# Patient Record
Sex: MALE | Race: WHITE | NOT HISPANIC OR LATINO | Employment: OTHER | URBAN - NONMETROPOLITAN AREA
[De-identification: names, ages, dates, MRNs, and addresses within clinical notes are randomized per-mention and may not be internally consistent; named-entity substitution may affect disease eponyms.]

---

## 2017-01-02 ENCOUNTER — OFFICE VISIT (OUTPATIENT)
Dept: CARDIAC REHAB | Facility: HOSPITAL | Age: 52
End: 2017-01-02
Payer: COMMERCIAL

## 2017-01-02 DIAGNOSIS — Z95.2 S/P TAVR (TRANSCATHETER AORTIC VALVE REPLACEMENT): ICD-10-CM

## 2017-01-02 PROCEDURE — 93798 PHYS/QHP OP CAR RHAB W/ECG: CPT

## 2017-01-04 ENCOUNTER — OFFICE VISIT (OUTPATIENT)
Dept: CARDIAC REHAB | Facility: HOSPITAL | Age: 52
End: 2017-01-04
Payer: COMMERCIAL

## 2017-01-04 DIAGNOSIS — Z95.2 S/P AVR: ICD-10-CM

## 2017-01-04 PROCEDURE — 93798 PHYS/QHP OP CAR RHAB W/ECG: CPT

## 2017-01-06 ENCOUNTER — OFFICE VISIT (OUTPATIENT)
Dept: CARDIAC REHAB | Facility: HOSPITAL | Age: 52
End: 2017-01-06
Payer: COMMERCIAL

## 2017-01-06 DIAGNOSIS — Z95.2 S/P AVR: ICD-10-CM

## 2017-01-06 PROCEDURE — 93798 PHYS/QHP OP CAR RHAB W/ECG: CPT

## 2017-01-09 ENCOUNTER — OFFICE VISIT (OUTPATIENT)
Dept: CARDIAC REHAB | Facility: HOSPITAL | Age: 52
End: 2017-01-09
Payer: COMMERCIAL

## 2017-01-09 DIAGNOSIS — Z95.2 S/P AVR (AORTIC VALVE REPLACEMENT): ICD-10-CM

## 2017-01-09 PROCEDURE — 93798 PHYS/QHP OP CAR RHAB W/ECG: CPT

## 2017-01-11 ENCOUNTER — GENERIC CONVERSION - ENCOUNTER (OUTPATIENT)
Dept: OTHER | Facility: OTHER | Age: 52
End: 2017-01-11

## 2017-01-11 ENCOUNTER — OFFICE VISIT (OUTPATIENT)
Dept: CARDIAC REHAB | Facility: HOSPITAL | Age: 52
End: 2017-01-11
Payer: COMMERCIAL

## 2017-01-11 DIAGNOSIS — Z95.2 S/P AVR (AORTIC VALVE REPLACEMENT): ICD-10-CM

## 2017-01-11 PROCEDURE — 93798 PHYS/QHP OP CAR RHAB W/ECG: CPT

## 2017-01-13 ENCOUNTER — APPOINTMENT (OUTPATIENT)
Dept: CARDIAC REHAB | Facility: HOSPITAL | Age: 52
End: 2017-01-13
Payer: COMMERCIAL

## 2017-01-14 DIAGNOSIS — I48.91 ATRIAL FIBRILLATION (HCC): ICD-10-CM

## 2017-01-16 ENCOUNTER — APPOINTMENT (OUTPATIENT)
Dept: CARDIAC REHAB | Facility: HOSPITAL | Age: 52
End: 2017-01-16
Payer: COMMERCIAL

## 2017-01-16 ENCOUNTER — GENERIC CONVERSION - ENCOUNTER (OUTPATIENT)
Dept: OTHER | Facility: OTHER | Age: 52
End: 2017-01-16

## 2017-01-16 ENCOUNTER — APPOINTMENT (OUTPATIENT)
Dept: LAB | Facility: HOSPITAL | Age: 52
End: 2017-01-16
Payer: COMMERCIAL

## 2017-01-16 ENCOUNTER — TRANSCRIBE ORDERS (OUTPATIENT)
Dept: ADMISSIONS | Facility: HOSPITAL | Age: 52
End: 2017-01-16

## 2017-01-16 DIAGNOSIS — I48.91 ATRIAL FIBRILLATION (HCC): ICD-10-CM

## 2017-01-16 LAB
INR PPP: 2.83 (ref 0.86–1.16)
PROTHROMBIN TIME: 28.4 SECONDS (ref 12–14.3)

## 2017-01-16 PROCEDURE — 36415 COLL VENOUS BLD VENIPUNCTURE: CPT

## 2017-01-16 PROCEDURE — 85610 PROTHROMBIN TIME: CPT

## 2017-01-18 ENCOUNTER — OFFICE VISIT (OUTPATIENT)
Dept: CARDIAC REHAB | Facility: HOSPITAL | Age: 52
End: 2017-01-18
Payer: COMMERCIAL

## 2017-01-18 DIAGNOSIS — Z95.2 S/P AVR (AORTIC VALVE REPLACEMENT): ICD-10-CM

## 2017-01-18 PROCEDURE — 93798 PHYS/QHP OP CAR RHAB W/ECG: CPT

## 2017-01-20 ENCOUNTER — OFFICE VISIT (OUTPATIENT)
Dept: CARDIAC REHAB | Facility: HOSPITAL | Age: 52
End: 2017-01-20
Payer: COMMERCIAL

## 2017-01-20 DIAGNOSIS — Z95.2 S/P AVR (AORTIC VALVE REPLACEMENT): ICD-10-CM

## 2017-01-20 PROCEDURE — 93798 PHYS/QHP OP CAR RHAB W/ECG: CPT

## 2017-01-23 ENCOUNTER — OFFICE VISIT (OUTPATIENT)
Dept: CARDIAC REHAB | Facility: HOSPITAL | Age: 52
End: 2017-01-23
Payer: COMMERCIAL

## 2017-01-23 DIAGNOSIS — Z95.2 S/P AVR: ICD-10-CM

## 2017-01-23 PROCEDURE — 93798 PHYS/QHP OP CAR RHAB W/ECG: CPT

## 2017-01-25 ENCOUNTER — OFFICE VISIT (OUTPATIENT)
Dept: CARDIAC REHAB | Facility: HOSPITAL | Age: 52
End: 2017-01-25
Payer: COMMERCIAL

## 2017-01-25 DIAGNOSIS — Z95.2 S/P AVR: ICD-10-CM

## 2017-01-25 PROCEDURE — 93798 PHYS/QHP OP CAR RHAB W/ECG: CPT

## 2017-01-27 ENCOUNTER — OFFICE VISIT (OUTPATIENT)
Dept: CARDIAC REHAB | Facility: HOSPITAL | Age: 52
End: 2017-01-27
Payer: COMMERCIAL

## 2017-01-27 DIAGNOSIS — Z95.2 S/P AVR: ICD-10-CM

## 2017-01-27 PROCEDURE — 93798 PHYS/QHP OP CAR RHAB W/ECG: CPT

## 2017-01-30 ENCOUNTER — APPOINTMENT (OUTPATIENT)
Dept: LAB | Facility: HOSPITAL | Age: 52
End: 2017-01-30
Payer: COMMERCIAL

## 2017-01-30 ENCOUNTER — GENERIC CONVERSION - ENCOUNTER (OUTPATIENT)
Dept: OTHER | Facility: OTHER | Age: 52
End: 2017-01-30

## 2017-01-30 ENCOUNTER — TRANSCRIBE ORDERS (OUTPATIENT)
Dept: ADMINISTRATIVE | Facility: HOSPITAL | Age: 52
End: 2017-01-30

## 2017-01-30 ENCOUNTER — OFFICE VISIT (OUTPATIENT)
Dept: CARDIAC REHAB | Facility: HOSPITAL | Age: 52
End: 2017-01-30
Payer: COMMERCIAL

## 2017-01-30 DIAGNOSIS — Z95.2 S/P AVR (AORTIC VALVE REPLACEMENT): ICD-10-CM

## 2017-01-30 DIAGNOSIS — I48.91 ATRIAL FIBRILLATION, UNSPECIFIED TYPE (HCC): Primary | ICD-10-CM

## 2017-01-30 DIAGNOSIS — I48.91 ATRIAL FIBRILLATION, UNSPECIFIED TYPE (HCC): ICD-10-CM

## 2017-01-30 LAB
INR PPP: 2.78 (ref 0.86–1.16)
PROTHROMBIN TIME: 28 SECONDS (ref 12–14.3)

## 2017-01-30 PROCEDURE — 36415 COLL VENOUS BLD VENIPUNCTURE: CPT

## 2017-01-30 PROCEDURE — 85610 PROTHROMBIN TIME: CPT

## 2017-01-30 PROCEDURE — 93798 PHYS/QHP OP CAR RHAB W/ECG: CPT

## 2017-02-01 ENCOUNTER — OFFICE VISIT (OUTPATIENT)
Dept: CARDIAC REHAB | Facility: HOSPITAL | Age: 52
End: 2017-02-01
Payer: COMMERCIAL

## 2017-02-01 DIAGNOSIS — Z95.2 S/P AVR: ICD-10-CM

## 2017-02-01 PROCEDURE — 93798 PHYS/QHP OP CAR RHAB W/ECG: CPT

## 2017-02-03 ENCOUNTER — OFFICE VISIT (OUTPATIENT)
Dept: CARDIAC REHAB | Facility: HOSPITAL | Age: 52
End: 2017-02-03
Payer: COMMERCIAL

## 2017-02-03 DIAGNOSIS — Z95.2 S/P AVR: ICD-10-CM

## 2017-02-03 PROCEDURE — 93798 PHYS/QHP OP CAR RHAB W/ECG: CPT

## 2017-02-06 ENCOUNTER — APPOINTMENT (OUTPATIENT)
Dept: CARDIAC REHAB | Facility: HOSPITAL | Age: 52
End: 2017-02-06
Payer: COMMERCIAL

## 2017-02-08 ENCOUNTER — APPOINTMENT (OUTPATIENT)
Dept: CARDIAC REHAB | Facility: HOSPITAL | Age: 52
End: 2017-02-08
Payer: COMMERCIAL

## 2017-02-10 ENCOUNTER — APPOINTMENT (OUTPATIENT)
Dept: CARDIAC REHAB | Facility: HOSPITAL | Age: 52
End: 2017-02-10
Payer: COMMERCIAL

## 2017-02-13 ENCOUNTER — APPOINTMENT (OUTPATIENT)
Dept: CARDIAC REHAB | Facility: HOSPITAL | Age: 52
End: 2017-02-13
Payer: COMMERCIAL

## 2017-02-14 ENCOUNTER — ALLSCRIPTS OFFICE VISIT (OUTPATIENT)
Dept: OTHER | Facility: OTHER | Age: 52
End: 2017-02-14

## 2017-02-14 DIAGNOSIS — E11.65 TYPE 2 DIABETES MELLITUS WITH HYPERGLYCEMIA (HCC): ICD-10-CM

## 2017-02-14 DIAGNOSIS — I10 ESSENTIAL (PRIMARY) HYPERTENSION: ICD-10-CM

## 2017-02-14 DIAGNOSIS — E78.5 HYPERLIPIDEMIA: ICD-10-CM

## 2017-02-14 DIAGNOSIS — I48.91 ATRIAL FIBRILLATION (HCC): ICD-10-CM

## 2017-02-14 DIAGNOSIS — J44.9 CHRONIC OBSTRUCTIVE PULMONARY DISEASE (HCC): ICD-10-CM

## 2017-02-15 ENCOUNTER — APPOINTMENT (OUTPATIENT)
Dept: CARDIAC REHAB | Facility: HOSPITAL | Age: 52
End: 2017-02-15
Payer: COMMERCIAL

## 2017-02-17 ENCOUNTER — APPOINTMENT (OUTPATIENT)
Dept: CARDIAC REHAB | Facility: HOSPITAL | Age: 52
End: 2017-02-17
Payer: COMMERCIAL

## 2017-02-20 ENCOUNTER — APPOINTMENT (OUTPATIENT)
Dept: CARDIAC REHAB | Facility: HOSPITAL | Age: 52
End: 2017-02-20
Payer: COMMERCIAL

## 2017-02-22 ENCOUNTER — APPOINTMENT (OUTPATIENT)
Dept: CARDIAC REHAB | Facility: HOSPITAL | Age: 52
End: 2017-02-22
Payer: COMMERCIAL

## 2017-02-24 ENCOUNTER — APPOINTMENT (OUTPATIENT)
Dept: LAB | Facility: HOSPITAL | Age: 52
End: 2017-02-24
Payer: MEDICARE

## 2017-02-24 ENCOUNTER — TRANSCRIBE ORDERS (OUTPATIENT)
Dept: ADMINISTRATIVE | Facility: HOSPITAL | Age: 52
End: 2017-02-24

## 2017-02-24 ENCOUNTER — APPOINTMENT (OUTPATIENT)
Dept: CARDIAC REHAB | Facility: HOSPITAL | Age: 52
End: 2017-02-24
Payer: COMMERCIAL

## 2017-02-24 ENCOUNTER — GENERIC CONVERSION - ENCOUNTER (OUTPATIENT)
Dept: OTHER | Facility: OTHER | Age: 52
End: 2017-02-24

## 2017-02-24 ENCOUNTER — ALLSCRIPTS OFFICE VISIT (OUTPATIENT)
Dept: OTHER | Facility: OTHER | Age: 52
End: 2017-02-24

## 2017-02-24 DIAGNOSIS — I48.91 ATRIAL FIBRILLATION (HCC): ICD-10-CM

## 2017-02-24 LAB
INR PPP: 2.41 (ref 0.86–1.16)
PROTHROMBIN TIME: 25.1 SECONDS (ref 12–14.3)

## 2017-02-24 PROCEDURE — 85610 PROTHROMBIN TIME: CPT

## 2017-02-24 PROCEDURE — 36415 COLL VENOUS BLD VENIPUNCTURE: CPT

## 2017-02-27 ENCOUNTER — APPOINTMENT (OUTPATIENT)
Dept: CARDIAC REHAB | Facility: HOSPITAL | Age: 52
End: 2017-02-27
Payer: COMMERCIAL

## 2017-03-17 ENCOUNTER — GENERIC CONVERSION - ENCOUNTER (OUTPATIENT)
Dept: OTHER | Facility: OTHER | Age: 52
End: 2017-03-17

## 2017-03-17 ENCOUNTER — TRANSCRIBE ORDERS (OUTPATIENT)
Dept: ADMINISTRATIVE | Facility: HOSPITAL | Age: 52
End: 2017-03-17

## 2017-03-17 ENCOUNTER — APPOINTMENT (OUTPATIENT)
Dept: LAB | Facility: HOSPITAL | Age: 52
End: 2017-03-17
Payer: MEDICARE

## 2017-03-17 DIAGNOSIS — I48.91 ATRIAL FIBRILLATION (HCC): ICD-10-CM

## 2017-03-17 LAB
INR PPP: 2.94 (ref 0.86–1.16)
PROTHROMBIN TIME: 29.2 SECONDS (ref 12–14.3)

## 2017-03-17 PROCEDURE — 85610 PROTHROMBIN TIME: CPT

## 2017-03-17 PROCEDURE — 36415 COLL VENOUS BLD VENIPUNCTURE: CPT

## 2017-04-14 DIAGNOSIS — I48.91 ATRIAL FIBRILLATION (HCC): ICD-10-CM

## 2017-04-18 ENCOUNTER — APPOINTMENT (OUTPATIENT)
Dept: LAB | Facility: HOSPITAL | Age: 52
End: 2017-04-18
Payer: MEDICARE

## 2017-04-18 ENCOUNTER — TRANSCRIBE ORDERS (OUTPATIENT)
Dept: ADMINISTRATIVE | Facility: HOSPITAL | Age: 52
End: 2017-04-18

## 2017-04-18 ENCOUNTER — GENERIC CONVERSION - ENCOUNTER (OUTPATIENT)
Dept: OTHER | Facility: OTHER | Age: 52
End: 2017-04-18

## 2017-04-18 DIAGNOSIS — I48.91 ATRIAL FIBRILLATION (HCC): ICD-10-CM

## 2017-04-18 LAB
INR PPP: 2.74 (ref 0.86–1.16)
PROTHROMBIN TIME: 27.7 SECONDS (ref 12–14.3)

## 2017-04-18 PROCEDURE — 36415 COLL VENOUS BLD VENIPUNCTURE: CPT

## 2017-04-18 PROCEDURE — 85610 PROTHROMBIN TIME: CPT

## 2017-04-19 ENCOUNTER — GENERIC CONVERSION - ENCOUNTER (OUTPATIENT)
Dept: OTHER | Facility: OTHER | Age: 52
End: 2017-04-19

## 2017-05-18 ENCOUNTER — APPOINTMENT (OUTPATIENT)
Dept: LAB | Facility: HOSPITAL | Age: 52
End: 2017-05-18
Payer: MEDICARE

## 2017-05-18 ENCOUNTER — GENERIC CONVERSION - ENCOUNTER (OUTPATIENT)
Dept: OTHER | Facility: OTHER | Age: 52
End: 2017-05-18

## 2017-05-18 ENCOUNTER — TRANSCRIBE ORDERS (OUTPATIENT)
Dept: ADMINISTRATIVE | Facility: HOSPITAL | Age: 52
End: 2017-05-18

## 2017-05-18 ENCOUNTER — HOSPITAL ENCOUNTER (OUTPATIENT)
Dept: SLEEP CENTER | Facility: HOSPITAL | Age: 52
Discharge: HOME | End: 2017-05-18
Payer: MEDICARE

## 2017-05-18 DIAGNOSIS — I48.91 ATRIAL FIBRILLATION (HCC): ICD-10-CM

## 2017-05-18 LAB
INR PPP: 2.39 (ref 0.86–1.16)
PROTHROMBIN TIME: 26.2 SECONDS (ref 12.1–14.4)

## 2017-05-18 PROCEDURE — 85610 PROTHROMBIN TIME: CPT

## 2017-05-18 PROCEDURE — 36415 COLL VENOUS BLD VENIPUNCTURE: CPT

## 2017-05-31 ENCOUNTER — ALLSCRIPTS OFFICE VISIT (OUTPATIENT)
Dept: OTHER | Facility: OTHER | Age: 52
End: 2017-05-31

## 2017-06-13 ENCOUNTER — ALLSCRIPTS OFFICE VISIT (OUTPATIENT)
Dept: OTHER | Facility: OTHER | Age: 52
End: 2017-06-13

## 2017-06-14 DIAGNOSIS — I48.91 ATRIAL FIBRILLATION (HCC): ICD-10-CM

## 2017-06-22 ENCOUNTER — APPOINTMENT (OUTPATIENT)
Dept: LAB | Facility: HOSPITAL | Age: 52
End: 2017-06-22
Payer: MEDICARE

## 2017-06-22 ENCOUNTER — TRANSCRIBE ORDERS (OUTPATIENT)
Dept: ADMINISTRATIVE | Facility: HOSPITAL | Age: 52
End: 2017-06-22

## 2017-06-22 ENCOUNTER — GENERIC CONVERSION - ENCOUNTER (OUTPATIENT)
Dept: OTHER | Facility: OTHER | Age: 52
End: 2017-06-22

## 2017-06-22 DIAGNOSIS — I48.91 ATRIAL FIBRILLATION, UNSPECIFIED TYPE (HCC): Primary | ICD-10-CM

## 2017-06-22 DIAGNOSIS — I10 ESSENTIAL HYPERTENSION, MALIGNANT: ICD-10-CM

## 2017-06-22 DIAGNOSIS — J44.9 OBSTRUCTIVE CHRONIC BRONCHITIS WITHOUT EXACERBATION (HCC): ICD-10-CM

## 2017-06-22 DIAGNOSIS — I48.91 ATRIAL FIBRILLATION, UNSPECIFIED TYPE (HCC): ICD-10-CM

## 2017-06-22 DIAGNOSIS — E78.5 HYPERLIPIDEMIA, UNSPECIFIED HYPERLIPIDEMIA TYPE: ICD-10-CM

## 2017-06-22 LAB
CHOLEST SERPL-MCNC: 142 MG/DL (ref 50–200)
EST. AVERAGE GLUCOSE BLD GHB EST-MCNC: 186 MG/DL
HBA1C MFR BLD: 8.1 % (ref 4.2–6.3)
HDLC SERPL-MCNC: 28 MG/DL (ref 40–60)
INR PPP: 2.35 (ref 0.86–1.16)
LDLC SERPL CALC-MCNC: 55 MG/DL (ref 0–100)
PROTHROMBIN TIME: 25.8 SECONDS (ref 12.1–14.4)
TRIGL SERPL-MCNC: 296 MG/DL

## 2017-06-22 PROCEDURE — 80061 LIPID PANEL: CPT

## 2017-06-22 PROCEDURE — 83036 HEMOGLOBIN GLYCOSYLATED A1C: CPT

## 2017-06-22 PROCEDURE — 85610 PROTHROMBIN TIME: CPT

## 2017-06-22 PROCEDURE — 36415 COLL VENOUS BLD VENIPUNCTURE: CPT

## 2017-06-23 ENCOUNTER — GENERIC CONVERSION - ENCOUNTER (OUTPATIENT)
Dept: OTHER | Facility: OTHER | Age: 52
End: 2017-06-23

## 2017-07-31 ENCOUNTER — APPOINTMENT (OUTPATIENT)
Dept: LAB | Facility: HOSPITAL | Age: 52
End: 2017-07-31
Payer: MEDICARE

## 2017-07-31 ENCOUNTER — TRANSCRIBE ORDERS (OUTPATIENT)
Dept: ADMINISTRATIVE | Facility: HOSPITAL | Age: 52
End: 2017-07-31

## 2017-07-31 ENCOUNTER — GENERIC CONVERSION - ENCOUNTER (OUTPATIENT)
Dept: OTHER | Facility: OTHER | Age: 52
End: 2017-07-31

## 2017-07-31 DIAGNOSIS — I48.91 ATRIAL FIBRILLATION, UNSPECIFIED TYPE (HCC): Primary | ICD-10-CM

## 2017-07-31 DIAGNOSIS — I48.91 ATRIAL FIBRILLATION, UNSPECIFIED TYPE (HCC): ICD-10-CM

## 2017-07-31 LAB
INR PPP: 2.72 (ref 0.86–1.16)
PROTHROMBIN TIME: 29 SECONDS (ref 12.1–14.4)

## 2017-07-31 PROCEDURE — 36415 COLL VENOUS BLD VENIPUNCTURE: CPT

## 2017-07-31 PROCEDURE — 85610 PROTHROMBIN TIME: CPT

## 2017-08-14 DIAGNOSIS — I48.91 ATRIAL FIBRILLATION (HCC): ICD-10-CM

## 2017-08-21 ENCOUNTER — TRANSCRIBE ORDERS (OUTPATIENT)
Dept: ADMINISTRATIVE | Facility: HOSPITAL | Age: 52
End: 2017-08-21

## 2017-08-21 ENCOUNTER — GENERIC CONVERSION - ENCOUNTER (OUTPATIENT)
Dept: OTHER | Facility: OTHER | Age: 52
End: 2017-08-21

## 2017-08-21 ENCOUNTER — APPOINTMENT (OUTPATIENT)
Dept: LAB | Facility: HOSPITAL | Age: 52
End: 2017-08-21
Payer: MEDICARE

## 2017-08-21 DIAGNOSIS — I48.91 ATRIAL FIBRILLATION, UNSPECIFIED TYPE (HCC): ICD-10-CM

## 2017-08-21 DIAGNOSIS — I48.91 ATRIAL FIBRILLATION, UNSPECIFIED TYPE (HCC): Primary | ICD-10-CM

## 2017-08-21 LAB
INR PPP: 2.57 (ref 0.86–1.16)
PROTHROMBIN TIME: 27.7 SECONDS (ref 12.1–14.4)

## 2017-08-21 PROCEDURE — 36415 COLL VENOUS BLD VENIPUNCTURE: CPT

## 2017-08-21 PROCEDURE — 85610 PROTHROMBIN TIME: CPT

## 2017-09-18 ENCOUNTER — APPOINTMENT (OUTPATIENT)
Dept: LAB | Facility: HOSPITAL | Age: 52
End: 2017-09-18
Payer: MEDICARE

## 2017-09-18 ENCOUNTER — TRANSCRIBE ORDERS (OUTPATIENT)
Dept: ADMINISTRATIVE | Facility: HOSPITAL | Age: 52
End: 2017-09-18

## 2017-09-18 DIAGNOSIS — Z01.818 PREOP EXAMINATION: Primary | ICD-10-CM

## 2017-09-18 DIAGNOSIS — Z01.818 PREOP EXAMINATION: ICD-10-CM

## 2017-09-18 LAB
INR PPP: 2.29 (ref 0.86–1.16)
PROTHROMBIN TIME: 25.3 SECONDS (ref 12.1–14.4)

## 2017-09-18 PROCEDURE — 36415 COLL VENOUS BLD VENIPUNCTURE: CPT

## 2017-09-18 PROCEDURE — 85610 PROTHROMBIN TIME: CPT

## 2017-09-21 DIAGNOSIS — I48.91 ATRIAL FIBRILLATION (HCC): ICD-10-CM

## 2017-10-16 ENCOUNTER — TRANSCRIBE ORDERS (OUTPATIENT)
Dept: SLEEP CENTER | Facility: HOSPITAL | Age: 52
End: 2017-10-16

## 2017-10-16 DIAGNOSIS — G47.33 OBSTRUCTIVE SLEEP APNEA SYNDROME: Primary | ICD-10-CM

## 2017-10-19 ENCOUNTER — TRANSCRIBE ORDERS (OUTPATIENT)
Dept: SLEEP CENTER | Facility: HOSPITAL | Age: 52
End: 2017-10-19

## 2017-10-19 ENCOUNTER — APPOINTMENT (OUTPATIENT)
Dept: LAB | Facility: HOSPITAL | Age: 52
End: 2017-10-19
Payer: MEDICARE

## 2017-10-19 ENCOUNTER — GENERIC CONVERSION - ENCOUNTER (OUTPATIENT)
Dept: OTHER | Facility: OTHER | Age: 52
End: 2017-10-19

## 2017-10-19 ENCOUNTER — HOSPITAL ENCOUNTER (OUTPATIENT)
Dept: SLEEP CENTER | Facility: HOSPITAL | Age: 52
Discharge: HOME | End: 2017-10-19
Payer: MEDICARE

## 2017-10-19 ENCOUNTER — TRANSCRIBE ORDERS (OUTPATIENT)
Dept: ADMINISTRATIVE | Facility: HOSPITAL | Age: 52
End: 2017-10-19

## 2017-10-19 DIAGNOSIS — G47.33 OBSTRUCTIVE SLEEP APNEA: Primary | ICD-10-CM

## 2017-10-19 DIAGNOSIS — I48.91 ATRIAL FIBRILLATION (HCC): ICD-10-CM

## 2017-10-19 DIAGNOSIS — G47.33 OBSTRUCTIVE SLEEP APNEA SYNDROME: ICD-10-CM

## 2017-10-19 LAB
INR PPP: 2.56 (ref 0.86–1.16)
PROTHROMBIN TIME: 27.6 SECONDS (ref 12.1–14.4)

## 2017-10-19 PROCEDURE — 85610 PROTHROMBIN TIME: CPT

## 2017-10-19 PROCEDURE — 36415 COLL VENOUS BLD VENIPUNCTURE: CPT

## 2017-10-19 NOTE — PROGRESS NOTES
Follow-up Note - [address]  45 y o  male  :1965  CWT:[id number]    Physician Requesting Consult: Asmita Croft DO    I saw Rashel Preciado in sleep clinic today for his sleep disordered breathing, coexisting sleep complaints & medical conditions  He is here today because he has machine broke and he got alone a about a week ago  He has DME provider states he is new to Medicare and needs repeat studies to justify ongoing use of CPAP and providing him with a new machine  A diagnostic study in  demonstrated an AHI of 29 per hour, considerably higher during stage REM  Minimum oxygen saturation was 83% and almost 12% of that study was spent with saturations less than 90%  There were also severe periodic limb movements of sleep  During his last titration study in 2016, he is sleep disordered breathing and periodic limb movements of sleep were remediated with bilevel pressure at 21/14 cm H2O  Past, Family, Social History & ROS were reviewed  Interval changes in PMH and medications were noted  He reports approximately 50 lb intentional weight loss in the past 5 months  Herewith findings in summary  Full Details are available on request     He is using CPAP regularly for and is benefitting from use  He has no difficulty tolerating the musk with the pressure  He is getting 6-8 hours sleep  He reports restless leg symptoms 1 to 2 times a week that may delay sleep and has interruptions around 3 times a night because of nocturia  However he awakens with the aid of an alarm feeling refreshed  He rated himself at 6/24 on the [place] Sleepiness Scale  On Exam:  Apart from weight at 377 lb, Physical findings are essentially unchanged from previous  Impression:  1  Obstructive sleep apnea that may have improved in association with weight loss  2  Restless leg syndrome and he may also have periodic limb movements of sleep  3   Hypersomnolence -improved  4  Obesity  5  Multiple comorbidities include hypertension, coronary artery disease, atrial fibrillation, asthma, COPD and diabetes  Plan:  1  I reviewed results of the Sleep studies with the patient  2  With respect to above conditions, I counseled on pathophysiology, diagnosis, treatment options, risks and benefits; inter-relationship and effects on symptoms and comorbidities; risks of no treatment; costs and insurance aspects  3  Patient elected to continue positive airway pressure therapy and ongoing use is medically necessary  The patient's current unit needs to be replaced  4   Based on his insurance mandate, a split study will be planned I and treatment and we initiated for AHI of greater than 5 per hour  He has pressure requirement may be lower given he has recent weight loss  5  Follow-up to be scheduled after the study to review results and to adjust therapy  Thank you for allowing me to participate in the care of this patient  I will keep you apprised of developments      Sincerely,    Khadra Saez MD  Board Certified Specialist

## 2017-10-23 ENCOUNTER — ALLSCRIPTS OFFICE VISIT (OUTPATIENT)
Dept: OTHER | Facility: OTHER | Age: 52
End: 2017-10-23

## 2017-10-23 DIAGNOSIS — E11.65 TYPE 2 DIABETES MELLITUS WITH HYPERGLYCEMIA (HCC): ICD-10-CM

## 2017-11-13 ENCOUNTER — TRANSCRIBE ORDERS (OUTPATIENT)
Dept: ADMINISTRATIVE | Facility: HOSPITAL | Age: 52
End: 2017-11-13

## 2017-11-13 ENCOUNTER — APPOINTMENT (OUTPATIENT)
Dept: LAB | Facility: HOSPITAL | Age: 52
End: 2017-11-13
Payer: MEDICARE

## 2017-11-13 ENCOUNTER — GENERIC CONVERSION - ENCOUNTER (OUTPATIENT)
Dept: OTHER | Facility: OTHER | Age: 52
End: 2017-11-13

## 2017-11-13 DIAGNOSIS — I48.91 ATRIAL FIBRILLATION, UNSPECIFIED TYPE (HCC): Primary | ICD-10-CM

## 2017-11-13 DIAGNOSIS — I48.91 ATRIAL FIBRILLATION, UNSPECIFIED TYPE (HCC): ICD-10-CM

## 2017-11-13 DIAGNOSIS — E11.65 TYPE 2 DIABETES MELLITUS WITH HYPERGLYCEMIA (HCC): ICD-10-CM

## 2017-11-13 LAB
CREAT UR-MCNC: 53.3 MG/DL
EST. AVERAGE GLUCOSE BLD GHB EST-MCNC: 303 MG/DL
HBA1C MFR BLD: 12.2 % (ref 4.2–6.3)
INR PPP: 2.46 (ref 0.86–1.16)
MICROALBUMIN UR-MCNC: 13.1 MG/L (ref 0–20)
MICROALBUMIN/CREAT 24H UR: 25 MG/G CREATININE (ref 0–30)
PROTHROMBIN TIME: 26.8 SECONDS (ref 12.1–14.4)

## 2017-11-13 PROCEDURE — 83036 HEMOGLOBIN GLYCOSYLATED A1C: CPT

## 2017-11-13 PROCEDURE — 82043 UR ALBUMIN QUANTITATIVE: CPT

## 2017-11-13 PROCEDURE — 85610 PROTHROMBIN TIME: CPT

## 2017-11-13 PROCEDURE — 36415 COLL VENOUS BLD VENIPUNCTURE: CPT

## 2017-11-13 PROCEDURE — 82570 ASSAY OF URINE CREATININE: CPT

## 2017-11-14 ENCOUNTER — GENERIC CONVERSION - ENCOUNTER (OUTPATIENT)
Dept: OTHER | Facility: OTHER | Age: 52
End: 2017-11-14

## 2017-11-26 ENCOUNTER — HOSPITAL ENCOUNTER (OUTPATIENT)
Dept: SLEEP CENTER | Facility: HOSPITAL | Age: 52
Discharge: HOME | End: 2017-11-26
Attending: INTERNAL MEDICINE
Payer: MEDICARE

## 2017-11-26 DIAGNOSIS — G47.33 OBSTRUCTIVE SLEEP APNEA: ICD-10-CM

## 2017-11-26 PROCEDURE — 95811 POLYSOM 6/>YRS CPAP 4/> PARM: CPT

## 2017-11-28 ENCOUNTER — TRANSCRIBE ORDERS (OUTPATIENT)
Dept: SLEEP CENTER | Facility: HOSPITAL | Age: 52
End: 2017-11-28

## 2017-11-28 DIAGNOSIS — I48.91 ATRIAL FIBRILLATION, UNSPECIFIED TYPE (HCC): ICD-10-CM

## 2017-11-28 DIAGNOSIS — G47.33 OBSTRUCTIVE SLEEP APNEA: Primary | ICD-10-CM

## 2017-12-12 ENCOUNTER — APPOINTMENT (OUTPATIENT)
Dept: LAB | Facility: HOSPITAL | Age: 52
End: 2017-12-12
Payer: MEDICARE

## 2017-12-12 ENCOUNTER — ALLSCRIPTS OFFICE VISIT (OUTPATIENT)
Dept: OTHER | Facility: OTHER | Age: 52
End: 2017-12-12

## 2017-12-12 ENCOUNTER — GENERIC CONVERSION - ENCOUNTER (OUTPATIENT)
Dept: OTHER | Facility: OTHER | Age: 52
End: 2017-12-12

## 2017-12-12 DIAGNOSIS — I48.91 ATRIAL FIBRILLATION, UNSPECIFIED TYPE (HCC): ICD-10-CM

## 2017-12-12 LAB
INR PPP: 2.6 (ref 0.86–1.16)
PROTHROMBIN TIME: 28 SECONDS (ref 12.1–14.4)

## 2017-12-12 PROCEDURE — 36415 COLL VENOUS BLD VENIPUNCTURE: CPT

## 2017-12-12 PROCEDURE — 85610 PROTHROMBIN TIME: CPT

## 2017-12-13 NOTE — PROGRESS NOTES
Assessment    1  Chronic obstructive pulmonary disease (496) (J44 9)   2  Sleep apnea (780 57) (G47 30)    Plan  Chronic obstructive pulmonary disease, Sleep apnea    · Follow-up visit in 6 months Evaluation and Treatment  Follow-up  Status: Hold For -Scheduling  Requested for: 43Uzl6666   Ordered;Chronic obstructive pulmonary disease, Sleep apnea; Ordered By: Celena Monae Performed:  Due: 71UGB1593    Discussion/Summary  Discussion Summary:   I have reviewed all this with the patient  He is currently on Breo 100 taking 1 puff once a day and Spiriva once a day  He also has a Ventolin inhaler to use as needed and nebulizer with albuterol solution  He does depend upon samples for his Breo inhaler  He did get the flu shot this year  did recommend Flonase for postnasal drip  also strongly suggested weight reduction and exercise  We will see him back in 6 months  Goals and Barriers: The patient has the current Goals: Breathe better  The patent has the current Barriers: Obesity and lack of exercise  Patient's Capacity to Self-Care: Patient is able to Self-Care  Patient Education: Educational resources provided: I did give the patient advice and recommendations regarding his chronic obstructive pulmonary disease and exercise  Medication SE Review and Pt Understands Tx: The treatment plan was reviewed with the patient/guardian  The patient/guardian understands and agrees with the treatment plan      Active Problems    1  Aortic stenosis (424 1) (I35 0)   2  Atrial fibrillation (427 31) (I48 91)   3  Chronic obstructive pulmonary disease (496) (J44 9)   4  Depression screening (V79 0) (Z13 89)   5  Diabetes mellitus type 2, uncontrolled (250 02) (E11 65)   6  Encounter for screening for malignant neoplasm of colon (V76 51) (Z12 11)   7  History of pneumococcal vaccination (V49 89) (Z92 29)   8  Hyperlipidemia (272 4) (E78 5)   9  Hypertension (401 9) (I10)   10   Living will, counseling/discussion (V65 49) (Z71 89) 11  Need for immunization against influenza (V04 81) (Z23)   12  Screening for cervical cancer (V76 2) (Z12 4)   13  Screening for colorectal cancer (V76 51) (Z12 11,Z12 12)   14  Screening for diabetic peripheral neuropathy (V80 09) (Z13 89)   15  Sleep apnea (780 57) (G47 30)    History of Present Illness  HPI: I saw this patient in the office today regarding his history of obstructive sleep apnea and chronic obstructive pulmonary disease  He is a 49-year-old gentleman who also has a history of an aortic valve replacement, atrial fibrillation and diabetes  He was last seen in this office about 6 months ago  He tells me he can walk about a block on the level before becoming short of breath  He does cough and brings up some mucus on a daily basis  He does have postnasal drip  He has not coughed up any blood  He does have some occasional wheezing in his chest   was a smoker but quit a year ago after he had his heart surgery  follows with Dr Quintin Jenkins for his THIEN  Review of Systems  Complete-Male Pulm:  Constitutional: feeling poorly-- and-- feeling tired, but-- No fever or chills, feels well, no tiredness, no recent weight gain or weight loss  Eyes: no complaints of vision problems  ENT: no rhinitis, no PND, no epistaxis  Cardiovascular: no palpitations, no chest pain-- and-- as noted in HPI  Respiratory: as noted in HPI  Past Medical History  1  History of Asthma (493 90) (J45 909)   2  History of Closed Fracture Of The Fibula (823 81)   3  History of Encounter for 's license history and physical (V70 3) (Z02 4)   4  History of atrial fibrillation (V12 59) (Z86 79)   5  History of chronic obstructive lung disease (V12 69) (Z87 09)   6  History of hypertension (V12 59) (Z86 79)   7  History of pneumococcal vaccination (V49 89) (Z92 29)   8  History of sleep apnea (V13 89) (Z86 69)   9  History of Increased BMI (783 9) (R63 8)   10   History of Need for hepatitis C screening test (V73 89) (Z11 59) 11  Need for immunization against influenza (V04 81) (Z23)   12  History of Restless legs syndrome (333 94) (G25 81)   13  History of Screening for depression (V79 0) (Z13 89)   14  History of Screening for diabetic retinopathy (V80 2) (Z13 5)    Surgical History  1  History of Cath Stent Placement  Surgical History Reviewed: The surgical history was reviewed and updated today  Family History  Mother    1  Family history of Atrial Fibrillation  Father    2  Family history of Acute Myocardial Infarction (V17 3)   3  Family history of Congestive Heart Failure   4  Family history of Stroke Syndrome (V17 1)    Social History     · Always uses seat belt   · Daily caffeine consumption   · Former smoker (D50 85) (U98 441)   · Smokes 5 cigarettes a day for 35 years   ·    · No alcohol use   · No living will  Social History Reviewed: The social history was reviewed and updated today  Current Meds   1  Albuterol Sulfate (2 5 MG/3ML) 0 083% Inhalation Nebulization Solution; use one unit dose in home nebulizer two to three times per day; Therapy: 91KYR1781 to (Last Rx:94Yfz0921)  Requested for: 04Rvn6192 Ordered   2  Aspirin 81 MG Oral Tablet Delayed Release Recorded   3  Atorvastatin Calcium 10 MG Oral Tablet; TAKE 1 TABLET BY MOUTH ONCE DAILY; Therapy: 19POZ7263 to (Last JX:03XFY3854)  Requested for: 87RHP5295 Ordered   4  BD Pen Needle Fany U/F 32G X 4 MM Miscellaneous; use with insulin; Therapy: 96HTS9116 to (Last Rx:87Bkh9747)  Requested for: 91WVV4460 Ordered   5  Breo Ellipta 100-25 MCG/INH Inhalation Aerosol Powder Breath Activated Recorded   6  DilTIAZem HCl ER Coated Beads 240 MG Oral Capsule Extended Release 24 Hour; Therapy: 32KNC5649 to (Last Rx:15Mar2011)  Requested for: 49CQV0702 Ordered   7  Furosemide 20 MG Oral Tablet; TAKE 1 TABLET DAILY AS DIRECTED; Therapy: 39Tcv7339 to (Vanessa Sy)  Requested for: 28LQG2795; Last Rx:13Jan2016 Ordered   8   MetFORMIN HCl - 1000 MG Oral Tablet; take 1 tablet by mouth twice a day; Therapy: 92QMI9026 to (Lise Villa)  Requested for: 57CZC1476; Last Rx:95Ltu6626 Ordered   9  Metoprolol Tartrate 50 MG Oral Tablet; take 1 tablet by mouth twice a day; Last Rx:87Qyn3963 Ordered   10  Plavix 75 MG Oral Tablet; Take daily Recorded   11  Potassium Chloride ER 10 MEQ Oral Tablet Extended Release; take 1 tablet by mouth  once daily; Therapy: 64Yka0907 to (Mariana Noguera)  Requested for: 99HDN7379; Last  Rx:13Jan2016 Ordered   12  Spiriva Respimat 2 5 MCG/ACT Inhalation Aerosol Solution; TAKE 2 INHALATIONS DAILY; Therapy: 37IBE7938 to (Last Rx:09Hgs0257)  Requested for: 44PLW1650 Ordered   13  Cristian Bowl FlexTouch 100 UNIT/ML Subcutaneous Solution Pen-injector; INJECT 10 UNIT  Daily; Therapy: 53GIC0988 to (Last Rx:16Nov2017)  Requested for: 21XUF9285 Ordered   14  Ventolin  (90 Base) MCG/ACT Inhalation Aerosol Solution; INHALE 2 PUFFS  EVERY 6 HOURS AS NEEDED FOR WHEEZING; Therapy: 69OYP3691 to (Evaluate:14Jan2018)  Requested for: 69SFW3602; Last  Rx:49Nry2100 Ordered   15  Warfarin Sodium 10 MG Oral Tablet; TAKE ONE TABLET BY MOUTH ONCE DAILY AS  DIRECTED; Therapy: 82CEY9168 to (Evaluate:53Uvc5125)  Requested for: 14Ygy3604; Last  Rx:06Tjt6369 Ordered   16  Warfarin Sodium 10 MG Oral Tablet; TAKE ONE TABLET BY MOUTH ONCE DAILY AS  DIRECTED; Therapy: 67Qta5136 to (Last Rx:32Paz3363)  Requested for: 95Buv1803 Ordered   17  Warfarin Sodium 2 MG Oral Tablet; 10 mg friday and 12 mg  daily rest of days; Therapy: 03OEB1854 to (Evaluate:21Nov2017)  Requested for: 06Kak8152; Last  Rx:14Qhr0206 Ordered    Allergies  1   No Known Drug Allergies    Vitals  Vital Signs    Recorded: 22MMZ4197 11:04AM   Heart Rate 93   Systolic 569, RUE, Sitting   Diastolic 76, RUE, Sitting   Height 6 ft 2 in   Weight 378 lb    BMI Calculated 48 53   BSA Calculated 2 85   O2 Saturation 97, RA       Physical Exam   Constitutional  General appearance: Abnormal   chronically ill,-- morbidly obese-- and-- appears older than stated age  Ears, Nose, Mouth, and Throat  Nasal mucosa, septum, and turbinates: Normal without edema or erythema  Lips, teeth, and gums: Normal, good dentition  Oropharynx: Abnormal  -- (some irritation and inflammation)  Neck  Neck: Supple, symmetric, trachea midline, no masses  Jugular veins: Normal    Pulmonary  Auscultation of lungs: Abnormal   Auscultation of the lungs revealed decreased breath sounds diffusely-- and-- expiratory wheezing  Cardiovascular  Auscultation of heart: Abnormal   The rhythm was irregularly irregular  -- prosthetic heart sounds  Examination of extremities for edema and/or varicosities: Normal    Abdomen  Abdomen: Soft, non-tender  Lymphatic  Palpation of lymph nodes in neck: No lymphadenopathy  Musculoskeletal  Gait and station: Normal    Digits and nails: Normal without clubbing or cyanosis  Neurologic  Mental Status: Normal  Not confused, no evidence of dementia, good comprehension, good concentration  Skin  Skin and subcutaneous tissue: Limited exam shows no rash     Psychiatric  Orientation to person, place and time: Normal    Mood and affect: Normal        Signatures   Electronically signed by : YANIRA Lilly ; Dec 12 2017  9:36AM EST                       (Author)

## 2017-12-21 ENCOUNTER — TRANSCRIBE ORDERS (OUTPATIENT)
Dept: SLEEP CENTER | Facility: HOSPITAL | Age: 52
End: 2017-12-21

## 2017-12-21 ENCOUNTER — HOSPITAL ENCOUNTER (OUTPATIENT)
Dept: SLEEP CENTER | Facility: HOSPITAL | Age: 52
Discharge: HOME | End: 2017-12-22
Payer: MEDICARE

## 2017-12-21 DIAGNOSIS — G47.33 OBSTRUCTIVE SLEEP APNEA: Primary | ICD-10-CM

## 2017-12-21 DIAGNOSIS — G47.33 OBSTRUCTIVE SLEEP APNEA: ICD-10-CM

## 2018-01-09 NOTE — RESULT NOTES
Verified Results  (1) PT WITH INR 87DWX3468 09:55AM Carley Martino     Test Name Result Flag Reference   INR 2 34 H 0 86-1 16   PT 24 6 seconds H 12 0-14 3

## 2018-01-09 NOTE — RESULT NOTES
Verified Results  (1) PT WITH INR 44Wxz6073 09:55AM Vinicius Hollis Order Number: [id number]     Test Name Result Flag Reference   INR 2 74 H 0 86-1 16   PT 27 7 seconds H 12 0-14 3

## 2018-01-09 NOTE — RESULT NOTES
Verified Results  (1) PT WITH INR 03CAH4335 10:30AM González Ponce Order Number: [id number]     Test Name Result Flag Reference   INR 2 66 H 0 86-1 16   PT 27 1 seconds H 12 0-14 3

## 2018-01-09 NOTE — RESULT NOTES
Verified Results  (1) PT WITH INR 99Qtp1076 08:14AM [facility]: [id number]   Order Number: [id number]     Test Name Result Flag Reference   INR 2 54 H 0 86-1 16   PT 26 1 seconds H 12 0-14 3

## 2018-01-09 NOTE — RESULT NOTES
Verified Results  (1) PT WITH INR 30Jan2017 08:50AM Bridger Irvin     Test Name Result Flag Reference   INR 2 78 H 0 86-1 16   PT 28 0 seconds H 12 0-14 3

## 2018-01-11 NOTE — RESULT NOTES
Verified Results  (1) PT WITH INR 31HJA2462 09:02AM Asmita Croft     Test Name Result Flag Reference   INR 2 72 H 0 86-1 16   PT 29 0 seconds H 12 1-14 4

## 2018-01-11 NOTE — RESULT NOTES
Verified Results  (1) PT WITH INR 62Kgl2400 08:58AM Asmita Croft     Test Name Result Flag Reference   INR 2 70 H 0 86-1 16   PT 27 4 seconds H 12 0-14 3

## 2018-01-11 NOTE — RESULT NOTES
Verified Results  (1) PT WITH INR 87Ber9182 07:32AM Nathalie Duran Order Number: [id number]     Test Name Result Flag Reference   INR 1 78 H 0 86-1 16   PT 20 6 seconds H 12 0-14 3

## 2018-01-12 NOTE — MISCELLANEOUS
Bisi Ramos has been going to Pulmonary rehab, [facility] advised that he has been in rapid A fib while at rest  I notified Dr Luis M Conley office so they could either order a stress test or change his medications  Their office is going to start him on bet blockers, and then he can start back to pulmonary rehab  Active Problems    1  Asthma (493 90) (J45 909)   2  Atrial fibrillation (427 31) (I48 91)   3  Chronic obstructive pulmonary disease (496) (J44 9)   4  Closed Fracture Of The Fibula (823 81)   5  Encounter for 's license history and physical (V70 3) (Z02 4)   6  Encounter for screening for malignant neoplasm of colon (V76 51) (Z12 11)   7  Hyperglycemia (790 29) (R73 9)   8  Hyperlipidemia (272 4) (E78 5)   9  Hypertension (401 9) (I10)   10  Need for pneumococcal vaccination (V03 82) (Z23)   11  Need for prophylactic vaccination and inoculation against influenza (V04 81) (Z23)   12  Sleep apnea (780 57) (G47 30)    Current Meds   1  Albuterol Sulfate (2 5 MG/3ML) 0 083% Inhalation Nebulization Solution; use one unit   dose in home nebulizer two to three times per day; Therapy: 02PWO5587 to (Last Rx:45Zrk5901)  Requested for: 72Faa4402 Ordered   2  Breo Ellipta 100-25 MCG/INH Inhalation Aerosol Powder Breath Activated; USE 1   INHALATION ONCE DAILY; Therapy: 75Lud3876 to (Evaluate:24Mmt2872)  Requested for: 06Yhx7791; Last   Rx:29Muo5561 Ordered   3  Diltiazem HCl ER Coated Beads 240 MG Oral Capsule Extended Release 24 Hour; Therapy: 31EDT7224 to (Last Rx:15Mar2011)  Requested for: 75YOA0485 Ordered   4  Furosemide 20 MG Oral Tablet; TAKE 1 TABLET DAILY AS DIRECTED; Therapy: 02Xtu1484 to (August Mis)  Requested for: 42STN2035; Last   Rx:13Jan2016 Ordered   5  Lisinopril 20 MG Oral Tablet; Therapy: 50EBT8423 to (Last Rx:08Mar2012)  Requested for: 15FZE4508 Ordered   6  MetFORMIN HCl - 500 MG Oral Tablet; TAKE ONE TABLET BY MOUTH IN THE   EVENING;    Therapy: 80RCX8809 to (Evaluate:14Mar2016)  Requested for: 47CYA4511; Last   Rx:54Zdp8954 Ordered   7  Potassium Chloride ER 10 MEQ Oral Tablet Extended Release; take 1 tablet by mouth   once daily; Therapy: 53Rkl8435 to (Deon Victoria)  Requested for: 19RMB9864; Last   Rx:13Jan2016 Ordered   8  Stiolto Respimat 2 5-2 5 MCG/ACT Inhalation Aerosol Solution; Two inhalations once   daily; Therapy: 13RSA2401 to (Evaluate:21Jan2016)  Requested for: 04Wdv9208; Last   Rx:60Rrg5886 Ordered   9  Ventolin  (90 Base) MCG/ACT Inhalation Aerosol Solution; INHALE 2 PUFFS   EVERY 6 HOURS AS NEEDED FOR WHEEZING; Therapy: 27JQF2290 to (Ryan Rasmussen)  Requested for: 30SGW8946; Last   Rx:89Wsu2641 Ordered   10  Warfarin Sodium 10 MG Oral Tablet; TAKE ONE TABLET BY MOUTH ONCE DAILY AS    DIRECTED; Therapy: 69BFC9882 to (Jessi Reinoso)  Requested for: 67MXU4137; Last    Rx:67Igj1634 Ordered   11  Warfarin Sodium 2 MG Oral Tablet; take as directed; Therapy: 55FLY6959 to (Evaluate:05Oct2015)  Requested for: 08Apr2015; Last    Rx:08Apr2015 Ordered    Allergies    1   No Known Drug Allergies    Signatures   Electronically signed by : Jared Brennan, ; Mar  2 2016  2:41PM EST                       (Author)

## 2018-01-12 NOTE — RESULT NOTES
Verified Results  (1) PT WITH INR 00HRM7073 10:07AM Rosie Risk     Test Name Result Flag Reference   INR 2 46 H 0 86-1 16   PT 26 8 seconds H 12 1-14 4

## 2018-01-13 VITALS
BODY MASS INDEX: 40.43 KG/M2 | OXYGEN SATURATION: 96 % | WEIGHT: 315 LBS | RESPIRATION RATE: 18 BRPM | HEART RATE: 99 BPM | DIASTOLIC BLOOD PRESSURE: 84 MMHG | HEIGHT: 74 IN | SYSTOLIC BLOOD PRESSURE: 128 MMHG

## 2018-01-13 VITALS
RESPIRATION RATE: 18 BRPM | HEIGHT: 74 IN | HEART RATE: 112 BPM | WEIGHT: 315 LBS | BODY MASS INDEX: 40.43 KG/M2 | SYSTOLIC BLOOD PRESSURE: 130 MMHG | OXYGEN SATURATION: 96 % | DIASTOLIC BLOOD PRESSURE: 82 MMHG

## 2018-01-13 VITALS
HEIGHT: 74 IN | SYSTOLIC BLOOD PRESSURE: 134 MMHG | DIASTOLIC BLOOD PRESSURE: 76 MMHG | WEIGHT: 315 LBS | BODY MASS INDEX: 40.43 KG/M2

## 2018-01-13 NOTE — RESULT NOTES
Verified Results  (1) PT WITH INR 41KMI6867 10:58AM Jamie Six     Test Name Result Flag Reference   INR 2 41 H 0 86-1 16   PT 24 9 seconds H 11 8-14 1

## 2018-01-13 NOTE — RESULT NOTES
Verified Results  (1) PT WITH INR 28YXM0305 08:01AM Claudette Pigg Order Number: [id number]     Test Name Result Flag Reference   INR 2 83 H 0 86-1 16   PT 28 4 seconds H 12 0-14 3

## 2018-01-13 NOTE — RESULT NOTES
Verified Results  (1) PT WITH INR [id number] 12:09PM [facility] Order Number: [id number]     Test Name Result Flag Reference   INR 2 94 H 0 86-1 16   PT 29 2 seconds H 12 0-14 3

## 2018-01-13 NOTE — RESULT NOTES
Verified Results  (1) CBC/PLT/DIFF 16Exb4935 08:14AM Vanessa Squires Order Number: [id number]  TW Order Number: [id number]     Test Name Result Flag Reference   WBC COUNT 7 31 Thousand/uL  4 31-10 16   RBC COUNT 5 14 Million/uL  3 88-5 62   HEMOGLOBIN 16 5 g/dL  12 0-17 0   HEMATOCRIT 47 6 %  36 5-49 3   MCV 93 fL  82-98   MCH 32 1 pg  26 8-34 3   MCHC 34 7 g/dL  31 4-37 4   RDW 13 4 %  11 6-15 1   MPV 9 1 fL  8 9-12 7   PLATELET COUNT 280 Thousands/uL  149-390   NEUTROPHILS RELATIVE PERCENT 62 %  43-75   LYMPHOCYTES RELATIVE PERCENT 25 %  14-44   MONOCYTES RELATIVE PERCENT 8 %  4-12   EOSINOPHILS RELATIVE PERCENT 4 %  0-6   BASOPHILS RELATIVE PERCENT 1 %  0-1   NEUTROPHILS ABSOLUTE COUNT 4 58 Thousands/?L  1 85-7 62   LYMPHOCYTES ABSOLUTE COUNT 1 84 Thousands/?L  0 60-4 47   MONOCYTES ABSOLUTE COUNT 0 59 Thousand/?L  0 17-1 22   EOSINOPHILS ABSOLUTE COUNT 0 26 Thousand/?L  0 00-0 61   BASOPHILS ABSOLUTE COUNT 0 04 Thousands/?L  0 00-0 10     (1) COMPREHENSIVE METABOLIC PANEL 13FVT1113 48:01TC Ruel Maldonado    Order Number: [id number]  TW Order Number: [id number] Order Number: [id number] Order Number: [id number]     Test Name Result Flag Reference   GLUCOSE,RANDM 119 mg/dL     If the patient is fasting, the ADA then defines impaired fasting glucose as > 100 mg/dL and diabetes as > or equal to 123 mg/dL     SODIUM 138 mmol/L  136-145   POTASSIUM 4 3 mmol/L  3 5-5 3   CHLORIDE 102 mmol/L  100-108   CARBON DIOXIDE 24 mmol/L  21-32   ANION GAP (CALC) 12 mmol/L  4-13   BLOOD UREA NITROGEN 16 mg/dL  5-25   CREATININE 0 84 mg/dL  0 60-1 30   Standardized to IDMS reference method   CALCIUM 8 8 mg/dL  8 3-10 1   BILI, TOTAL 0 40 mg/dL  0 20-1 00   ALK PHOSPHATAS 68 U/L     ALT (SGPT) 40 U/L  12-78   AST(SGOT) 18 U/L  5-45   ALBUMIN 3 6 g/dL  3 5-5 0   TOTAL PROTEIN 7 3 g/dL  6 4-8 2   eGFR Non-African American      >60 0 ml/min/1 73sq m National Kidney Disease Education Program recommendations are as follows:  GFR calculation is accurate only with a steady state creatinine  Chronic Kidney disease less than 60 ml/min/1 73 sq  meters  Kidney failure less than 15 ml/min/1 73 sq  meters  (1) HEMOGLOBIN A1C 22Jul2016 08:14AM [facility] Order Number: [id number]  TW Order Number: [id number]     Test Name Result Flag Reference   HEMOGLOBIN A1C 6 6 % H 4 2-6 3   EST  AVG  GLUCOSE 143 mg/dl       (1) LDL,DIRECT 02LOV1746 08:14AM Shameka [facility]    Order Number: [id number]  TW Order Number: [id number] Order Number: [id number] Order Number: [id number]     Test Name Result Flag Reference   LDL, DIRECT 103 mg/dl H 0-100   LDL Cholesterol:        Optimal          <100 mg/dl        Near Optimal     100-129 mg/dl        Above Optimal          Borderline High   130-159 mg/dl          High              160-189 mg/dl          Very High        >189 mg/dl     (1) TSH 22Jul2016 08:14AM Shameka Billcaio    Order Number: [id number]  TW Order Number: [id number] Order Number: [id number] Order Number: [id number]     Test Name Result Flag Reference   TSH 3 238 uIU/mL  0 358-3 740   Patients undergoing fluorescein dye angiography may retain small amounts of fluorescein in the body for 48-72 hours post procedure  Samples containing fluorescein can produce falsely depressed TSH values  If the patient had this procedure,a specimen should be resubmitted post fluorescein clearance       (1) APTT 22Jul2016 08:14AM [facility] Order Number: [id number]     Test Name Result Flag Reference   PARTIAL THROMBOPLASTIN TIME 46 seconds H 24-36   Therapeutic Heparin Range = 60-90 seconds

## 2018-01-14 VITALS
SYSTOLIC BLOOD PRESSURE: 122 MMHG | DIASTOLIC BLOOD PRESSURE: 76 MMHG | BODY MASS INDEX: 40.43 KG/M2 | HEIGHT: 74 IN | WEIGHT: 315 LBS

## 2018-01-14 NOTE — RESULT NOTES
Verified Results  (1) PT WITH INR 41Ybz0280 10:04AM Haley Pena     Test Name Result Flag Reference   INR 2 57 H 0 86-1 16   PT 27 7 seconds H 12 1-14 4

## 2018-01-14 NOTE — RESULT NOTES
Verified Results  (1) PT WITH INR 51QAY7116 08:49AM Sung Bucio     Test Name Result Flag Reference   INR 2 37 H 0 86-1 16   PT 24 6 seconds H 11 8-14 1

## 2018-01-14 NOTE — RESULT NOTES
Verified Results  (1) LIPID PANEL FASTING W DIRECT LDL REFLEX [id number] 08:00AM Afsaneh Merida Order Number: [id number]      Triglyceride:         Normal              <150 mg/dl       Borderline High    150-199 mg/dl       High               200-499 mg/dl       Very High          >499 mg/dl  Cholesterol:         Desirable        <200 mg/dl      Borderline High  200-239 mg/dl      High             >239 mg/dl  HDL Cholesterol:        High    >59 mg/dL      Low     <41 mg/dL  LDL Cholesterol:        Optimal          <100 mg/dl         Near Optimal     100-129 mg/dl        Above Optimal          Borderline High   130-159 mg/dl          High              160-189 mg/dl          Very High        >189 mg/dl  LDL CALCULATED:    This screening LDL is a calculated result  It does not have the accuracy of the Direct Measured LDL in the monitoring of patients with hyperlipidemia and/or statin therapy  Direct Measure LDL (SDL241) must be ordered separately in these patients  Test Name Result Flag Reference   CHOLESTEROL 186 mg/dL     LDL CHOLESTEROL CALCULATED 119 mg/dL H 0-100   TRIGLYCERIDES 187 mg/dL H <=150   HDL,DIRECT 30 mg/dL L 40-60     (1) HEMOGLOBIN A1C 68JIR8790 08:00AM [facility] Order Number: [id number]      5 7-6 4% impaired fasting glucose  >=6 5% diagnosis of diabetes    Falsely low levels are seen in conditions linked to short RBC life span-  hemolytic anemia, and splenomegaly  Falsely elevated levels are seen in situations where there is an increased production of RBC- receipt of erythropoietin or blood transfusions  Adopted from ADA-Clinical Practice Recommendations     Test Name Result Flag Reference   HEMOGLOBIN A1C 11 9 % H 4 0-5 6   EST  AVG  GLUCOSE 295 mg/dl         Plan  Diabetes mellitus type 2, uncontrolled    · Invokamet  MG Oral Tablet;  Take 1 tablet twice daily  Hyperglycemia    · MetFORMIN HCl - 500 MG Oral Tablet

## 2018-01-15 VITALS
HEIGHT: 74 IN | SYSTOLIC BLOOD PRESSURE: 140 MMHG | WEIGHT: 315 LBS | BODY MASS INDEX: 40.43 KG/M2 | DIASTOLIC BLOOD PRESSURE: 82 MMHG

## 2018-01-15 NOTE — RESULT NOTES
Verified Results  (1) PT WITH INR [id number] 09:56AM Claudette Pigg Order Number: [id number]     Test Name Result Flag Reference   INR 2 56 H 0 86-1 16   PT 27 6 seconds H 12 1-14 4

## 2018-01-15 NOTE — RESULT NOTES
Verified Results  (1) PT WITH INR 06Apr2016 08:56AM Sandra Grissom     Test Name Result Flag Reference   INR 2 47 H 0 86-1 16   PT 25 3 seconds H 11 8-14 1

## 2018-01-15 NOTE — RESULT NOTES
Verified Results  (1) PT WITH INR [id number] 11:27AM Saida Merchant Order Number: [id number]     Test Name Result Flag Reference   INR 2 39 H 0 86-1 16   PT 26 2 seconds H 12 1-14 4

## 2018-01-15 NOTE — PROGRESS NOTES
Assessment    1  Former smoker (V15 82) (B51 798)   · Smokes 5 cigarettes a day for 35 years   2  No living will   3  Encounter for preventive health examination (V70 0) (Z00 00)    Plan  Chronic obstructive pulmonary disease    · Amoxicillin 500 MG Oral Capsule; TAKE 2 CAPSULES TWICE DAILY UNTIL GONE   · *1 - [facility]  *  Status: Active  Requested for:  08LEB7771  Care Summary provided  : Yes  Depression screening, Screening for diabetic peripheral neuropathy    · *VB - Foot Exam; Status:Complete - Retrospective By Protocol Authorization;   Done:  25QTD5108 11:08AM  Depression screening, SocHx: No living will    · *VB-Depression Screening; Status:Complete - Retrospective By Protocol Authorization;    Done: 11ERV5800 11:07AM  Diabetes mellitus type 2, uncontrolled    · (1) HEMOGLOBIN A1C; Status:Active; Requested UBO:23ZQD2311;    · (1) MICROALBUMIN CREATININE RATIO, RANDOM URINE; Status:Active; Requested  JIR:43LWG7320;    · Follow-up visit in 4 Months Evaluation and Treatment  Follow-up  Status: Hold For -  Scheduling  Requested for: 51MFM9617  Need for immunization against influenza    · Fluzone Quadrivalent 0 5 ML Intramuscular Suspension Prefilled Syringe;  INJECT 0 5  ML Intramuscular; To Be Done: 80XDK8197  Screening for colorectal cancer    · COLONOSCOPY; Status:Temporary Deferral - Pt refuses;    10/23/2018    Discussion/Summary    Rx for amoxicillin in case his cold gets worse  Refer back to pulmonology  He does not wish to see podiatry at this time  Flu shot given  Rx for hemoglobin A1c and microalbumin  Follow-up in 4 months or when necessary  Impression: Subsequent Annual Wellness Visit  Cardiovascular screening and counseling: the risks and benefits of screening were discussed and screening is current  Diabetes screening and counseling: the risks and benefits of screening were discussed and due for blood glucose     HIV screening and counseling: screening not indicated  Immunizations: influenza vaccine is due today  Advance Directive Planning: not complete, he was encouraged to follow-up with me to discuss his questions and/or decisions  Patient Discussion: plan discussed with the patient, follow-up visit needed in 4 months  Possible side effects of new medications were reviewed with the patient/guardian today  The treatment plan was reviewed with the patient/guardian  The patient/guardian understands and agrees with the treatment plan      Chief Complaint  Med well      History of Present Illness  HPI: Med well  Patient denies any increased shortness of breath  Patient has had a sore throat with some congestion lately  No fever chills  His left knee continues to bother him  Does not feel it following up with anybody yet  Welcome to [employer] and Wellness Visits: The patient is being seen for the subsequent annual wellness visit  Medicare Screening and Risk Factors   Hospitalizations: no previous hospitalizations and he has been hospitalized 0 times  Once per lifetime medicare screening tests: ECG has not been done  Medicare Screening Tests Risk Questions   Abdominal aortic aneurysm risk assessment: none indicated  Osteoporosis risk assessment: none indicated  HIV risk assessment: none indicated  Drug and Alcohol Use: The patient is a former cigarette smoker  The patient reports never drinking alcohol  He has never used illicit drugs  Diet and Physical Activity: Current diet includes well balanced meals  The patient does not exercise  Mood Disorder and Cognitive Impairment Screening: Geriatric Depression Scale   Depression screening  negative for symptoms  He denies feeling down, depressed, or hopeless over the past two weeks  He denies feeling little interest or pleasure in doing things over the past two weeks     Cognitive impairment screening: denies difficulty learning/retaining new information, denies difficulty handling complex tasks, denies difficulty with reasoning, denies difficulty with spatial ability and orientation, denies difficulty with language and denies difficulty with behavior  Functional Ability/Level of Safety: Hearing is normal bilaterally, normal in the right ear and normal in the left ear  He does not use a hearing aid  The patient is currently able to participate in social activities with limitations, but able to do activities of daily living without limitations, able to do instrumental activities of daily living without limitations and able to drive without limitations  Activities of daily living details: does not need help using the phone, does not need help shopping, no meal preparation help needed, does not need help doing housework, does not need help doing laundry, does not need help managing medications and does not need help managing money  Fall risk factors: The patient fell 0 times in the past 12 months  Injury History: no polypharmacy, no alcohol use, no mobility impairment, no antidepressant use, no deconditioning, no postural hypotension, no sedative use, no visual impairment, no urinary incontinence, no antihypertensive use, no cognitive impairment, up and go test was normal and no previous fall  Home safety risk factors:  no handrails on the stairs, but no unfamiliar surroundings, no loose rugs, no poor household lighting, no uneven floors, no household clutter and grab bars in the bathroom  Advance Directives: Advance directives: no living will, no durable power of  for health care directives and no advance directives  Co-Managers and Medical Equipment/Suppliers: See Patient Care Team      Patient Care Team    Care Team Member Role Specialty Office Number   Kaia Muniz  Specialist Internal Medicine [phone]   [place] [address] Specialist Pulmonary Medicine [phone], [address]   [phone]     Review of Systems    Constitutional: negative  ENT: as noted in HPI  Cardiovascular: negative  Respiratory: negative  Gastrointestinal: negative  Genitourinary: negative  Active Problems    1  Aortic stenosis (424 1) (I35 0)   2  Asthma (493 90) (J45 909)   3  Atrial fibrillation (427 31) (I48 91)   4  Chronic obstructive pulmonary disease (496) (J44 9)   5  Depression screening (V79 0) (Z13 89)   6  Diabetes mellitus type 2, uncontrolled (250 02) (E11 65)   7  Encounter for screening for malignant neoplasm of colon (V76 51) (Z12 11)   8  History of pneumococcal vaccination (V49 89) (Z92 29)   9  Hyperlipidemia (272 4) (E78 5)   10  Hypertension (401 9) (I10)   11  Living will, counseling/discussion (V65 49) (Z71 89)   12  Need for immunization against influenza (V04 81) (Z23)   13  Screening for cervical cancer (V76 2) (Z12 4)   14  Screening for colorectal cancer (V76 51) (Z12 11,Z12 12)   15  Screening for diabetic peripheral neuropathy (V80 09) (Z13 89)   16  Sleep apnea (780 57) (G47 30)    Past Medical History    · History of Asthma (493 90) (J45 909)   · History of Closed Fracture Of The Fibula (823 81)   · History of Encounter for 's license history and physical (V70 3) (Z02 4)   · History of atrial fibrillation (V12 59) (Z86 79)   · History of chronic obstructive lung disease (V12 69) (Z87 09)   · History of hypertension (V12 59) (Z86 79)   · History of pneumococcal vaccination (V49 89) (Z92 29)   · History of sleep apnea (V13 89) (Z86 69)   · History of Increased BMI (783 9) (R63 8)   · History of Need for hepatitis C screening test (V73 89) (Z11 59)   · Need for immunization against influenza (V04 81) (Z23)   · History of Restless legs syndrome (333 94) (G25 81)   · History of Screening for depression (V79 0) (Z13 89)   · History of Screening for diabetic retinopathy (V80 2) (Z13 5)    The active problems and past medical history were reviewed and updated today        Surgical History    · History of Cath Stent Placement    The surgical history was reviewed and updated today  Family History  Mother    · Family history of Atrial Fibrillation  Father    · Family history of Acute Myocardial Infarction (V17 3)   · Family history of Congestive Heart Failure   · Family history of Stroke Syndrome (V17 1)    The family history was reviewed and updated today  Social History    · Always uses seat belt   · Daily caffeine consumption   · Former smoker (F08 87) (M63 531)   · Smokes 5 cigarettes a day for 35 years   ·    · No alcohol use   · No living will  The social history was reviewed and updated today  The social history was reviewed and is unchanged  Current Meds   1  Albuterol Sulfate (2 5 MG/3ML) 0 083% Inhalation Nebulization Solution; use one unit   dose in home nebulizer two to three times per day; Therapy: 32BFY1438 to (Last Rx:79Ezf1007)  Requested for: 96Rnp0617 Ordered   2  Aspirin 81 MG Oral Tablet Delayed Release Recorded   3  Atorvastatin Calcium 10 MG Oral Tablet; TAKE 1 TABLET BY MOUTH ONCE DAILY; Therapy: 28SNT8497 to (Last IS:58SFP6209)  Requested for: 58XUT9860 Ordered   4  Breo Ellipta 100-25 MCG/INH Inhalation Aerosol Powder Breath Activated Recorded   5  DilTIAZem HCl ER Coated Beads 240 MG Oral Capsule Extended Release 24 Hour; Therapy: 89ZZH9051 to (Last Rx:15Mar2011)  Requested for: 89NTX8382 Ordered   6  Furosemide 20 MG Oral Tablet; TAKE 1 TABLET DAILY AS DIRECTED; Therapy: 28Tlx1382 to (Valentine Gallego)  Requested for: 41ADW5920; Last   Rx:34Zwb5828 Ordered   7  MetFORMIN HCl - 1000 MG Oral Tablet; take 1 tablet by mouth twice a day; Therapy: 91KOJ6553 to (Bustre Card)  Requested for: 99JHZ7697; Last   Rx:08Xia0473 Ordered   8  Metoprolol Tartrate 50 MG Oral Tablet; take 1 tablet by mouth twice a day; Last   Rx:68Src7302 Ordered   9  Plavix 75 MG Oral Tablet; Take daily Recorded   10  Potassium Chloride ER 10 MEQ Oral Tablet Extended Release; take 1 tablet by mouth    once daily;     Therapy: 87ZTG5438 to (Kevin Sultana)  Requested for: 56QNP9347; Last    Rx:33Yau0752 Ordered   11  Spiriva Respimat 2 5 MCG/ACT Inhalation Aerosol Solution; TAKE 2 INHALATIONS    DAILY; Therapy: 10PRB5631 to (Last Rx:49Jag4325)  Requested for: 79LKI9208 Ordered   12  Tradjenta 5 MG Oral Tablet; TAKE 1 TABLET BY MOUTH ONCE DAILY; Therapy: 89CZR6813 to (Corey Rojas)  Requested for: 91LUQ4025; Last    MY:85KOW8804 Ordered   13  Ventolin  (90 Base) MCG/ACT Inhalation Aerosol Solution; INHALE 2 PUFFS    EVERY 6 HOURS AS NEEDED FOR WHEEZING; Therapy: 14SUV4811 to (Hermila Arnett)  Requested for: 14AHQ9445; Last    Rx:69Pfx5207 Ordered   14  Warfarin Sodium 10 MG Oral Tablet; TAKE ONE TABLET BY MOUTH ONCE DAILY AS    DIRECTED; Therapy: 05VXA8413 to (Evaluate:53Zkv0704)  Requested for: 79Ysk5259; Last    Rx:25Xpd9707 Ordered   15  Warfarin Sodium 10 MG Oral Tablet; TAKE ONE TABLET BY MOUTH ONCE DAILY AS    DIRECTED; Therapy: 77Gxu5326 to (Last Rx:83Aeq0901)  Requested for: 46Ufd5233 Ordered   16  Warfarin Sodium 2 MG Oral Tablet; 10 mg friday and 12 mg  daily rest of days; Therapy: 99IVW4497 to (Mare Evans)  Requested for: 58FIV0423; Last    Rx:89Ixx2466 Ordered    The medication list was reviewed and updated today  Allergies    1  No Known Drug Allergies    Immunizations   1 2 3 4    Influenza  03-Albert-2013 16-Dec-2013 03-Nov-2014 14-Oct-2016    PPSV  16-Dec-2013        Vitals  Signs    Systolic: 221  Diastolic: 76  Height: 6 ft 2 in  Weight: 378 lb   BMI Calculated: 48 53  BSA Calculated: 2 85    Physical Exam    Constitutional   General appearance: No acute distress, well appearing and well nourished  Ears, Nose, Mouth, and Throat   Oropharynx: Abnormal   The posterior pharynx was erythematous, but did not have an exudate  Pulmonary   Respiratory effort: No increased work of breathing or signs of respiratory distress      Auscultation of lungs: Abnormal   wheezing over both bases  Psychiatric   Orientation to person, place and time: Normal     Mood and affect: Normal       Socks and shoes removed, Right Foot Findings: normal foot, no swelling, no erythema  Diminished tactile sensation with monofilament testing throughout the right foot  Socks and shoes removed, Left Foot Findings: normal foot, no swelling, no erythema  Diminished tactile sensation with monofilament testing throughout the left foot  Pulses:   1+ in the dorsalis pedis on the right  Pulses:   1+ in the dorsalis pedis on the left  Assign Risk Category: 2: Loss of protective sensation with or without weakness, deformity, callus, pre-ulcer, or history of ulceration  High risk        Results/Data  *VB - Foot Exam 42HVP8680 11:08AM Fran Pap     Test Name Result Flag Reference   FOOT Ekaterina Pereira 59GJN2895       *VB-Depression Screening 77UCQ9500 11:07AM Fran Pap     Test Name Result Flag Reference   Depression Scale Result      Depression Screen - Negative For Symptoms       Signatures   Electronically signed by : Christina Mireles DO; Oct 23 2017 11:37AM EST                       (Author)

## 2018-01-15 NOTE — RESULT NOTES
Verified Results  (1) LIPID PANEL, FASTING 11BNY1925 07:28AM WiFastllis Molt     Test Name Result Flag Reference   CHOLESTEROL 142 mg/dL     HDL,DIRECT 28 mg/dL L 40-60   Specimen collection should occur prior to Metamizole administration due to the potential for falsely depressed results  LDL CHOLESTEROL CALCULATED 55 mg/dL  0-100   This is a fasting blood test  Water,black tea or black  coffee only after 9:00pm the night before test  Drink 2 glasses of water the morning of test         Triglyceride:         Normal              <150 mg/dl       Borderline High    150-199 mg/dl       High               200-499 mg/dl       Very High          >499 mg/dl  Cholesterol:         Desirable        <200 mg/dl      Borderline High  200-239 mg/dl      High             >239 mg/dl  HDL Cholesterol:        High    >59 mg/dL      Low     <41 mg/dL  LDL CALCULATED:    This screening LDL is a calculated result  It does not have the accuracy of the Direct Measured LDL in the monitoring of patients with hyperlipidemia and/or statin therapy  Direct Measure LDL (DXG847) must be ordered separately in these patients  TRIGLYCERIDES 296 mg/dL H <=150   Specimen collection should occur prior to N-Acetylcysteine or Metamizole administration due to the potential for falsely depressed results  (1) HEMOGLOBIN A1C 17MZQ8600 07:28AM WiFastllAito [facility] Molt     Test Name Result Flag Reference   HEMOGLOBIN A1C 8 1 % H 4 2-6 3   EST  AVG   GLUCOSE 186 mg/dl

## 2018-01-16 NOTE — MISCELLANEOUS
Assessment    1  Aortic stenosis (424 1) (I35 0)    Plan  Aortic stenosis, Diabetes mellitus type 2, uncontrolled    · (1) HEMOGLOBIN A1C; Status:Active; Requested for:18Oct2016;    Perform:[facility]; Due:18Oct2017;Ordered; For:Aortic stenosis, Diabetes mellitus type 2, uncontrolled; Ordered By:Иван Yanez;   · (1) MICROALBUMIN CREATININE RATIO, RANDOM URINE; Status:Active; Requested  for:18Oct2016;    Perform:[facility]; Due:18Oct2017;Ordered; For:Aortic stenosis, Diabetes mellitus type 2, uncontrolled; Ordered By:Иван Yanez;   · Follow-up visit in 1 month Evaluation and Treatment  Follow-up  Status: Hold For -  Scheduling  Requested for: 26GSS2002   Ordered; For: Aortic stenosis, Diabetes mellitus type 2, uncontrolled; Ordered By: Ibis Matamoros Performed:  Due: 37BMP3430  Atrial fibrillation    · From  Metoprolol Tartrate 25 MG Oral Tablet Take 1 tablet twice daily To  Metoprolol Tartrate 50 MG Oral Tablet take 1 tablet by mouth twice a day   Rx By: Ibis Matamoros; Dispense: 0 Days ; #:60 Tablet; Refill: 5; For: Atrial fibrillation; JACE = N; Record; Last Updated By: Fabienne Goddard; 10/18/2016 8:03:17 AM    Discussion/Summary  Discussion Summary:   Patient will continue same meds  We will call him with his INR result today  Get blood work with his next INR  He will call us if his weight continues to increase  Follow-up as scheduled next month or when necessary  Medication SE Review and Pt Understands Tx: Possible side effects of new medications were reviewed with the patient/guardian today  The treatment plan was reviewed with the patient/guardian  The patient/guardian understands and agrees with the treatment plan      Chief Complaint  Chief Complaint Free Text Note Form: Status post aortic valve replacement for aortic stenosis        History of Present Illness  TCM Communication [facility]: The patient is being contacted for follow-up after hospitalization and Homar Day called patient and scheduled for KRISTINE appt on 10/18/16 at 7:30 am  He also has a pending appt scheduled with our office on 11/14/16 at 9:15 am  Hospital records were reviewed and Patients Coumadin and Lovenox will be managed by Dr Barrera Harrell  He was hospitalized at [facility]  The date of admission: 10/7/16, date of discharge: 10/13/16  Diagnosis: Other symptoms involving cardiovascular system; Other specified pre-op exam; Aortic valve disease; Aortic stenosis D/C dx - Angina pectoris; Nonrheumatic aortic valve stenosis; Aortic stenosis; COPD, moderate; Morbid obesity due to excess calories; THIEN; Atrial fibrillation; CAD; Acute blood loss anemia; Chronic systolic CHF; Atelectasis, bilateral; S/P AVR; Hyponatremia  He was discharged to home, [facility]  Medications were not reviewed today  He scheduled a follow up appointment  Follow-up appointments with other specialists: Dr Srikanth Garcia 2 wks; Dr Emre Greene appt 11/7/16  The patient is currently asymptomatic  Counseling was provided to patient's family  Rosalio Jameson spoke with Faina Curiel  Communication performed and completed by René Anderson   HPI: Patient status post aortic valve replacement  Patient states and swelling in the legs  Patient put on his compression stockings yesterday  Patient states he is going to the bathroom more since he started that  He denies any shortness of breath  Just got his INR this morning      Review of Systems  Complete-Male:   Constitutional: No fever or chills, feels well, no tiredness, no recent weight gain or weight loss  Cardiovascular: as noted in HPI  Respiratory: No complaints of shortness of breath, no wheezing, no cough, no SOB on exertion, no orthopnea or PND  Gastrointestinal: No complaints of abdominal pain, no constipation, no nausea or vomiting, no diarrhea or bloody stools     Genitourinary: No complaints of dysuria, no incontinence, no hesitancy, no nocturia, no genital lesion, no testicular pain  Active Problems    1  Asthma (493 90) (J45 909)   2  Atrial fibrillation (427 31) (I48 91)   3  Chronic obstructive pulmonary disease (496) (J44 9)   4  Closed Fracture Of The Fibula (823 81)   5  Diabetes mellitus type 2, uncontrolled (250 02) (E11 65)   6  Encounter for 's license history and physical (V70 3) (Z02 4)   7  Encounter for screening for malignant neoplasm of colon (V76 51) (Z12 11)   8  Hyperlipidemia (272 4) (E78 5)   9  Hypertension (401 9) (I10)   10  Living will, counseling/discussion (V65 49) (Z71 89)   11  Need for pneumococcal vaccination (V03 82) (Z23)   12  Need for prophylactic vaccination and inoculation against influenza (V04 81) (Z23)   13  Screening for depression (V79 0) (Z13 89)   14  Sleep apnea (780 57) (G47 30)    Past Medical History    1  History of Asthma (493 90) (J45 909)   2  History of atrial fibrillation (V12 59) (Z86 79)   3  History of chronic obstructive lung disease (V12 69) (Z87 09)   4  History of hypertension (V12 59) (Z86 79)   5  History of sleep apnea (V13 89) (Z87 09)   6  Need for pneumococcal vaccination (V03 82) (Z23)   7  Need for prophylactic vaccination and inoculation against influenza (V04 81) (Z23)   8  History of Restless legs syndrome (333 94) (G25 81)    Surgical History    1  History of Cath Stent Placement  Surgical History Reviewed: The surgical history was reviewed and updated today  Family History  Mother    1  Family history of Atrial Fibrillation  Father    2  Family history of Acute Myocardial Infarction (V17 3)   3  Family history of Congestive Heart Failure   4  Family history of Stroke Syndrome (V17 1)  Family History Reviewed: The family history was reviewed and updated today  Social History    · Former smoker (P55 64) (M96 254)   · No alcohol use   · No living will  Social History Reviewed: The social history was reviewed and updated today   The social history was reviewed and is unchanged  Current Meds   1  Albuterol Sulfate (2 5 MG/3ML) 0 083% Inhalation Nebulization Solution; use one unit   dose in home nebulizer two to three times per day; Therapy: 13GQY1811 to (Last Rx:24Gfc8052)  Requested for: 50Ubr8601 Ordered   2  Breo Ellipta 100-25 MCG/INH Inhalation Aerosol Powder Breath Activated Recorded   3  DiltiaZEM HCl ER Coated Beads 240 MG Oral Capsule Extended Release 24 Hour; Therapy: 42KPS5029 to (Last Rx:15Mar2011)  Requested for: 89USZ6429 Ordered   4  Furosemide 20 MG Oral Tablet; TAKE 1 TABLET DAILY AS DIRECTED; Therapy: 86Xyr9475 to (SurveyMonkeyan Speaker)  Requested for: 74GPF6769; Last   Rx:13Jan2016 Ordered   5  Lisinopril 20 MG Oral Tablet; Therapy: 87DTM8822 to (Last Rx:08Mar2012)  Requested for: 77JCX1531 Ordered   6  MetFORMIN HCl - 1000 MG Oral Tablet; take 1 tablet by mouth twice a day; Therapy: 56PDP4643 to (Evaluate:24Jun2017)  Requested for: 61YMX7637; Last   Rx:29Jun2016 Ordered   7  Metoprolol Tartrate 25 MG Oral Tablet; Take 1 tablet twice daily Recorded   8  Plavix 75 MG Oral Tablet; Take daily Recorded   9  Potassium Chloride ER 10 MEQ Oral Tablet Extended Release; take 1 tablet by mouth   once daily; Therapy: 33Ima1443 to (Granify Speaker)  Requested for: 92UOH0523; Last   Rx:13Jan2016 Ordered   10  Spiriva Respimat 2 5 MCG/ACT Inhalation Aerosol Solution; TAKE 2 INHALATIONS DAILY; Therapy: 64KYU8492 to (Last Rx:53Ubu0795)  Requested for: 43Piv3149 Ordered   11  Ventolin  (90 Base) MCG/ACT Inhalation Aerosol Solution; INHALE 2 PUFFS    EVERY 6 HOURS AS NEEDED FOR WHEEZING; Therapy: 40PCQ2731 to (Evaluate:25Apr2016)  Requested for: 95AVZ3020; Last    Rx:86Jgm4324 Ordered   12  Warfarin Sodium 10 MG Oral Tablet; TAKE ONE TABLET BY MOUTH ONCE DAILY AS    DIRECTED; Therapy: 62COM0063 to (Chucky Kawasaki)  Requested for: 09ECA4750; Last    Rx:64Buz8194 Ordered   13  Warfarin Sodium 2 MG Oral Tablet; take as directed;     Therapy: 18LYE3481 to (Juarez Nascimento)  Requested for: 02Jun2016; Last    Rx:02Jun2016 Ordered  Medication List Reviewed: The medication list was reviewed and updated today  Allergies    1  No Known Drug Allergies    Vitals  Signs   Recorded: 65NKB3279 69:12QT   Systolic: 908, LUE, Sitting  Diastolic: 64, LUE, Sitting  Height: 6 ft 2 in  Weight: 422 lb   BMI Calculated: 54 18  BSA Calculated: 2 98    Physical Exam    Constitutional   General appearance: No acute distress, well appearing and well nourished  Pulmonary   Respiratory effort: No increased work of breathing or signs of respiratory distress  Auscultation of lungs: Clear to auscultation, equal breath sounds bilaterally, no wheezes, no rales, no rhonci  Cardiovascular   Auscultation of heart: Normal rate and rhythm, normal S1 and S2, without murmurs  Examination of extremities for edema and/or varicosities: Abnormal   bilateral ankle 1+ pitting edema and bilateral pretibial 2+ pitting edema     Psychiatric   Orientation to person, place and time: Normal     Mood and affect: Normal          Future Appointments    Date/Time Provider Specialty Site   11/14/2016 09:15 AM Nicole Davis DO Family Medicine [address] [place] [address]     Signatures   Electronically signed by : Parth Hawley, ; Oct 14 2016  2:36PM EST                       (Author)    Electronically signed by : Mirta Pantoja DO; Oct 18 2016  8:27AM EST                       (Author)

## 2018-01-16 NOTE — RESULT NOTES
Verified Results  (1) PT WITH INR [id number] 10:57AM Henny Le     Test Name Result Flag Reference   INR 2 78 H 0 86-1 16   PT 28 0 seconds H 12 0-14 3

## 2018-01-17 NOTE — RESULT NOTES
Verified Results  (1) HEMOGLOBIN A1C [id number] 10:07AM Pierre Kaur Order Number: [id number]     Test Name Result Flag Reference   HEMOGLOBIN A1C 12 2 % H 4 2-6 3   EST  AVG   GLUCOSE 303 mg/dl       (1) MICROALBUMIN CREATININE RATIO, RANDOM URINE [id number] 10:07AM Pierre Kaur Order Number: [id number]     Test Name Result Flag Reference   MICROALBUMIN/ CREAT R 25 mg/g creatinine  0-30   MICROALBUMIN,URINE 13 1 mg/L  0 0-20 0   CREATININE URINE 53 3 mg/dL

## 2018-01-17 NOTE — RESULT NOTES
Verified Results  (1) PT WITH INR [id number] 08:00AM Jailene Glez     Test Name Result Flag Reference   INR 2 08 H 0 86-1 16   PT 22 3 seconds H 11 8-14 1

## 2018-01-17 NOTE — RESULT NOTES
Verified Results  (1) PT WITH INR [id number] 11:26AM Haley Pena     Test Name Result Flag Reference   INR 2 48 H 0 86-1 16   PT 25 7 seconds H 12 0-14 3

## 2018-01-17 NOTE — RESULT NOTES
Verified Results  (1) HEMOGLOBIN A1C 27Oct2016 11:18AM [facility] [employer] Order Number: [id number]     Test Name Result Flag Reference   HEMOGLOBIN A1C 6 1 %  4 2-6 3   EST  AVG   GLUCOSE 128 mg/dl       (1) MICROALBUMIN CREATININE RATIO, RANDOM URINE 27Oct2016 11:18AM [facility] [employer] Order Number: [id number]     Test Name Result Flag Reference   MICROALBUMIN/ CREAT R <10 mg/g creatinine  0-30   MICROALBUMIN,URINE <5 0 mg/L  0 0-20 0   CREATININE URINE 49 4 mg/dL       (1) PT WITH INR 27Oct2016 11:18AM Santos William     Test Name Result Flag Reference   INR 2 05 H 0 86-1 16   PT 22 9 seconds H 12 0-14 3

## 2018-01-18 NOTE — RESULT NOTES
Verified Results  (1) PT WITH INR [id number] 03:00PM Henny Le     Test Name Result Flag Reference   INR 2 18 H 0 86-1 16   PT 23 3 seconds H 12 0-14 3

## 2018-01-18 NOTE — RESULT NOTES
Verified Results  (1) PT WITH INR [id number] 10:30AM [employer] Order Number: [id number]     Test Name Result Flag Reference   INR 2 41 H 0 86-1 16   PT 25 1 seconds H 12 0-14 3

## 2018-01-18 NOTE — RESULT NOTES
Verified Results  (1) PT WITH INR [id number] 12:01PM [facility] Order Number: [id number]     Test Name Result Flag Reference   INR 2 34 H 0 86-1 16   PT 24 6 seconds H 12 0-14 3

## 2018-01-18 NOTE — RESULT NOTES
Verified Results  (1) PT WITH INR [id number] 10:37AM Laura Saunders Order Number: [id number]     Test Name Result Flag Reference   INR 2 79 H 0 86-1 16   PT 28 1 seconds H 12 0-14 3

## 2018-01-22 ENCOUNTER — APPOINTMENT (OUTPATIENT)
Dept: LAB | Facility: HOSPITAL | Age: 53
End: 2018-01-22
Payer: MEDICARE

## 2018-01-22 ENCOUNTER — GENERIC CONVERSION - ENCOUNTER (OUTPATIENT)
Dept: OTHER | Facility: OTHER | Age: 53
End: 2018-01-22

## 2018-01-22 ENCOUNTER — TRANSCRIBE ORDERS (OUTPATIENT)
Dept: ADMINISTRATIVE | Facility: HOSPITAL | Age: 53
End: 2018-01-22

## 2018-01-22 DIAGNOSIS — I48.91 ATRIAL FIBRILLATION, UNSPECIFIED TYPE (HCC): ICD-10-CM

## 2018-01-22 DIAGNOSIS — I48.91 ATRIAL FIBRILLATION, UNSPECIFIED TYPE (HCC): Primary | ICD-10-CM

## 2018-01-22 LAB
INR PPP: 2.4 (ref 0.86–1.16)
PROTHROMBIN TIME: 26.3 SECONDS (ref 12.1–14.4)

## 2018-01-22 PROCEDURE — 36415 COLL VENOUS BLD VENIPUNCTURE: CPT

## 2018-01-22 PROCEDURE — 85610 PROTHROMBIN TIME: CPT

## 2018-01-23 VITALS
OXYGEN SATURATION: 97 % | WEIGHT: 315 LBS | SYSTOLIC BLOOD PRESSURE: 134 MMHG | DIASTOLIC BLOOD PRESSURE: 76 MMHG | HEART RATE: 79 BPM | BODY MASS INDEX: 40.43 KG/M2 | HEIGHT: 74 IN

## 2018-01-23 NOTE — RESULT NOTES
Verified Results  (1) PT WITH INR [id number] 09:41AM Dallas Ortiz     Test Name Result Flag Reference   INR 2 60 H 0 86-1 16   PT 28 0 seconds H 12 1-14 4

## 2018-01-24 NOTE — RESULT NOTES
Verified Results  (1) PT WITH INR 22Jan2018 09:39AM Justo Malone     Test Name Result Flag Reference   INR 2 40 H 0 86-1 16   This is a corrected result  Previous result was 4 97 on 1/22/2018 at 1049 EST   PT 26 3 seconds H 12 1-14 4   This is a corrected result   Previous result was 46 8 seconds on 1/22/2018 at 1049 EST

## 2018-02-12 DIAGNOSIS — I48.91 ATRIAL FIBRILLATION, UNSPECIFIED TYPE (HCC): Primary | ICD-10-CM

## 2018-02-12 RX ORDER — WARFARIN SODIUM 10 MG/1
1 TABLET ORAL DAILY
COMMUNITY
Start: 2016-12-21 | End: 2018-02-12 | Stop reason: SDUPTHER

## 2018-02-12 RX ORDER — ALBUTEROL SULFATE 90 UG/1
2 AEROSOL, METERED RESPIRATORY (INHALATION) EVERY 6 HOURS PRN
COMMUNITY
Start: 2013-01-03 | End: 2018-10-25 | Stop reason: SDUPTHER

## 2018-02-12 RX ORDER — POTASSIUM CHLORIDE 750 MG/1
1 CAPSULE, EXTENDED RELEASE ORAL DAILY
COMMUNITY
Start: 2011-08-30 | End: 2018-02-15

## 2018-02-12 RX ORDER — WARFARIN SODIUM 10 MG/1
TABLET ORAL
Qty: 90 TABLET | Refills: 5 | Status: SHIPPED | OUTPATIENT
Start: 2018-02-12 | End: 2019-02-13 | Stop reason: SDUPTHER

## 2018-02-12 RX ORDER — DILTIAZEM HYDROCHLORIDE 240 MG/1
240 CAPSULE, COATED, EXTENDED RELEASE ORAL 2 TIMES DAILY
COMMUNITY
Start: 2011-03-15 | End: 2018-08-30 | Stop reason: SDUPTHER

## 2018-02-12 RX ORDER — ALBUTEROL SULFATE 2.5 MG/3ML
SOLUTION RESPIRATORY (INHALATION)
COMMUNITY
Start: 2015-12-30 | End: 2021-02-25

## 2018-02-12 RX ORDER — ATORVASTATIN CALCIUM 10 MG/1
1 TABLET, FILM COATED ORAL DAILY
COMMUNITY
Start: 2017-06-23 | End: 2018-05-17 | Stop reason: SDUPTHER

## 2018-02-12 RX ORDER — FLUTICASONE FUROATE AND VILANTEROL 100; 25 UG/1; UG/1
1 POWDER RESPIRATORY (INHALATION) DAILY
COMMUNITY
End: 2018-03-23 | Stop reason: CLARIF

## 2018-02-12 RX ORDER — CLOPIDOGREL BISULFATE 75 MG/1
TABLET ORAL DAILY
COMMUNITY
End: 2018-02-15 | Stop reason: ALTCHOICE

## 2018-02-12 RX ORDER — METOPROLOL TARTRATE 50 MG/1
1 TABLET, FILM COATED ORAL 2 TIMES DAILY
COMMUNITY
End: 2018-10-08 | Stop reason: SDUPTHER

## 2018-02-12 RX ORDER — WARFARIN SODIUM 2 MG/1
TABLET ORAL
COMMUNITY
Start: 2014-02-10 | End: 2018-02-12 | Stop reason: SDUPTHER

## 2018-02-12 RX ORDER — FUROSEMIDE 20 MG/1
40 TABLET ORAL DAILY
COMMUNITY
Start: 2011-08-30 | End: 2018-03-23 | Stop reason: SDUPTHER

## 2018-02-12 RX ORDER — WARFARIN SODIUM 2 MG/1
2 TABLET ORAL
Qty: 90 TABLET | Refills: 5 | Status: SHIPPED | OUTPATIENT
Start: 2018-02-12 | End: 2018-07-30 | Stop reason: SDUPTHER

## 2018-02-14 ENCOUNTER — APPOINTMENT (OUTPATIENT)
Dept: LAB | Facility: HOSPITAL | Age: 53
End: 2018-02-14
Payer: MEDICARE

## 2018-02-14 ENCOUNTER — ANTICOAG VISIT (OUTPATIENT)
Dept: FAMILY MEDICINE CLINIC | Facility: CLINIC | Age: 53
End: 2018-02-14

## 2018-02-14 ENCOUNTER — TRANSCRIBE ORDERS (OUTPATIENT)
Dept: ADMINISTRATIVE | Facility: HOSPITAL | Age: 53
End: 2018-02-14

## 2018-02-14 DIAGNOSIS — I48.91 ATRIAL FIBRILLATION, UNSPECIFIED TYPE (HCC): Primary | ICD-10-CM

## 2018-02-14 DIAGNOSIS — I48.91 ATRIAL FIBRILLATION, UNSPECIFIED TYPE (HCC): ICD-10-CM

## 2018-02-14 LAB
INR PPP: 2.55 (ref 0.86–1.16)
PROTHROMBIN TIME: 27.5 SECONDS (ref 12.1–14.4)

## 2018-02-14 PROCEDURE — 85610 PROTHROMBIN TIME: CPT

## 2018-02-14 PROCEDURE — 36415 COLL VENOUS BLD VENIPUNCTURE: CPT

## 2018-02-15 ENCOUNTER — OFFICE VISIT (OUTPATIENT)
Dept: FAMILY MEDICINE CLINIC | Facility: CLINIC | Age: 53
End: 2018-02-15
Payer: MEDICARE

## 2018-02-15 ENCOUNTER — TRANSCRIBE ORDERS (OUTPATIENT)
Dept: ADMINISTRATIVE | Facility: HOSPITAL | Age: 53
End: 2018-02-15

## 2018-02-15 VITALS
DIASTOLIC BLOOD PRESSURE: 62 MMHG | SYSTOLIC BLOOD PRESSURE: 102 MMHG | HEIGHT: 74 IN | WEIGHT: 315 LBS | BODY MASS INDEX: 40.43 KG/M2

## 2018-02-15 DIAGNOSIS — R07.9 CHEST PAIN, UNSPECIFIED TYPE: Primary | ICD-10-CM

## 2018-02-15 DIAGNOSIS — IMO0002 UNCONTROLLED TYPE 2 DIABETES MELLITUS WITH DIABETIC POLYNEUROPATHY, WITH LONG-TERM CURRENT USE OF INSULIN: Primary | ICD-10-CM

## 2018-02-15 DIAGNOSIS — I10 ESSENTIAL HYPERTENSION: ICD-10-CM

## 2018-02-15 DIAGNOSIS — J44.9 CHRONIC OBSTRUCTIVE PULMONARY DISEASE, UNSPECIFIED COPD TYPE (HCC): ICD-10-CM

## 2018-02-15 PROCEDURE — 99214 OFFICE O/P EST MOD 30 MIN: CPT | Performed by: FAMILY MEDICINE

## 2018-02-15 RX ORDER — METOPROLOL SUCCINATE 50 MG/1
TABLET, EXTENDED RELEASE ORAL
COMMUNITY
Start: 2018-01-12 | End: 2018-03-23 | Stop reason: SDUPTHER

## 2018-02-15 RX ORDER — POTASSIUM CHLORIDE 750 MG/1
TABLET, FILM COATED, EXTENDED RELEASE ORAL
COMMUNITY
Start: 2018-02-12 | End: 2019-03-05 | Stop reason: SDUPTHER

## 2018-02-15 NOTE — PROGRESS NOTES
Assessment/Plan:   we will increase  His [place] to  20 units daily  He will continue to monitor sugars  He will call us in the next 2 weeks with results  We will increase the [place]  Accordingly if necessary  He will get blood work before we see him again in the next 3-4 months  No problem-specific Assessment & Plan notes found for this encounter  Diagnoses and all orders for this visit:    Uncontrolled type 2 diabetes mellitus with diabetic polyneuropathy, with long-term current use of insulin (HCC)  -     insulin degludec (TRESIBA FLEXTOUCH) injection pen 100 units/mL; Inject 20 Units under the skin daily  -     Hemoglobin A1c; Future  -     Lipid Panel with Direct LDL reflex; Future    Chronic obstructive pulmonary disease, unspecified COPD type ([facility] )    Essential hypertension  -     Comprehensive metabolic panel; Future  -     Lipid Panel with Direct LDL reflex; Future    Other orders  -     potassium chloride (K-DUR) 10 mEq tablet;   -     metoprolol succinate (TOPROL-XL) 50 mg 24 hr tablet;           Subjective:      Patient ID: hSeila New is a 45 y o  male  Patient here today for follow-up  Patient denies any chest pain  Still has his breathing issues, but it is stable  Patient's sugar is still elevated  Always above 200  Patient tolerating the Tresiba  Well  He is taking 10 units daily  Diabetes   He presents for his follow-up diabetic visit  He has type 2 diabetes mellitus  His disease course has been worsening  There are no hypoglycemic associated symptoms  Associated symptoms include foot paresthesias ( mild)  There are no hypoglycemic complications  Symptoms are stable  Diabetic complications include peripheral neuropathy  Risk factors for coronary artery disease include diabetes mellitus, dyslipidemia, male sex, hypertension and obesity  Current diabetic treatment includes oral agent (monotherapy) and insulin injections  He is compliant with treatment all of the time   His weight is stable  He rarely participates in exercise  There is no change in his home blood glucose trend  His breakfast blood glucose range is generally >200 mg/dl  His lunch blood glucose range is generally >200 mg/dl  His dinner blood glucose range is generally >200 mg/dl  An ACE inhibitor/angiotensin II receptor blocker is not being taken  Hypertension   This is a chronic problem  The problem is unchanged  The problem is controlled  Risk factors for coronary artery disease include diabetes mellitus, male gender, obesity and sedentary lifestyle  Past treatments include beta blockers and calcium channel blockers  The current treatment provides significant improvement  There are no compliance problems  The following portions of the patient's history were reviewed and updated as appropriate:   He  has no past medical history on file  He  does not have any pertinent problems on file  He  has no past surgical history on file  His family history is not on file  He  reports that he has quit smoking  He has never used smokeless tobacco  He reports that he does not drink alcohol or use drugs    Current Outpatient Prescriptions   Medication Sig Dispense Refill    albuterol (2 5 mg/3 mL) 0 083 % nebulizer solution Inhale      albuterol (VENTOLIN HFA) 90 mcg/act inhaler Inhale 2 puffs every 6 (six) hours as needed      aspirin 81 MG tablet Take by mouth      atorvastatin (LIPITOR) 10 mg tablet Take 1 tablet by mouth daily      diltiazem (CARDIZEM CD) 240 mg 24 hr capsule Take by mouth      fluticasone furoate-vilanterol (BREO ELLIPTA) Inhale      furosemide (LASIX) 20 mg tablet Take 40 mg by mouth daily        insulin degludec (TRESIBA FLEXTOUCH) injection pen 100 units/mL Inject 20 Units under the skin daily 5 pen 5    Insulin Pen Needle (BD PEN NEEDLE MANJEET U/F) 32G X 4 MM MISC by Does not apply route      metFORMIN (GLUCOPHAGE) 1000 MG tablet Take 1 tablet by mouth 2 (two) times a day      metoprolol tartrate (LOPRESSOR) 50 mg tablet Take 1 tablet by mouth 2 (two) times a day      Tiotropium Bromide Monohydrate (SPIRIVA RESPIMAT) 2 5 MCG/ACT AERS Inhale daily      warfarin (COUMADIN) 10 mg tablet TAKE ONE TABLET BY MOUTH ONCE DAILY AS DIRECTED 90 tablet 5    warfarin (COUMADIN) 2 mg tablet Take 1 tablet (2 mg total) by mouth daily 90 tablet 5    metoprolol succinate (TOPROL-XL) 50 mg 24 hr tablet       potassium chloride (K-DUR) 10 mEq tablet        No current facility-administered medications for this visit  Current Outpatient Prescriptions on File Prior to Visit   Medication Sig    albuterol (2 5 mg/3 mL) 0 083 % nebulizer solution Inhale    albuterol (VENTOLIN HFA) 90 mcg/act inhaler Inhale 2 puffs every 6 (six) hours as needed    aspirin 81 MG tablet Take by mouth    atorvastatin (LIPITOR) 10 mg tablet Take 1 tablet by mouth daily    diltiazem (CARDIZEM CD) 240 mg 24 hr capsule Take by mouth    fluticasone furoate-vilanterol (BREO ELLIPTA) Inhale    furosemide (LASIX) 20 mg tablet Take 40 mg by mouth daily      Insulin Pen Needle (BD PEN NEEDLE MANJEET U/F) 32G X 4 MM MISC by Does not apply route    metFORMIN (GLUCOPHAGE) 1000 MG tablet Take 1 tablet by mouth 2 (two) times a day    metoprolol tartrate (LOPRESSOR) 50 mg tablet Take 1 tablet by mouth 2 (two) times a day    Tiotropium Bromide Monohydrate (SPIRIVA RESPIMAT) 2 5 MCG/ACT AERS Inhale daily    warfarin (COUMADIN) 10 mg tablet TAKE ONE TABLET BY MOUTH ONCE DAILY AS DIRECTED    warfarin (COUMADIN) 2 mg tablet Take 1 tablet (2 mg total) by mouth daily    [DISCONTINUED] insulin degludec (TRESIBA FLEXTOUCH) injection pen 100 units/mL Inject 10 Units under the skin daily    [DISCONTINUED] potassium chloride (MICRO-K) 10 MEQ CR capsule Take 1 tablet by mouth daily    [DISCONTINUED] clopidogrel (PLAVIX) 75 mg tablet Take by mouth daily     No current facility-administered medications on file prior to visit        He is allergic to fluticasone-salmeterol       Review of Systems   Constitutional: Negative  Respiratory:        As per HPI   Cardiovascular: Negative  Gastrointestinal: Negative  Endocrine:        As per HPI   Genitourinary: Negative  Objective:    Vitals:    02/15/18 0730   BP: 102/62        Physical Exam   Constitutional: He is oriented to person, place, and time  He appears well-developed and well-nourished  Cardiovascular: Normal rate, regular rhythm and normal heart sounds  Pulmonary/Chest: Effort normal and breath sounds normal    Neurological: He is alert and oriented to person, place, and time  Psychiatric: He has a normal mood and affect   His behavior is normal

## 2018-02-21 ENCOUNTER — HOSPITAL ENCOUNTER (OUTPATIENT)
Dept: CT IMAGING | Facility: HOSPITAL | Age: 53
Discharge: HOME | End: 2018-02-21
Payer: MEDICARE

## 2018-02-21 DIAGNOSIS — R07.9 CHEST PAIN, UNSPECIFIED TYPE: ICD-10-CM

## 2018-02-21 PROCEDURE — 71250 CT THORAX DX C-: CPT

## 2018-03-01 ENCOUNTER — OFFICE VISIT (OUTPATIENT)
Dept: SLEEP CENTER | Facility: CLINIC | Age: 53
End: 2018-03-01

## 2018-03-01 VITALS
BODY MASS INDEX: 40.43 KG/M2 | SYSTOLIC BLOOD PRESSURE: 128 MMHG | WEIGHT: 315 LBS | OXYGEN SATURATION: 97 % | HEIGHT: 74 IN | DIASTOLIC BLOOD PRESSURE: 78 MMHG

## 2018-03-01 DIAGNOSIS — G25.81 RESTLESS LEG SYNDROME: ICD-10-CM

## 2018-03-01 DIAGNOSIS — G47.10 HYPERSOMNIA: ICD-10-CM

## 2018-03-01 DIAGNOSIS — G47.61 PLMD (PERIODIC LIMB MOVEMENT DISORDER): ICD-10-CM

## 2018-03-01 DIAGNOSIS — E66.01 MORBID OBESITY WITH BMI OF 40.0-44.9, ADULT (HCC): ICD-10-CM

## 2018-03-01 DIAGNOSIS — R00.2 PALPITATIONS: ICD-10-CM

## 2018-03-01 DIAGNOSIS — J44.9 COPD MIXED TYPE (HCC): ICD-10-CM

## 2018-03-01 DIAGNOSIS — G47.33 OBSTRUCTIVE SLEEP APNEA: Primary | ICD-10-CM

## 2018-03-01 NOTE — PROGRESS NOTES
Follow-Up Note - Seth Flores  45 y o  male  :1965  SUN:[id number]    CC: I saw this patient for follow-up in clinic today for his Sleep Disordered Breathing, Coexisting Sleep and Medical Problems  Medications, Past, Family & Social History were reviewed  Interval changes notable for None reported  ROS: as attached reviewed & significant for  Paresthesias involving lower extremities and ongoing restless leg symptoms  Nasal and respiratory symptoms are controlled  he has some swelling of his leg and some dyspnea on effort he reports less frequent palpitations  [place] HPI:  With respect to positive airway pressure therapy (PAP) and compliance data  he is using PAP > 4 hours per night  97% of the time  and AHI is  12 5 /hour at pressure of  22/15 cm H2O  This may be explained by a leaks occurring for almost 2 hours during the night  Tip Torres reports:   - some difficulty tolerating PAP;  air leaks from mask  -   benefiting from use   ; sleeping better   -         Sleep Routine:  Tip Torres reports getting sufficient sleep  ; he has no difficulty initiating, but reports diffculty maintaining sleep  He reports interruptions around 3 times a night because of nocturia  He is not aware of jerking movements during sleep  He awakens with the aid of an alarm feeling refreshed He denies excessive drowsiness  rated himself at Total score: 6 /24 on the [place] sleepiness scale  He naps only occasionally  EXAM: Vitals are stable: Blood pressure 128/78, height 6' 1 5" (1 867 m), weight (!) 167 kg (369 lb), SpO2 97 %  Body mass index is 48 02 kg/m²  [place] Organ Neck Circumference: 47 cm  Patient is alert, orientated, cooperative and in no distress  ental state apears normal  here are [no fcial pressure marks or rashes  Pysical findings are essentially unchanged from previous  IMPRESSION:     1  Obstructive sleep apnea  Sleep F/U  - established patient    Cpap DME   2  Restless leg syndrome     3  Hypersomnia     4  Morbid obesity with BMI of 40 0-44 9, adult ([facility] )     5  COPD mixed type ([facility] )     6  Palpitations      Comorbidities as outlined in his problem list    PLAN:  1  Treatment with  PAP is medically necessary and Dominick Jiang is agreable to continue use  2  Pressure settin/15 cm H2O     3  Instruction on care of equipment and strategies to improve comfort with use of PAP were discussed  Care was coordinated with DME provider and prescription to replace supplies as needed was provided  4  Need for compliance with therapy and weight reduction were emphasized  5  I advised stretching and magnesium supplement for the restless leg symptoms  He declined adding a medication for now  If symptoms persist or escalate, and additional medication may be considered  6  With your consent, follow-up is advised in 1 yea or sooner if needed omonitor progress, compliance and to           adjust therapy  Thank you for allowing me to participate in the care of this patient      Sincerely,    Jabari Coyle MD  Board Certified Specialist

## 2018-03-06 DIAGNOSIS — IMO0002 UNCONTROLLED TYPE 2 DIABETES MELLITUS WITH DIABETIC POLYNEUROPATHY, WITH LONG-TERM CURRENT USE OF INSULIN: ICD-10-CM

## 2018-03-23 ENCOUNTER — OFFICE VISIT (OUTPATIENT)
Dept: PULMONOLOGY | Facility: HOSPITAL | Age: 53
End: 2018-03-23
Payer: MEDICARE

## 2018-03-23 VITALS
WEIGHT: 315 LBS | BODY MASS INDEX: 49.23 KG/M2 | OXYGEN SATURATION: 96 % | SYSTOLIC BLOOD PRESSURE: 115 MMHG | HEART RATE: 67 BPM | TEMPERATURE: 97.2 F | DIASTOLIC BLOOD PRESSURE: 65 MMHG

## 2018-03-23 DIAGNOSIS — Z95.2 S/P AVR: ICD-10-CM

## 2018-03-23 DIAGNOSIS — R91.8 MULTIPLE PULMONARY NODULES DETERMINED BY COMPUTED TOMOGRAPHY OF LUNG: ICD-10-CM

## 2018-03-23 DIAGNOSIS — J44.9 COPD MIXED TYPE (HCC): Primary | ICD-10-CM

## 2018-03-23 DIAGNOSIS — G47.33 OBSTRUCTIVE SLEEP APNEA: ICD-10-CM

## 2018-03-23 DIAGNOSIS — J44.1 ACUTE EXACERBATION OF CHRONIC OBSTRUCTIVE BRONCHITIS (HCC): ICD-10-CM

## 2018-03-23 PROCEDURE — 99214 OFFICE O/P EST MOD 30 MIN: CPT | Performed by: INTERNAL MEDICINE

## 2018-03-23 RX ORDER — AZITHROMYCIN 250 MG/1
TABLET, FILM COATED ORAL
Qty: 6 TABLET | Refills: 0 | Status: SHIPPED | OUTPATIENT
Start: 2018-03-23 | End: 2018-03-28

## 2018-03-23 RX ORDER — PREDNISONE 20 MG/1
20 TABLET ORAL DAILY
Qty: 5 TABLET | Refills: 0 | Status: SHIPPED | OUTPATIENT
Start: 2018-03-23 | End: 2018-03-28

## 2018-03-23 RX ORDER — FUROSEMIDE 40 MG/1
1 TABLET ORAL DAILY
COMMUNITY
Start: 2018-03-21 | End: 2018-09-27 | Stop reason: SDUPTHER

## 2018-03-23 RX ORDER — WARFARIN SODIUM 2 MG/1
2 TABLET ORAL
COMMUNITY
End: 2018-03-23 | Stop reason: SDUPTHER

## 2018-03-23 NOTE — PROGRESS NOTES
Assessment/Plan:    Acute exacerbation of chronic obstructive bronchitis (HCC)  Trelegy inhaler 1 puff daily, rinse mouth after each use  This will replace the Spiriva and the Breo since Trelegy has 3 meds in the one inhaler  Prednisone 20 mg X 5 days then stop  This will break up the wheezing  Zithromax pack to reduce secretions  *reduce Coumadin to 10 mg daily for the 5 days that you are on Zithromax*    Multiple pulmonary nodules determined by computed tomography of lung  Repeat CT Chest in 6 months  Return after CT Chest         Problem List Items Addressed This Visit     COPD mixed type ([facility] [address] ) - Primary    Relevant Medications    Fluticasone-Umeclidin-Vilant 100-62 5-25 MCG/INH AEPB    predniSONE 20 mg tablet    azithromycin (ZITHROMAX) 250 mg tablet    Other Relevant Orders    CT chest without contrast    S/P AVR    Obstructive sleep apnea    Acute exacerbation of chronic obstructive bronchitis (HCC)     Trelegy inhaler 1 puff daily, rinse mouth after each use  This will replace the Spiriva and the Breo since Trelegy has 3 meds in the one inhaler  Prednisone 20 mg X 5 days then stop  This will break up the wheezing  Zithromax pack to reduce secretions  *reduce Coumadin to 10 mg daily for the 5 days that you are on Zithromax*         Relevant Medications    Fluticasone-Umeclidin-Vilant 100-62 5-25 MCG/INH AEPB    Multiple pulmonary nodules determined by computed tomography of lung     Repeat CT Chest in 6 months  Return after CT Chest                 Subjective:      Patient ID: April Faye is a 45 y o  male  Pt w/ COPD and pulm calcifications is here for 4 month re evaluation  He is generally stable but ofev the past week noticed increased SOB, soft wheezing and more than his usual sputum but still uncolored  He denies fever, chills, chest pain  He is on disability due to mult medical illnesses and an injured L knee  He had AVR several yrs ago and cardiac function is stable    He has NO increased edema  He also noticed increased abd wall discomfort in the L lateral ant abd without known injury and NO change in appetite or bowel habits  CT scan was reviewed and shows multiple 3-5 mm lung nodules  The following portions of the patient's history were reviewed and updated as appropriate: allergies, current medications, past family history, past medical history, past social history, past surgical history and problem list     Review of Systems   All other systems reviewed and are negative  Objective:      /65 (BP Location: Left arm, Patient Position: Sitting, Cuff Size: Large)   Pulse 67   Temp (!) 97 2 °F (36 2 °C)   Wt (!) 172 kg (378 lb 4 8 oz)   SpO2 96%   BMI 49 23 kg/m²          Physical Exam   Constitutional: He is oriented to person, place, and time  He appears well-developed and well-nourished  No distress  HENT:   Head: Normocephalic  Mouth/Throat: Oropharynx is clear and moist  No oropharyngeal exudate  Eyes: Conjunctivae are normal  Pupils are equal, round, and reactive to light  Neck: Neck supple  No JVD present  No tracheal deviation present  No thyromegaly present  Cardiovascular: Normal rate and regular rhythm  Exam reveals no gallop and no friction rub  No murmur heard  Pulmonary/Chest:   Chest wall is hyper expanded, mid sternotomy is intact  Lungs are diminished w/ inspiratory wheezes at the bases  Cough is non prodouctive   Abdominal: Soft  Bowel sounds are normal  He exhibits no distension  There is no tenderness  There is no rebound and no guarding  Musculoskeletal: Normal range of motion  He exhibits no edema  Neurological: He is alert and oriented to person, place, and time  Skin: Skin is warm and dry  Psychiatric: He has a normal mood and affect  His behavior is normal    Nursing note and vitals reviewed

## 2018-03-23 NOTE — LETTER
March 23, 2018     Aby Durham   [address] [facility] [address]    Patient: Nigel Alvarez   YOB: 1965   Date of Visit: 3/23/2018       Dear Dr Zhou Gore: Thank you for referring Leonila Méndez to me for evaluation  Below are my notes for this consultation  If you have questions, please do not hesitate to call me  I look forward to following your patient along with you  Sincerely,        Scarlet Dumont MD        CC: No Recipients  Scarlet Dumont MD  3/23/2018  1:59 PM  Sign at close encounter  Assessment/Plan:    Acute exacerbation of chronic obstructive bronchitis (HCC)  Trelegy inhaler 1 puff daily, rinse mouth after each use  This will replace the Spiriva and the Breo since Trelegy has 3 meds in the one inhaler  Prednisone 20 mg X 5 days then stop  This will break up the wheezing  Zithromax pack to reduce secretions  *reduce Coumadin to 10 mg daily for the 5 days that you are on Zithromax*    Multiple pulmonary nodules determined by computed tomography of lung  Repeat CT Chest in 6 months  Return after CT Chest         Problem List Items Addressed This Visit     COPD mixed type ([address] ) - Primary    Relevant Medications    Fluticasone-Umeclidin-Vilant 100-62 5-25 MCG/INH AEPB    predniSONE 20 mg tablet    azithromycin (ZITHROMAX) 250 mg tablet    Other Relevant Orders    CT chest without contrast    S/P AVR    Obstructive sleep apnea    Acute exacerbation of chronic obstructive bronchitis (HCC)     Trelegy inhaler 1 puff daily, rinse mouth after each use  This will replace the Spiriva and the Breo since Trelegy has 3 meds in the one inhaler  Prednisone 20 mg X 5 days then stop  This will break up the wheezing  Zithromax pack to reduce secretions      *reduce Coumadin to 10 mg daily for the 5 days that you are on Zithromax*         Relevant Medications    Fluticasone-Umeclidin-Vilant 100-62 5-25 MCG/INH AEPB    Multiple pulmonary nodules determined by computed tomography of lung     Repeat CT Chest in 6 months  Return after CT Chest                 Subjective:      Patient ID: Toribio Magallanes is a 45 y o  male  Pt w/ COPD and pulm calcifications is here for 4 month re evaluation  He is generally stable but ofev the past week noticed increased SOB, soft wheezing and more than his usual sputum but still uncolored  He denies fever, chills, chest pain  He is on disability due to mult medical illnesses and an injured L knee  He had AVR several yrs ago and cardiac function is stable  He has NO increased edema  He also noticed increased abd wall discomfort in the L lateral ant abd without known injury and NO change in appetite or bowel habits  CT scan was reviewed and shows multiple 3-5 mm lung nodules  The following portions of the patient's history were reviewed and updated as appropriate: allergies, current medications, past family history, past medical history, past social history, past surgical history and problem list     Review of Systems   All other systems reviewed and are negative  Objective:      /65 (BP Location: Left arm, Patient Position: Sitting, Cuff Size: Large)   Pulse 67   Temp (!) 97 2 °F (36 2 °C)   Wt (!) 172 kg (378 lb 4 8 oz)   SpO2 96%   BMI 49 23 kg/m²           Physical Exam   Constitutional: He is oriented to person, place, and time  He appears well-developed and well-nourished  No distress  HENT:   Head: Normocephalic  Mouth/Throat: Oropharynx is clear and moist  No oropharyngeal exudate  Eyes: Conjunctivae are normal  Pupils are equal, round, and reactive to light  Neck: Neck supple  No JVD present  No tracheal deviation present  No thyromegaly present  Cardiovascular: Normal rate and regular rhythm  Exam reveals no gallop and no friction rub  No murmur heard  Pulmonary/Chest:   Chest wall is hyper expanded, mid sternotomy is intact  Lungs are diminished w/ inspiratory wheezes at the bases    Cough is non prodouctive   Abdominal: Soft  Bowel sounds are normal  He exhibits no distension  There is no tenderness  There is no rebound and no guarding  Musculoskeletal: Normal range of motion  He exhibits no edema  Neurological: He is alert and oriented to person, place, and time  Skin: Skin is warm and dry  Psychiatric: He has a normal mood and affect  His behavior is normal    Nursing note and vitals reviewed

## 2018-03-23 NOTE — PATIENT INSTRUCTIONS
Acute exacerbation of chronic obstructive bronchitis (HCC)  Trelegy inhaler 1 puff daily, rinse mouth after each use  This will replace the Spiriva and the Breo since Trelegy has 3 meds in the one inhaler  Prednisone 20 mg X 5 days then stop  This will break up the wheezing  Zithromax pack to reduce secretions  *reduce Coumadin to 10 mg daily for the 5 days that you are on Zithromax*    Multiple pulmonary nodules determined by computed tomography of lung  Repeat CT Chest in 6 months    Return after CT Chest

## 2018-03-23 NOTE — ASSESSMENT & PLAN NOTE
Trelegy inhaler 1 puff daily, rinse mouth after each use  This will replace the Spiriva and the Breo since Trelegy has 3 meds in the one inhaler  Prednisone 20 mg X 5 days then stop  This will break up the wheezing  Zithromax pack to reduce secretions      *reduce Coumadin to 10 mg daily for the 5 days that you are on Zithromax*

## 2018-03-26 ENCOUNTER — TRANSCRIBE ORDERS (OUTPATIENT)
Dept: ADMINISTRATIVE | Facility: HOSPITAL | Age: 53
End: 2018-03-26

## 2018-03-26 ENCOUNTER — LAB (OUTPATIENT)
Dept: LAB | Facility: HOSPITAL | Age: 53
End: 2018-03-26
Payer: MEDICARE

## 2018-03-26 ENCOUNTER — ANTICOAG VISIT (OUTPATIENT)
Dept: FAMILY MEDICINE CLINIC | Facility: CLINIC | Age: 53
End: 2018-03-26

## 2018-03-26 DIAGNOSIS — I48.91 ATRIAL FIBRILLATION, UNSPECIFIED TYPE (HCC): Primary | ICD-10-CM

## 2018-03-26 DIAGNOSIS — I48.91 ATRIAL FIBRILLATION, UNSPECIFIED TYPE (HCC): ICD-10-CM

## 2018-03-26 LAB
INR PPP: 2.72 (ref 0.86–1.16)
PROTHROMBIN TIME: 29 SECONDS (ref 12.1–14.4)

## 2018-03-26 PROCEDURE — 85610 PROTHROMBIN TIME: CPT

## 2018-03-26 PROCEDURE — 36415 COLL VENOUS BLD VENIPUNCTURE: CPT

## 2018-05-02 ENCOUNTER — ANTICOAG VISIT (OUTPATIENT)
Dept: FAMILY MEDICINE CLINIC | Facility: CLINIC | Age: 53
End: 2018-05-02

## 2018-05-02 ENCOUNTER — APPOINTMENT (OUTPATIENT)
Dept: LAB | Facility: HOSPITAL | Age: 53
End: 2018-05-02
Payer: MEDICARE

## 2018-05-02 ENCOUNTER — TRANSCRIBE ORDERS (OUTPATIENT)
Dept: ADMINISTRATIVE | Facility: HOSPITAL | Age: 53
End: 2018-05-02

## 2018-05-02 DIAGNOSIS — I48.91 ATRIAL FIBRILLATION, UNSPECIFIED TYPE (HCC): ICD-10-CM

## 2018-05-02 DIAGNOSIS — I48.91 ATRIAL FIBRILLATION, UNSPECIFIED TYPE (HCC): Primary | ICD-10-CM

## 2018-05-02 LAB
INR PPP: 2.22 (ref 0.86–1.16)
PROTHROMBIN TIME: 24.7 SECONDS (ref 12.1–14.4)

## 2018-05-02 PROCEDURE — 85610 PROTHROMBIN TIME: CPT

## 2018-05-02 PROCEDURE — 36415 COLL VENOUS BLD VENIPUNCTURE: CPT

## 2018-05-17 ENCOUNTER — APPOINTMENT (OUTPATIENT)
Dept: LAB | Facility: HOSPITAL | Age: 53
End: 2018-05-17
Payer: MEDICARE

## 2018-05-17 ENCOUNTER — TRANSCRIBE ORDERS (OUTPATIENT)
Dept: ADMINISTRATIVE | Facility: HOSPITAL | Age: 53
End: 2018-05-17

## 2018-05-17 ENCOUNTER — ANTICOAG VISIT (OUTPATIENT)
Dept: FAMILY MEDICINE CLINIC | Facility: CLINIC | Age: 53
End: 2018-05-17

## 2018-05-17 DIAGNOSIS — I48.20 CHRONIC ATRIAL FIBRILLATION (HCC): Primary | ICD-10-CM

## 2018-05-17 DIAGNOSIS — E78.49 OTHER HYPERLIPIDEMIA: Primary | ICD-10-CM

## 2018-05-17 DIAGNOSIS — I48.20 CHRONIC ATRIAL FIBRILLATION (HCC): ICD-10-CM

## 2018-05-17 LAB
INR PPP: 2.63 (ref 0.86–1.16)
PROTHROMBIN TIME: 28.2 SECONDS (ref 11.8–14.2)

## 2018-05-17 PROCEDURE — 36415 COLL VENOUS BLD VENIPUNCTURE: CPT

## 2018-05-17 PROCEDURE — 85610 PROTHROMBIN TIME: CPT

## 2018-05-17 RX ORDER — ATORVASTATIN CALCIUM 10 MG/1
10 TABLET, FILM COATED ORAL DAILY
Qty: 90 TABLET | Refills: 3 | Status: SHIPPED | OUTPATIENT
Start: 2018-05-17 | End: 2019-05-13 | Stop reason: SDUPTHER

## 2018-05-29 ENCOUNTER — TELEPHONE (OUTPATIENT)
Dept: PULMONOLOGY | Facility: CLINIC | Age: 53
End: 2018-05-29

## 2018-05-29 DIAGNOSIS — J44.9 COPD MIXED TYPE (HCC): ICD-10-CM

## 2018-05-29 NOTE — TELEPHONE ENCOUNTER
Patient called stating that he needs refills in his trilogy 100-62 5  Patient stated there was a problem with his last script that was corrected via phone but now he has no refills   Please call patient to advise if refills can be sent to [facility]

## 2018-06-12 ENCOUNTER — APPOINTMENT (OUTPATIENT)
Dept: LAB | Facility: HOSPITAL | Age: 53
End: 2018-06-12
Payer: MEDICARE

## 2018-06-12 ENCOUNTER — ANTICOAG VISIT (OUTPATIENT)
Dept: FAMILY MEDICINE CLINIC | Facility: CLINIC | Age: 53
End: 2018-06-12

## 2018-06-12 DIAGNOSIS — I48.20 CHRONIC ATRIAL FIBRILLATION (HCC): ICD-10-CM

## 2018-06-12 DIAGNOSIS — IMO0002 UNCONTROLLED TYPE 2 DIABETES MELLITUS WITH DIABETIC POLYNEUROPATHY, WITH LONG-TERM CURRENT USE OF INSULIN: ICD-10-CM

## 2018-06-12 DIAGNOSIS — I10 ESSENTIAL HYPERTENSION: ICD-10-CM

## 2018-06-12 LAB
ALBUMIN SERPL BCP-MCNC: 3.8 G/DL (ref 3.5–5)
ALP SERPL-CCNC: 82 U/L (ref 46–116)
ALT SERPL W P-5'-P-CCNC: 28 U/L (ref 12–78)
ANION GAP SERPL CALCULATED.3IONS-SCNC: 11 MMOL/L (ref 4–13)
AST SERPL W P-5'-P-CCNC: 22 U/L (ref 5–45)
BILIRUB SERPL-MCNC: 0.6 MG/DL (ref 0.2–1)
BUN SERPL-MCNC: 10 MG/DL (ref 5–25)
CALCIUM SERPL-MCNC: 8.6 MG/DL (ref 8.3–10.1)
CHLORIDE SERPL-SCNC: 100 MMOL/L (ref 100–108)
CHOLEST SERPL-MCNC: 111 MG/DL (ref 50–200)
CO2 SERPL-SCNC: 25 MMOL/L (ref 21–32)
CREAT SERPL-MCNC: 0.8 MG/DL (ref 0.6–1.3)
EST. AVERAGE GLUCOSE BLD GHB EST-MCNC: 183 MG/DL
GFR SERPL CREATININE-BSD FRML MDRD: 101 ML/MIN/1.73SQ M
GLUCOSE P FAST SERPL-MCNC: 137 MG/DL (ref 65–99)
HBA1C MFR BLD: 8 % (ref 4.2–6.3)
HDLC SERPL-MCNC: 30 MG/DL (ref 40–60)
INR PPP: 3.66 (ref 0.86–1.17)
INTERNATIONAL NORMALIZATION RATIO, POC: 3.66
LDLC SERPL CALC-MCNC: 56 MG/DL (ref 0–100)
POTASSIUM SERPL-SCNC: 4.2 MMOL/L (ref 3.5–5.3)
PROT SERPL-MCNC: 7.1 G/DL (ref 6.4–8.2)
PROTHROMBIN TIME: 34.7 SECONDS (ref 11.8–14.2)
SODIUM SERPL-SCNC: 136 MMOL/L (ref 136–145)
TRIGL SERPL-MCNC: 125 MG/DL

## 2018-06-12 PROCEDURE — 83036 HEMOGLOBIN GLYCOSYLATED A1C: CPT

## 2018-06-12 PROCEDURE — 80053 COMPREHEN METABOLIC PANEL: CPT

## 2018-06-12 PROCEDURE — 85610 PROTHROMBIN TIME: CPT

## 2018-06-12 PROCEDURE — 80061 LIPID PANEL: CPT

## 2018-06-12 PROCEDURE — 36415 COLL VENOUS BLD VENIPUNCTURE: CPT

## 2018-06-18 ENCOUNTER — OFFICE VISIT (OUTPATIENT)
Dept: FAMILY MEDICINE CLINIC | Facility: CLINIC | Age: 53
End: 2018-06-18
Payer: MEDICARE

## 2018-06-18 VITALS
BODY MASS INDEX: 40.43 KG/M2 | WEIGHT: 315 LBS | HEIGHT: 74 IN | DIASTOLIC BLOOD PRESSURE: 68 MMHG | SYSTOLIC BLOOD PRESSURE: 134 MMHG

## 2018-06-18 DIAGNOSIS — G89.29 CHRONIC PAIN OF LEFT KNEE: ICD-10-CM

## 2018-06-18 DIAGNOSIS — IMO0002 UNCONTROLLED TYPE 2 DIABETES MELLITUS WITH DIABETIC POLYNEUROPATHY, WITH LONG-TERM CURRENT USE OF INSULIN: Primary | ICD-10-CM

## 2018-06-18 DIAGNOSIS — E78.49 OTHER HYPERLIPIDEMIA: ICD-10-CM

## 2018-06-18 DIAGNOSIS — M25.562 CHRONIC PAIN OF LEFT KNEE: ICD-10-CM

## 2018-06-18 DIAGNOSIS — I10 ESSENTIAL HYPERTENSION: ICD-10-CM

## 2018-06-18 PROCEDURE — 99213 OFFICE O/P EST LOW 20 MIN: CPT | Performed by: FAMILY MEDICINE

## 2018-06-18 NOTE — PROGRESS NOTES
Assessment/Plan:   reviewed his blood work  Patient will continue same medications  We will check blood work on him before his next visit in 4 months  Does not wish to follow up with Orthopedics yet for his knee pain  No problem-specific Assessment & Plan notes found for this encounter  Diagnoses and all orders for this visit:    Uncontrolled type 2 diabetes mellitus with diabetic polyneuropathy, with long-term current use of insulin (HCC)  -     Hemoglobin A1C; Future  -     Microalbumin / creatinine urine ratio; Future    Essential hypertension    Other hyperlipidemia    Chronic pain of left knee          Subjective:      Patient ID: Miky Vera is a 45 y o  male  Patient here today for follow-up  Patient denies any chest pain,  Still gets short of breath with exertion  Denies any nausea or vomiting  Patient still suffering from knee pain, especially on the left knee that he injured at work  Reviewed his blood work, his hemoglobin A1c came down to 8  His LDL is good at 56  Diabetes   He presents for his follow-up diabetic visit  He has type 2 diabetes mellitus  His disease course has been improving  There are no hypoglycemic associated symptoms  There are no diabetic associated symptoms  There are no hypoglycemic complications  There are no diabetic complications  Risk factors for coronary artery disease include diabetes mellitus, dyslipidemia, obesity, male sex, hypertension and sedentary lifestyle  Current diabetic treatment includes insulin injections and oral agent (monotherapy)  He is compliant with treatment all of the time  His weight is stable  He is following a generally healthy diet  He never participates in exercise  His home blood glucose trend is decreasing steadily  An ACE inhibitor/angiotensin II receptor blocker is not being taken  Eye exam is current  Hyperlipidemia   This is a chronic problem  The problem is controlled   Recent lipid tests were reviewed and are normal  There are no known factors aggravating his hyperlipidemia  Current antihyperlipidemic treatment includes statins  The current treatment provides significant improvement of lipids  There are no compliance problems  Risk factors for coronary artery disease include diabetes mellitus, dyslipidemia, hypertension, male sex, a sedentary lifestyle and obesity  Hypertension   This is a chronic problem  The problem is unchanged  The problem is controlled  There are no associated agents to hypertension  Risk factors for coronary artery disease include diabetes mellitus, dyslipidemia, male gender, obesity and sedentary lifestyle  Past treatments include beta blockers and diuretics  The current treatment provides moderate improvement  There are no compliance problems  The following portions of the patient's history were reviewed and updated as appropriate:   He  has no past medical history on file  He   Patient Active Problem List    Diagnosis Date Noted    Chronic pain of left knee 06/18/2018    Acute exacerbation of chronic obstructive bronchitis ([place] [address] ) 03/23/2018    Multiple pulmonary nodules determined by computed tomography of lung 03/23/2018    Restless leg syndrome 03/01/2018    Hypersomnia 03/01/2018    Morbid obesity with BMI of 40 0-44 9, adult ([place] [address] ) 03/01/2018    PLMD (periodic limb movement disorder) 03/01/2018    Palpitations 03/01/2018    Aortic stenosis 10/18/2016    S/P AVR 10/10/2016    Uncontrolled type 2 diabetes mellitus with diabetic polyneuropathy, with long-term current use of insulin ([place] [address] ) 03/04/2016    Asthma 08/21/2012    Atrial fibrillation ([place] [address] ) 08/21/2012    COPD mixed type ([place] [address] ) 08/21/2012    Other hyperlipidemia 08/21/2012    Essential hypertension 08/21/2012    Obstructive sleep apnea 08/21/2012     He  has no past surgical history on file  His family history is not on file  He  reports that he quit smoking about 20 months ago  His smoking use included Cigarettes   He has never used smokeless tobacco  He reports that he does not drink alcohol or use drugs  Current Outpatient Prescriptions   Medication Sig Dispense Refill    albuterol (2 5 mg/3 mL) 0 083 % nebulizer solution Inhale      albuterol (VENTOLIN HFA) 90 mcg/act inhaler Inhale 2 puffs every 6 (six) hours as needed      aspirin 81 MG tablet Take by mouth      atorvastatin (LIPITOR) 10 mg tablet Take 1 tablet (10 mg total) by mouth daily 90 tablet 3    diltiazem (CARDIZEM CD) 240 mg 24 hr capsule Take 240 mg by mouth 2 (two) times a day        Fluticasone-Umeclidin-Vilant 100-62 5-25 MCG/INH AEPB Inhale 1 puff daily 1 each 11    furosemide (LASIX) 40 mg tablet Take 1 tablet by mouth daily      insulin degludec (TRESIBA FLEXTOUCH) injection pen 100 units/mL Inject 20 Units under the skin daily 5 pen 3    Insulin Pen Needle (BD PEN NEEDLE MANJEET U/F) 32G X 4 MM MISC by Does not apply route      metFORMIN (GLUCOPHAGE) 1000 MG tablet Take 1 tablet by mouth 2 (two) times a day      metoprolol tartrate (LOPRESSOR) 50 mg tablet Take 1 tablet by mouth 2 (two) times a day      potassium chloride (K-DUR) 10 mEq tablet       warfarin (COUMADIN) 10 mg tablet TAKE ONE TABLET BY MOUTH ONCE DAILY AS DIRECTED 90 tablet 5    warfarin (COUMADIN) 2 mg tablet Take 1 tablet (2 mg total) by mouth daily 90 tablet 5     No current facility-administered medications for this visit        Current Outpatient Prescriptions on File Prior to Visit   Medication Sig    albuterol (2 5 mg/3 mL) 0 083 % nebulizer solution Inhale    albuterol (VENTOLIN HFA) 90 mcg/act inhaler Inhale 2 puffs every 6 (six) hours as needed    aspirin 81 MG tablet Take by mouth    atorvastatin (LIPITOR) 10 mg tablet Take 1 tablet (10 mg total) by mouth daily    diltiazem (CARDIZEM CD) 240 mg 24 hr capsule Take 240 mg by mouth 2 (two) times a day      Fluticasone-Umeclidin-Vilant 100-62 5-25 MCG/INH AEPB Inhale 1 puff daily    furosemide (LASIX) 40 mg tablet Take 1 tablet by mouth daily    insulin degludec (TRESIBA FLEXTOUCH) injection pen 100 units/mL Inject 20 Units under the skin daily    Insulin Pen Needle (BD PEN NEEDLE MANJEET U/F) 32G X 4 MM MISC by Does not apply route    metFORMIN (GLUCOPHAGE) 1000 MG tablet Take 1 tablet by mouth 2 (two) times a day    metoprolol tartrate (LOPRESSOR) 50 mg tablet Take 1 tablet by mouth 2 (two) times a day    potassium chloride (K-DUR) 10 mEq tablet     warfarin (COUMADIN) 10 mg tablet TAKE ONE TABLET BY MOUTH ONCE DAILY AS DIRECTED    warfarin (COUMADIN) 2 mg tablet Take 1 tablet (2 mg total) by mouth daily     No current facility-administered medications on file prior to visit  He is allergic to fluticasone-salmeterol       Review of Systems   Constitutional: Negative  Respiratory: Negative  Cardiovascular: Negative  Gastrointestinal: Negative  Endocrine:        As per HPI   Genitourinary: Negative  Musculoskeletal: Positive for arthralgias ( left knee)  Objective:      /68   Ht 6' 1 5" (1 867 m)   Wt (!) 180 kg (397 lb)   BMI 51 67 kg/m²          Physical Exam   Constitutional: He is oriented to person, place, and time  He appears well-developed and well-nourished  No distress  Cardiovascular: Normal rate, regular rhythm and normal heart sounds  Exam reveals no gallop and no friction rub  No murmur heard  Pulmonary/Chest: Effort normal and breath sounds normal  No respiratory distress  He has no wheezes  He has no rales  Musculoskeletal: He exhibits edema ( mild ankle edema bilaterally ) and tenderness (  Left knee)  Neurological: He is alert and oriented to person, place, and time  Skin: He is not diaphoretic  Psychiatric: He has a normal mood and affect  His behavior is normal  Judgment and thought content normal    Vitals reviewed

## 2018-06-27 ENCOUNTER — TRANSCRIBE ORDERS (OUTPATIENT)
Dept: ADMINISTRATIVE | Facility: HOSPITAL | Age: 53
End: 2018-06-27

## 2018-06-27 ENCOUNTER — APPOINTMENT (OUTPATIENT)
Dept: LAB | Facility: HOSPITAL | Age: 53
End: 2018-06-27
Payer: MEDICARE

## 2018-06-27 DIAGNOSIS — I48.91 ATRIAL FIBRILLATION, UNSPECIFIED TYPE (HCC): ICD-10-CM

## 2018-06-27 DIAGNOSIS — I48.91 ATRIAL FIBRILLATION, UNSPECIFIED TYPE (HCC): Primary | ICD-10-CM

## 2018-06-27 LAB
INR PPP: 3.25 (ref 0.86–1.17)
PROTHROMBIN TIME: 31.7 SECONDS (ref 11.8–14.2)

## 2018-06-27 PROCEDURE — 36415 COLL VENOUS BLD VENIPUNCTURE: CPT

## 2018-06-27 PROCEDURE — 85610 PROTHROMBIN TIME: CPT

## 2018-06-28 ENCOUNTER — ANTICOAG VISIT (OUTPATIENT)
Dept: FAMILY MEDICINE CLINIC | Facility: CLINIC | Age: 53
End: 2018-06-28

## 2018-07-02 DIAGNOSIS — E11.8 TYPE 2 DIABETES MELLITUS WITH COMPLICATION, UNSPECIFIED WHETHER LONG TERM INSULIN USE: Primary | ICD-10-CM

## 2018-07-20 ENCOUNTER — TRANSCRIBE ORDERS (OUTPATIENT)
Dept: ADMINISTRATIVE | Facility: HOSPITAL | Age: 53
End: 2018-07-20

## 2018-07-20 ENCOUNTER — LAB (OUTPATIENT)
Dept: LAB | Facility: HOSPITAL | Age: 53
End: 2018-07-20
Payer: MEDICARE

## 2018-07-20 ENCOUNTER — ANTICOAG VISIT (OUTPATIENT)
Dept: FAMILY MEDICINE CLINIC | Facility: CLINIC | Age: 53
End: 2018-07-20

## 2018-07-20 DIAGNOSIS — I48.91 ATRIAL FIBRILLATION, UNSPECIFIED TYPE (HCC): ICD-10-CM

## 2018-07-20 DIAGNOSIS — I48.91 ATRIAL FIBRILLATION, UNSPECIFIED TYPE (HCC): Primary | ICD-10-CM

## 2018-07-20 LAB
INR PPP: 3.24 (ref 0.86–1.17)
PROTHROMBIN TIME: 31.6 SECONDS (ref 11.8–14.2)

## 2018-07-20 PROCEDURE — 85610 PROTHROMBIN TIME: CPT

## 2018-07-20 PROCEDURE — 36415 COLL VENOUS BLD VENIPUNCTURE: CPT

## 2018-07-30 DIAGNOSIS — I48.91 ATRIAL FIBRILLATION, UNSPECIFIED TYPE (HCC): ICD-10-CM

## 2018-07-30 RX ORDER — WARFARIN SODIUM 2 MG/1
2 TABLET ORAL
Qty: 90 TABLET | Refills: 5 | Status: SHIPPED | OUTPATIENT
Start: 2018-07-30 | End: 2019-08-13 | Stop reason: SDUPTHER

## 2018-08-20 DIAGNOSIS — E11.8 TYPE 2 DIABETES MELLITUS WITH COMPLICATION, UNSPECIFIED WHETHER LONG TERM INSULIN USE: ICD-10-CM

## 2018-08-22 ENCOUNTER — LAB (OUTPATIENT)
Dept: LAB | Facility: HOSPITAL | Age: 53
End: 2018-08-22
Payer: MEDICARE

## 2018-08-22 ENCOUNTER — TRANSCRIBE ORDERS (OUTPATIENT)
Dept: ADMINISTRATIVE | Facility: HOSPITAL | Age: 53
End: 2018-08-22

## 2018-08-22 ENCOUNTER — ANTICOAG VISIT (OUTPATIENT)
Dept: FAMILY MEDICINE CLINIC | Facility: CLINIC | Age: 53
End: 2018-08-22

## 2018-08-22 DIAGNOSIS — I48.91 ATRIAL FIBRILLATION, UNSPECIFIED TYPE (HCC): ICD-10-CM

## 2018-08-22 DIAGNOSIS — I48.91 ATRIAL FIBRILLATION, UNSPECIFIED TYPE (HCC): Primary | ICD-10-CM

## 2018-08-22 LAB
INR PPP: 3.06 (ref 0.86–1.17)
PROTHROMBIN TIME: 30.2 SECONDS (ref 11.8–14.2)

## 2018-08-22 PROCEDURE — 36415 COLL VENOUS BLD VENIPUNCTURE: CPT

## 2018-08-22 PROCEDURE — 85610 PROTHROMBIN TIME: CPT

## 2018-08-30 DIAGNOSIS — I10 HYPERTENSION, UNSPECIFIED TYPE: Primary | ICD-10-CM

## 2018-08-30 RX ORDER — DILTIAZEM HYDROCHLORIDE 240 MG/1
240 CAPSULE, COATED, EXTENDED RELEASE ORAL 2 TIMES DAILY
Qty: 180 CAPSULE | Refills: 3 | Status: SHIPPED | OUTPATIENT
Start: 2018-08-30 | End: 2019-08-28 | Stop reason: SDUPTHER

## 2018-08-30 NOTE — TELEPHONE ENCOUNTER
----- Message from [facility] Antolin PUENTES sent at 8/30/2018  8:11 AM EDT -----  Regarding: medication  Contact: [phone]  Needs Cartia XT 240mg  pills with 3 refills sent to [facility]   Dr Katherin Lambert

## 2018-09-05 ENCOUNTER — HOSPITAL ENCOUNTER (OUTPATIENT)
Dept: CT IMAGING | Facility: HOSPITAL | Age: 53
Discharge: HOME | End: 2018-09-05
Attending: INTERNAL MEDICINE
Payer: MEDICARE

## 2018-09-05 DIAGNOSIS — J44.9 COPD MIXED TYPE (HCC): ICD-10-CM

## 2018-09-05 PROCEDURE — 71250 CT THORAX DX C-: CPT

## 2018-09-17 ENCOUNTER — OFFICE VISIT (OUTPATIENT)
Dept: PULMONOLOGY | Facility: HOSPITAL | Age: 53
End: 2018-09-17
Payer: MEDICARE

## 2018-09-17 VITALS
OXYGEN SATURATION: 96 % | BODY MASS INDEX: 41.75 KG/M2 | DIASTOLIC BLOOD PRESSURE: 82 MMHG | WEIGHT: 315 LBS | HEART RATE: 95 BPM | SYSTOLIC BLOOD PRESSURE: 140 MMHG | HEIGHT: 73 IN

## 2018-09-17 DIAGNOSIS — J30.1 SEASONAL ALLERGIC RHINITIS DUE TO POLLEN: ICD-10-CM

## 2018-09-17 DIAGNOSIS — J44.9 COPD MIXED TYPE (HCC): Primary | ICD-10-CM

## 2018-09-17 PROCEDURE — 99214 OFFICE O/P EST MOD 30 MIN: CPT | Performed by: INTERNAL MEDICINE

## 2018-09-17 RX ORDER — FLUTICASONE PROPIONATE 50 MCG
2 SPRAY, SUSPENSION (ML) NASAL DAILY
Qty: 16 G | Refills: 3 | Status: SHIPPED | OUTPATIENT
Start: 2018-09-17 | End: 2021-02-05 | Stop reason: ALTCHOICE

## 2018-09-17 NOTE — ASSESSMENT & PLAN NOTE
Continue Trelegy  Call if wheezing and SOB increases  Get a flu shot in the next month or so    Check PFT in 6 months for baseline

## 2018-09-17 NOTE — LETTER
September 17, 2018     Santino Larkin DO  [address] [facility] [address]    Patient: Kathryn Winkler   YOB: 1965   Date of Visit: 9/17/2018       Dear Dr Estevan Malik: Thank you for referring Marian Brito to me for evaluation  Below are my notes for this consultation  If you have questions, please do not hesitate to call me  I look forward to following your patient along with you  Sincerely,        Gerri Viveros MD        CC: No Recipients  Gerri Viveros MD  9/17/2018  9:56 AM  Sign at close encounter  Assessment/Plan:    COPD mixed type ([address] )  Continue Trelegy  Call if wheezing and SOB increases  Get a flu shot in the next month or so  Check PFT in 6 months for baseline    Seasonal allergic rhinitis due to pollen  OTC antihistamines  Benadryl is the best antihistamine but may cause drowsiness  Newer antihistamines are less sedating and include Claritin, Allegra and Zyrtec  You can take any OTC antihistamine that works best for you  Flonase 2 sprays each nostril for nasal congestion  Avoid decongestants  Diagnoses and all orders for this visit:    COPD mixed type ([address] )  -     Pulmonary function test; Future    Seasonal allergic rhinitis due to pollen  -     fluticasone (FLONASE) 50 mcg/act nasal spray; 2 sprays into each nostril daily          Subjective:      Patient ID: Kathryn Winkler is a 52 y o  male  Pt w/ COPD and pulmonary nodules is here for 6 month review  He is better than last visit but is having allergy symptoms w/ nasal congestion and rhinorrhea  Trelegy is working well for him  CT chest done to evaluate nodules shows resolution of the new nodules  He is being treated for THIEN by Dr Marlyn Kennedy          The following portions of the patient's history were reviewed and updated as appropriate: allergies, current medications, past family history, past medical history, past social history, past surgical history and problem list     Review of Systems Constitutional: Negative  HENT: Positive for congestion, postnasal drip, rhinorrhea and sneezing  Eyes: Negative  Respiratory:        Still w/ SOBOE but minimal wheezing  Cardiovascular: Negative  Gastrointestinal: Negative  Objective:      /82 (BP Location: Left arm, Patient Position: Sitting)   Pulse 95   Ht 6' 1" (1 854 m)   Wt (!) 182 kg (402 lb)   SpO2 96%   BMI 53 04 kg/m²           Physical Exam   Constitutional: He is oriented to person, place, and time  He appears well-developed and well-nourished  No distress  HENT:   Head: Normocephalic  Mouth/Throat: Oropharynx is clear and moist  No oropharyngeal exudate  Eyes: Pupils are equal, round, and reactive to light  No scleral icterus  Neck: Normal range of motion  Neck supple  No JVD present  No thyromegaly present  Cardiovascular: Normal rate and regular rhythm  Exam reveals no gallop and no friction rub  No murmur heard  Pulmonary/Chest: Effort normal    Generally diminished BS w/ soft end exp wheezes at the bases   Abdominal: Soft  He exhibits no distension  There is no tenderness  Musculoskeletal: He exhibits no edema  Neurological: He is alert and oriented to person, place, and time  Psychiatric: He has a normal mood and affect  Vitals reviewed

## 2018-09-17 NOTE — PATIENT INSTRUCTIONS
COPD mixed type ([address] )  Continue Trelegy  Call if wheezing and SOB increases  Get a flu shot in the next month or so  Check PFT in 6 months for baseline    Seasonal allergic rhinitis due to pollen  OTC antihistamines  Benadryl is the best antihistamine but may cause drowsiness  Newer antihistamines are less sedating and include Claritin, Allegra and Zyrtec  You can take any OTC antihistamine that works best for you  Flonase 2 sprays each nostril for nasal congestion  Avoid decongestants

## 2018-09-17 NOTE — PROGRESS NOTES
Assessment/Plan:    COPD mixed type ([address] )  Continue Trelegy  Call if wheezing and SOB increases  Get a flu shot in the next month or so  Check PFT in 6 months for baseline    Seasonal allergic rhinitis due to pollen  OTC antihistamines  Benadryl is the best antihistamine but may cause drowsiness  Newer antihistamines are less sedating and include Claritin, Allegra and Zyrtec  You can take any OTC antihistamine that works best for you  Flonase 2 sprays each nostril for nasal congestion  Avoid decongestants  Diagnoses and all orders for this visit:    COPD mixed type ([address] )  -     Pulmonary function test; Future    Seasonal allergic rhinitis due to pollen  -     fluticasone (FLONASE) 50 mcg/act nasal spray; 2 sprays into each nostril daily          Subjective:      Patient ID: Michele Martin is a 52 y o  male  Pt w/ COPD and pulmonary nodules is here for 6 month review  He is better than last visit but is having allergy symptoms w/ nasal congestion and rhinorrhea  Trelegy is working well for him  CT chest done to evaluate nodules shows resolution of the new nodules  He is being treated for THIEN by Dr Hao Stubbs  The following portions of the patient's history were reviewed and updated as appropriate: allergies, current medications, past family history, past medical history, past social history, past surgical history and problem list     Review of Systems   Constitutional: Negative  HENT: Positive for congestion, postnasal drip, rhinorrhea and sneezing  Eyes: Negative  Respiratory:        Still w/ SOBOE but minimal wheezing  Cardiovascular: Negative  Gastrointestinal: Negative  Objective:      /82 (BP Location: Left arm, Patient Position: Sitting)   Pulse 95   Ht 6' 1" (1 854 m)   Wt (!) 182 kg (402 lb)   SpO2 96%   BMI 53 04 kg/m²          Physical Exam   Constitutional: He is oriented to person, place, and time  He appears well-developed and well-nourished   No distress  HENT:   Head: Normocephalic  Mouth/Throat: Oropharynx is clear and moist  No oropharyngeal exudate  Eyes: Pupils are equal, round, and reactive to light  No scleral icterus  Neck: Normal range of motion  Neck supple  No JVD present  No thyromegaly present  Cardiovascular: Normal rate and regular rhythm  Exam reveals no gallop and no friction rub  No murmur heard  Pulmonary/Chest: Effort normal    Generally diminished BS w/ soft end exp wheezes at the bases   Abdominal: Soft  He exhibits no distension  There is no tenderness  Musculoskeletal: He exhibits no edema  Neurological: He is alert and oriented to person, place, and time  Psychiatric: He has a normal mood and affect  Vitals reviewed

## 2018-09-17 NOTE — ASSESSMENT & PLAN NOTE
OTC antihistamines  Benadryl is the best antihistamine but may cause drowsiness  Newer antihistamines are less sedating and include Claritin, Allegra and Zyrtec  You can take any OTC antihistamine that works best for you  Flonase 2 sprays each nostril for nasal congestion  Avoid decongestants

## 2018-09-27 ENCOUNTER — TELEPHONE (OUTPATIENT)
Dept: FAMILY MEDICINE CLINIC | Facility: CLINIC | Age: 53
End: 2018-09-27

## 2018-09-27 DIAGNOSIS — IMO0002 UNCONTROLLED TYPE 2 DIABETES MELLITUS WITH DIABETIC POLYNEUROPATHY, WITH LONG-TERM CURRENT USE OF INSULIN: Primary | ICD-10-CM

## 2018-09-27 DIAGNOSIS — I48.91 ATRIAL FIBRILLATION, UNSPECIFIED TYPE (HCC): ICD-10-CM

## 2018-09-27 DIAGNOSIS — I25.10 CORONARY ARTERY DISEASE INVOLVING NATIVE CORONARY ARTERY OF NATIVE HEART WITHOUT ANGINA PECTORIS: Primary | ICD-10-CM

## 2018-09-27 DIAGNOSIS — I42.8 OTHER CARDIOMYOPATHY (HCC): ICD-10-CM

## 2018-09-27 RX ORDER — FUROSEMIDE 40 MG/1
40 TABLET ORAL DAILY
Qty: 90 TABLET | Refills: 3 | Status: SHIPPED | OUTPATIENT
Start: 2018-09-27 | End: 2019-09-30 | Stop reason: SDUPTHER

## 2018-10-04 ENCOUNTER — APPOINTMENT (OUTPATIENT)
Dept: LAB | Facility: HOSPITAL | Age: 53
End: 2018-10-04
Payer: MEDICARE

## 2018-10-04 ENCOUNTER — ANTICOAG VISIT (OUTPATIENT)
Dept: FAMILY MEDICINE CLINIC | Facility: CLINIC | Age: 53
End: 2018-10-04

## 2018-10-04 DIAGNOSIS — E11.8 TYPE 2 DIABETES MELLITUS WITH COMPLICATION, UNSPECIFIED WHETHER LONG TERM INSULIN USE: ICD-10-CM

## 2018-10-04 LAB
CREAT UR-MCNC: 193 MG/DL
EST. AVERAGE GLUCOSE BLD GHB EST-MCNC: 177 MG/DL
HBA1C MFR BLD: 7.8 % (ref 4.2–6.3)
INR PPP: 2.6 (ref 0.86–1.17)
MICROALBUMIN UR-MCNC: 73.1 MG/L (ref 0–20)
MICROALBUMIN/CREAT 24H UR: 38 MG/G CREATININE (ref 0–30)
PROTHROMBIN TIME: 26.6 SECONDS (ref 11.8–14.2)
TSH SERPL DL<=0.05 MIU/L-ACNC: 3.3 UIU/ML (ref 0.36–3.74)

## 2018-10-04 PROCEDURE — 36415 COLL VENOUS BLD VENIPUNCTURE: CPT

## 2018-10-04 PROCEDURE — 82043 UR ALBUMIN QUANTITATIVE: CPT

## 2018-10-04 PROCEDURE — 85610 PROTHROMBIN TIME: CPT

## 2018-10-04 PROCEDURE — 84443 ASSAY THYROID STIM HORMONE: CPT

## 2018-10-04 PROCEDURE — 83036 HEMOGLOBIN GLYCOSYLATED A1C: CPT

## 2018-10-04 PROCEDURE — 82570 ASSAY OF URINE CREATININE: CPT

## 2018-10-05 DIAGNOSIS — IMO0002 UNCONTROLLED TYPE 2 DIABETES MELLITUS WITH DIABETIC POLYNEUROPATHY, WITH LONG-TERM CURRENT USE OF INSULIN: Primary | ICD-10-CM

## 2018-10-05 RX ORDER — BENAZEPRIL HYDROCHLORIDE 10 MG/1
10 TABLET ORAL DAILY
Qty: 30 TABLET | Refills: 5 | Status: SHIPPED | OUTPATIENT
Start: 2018-10-05 | End: 2018-11-20 | Stop reason: SDUPTHER

## 2018-10-08 DIAGNOSIS — I10 ESSENTIAL HYPERTENSION: Primary | ICD-10-CM

## 2018-10-08 RX ORDER — METOPROLOL TARTRATE 50 MG/1
50 TABLET, FILM COATED ORAL 2 TIMES DAILY
Qty: 180 TABLET | Refills: 3 | Status: SHIPPED | OUTPATIENT
Start: 2018-10-08 | End: 2019-09-30 | Stop reason: SDUPTHER

## 2018-10-11 ENCOUNTER — APPOINTMENT (OUTPATIENT)
Dept: LAB | Facility: HOSPITAL | Age: 53
End: 2018-10-11
Payer: MEDICARE

## 2018-10-11 DIAGNOSIS — IMO0002 UNCONTROLLED TYPE 2 DIABETES MELLITUS WITH DIABETIC POLYNEUROPATHY, WITH LONG-TERM CURRENT USE OF INSULIN: ICD-10-CM

## 2018-10-11 LAB
ANION GAP SERPL CALCULATED.3IONS-SCNC: 10 MMOL/L (ref 4–13)
BUN SERPL-MCNC: 13 MG/DL (ref 5–25)
CALCIUM SERPL-MCNC: 8.9 MG/DL (ref 8.3–10.1)
CHLORIDE SERPL-SCNC: 100 MMOL/L (ref 100–108)
CO2 SERPL-SCNC: 25 MMOL/L (ref 21–32)
CREAT SERPL-MCNC: 0.96 MG/DL (ref 0.6–1.3)
GFR SERPL CREATININE-BSD FRML MDRD: 89 ML/MIN/1.73SQ M
GLUCOSE SERPL-MCNC: 128 MG/DL (ref 65–140)
POTASSIUM SERPL-SCNC: 4 MMOL/L (ref 3.5–5.3)
SODIUM SERPL-SCNC: 135 MMOL/L (ref 136–145)

## 2018-10-11 PROCEDURE — 80048 BASIC METABOLIC PNL TOTAL CA: CPT

## 2018-10-11 PROCEDURE — 36415 COLL VENOUS BLD VENIPUNCTURE: CPT

## 2018-10-12 DIAGNOSIS — I10 ESSENTIAL HYPERTENSION: Primary | ICD-10-CM

## 2018-10-25 ENCOUNTER — OFFICE VISIT (OUTPATIENT)
Dept: FAMILY MEDICINE CLINIC | Facility: CLINIC | Age: 53
End: 2018-10-25
Payer: MEDICARE

## 2018-10-25 VITALS
SYSTOLIC BLOOD PRESSURE: 122 MMHG | HEIGHT: 73 IN | DIASTOLIC BLOOD PRESSURE: 86 MMHG | BODY MASS INDEX: 41.75 KG/M2 | WEIGHT: 315 LBS

## 2018-10-25 DIAGNOSIS — IMO0002 UNCONTROLLED TYPE 2 DIABETES MELLITUS WITH DIABETIC POLYNEUROPATHY, WITH LONG-TERM CURRENT USE OF INSULIN: Primary | ICD-10-CM

## 2018-10-25 DIAGNOSIS — Z00.00 MEDICARE ANNUAL WELLNESS VISIT, SUBSEQUENT: ICD-10-CM

## 2018-10-25 DIAGNOSIS — J43.9 PULMONARY EMPHYSEMA, UNSPECIFIED EMPHYSEMA TYPE (HCC): Primary | ICD-10-CM

## 2018-10-25 DIAGNOSIS — Z23 ENCOUNTER FOR IMMUNIZATION: ICD-10-CM

## 2018-10-25 PROCEDURE — G0009 ADMIN PNEUMOCOCCAL VACCINE: HCPCS | Performed by: FAMILY MEDICINE

## 2018-10-25 PROCEDURE — G0439 PPPS, SUBSEQ VISIT: HCPCS | Performed by: FAMILY MEDICINE

## 2018-10-25 PROCEDURE — G0008 ADMIN INFLUENZA VIRUS VAC: HCPCS | Performed by: FAMILY MEDICINE

## 2018-10-25 PROCEDURE — 90682 RIV4 VACC RECOMBINANT DNA IM: CPT | Performed by: FAMILY MEDICINE

## 2018-10-25 PROCEDURE — 90732 PPSV23 VACC 2 YRS+ SUBQ/IM: CPT | Performed by: FAMILY MEDICINE

## 2018-10-25 RX ORDER — ALBUTEROL SULFATE 90 UG/1
2 AEROSOL, METERED RESPIRATORY (INHALATION) EVERY 6 HOURS PRN
Qty: 1 INHALER | Refills: 5 | Status: SHIPPED | OUTPATIENT
Start: 2018-10-25 | End: 2019-12-10 | Stop reason: SDUPTHER

## 2018-10-25 NOTE — PATIENT INSTRUCTIONS
Obesity   AMBULATORY CARE:   Obesity  is when your body mass index (BMI) is greater than 30  Your healthcare provider will use your height and weight to measure your BMI  The risks of obesity include  many health problems, such as injuries or physical disability  You may need tests to check for the following:  · Diabetes     · High blood pressure or high cholesterol     · Heart disease     · Gallbladder or liver disease     · Cancer of the colon, breast, prostate, liver, or kidney     · Sleep apnea     · Arthritis or gout  Seek care immediately if:   · You have a severe headache, confusion, or difficulty speaking  · You have weakness on one side of your body  · You have chest pain, sweating, or shortness of breath  Contact your healthcare provider if:   · You have symptoms of gallbladder or liver disease, such as pain in your upper abdomen  · You have knee or hip pain and discomfort while walking  · You have symptoms of diabetes, such as intense hunger and thirst, and frequent urination  · You have symptoms of sleep apnea, such as snoring or daytime sleepiness  · You have questions or concerns about your condition or care  Treatment for obesity  focuses on helping you lose weight to improve your health  Even a small decrease in BMI can reduce the risk for many health problems  Your healthcare provider will help you set a weight-loss goal   · Lifestyle changes  are the first step in treating obesity  These include making healthy food choices and getting regular physical activity  Your healthcare provider may suggest a weight-loss program that involves coaching, education, and therapy  · Medicine  may help you lose weight when it is used with a healthy diet and physical activity  · Surgery  can help you lose weight if you are very obese and have other health problems  There are several types of weight-loss surgery  Ask your healthcare provider for more information    Be successful losing weight:   · Set small, realistic goals  An example of a small goal is to walk for 20 minutes 5 days a week  Anther goal is to lose 5% of your body weight  · Tell friends, family members, and coworkers about your goals  and ask for their support  Ask a friend to lose weight with you, or join a weight-loss support group  · Identify foods or triggers that may cause you to overeat , and find ways to avoid them  Remove tempting high-calorie foods from your home and workplace  Place a bowl of fresh fruit on your kitchen counter  If stress causes you to eat, then find other ways to cope with stress  · Keep a diary to track what you eat and drink  Also write down how many minutes of physical activity you do each day  Weigh yourself once a week and record it in your diary  Eating changes: You will need to eat 500 to 1,000 fewer calories each day than you currently eat to lose 1 to 2 pounds a week  The following changes will help you cut calories:  · Eat smaller portions  Use small plates, no larger than 9 inches in diameter  Fill your plate half full of fruits and vegetables  Measure your food using measuring cups until you know what a serving size looks like  · Eat 3 meals and 1 or 2 snacks each day  Plan your meals in advance  Vinicio Rodríguez and eat at home most of the time  Eat slowly  · Eat fruits and vegetables at every meal   They are low in calories and high in fiber, which makes you feel full  Do not add butter, margarine, or cream sauce to vegetables  Use herbs to season steamed vegetables  · Eat less fat and fewer fried foods  Eat more baked or grilled chicken and fish  These protein sources are lower in calories and fat than red meat  Limit fast food  Dress your salads with olive oil and vinegar instead of bottled dressing  · Limit the amount of sugar you eat  Do not drink sugary beverages  Limit alcohol  Activity changes:  Physical activity is good for your body in many ways   It helps you burn calories and build strong muscles  It decreases stress and depression, and improves your mood  It can also help you sleep better  Talk to your healthcare provider before you begin an exercise program   · Exercise for at least 30 minutes 5 days a week  Start slowly  Set aside time each day for physical activity that you enjoy and that is convenient for you  It is best to do both weight training and an activity that increases your heart rate, such as walking, bicycling, or swimming  · Find ways to be more active  Do yard work and housecleaning  Walk up the stairs instead of using elevators  Spend your leisure time going to events that require walking, such as outdoor festivals or fairs  This extra physical activity can help you lose weight and keep it off  Follow up with your healthcare provider as directed: You may need to meet with a dietitian  Write down your questions so you remember to ask them during your visits  © 2017 [address] [employer] Information is for End User's use only and may not be sold, redistributed or otherwise used for commercial purposes  All illustrations and images included in CareNotes® are the copyrighted property of [employer]  or Roger Guadalupe  The above information is an  only  It is not intended as medical advice for individual conditions or treatments  Talk to your doctor, nurse or pharmacist before following any medical regimen to see if it is safe and effective for you  Urinary Incontinence   WHAT YOU NEED TO KNOW:   What is urinary incontinence? Urinary incontinence (UI) is when you lose control of your bladder  What causes UI? UI occurs because your bladder cannot store or empty urine properly  The following are the most common types of UI:  · Stress incontinence  is when you leak urine due to increased bladder pressure  This may happen when you cough, sneeze, or exercise       · Urge incontinence  is when you feel the need to urinate right away and leak urine accidentally  · Mixed incontinence  is when you have both stress and urge UI  What are the signs and symptoms of UI?   · You feel like your bladder does not empty completely when you urinate  · You urinate often and need to urinate immediately  · You leak urine when you sleep, or you wake up with the urge to urinate  · You leak urine when you cough, sneeze, exercise, or laugh  How is UI diagnosed? Your healthcare provider will ask how often you leak urine and whether you have stress or urge symptoms  Tell him which medicines you take, how often you urinate, and how much liquid you drink each day  You may need any of the following tests:  · Urine tests  may show infection or kidney function  · A pelvic exam  may be done to check for blockages  A pelvic exam will also show if your bladder, uterus, or other organs have moved out of place  · An x-ray, ultrasound, or CT  may show problems with parts of your urinary system  You may be given contrast liquid to help your organs show up better in the pictures  Tell the healthcare provider if you have ever had an allergic reaction to contrast liquid  Do not enter the MRI room with anything metal  Metal can cause serious injury  Tell the healthcare provider if you have any metal in or on your body  · A bladder scan  will show how much urine is left in your bladder after you urinate  You will be asked to urinate and then healthcare providers will use a small ultrasound machine to check the urine left in your bladder  · Cystometry  is used to check the function of your urinary system  Your healthcare provider checks the pressure in your bladder while filling it with fluid  Your bladder pressure may also be tested when your bladder is full and while you urinate  How is UI treated? · Medicines  can help strengthen your bladder control      · Electrical stimulation  is used to send a small amount of electrical energy to your pelvic floor muscles  This helps control your bladder function  Electrodes may be placed outside your body or in your rectum  For women, the electrodes may be placed in the vagina  · A bulking agent  may be injected into the wall of your urethra to make it thicker  This helps keep your urethra closed and decreases urine leakage  · Devices  such as a clamp, pessary, or tampon may help stop urine leaks  Ask your healthcare provider for more information about these and other devices  · Surgery  may be needed if other treatments do not work  Several types of surgery can help improve your bladder control  Ask your healthcare provider for more information about the surgery you may need  How can I manage my symptoms? · Do pelvic muscle exercises often  Your pelvic muscles help you stop urinating  Squeeze these muscles tight for 5 seconds, then relax for 5 seconds  Gradually work up to squeezing for 10 seconds  Do 3 sets of 15 repetitions a day, or as directed  This will help strengthen your pelvic muscles and improve bladder control  · A catheter  may be used to help empty your bladder  A catheter is a tiny, plastic tube that is put into your bladder to drain your urine  Your healthcare provider may tell you to use a catheter to prevent your bladder from getting too full and leaking urine  · Keep a UI record  Write down how often you leak urine and how much you leak  Make a note of what you were doing when you leaked urine  · Train your bladder  Go to the bathroom at set times, such as every 2 hours, even if you do not feel the urge to go  You can also try to hold your urine when you feel the urge to go  For example, hold your urine for 5 minutes when you feel the urge to go  As that becomes easier, hold your urine for 10 minutes  · Drink liquids as directed  Ask your healthcare provider how much liquid to drink each day and which liquids are best for you   You may need to limit the amount of liquid you drink to help control your urine leakage  Limit or do not have drinks that contain caffeine or alcohol  Do not drink any liquid right before you go to bed  · Prevent constipation  Eat a variety of high-fiber foods  Good examples are high-fiber cereals, beans, vegetables, and whole-grain breads  Prune juice may help make your bowel movement softer  Walking is the best way to trigger your intestines to have a bowel movement  · Exercise regularly and maintain a healthy weight  Ask your healthcare provider how much you should weigh and about the best exercise plan for you  Weight loss and exercise will decrease pressure on your bladder and help you control your leakage  Ask him to help you create a weight loss plan if you are overweight  When should I seek immediate care? · You have severe pain  · You are confused or cannot think clearly  When should I contact my healthcare provider? · You have a fever  · You see blood in your urine  · You have pain when you urinate  · You have new or worse pain, even after treatment  · Your mouth feels dry or you have vision changes  · Your urine is cloudy or smells bad  · You have questions or concerns about your condition or care  CARE AGREEMENT:   You have the right to help plan your care  Learn about your health condition and how it may be treated  Discuss treatment options with your caregivers to decide what care you want to receive  You always have the right to refuse treatment  The above information is an  only  It is not intended as medical advice for individual conditions or treatments  Talk to your doctor, nurse or pharmacist before following any medical regimen to see if it is safe and effective for you  © 2017 [address] [employer] Information is for End User's use only and may not be sold, redistributed or otherwise used for commercial purposes   All illustrations and images included in CareNotes® are the copyrighted property of [employer]  or Roger Guadalupe  Cigarette Smoking and Your Health   AMBULATORY CARE:   Risks to your health if you smoke:  Nicotine and other chemicals found in tobacco damage every cell in your body  Even if you are a light smoker, you have an increased risk for cancer, heart disease, and lung disease  If you are pregnant or have diabetes, smoking increases your risk for complications  Benefits to your health if you stop smoking:   · You decrease respiratory symptoms such as coughing, wheezing, and shortness of breath  · You reduce your risk for cancers of the lung, mouth, throat, kidney, bladder, pancreas, stomach, and cervix  If you already have cancer, you increase the benefits of chemotherapy  You also reduce your risk for cancer returning or a second cancer from developing  · You reduce your risk for heart disease, blood clots, heart attack, and stroke  · You reduce your risk for lung infections, and diseases such as pneumonia, asthma, chronic bronchitis, and emphysema  · Your circulation improves  More oxygen can be delivered to your body  If you have diabetes, you lower your risk for complications, such as kidney, artery, and eye diseases  You also lower your risk for nerve damage  Nerve damage can lead to amputations, poor vision, and blindness  · You improve your body's ability to heal and to fight infections  Benefits to the health of others if you stop smoking:  Tobacco is harmful to nonsmokers who breathe in your secondhand smoke  The following are ways the health of others around you may improve when you stop smoking:  · You lower the risks for lung cancer and heart disease in nonsmoking adults  · If you are pregnant, you lower the risk for miscarriage, early delivery, low birth weight, and stillbirth  You also lower your baby's risk for SIDS, obesity, developmental delay, and neurobehavioral problems, such as ADHD  · If you have children, you lower their risk for ear infections, colds, pneumonia, bronchitis, and asthma  For more information and support to stop smoking:   · Smokefree  gov  Phone: 0- 883 - 449-9057  Web Address: www smokefrZapya  Follow up with your healthcare provider as directed:  Write down your questions so you remember to ask them during your visits  © 2017 [address] [employer] Information is for End User's use only and may not be sold, redistributed or otherwise used for commercial purposes  All illustrations and images included in CareNotes® are the copyrighted property of [employer]  or Roger Guadalupe  The above information is an  only  It is not intended as medical advice for individual conditions or treatments  Talk to your doctor, nurse or pharmacist before following any medical regimen to see if it is safe and effective for you  Fall Prevention   WHAT YOU NEED TO KNOW:   What is fall prevention? Fall prevention includes ways to make your home and other areas safer  It also includes ways you can move more carefully to prevent a fall  What increases my risk for falls? · Lack of vitamin D    · Not getting enough sleep each night    · Trouble walking or keeping your balance, or foot problems    · Health conditions that cause changes in your blood pressure, vision, or muscle strength and coordination    · Medicines that make you dizzy, weak, or sleepy    · Problems seeing clearly    · Shoes that have high heels or are not supportive    · Tripping hazards, such as items left on the floor, no handrails on the stairs, or broken steps  How can I help protect myself from falls? · Stand or sit up slowly  This may help you keep your balance and prevent falls  If you need to get up during the night, sit up first  Be sure you are fully awake before you stand  Turn on the light before you start walking  Go slowly in case you are still sleepy   Make sure you will not trip over any pets sleeping in the bedroom  · Use assistive devices as directed  Your healthcare provider may suggest that you use a cane or walker to help you keep your balance  You may need to have grab bars put in your bathroom near the toilet or in the shower  · Wear shoes that fit well and have soles that   Wear shoes both inside and outside  Use slippers with good   Do not wear shoes with high heels  · Wear a personal alarm  This is a device that allows you to call 911 if you fall and need help  Ask your healthcare provider for more information  · Stay active  Exercise can help strengthen your muscles and improve your balance  Your healthcare provider may recommend water aerobics or walking  He or she may also recommend physical therapy to improve your coordination  Never start an exercise program without talking to your healthcare provider first      · Manage medical conditions  Keep all appointments with your healthcare providers  Visit your eye doctor as directed  How can I make my home safer? · Add items to prevent falls in the bathroom  Put nonslip strips on your bath or shower floor to prevent you from slipping  Use a bath mat if you do not have carpet in the bathroom  This will prevent you from falling when you step out of the bath or shower  Use a shower seat so you do not need to stand while you shower  Sit on the toilet or a chair in your bathroom to dry yourself and put on clothing  This will prevent you from losing your balance from drying or dressing yourself while you are standing  · Keep paths clear  Remove books, shoes, and other objects from walkways and stairs  Place cords for telephones and lamps out of the way so that you do not need to walk over them  Tape them down if you cannot move them  Remove small rugs  If you cannot remove a rug, secure it with double-sided tape  This will prevent you from tripping  · Install bright lights in your home  Use night lights to help light paths to the bathroom or kitchen  Always turn on the light before you start walking  · Keep items you use often on shelves within reach  Do not use a step stool to help you reach an item  · Paint or place reflective tape on the edges of your stairs  This will help you see the stairs better  Call 911 or have someone else call if:   · You have fallen and are unconscious  · You have fallen and cannot move part of your body  Contact your healthcare provider if:   · You have fallen and have pain or a headache  · You have questions or concerns about your condition or care  CARE AGREEMENT:   You have the right to help plan your care  Learn about your health condition and how it may be treated  Discuss treatment options with your caregivers to decide what care you want to receive  You always have the right to refuse treatment  The above information is an  only  It is not intended as medical advice for individual conditions or treatments  Talk to your doctor, nurse or pharmacist before following any medical regimen to see if it is safe and effective for you  © 2017 [address] Information is for End User's use only and may not be sold, redistributed or otherwise used for commercial purposes  All illustrations and images included in CareNotes® are the copyrighted property of [employer]  or Roger Guadalupe  Advance Directives   WHAT YOU NEED TO KNOW:   What are advance directives? Advance directives are legal documents that state your wishes and plans for medical care  These plans are made ahead of time in case you lose your ability to make decisions for yourself  Advance directives can apply to any medical decision, such as the treatments you want, and if you want to donate organs  What are the types of advance directives? There are many types of advance directives, and each state has rules about how to use them   You may choose a combination of any of the following:  · Living will: This is a written record of the treatment you want  You can also choose which treatments you do not want, which to limit, and which to stop at a certain time  This includes surgery, medicine, IV fluid, and tube feedings  · Durable power of  for healthcare [facility]): This is a written record that states who you want to make healthcare choices for you when you are unable to make them for yourself  This person, called a proxy, is usually a family member or a friend  You may choose more than 1 proxy  · Do not resuscitate (DNR) order:  A DNR order is used in case your heart stops beating or you stop breathing  It is a request not to have certain forms of treatment, such as CPR  A DNR order may be included in other types of advance directives  · Medical directive: This covers the care that you want if you are in a coma, near death, or unable to make decisions for yourself  You can list the treatments you want for each condition  Treatment may include pain medicine, surgery, blood transfusions, dialysis, IV or tube feedings, and a ventilator (breathing machine)  · Values history: This document has questions about your views, beliefs, and how you feel and think about life  This information can help others choose the care that you would choose  Why are advance directives important? An advance directive helps you control your care  Although spoken wishes may be used, it is better to have your wishes written down  Spoken wishes can be misunderstood, or not followed  Treatments may be given even if you do not want them  An advance directive may make it easier for your family to make difficult choices about your care  How do I decide what to put in my advance directives? · Make informed decisions:  Make sure you fully understand treatments or care you may receive   Think about the benefits and problems your decisions could cause for you or your family  Talk to healthcare providers if you have concerns or questions before you write down your wishes  You may also want to talk with your [employer] or , or a   Check your state laws to make sure that what you put in your advance directive is legal      · Sign all forms:  Sign and date your advance directive when you have finished  You may also need 2 witnesses to sign the forms  Witnesses cannot be your doctor or his staff, your spouse, heirs or beneficiaries, people you owe money to, or your chosen proxy  Talk to your family, proxy, and healthcare providers about your advance directive  Give each person a copy, and keep one for yourself in a place you can get to easily  Do not keep it hidden or locked away  · Review and revise your plans: You can revise your advance directive at any time, as long as you are able to make decisions  Review your plan every year, and when there are changes in your life, or your health  When you make changes, let your family, proxy, and healthcare providers know  Give each a new copy  Where can I find more information? · [employer]  [address] , [address]   Phone: 3- [phone] - [phone]  Phone: 3- 609 - [phone]  Web Address: http://www  aafp org  · [address] [facility])  [address] [facility], [address]  Phone: 5- [phone]  Phone: [phone]  Web Address: Josse martinez  [facility] AGREEMENT:   You have the right to help plan your care  To help with this plan, you must learn about your health condition and treatment options  You must also learn about advance directives and how they are used  Work with your healthcare providers to decide what care will be used to treat you  You always have the right to refuse treatment  The above information is an  only   It is not intended as medical advice for individual conditions or treatments  Talk to your doctor, nurse or pharmacist before following any medical regimen to see if it is safe and effective for you  © 2017 [address] [employer] Information is for End User's use only and may not be sold, redistributed or otherwise used for commercial purposes  All illustrations and images included in CareNotes® are the copyrighted property of A [employer] M [employer]  or Roger Guadalupe

## 2018-10-25 NOTE — PROGRESS NOTES
Assessment and Plan:  Problem List Items Addressed This Visit        Endocrine    Uncontrolled type 2 diabetes mellitus with diabetic polyneuropathy, with long-term current use of insulin ([facility] ) - Primary    Relevant Orders    Hemoglobin A1C      Other Visit Diagnoses     Medicare annual wellness visit, subsequent        Encounter for immunization        Relevant Orders    PNEUMOCOCCAL POLYSACCHARIDE VACCINE 23-VALENT =>1YO SQ IM (Pneumovax)    influenza vaccine, 3751-5201, quadrivalent, recombinant, PF, 0 5 mL, for patients 18 yr+ (FLUBLOK)        Health Maintenance Due   Topic Date Due    INFLUENZA VACCINE  07/01/2018    Diabetic Foot Exam  10/23/2018     Patient does not wish to see Podiatry  Flu shot and pneumonia 23 given today  Reviewed blood work  Follow up with Cardiology as scheduled  Follow-up here in 4 months, getting a hemoglobin A1c before that visit      HPI:  Patient Active Problem List   Diagnosis    Aortic stenosis    Asthma    Atrial fibrillation ([facility] )    COPD mixed type ([facility] )    Uncontrolled type 2 diabetes mellitus with diabetic polyneuropathy, with long-term current use of insulin (HCC)    Other hyperlipidemia    Essential hypertension    S/P AVR    Obstructive sleep apnea    Restless leg syndrome    Hypersomnia    Morbid obesity with BMI of 40 0-44 9, adult ([facility])    PLMD (periodic limb movement disorder)    Palpitations    Acute exacerbation of chronic obstructive bronchitis ([facility])    Multiple pulmonary nodules determined by computed tomography of lung    Chronic pain of left knee    Seasonal allergic rhinitis due to pollen     Past Medical History:   Diagnosis Date    Asthma     last assessed 8/21/12    Atrial fibrillation ([facility] )     last assessed 8/21/12    Closed fracture of fibula     last assessed 10/18/13    COLD (chronic obstructive lung disease) ([facility] )     last assessed 8/21/12    Hypertension     last assessed 8/21/12    Restless legs syndrome     last assessed 8/21/12    Sleep apnea     last assessed 8/21/12     Past Surgical History:   Procedure Laterality Date    CORONARY ANGIOPLASTY WITH STENT PLACEMENT       Family History   Problem Relation Age of Onset    Atrial fibrillation Mother     Heart attack Father         acute    Heart failure Father     Stroke Father         syndrome     History   Smoking Status    Former Smoker    Years: 35 00    Types: Cigarettes    Quit date: 10/2016   Smokeless Tobacco    Never Used     Comment: 5 cigs a day     History   Alcohol Use No      History   Drug Use No         Current Outpatient Prescriptions   Medication Sig Dispense Refill    albuterol (2 5 mg/3 mL) 0 083 % nebulizer solution Inhale      aspirin 81 MG tablet Take by mouth      atorvastatin (LIPITOR) 10 mg tablet Take 1 tablet (10 mg total) by mouth daily 90 tablet 3    benazepril (LOTENSIN) 10 mg tablet Take 1 tablet (10 mg total) by mouth daily 30 tablet 5    diltiazem (CARDIZEM CD) 240 mg 24 hr capsule Take 1 capsule (240 mg total) by mouth 2 (two) times a day 180 capsule 3    fluticasone (FLONASE) 50 mcg/act nasal spray 2 sprays into each nostril daily 16 g 3    Fluticasone-Umeclidin-Vilant 100-62 5-25 MCG/INH AEPB Inhale 1 puff daily 1 each 11    furosemide (LASIX) 40 mg tablet Take 1 tablet (40 mg total) by mouth daily 90 tablet 3    insulin degludec (TRESIBA FLEXTOUCH) injection pen 100 units/mL Inject 20 Units under the skin daily 5 pen 3    Insulin Pen Needle (BD PEN NEEDLE MANJEET U/F) 32G X 4 MM MISC by Does not apply route      metFORMIN (GLUCOPHAGE) 1000 MG tablet Take 1 tablet (1,000 mg total) by mouth 2 (two) times a day 180 tablet 1    metoprolol tartrate (LOPRESSOR) 50 mg tablet Take 1 tablet (50 mg total) by mouth 2 (two) times a day 180 tablet 3    potassium chloride (K-DUR) 10 mEq tablet       warfarin (COUMADIN) 10 mg tablet TAKE ONE TABLET BY MOUTH ONCE DAILY AS DIRECTED 90 tablet 5    warfarin (COUMADIN) 2 mg tablet Take 1 tablet (2 mg total) by mouth daily 90 tablet 5    albuterol (VENTOLIN HFA) 90 mcg/act inhaler Inhale 2 puffs every 6 (six) hours as needed for shortness of breath 1 Inhaler 5     No current facility-administered medications for this visit  Allergies   Allergen Reactions    Fluticasone-Salmeterol Palpitations     Immunization History   Administered Date(s) Administered    Influenza 10/13/2016, 10/14/2016, 10/23/2017    Influenza Quadrivalent Preservative Free 3 years and older IM 11/03/2014, 10/14/2016, 10/23/2017    Influenza TIV (IM) 01/03/2013, 12/16/2013    Pneumococcal Polysaccharide PPV23 12/16/2013       Patient Care Team:  Oscar Vasquez DO as PCP - General  Corinne Shy, MD    Medicare Screening Tests and Risk Assessments:  Melvin Rojo is here for his Subsequent Wellness visit  Last Medicare Wellness visit information reviewed, patient interviewed and updates made to the record today  Health Risk Assessment:  Patient rates overall health as poor  Patient feels that their physical health rating is Same  Eyesight was rated as Same  Hearing was rated as Same  Patient feels that their emotional and mental health rating is Same  Pain experienced by patient in the last 7 days has been A lot  Patient's pain rating has been 5/10  Patient states that he has experienced no weight loss or gain in last 6 months  Emotional/Mental Health:  Patient has been feeling nervous/anxious  PHQ-9 Depression Screening:    Frequency of the following problems over the past two weeks:      1  Little interest or pleasure in doing things: 0 - not at all      2  Feeling down, depressed, or hopeless: 0 - not at all  PHQ-2 Score: 0          Broken Bones/Falls: Fall Risk Assessment:    In the past year, patient has experienced: No history of falling in past year          Bladder/Bowel:  Patient has not leaked urine accidently in the last six months  Patient reports no loss of bowel control      Immunizations:  Patient has had a flu vaccination within the last year  Patient has received a pneumonia shot  Patient has not received a shingles shot  Patient has received tetanus/diphtheria shot  (Additional Comments: Dates unknown)    Home Safety:  Patient has trouble with stairs inside or outside of their home  Patient currently reports that there are no safety hazards present in home, working smoke alarms, working carbon monoxide detectors  Preventative Screenings:   no prostate cancer screen performed, no colon cancer screen completed, cholesterol screen completed, no glaucoma eye exam completed    Nutrition:  Current diet: Regular with servings of the following:    Medications:  Patient is not currently taking any over-the-counter supplements  Patient is able to manage medications  Lifestyle Choices:  Patient reports no tobacco use  Patient has smoked or used tobacco in the past   Patient has stopped his tobacco use  Tobacco use quit date: 2 years ago  Patient reports no alcohol use  Patient drives a vehicle  Patient wears seat belt  Current level of exercise of physical activity described by patient as: yard work  Activities of Daily Living:  Can get out of bed by his or her self, able to dress self, able to make own meals, able to do own shopping, able to bathe self, can do own laundry/housekeeping, can manage own money, pay bills and track expenses    Previous Hospitalizations:  No hospitalization or ED visit in past 12 months        Advanced Directives:  Patient has decided on a power of   Patient has not spoken to designated power of   Patient has not completed advanced directive          Preventative Screening/Counseling:      Cardiovascular:      General: Risks and Benefits Discussed and Screening Current          Diabetes:      General: Risks and Benefits Discussed and Screening Current          Colorectal Cancer:      General: Patient Declines          HIV:      General: Screening Not Indicated          Hepatitis C:      General: Screening Not Indicated        Advanced Directives:   Patient has no living will for healthcare, does not have durable POA for healthcare, patient does not have an advanced directive  Information on ACP and/or AD provided  Immunizations:      Influenza: Influenza Due Today      Pneumococcal: Pneumococcal Due Today            No exam data present    Physical Exam:  Review of Systems   Constitutional: Negative  Respiratory: Negative  Cardiovascular: Negative  Gastrointestinal: Negative  Negative for bowel incontinence  Genitourinary: Negative  Psychiatric/Behavioral: The patient is nervous/anxious  Vitals:    10/25/18 0856   BP: 122/86   Weight: (!) 182 kg (402 lb 3 2 oz)   Height: 6' 1" (1 854 m)   Body mass index is 53 06 kg/m²  Physical Exam   Constitutional: He is oriented to person, place, and time  He appears well-developed and well-nourished  No distress  Cardiovascular: Normal rate, regular rhythm and normal heart sounds  Exam reveals no gallop and no friction rub  Pulses are no weak pulses  No murmur heard  Pulses:       Dorsalis pedis pulses are 2+ on the right side, and 2+ on the left side  Posterior tibial pulses are 2+ on the right side, and 2+ on the left side  Pulmonary/Chest: Effort normal and breath sounds normal  No respiratory distress  He has no wheezes  He has no rales  Feet:   Right Foot:   Skin Integrity: Negative for ulcer, skin breakdown, erythema, warmth, callus or dry skin  Left Foot:   Skin Integrity: Negative for ulcer, skin breakdown, erythema, warmth, callus or dry skin  Neurological: He is alert and oriented to person, place, and time  Skin: He is not diaphoretic  Psychiatric: He has a normal mood and affect  His behavior is normal  Judgment and thought content normal    Vitals reviewed  Patient's shoes and socks removed  Right Foot/Ankle   Right Foot Inspection  Skin Exam: skin normal and skin intact no dry skin, no warmth, no callus, no erythema, no maceration, no abnormal color, no pre-ulcer, no ulcer and no callus                            Sensory       Monofilament testing: absent  Vascular    The right DP pulse is 2+  The right PT pulse is 2+  Left Foot/Ankle  Left Foot Inspection  Skin Exam: skin normal and skin intactno dry skin, no warmth, no erythema, no maceration, normal color, no pre-ulcer, no ulcer and no callus                                         Sensory       Monofilament: absent  Vascular    The left DP pulse is 2+  The left PT pulse is 2+  Assign Risk Category:  No deformity present; Loss of protective sensation;  No weak pulses       Risk: 2

## 2018-11-20 DIAGNOSIS — IMO0002 UNCONTROLLED TYPE 2 DIABETES MELLITUS WITH DIABETIC POLYNEUROPATHY, WITH LONG-TERM CURRENT USE OF INSULIN: ICD-10-CM

## 2018-11-20 RX ORDER — BENAZEPRIL HYDROCHLORIDE 10 MG/1
10 TABLET ORAL DAILY
Qty: 90 TABLET | Refills: 3 | Status: SHIPPED | OUTPATIENT
Start: 2018-11-20 | End: 2019-03-25 | Stop reason: SDUPTHER

## 2018-11-27 ENCOUNTER — APPOINTMENT (OUTPATIENT)
Dept: LAB | Facility: HOSPITAL | Age: 53
End: 2018-11-27
Payer: MEDICARE

## 2018-11-27 ENCOUNTER — TRANSCRIBE ORDERS (OUTPATIENT)
Dept: ADMINISTRATIVE | Facility: HOSPITAL | Age: 53
End: 2018-11-27

## 2018-11-27 DIAGNOSIS — I48.91 ATRIAL FIBRILLATION, UNSPECIFIED TYPE (HCC): Primary | ICD-10-CM

## 2018-11-27 DIAGNOSIS — Z79.01 ANTICOAGULATED ON COUMADIN: ICD-10-CM

## 2018-11-27 DIAGNOSIS — Z79.01 ANTICOAGULATED ON COUMADIN: Primary | ICD-10-CM

## 2018-11-27 LAB
INR PPP: 4.84 (ref 0.86–1.17)
PROTHROMBIN TIME: 43.1 SECONDS (ref 11.8–14.2)

## 2018-11-27 PROCEDURE — 85610 PROTHROMBIN TIME: CPT

## 2018-11-27 PROCEDURE — 36415 COLL VENOUS BLD VENIPUNCTURE: CPT

## 2018-11-28 ENCOUNTER — ANTICOAG VISIT (OUTPATIENT)
Dept: FAMILY MEDICINE CLINIC | Facility: CLINIC | Age: 53
End: 2018-11-28

## 2018-11-28 DIAGNOSIS — I25.10 ATHSCL HEART DISEASE OF NATIVE CORONARY ARTERY W/O ANG PCTRS: ICD-10-CM

## 2018-11-28 DIAGNOSIS — I35.0 NONRHEUMATIC AORTIC VALVE STENOSIS: Primary | ICD-10-CM

## 2018-11-30 ENCOUNTER — HOSPITAL ENCOUNTER (OUTPATIENT)
Dept: NON INVASIVE DIAGNOSTICS | Facility: HOSPITAL | Age: 53
Discharge: HOME | End: 2018-11-30
Attending: INTERNAL MEDICINE
Payer: MEDICARE

## 2018-11-30 DIAGNOSIS — I25.10 ATHSCL HEART DISEASE OF NATIVE CORONARY ARTERY W/O ANG PCTRS: ICD-10-CM

## 2018-11-30 DIAGNOSIS — I35.0 NONRHEUMATIC AORTIC VALVE STENOSIS: ICD-10-CM

## 2018-11-30 PROCEDURE — 93306 TTE W/DOPPLER COMPLETE: CPT | Performed by: INTERNAL MEDICINE

## 2018-11-30 PROCEDURE — 93306 TTE W/DOPPLER COMPLETE: CPT

## 2018-12-04 DIAGNOSIS — J44.9 COPD MIXED TYPE (HCC): Primary | ICD-10-CM

## 2018-12-10 ENCOUNTER — OFFICE VISIT (OUTPATIENT)
Dept: CARDIOLOGY CLINIC | Facility: CLINIC | Age: 53
End: 2018-12-10
Payer: MEDICARE

## 2018-12-10 VITALS
HEART RATE: 76 BPM | BODY MASS INDEX: 41.75 KG/M2 | SYSTOLIC BLOOD PRESSURE: 120 MMHG | DIASTOLIC BLOOD PRESSURE: 80 MMHG | WEIGHT: 315 LBS | HEIGHT: 73 IN

## 2018-12-10 DIAGNOSIS — G47.33 OBSTRUCTIVE SLEEP APNEA: ICD-10-CM

## 2018-12-10 DIAGNOSIS — Z95.5 H/O HEART ARTERY STENT: ICD-10-CM

## 2018-12-10 DIAGNOSIS — I48.20 CHRONIC ATRIAL FIBRILLATION (HCC): ICD-10-CM

## 2018-12-10 DIAGNOSIS — Z95.2 S/P AVR: ICD-10-CM

## 2018-12-10 DIAGNOSIS — I25.10 CORONARY ARTERY DISEASE INVOLVING NATIVE CORONARY ARTERY OF NATIVE HEART WITHOUT ANGINA PECTORIS: Primary | ICD-10-CM

## 2018-12-10 PROCEDURE — 93000 ELECTROCARDIOGRAM COMPLETE: CPT | Performed by: INTERNAL MEDICINE

## 2018-12-10 PROCEDURE — 99214 OFFICE O/P EST MOD 30 MIN: CPT | Performed by: INTERNAL MEDICINE

## 2018-12-10 RX ORDER — NITROGLYCERIN 0.4 MG/1
0.4 TABLET SUBLINGUAL
COMMUNITY
End: 2018-12-10 | Stop reason: SDUPTHER

## 2018-12-10 RX ORDER — NITROGLYCERIN 0.4 MG/1
0.4 TABLET SUBLINGUAL
Qty: 100 TABLET | Refills: 3 | Status: SHIPPED | OUTPATIENT
Start: 2018-12-10 | End: 2021-09-09 | Stop reason: SDUPTHER

## 2018-12-10 NOTE — PROGRESS NOTES
Subjective:        Patient ID: Vangie Feng is a 52 y o  male  Chief Complaint:  Melita is here for routine follow-up, I reviewed Dr Nelson Cruz note from [address] [facility] surgery, he felt revising his sternotomy was likely more risky than beneficial   Since last seen Melita only noted 1 episode of tachycardia, heart rate transiently 200 beats per minute on a pulse ox machine, did have some palpitations at a but no dyspnea or chest pain, this has never recurred  Right around that time he did switch to metoprolol tartrate from succinate  He has had no recent exertional chest pains, concerning dyspnea or progressive lower extremity edema  He is using CPAP at night  We reviewed his recent echo, EF 45%, normal aortic bioprosthetic valve function  EKG shows rate controlled stable atrial fibrillation  The following portions of the patient's history were reviewed and updated as appropriate: allergies, current medications, past family history, past medical history, past social history, past surgical history and problem list   Review of Systems   Constitution: Negative for chills, diaphoresis, malaise/fatigue and weight gain  HENT: Negative for nosebleeds and stridor  Eyes: Negative for double vision, vision loss in left eye, vision loss in right eye and visual disturbance  Cardiovascular: Positive for chest pain ( clearly musculoskeletal, nonexertional, with certain thorax movement), dyspnea on exertion (Mild chronic stable), leg swelling ( chronic, nonprogressive) and palpitations ( 1 day prior to on metoprolol tartrate)  Negative for claudication, cyanosis, irregular heartbeat, near-syncope, orthopnea, paroxysmal nocturnal dyspnea and syncope  Respiratory: Negative for cough, shortness of breath, snoring and wheezing  Endocrine: Negative for polydipsia, polyphagia and polyuria  Hematologic/Lymphatic: Negative for bleeding problem  Does not bruise/bleed easily  Skin: Negative for flushing and rash  Musculoskeletal: Negative for falls and myalgias  Gastrointestinal: Negative for abdominal pain, heartburn, hematemesis, hematochezia, melena and nausea  Genitourinary: Negative for hematuria  Neurological: Negative for brief paralysis, dizziness, focal weakness, headaches, light-headedness, loss of balance and vertigo  Psychiatric/Behavioral: Negative for altered mental status and substance abuse  Allergic/Immunologic: Negative for hives  Objective:      /80   Pulse 76   Ht 6' 1" (1 854 m)   Wt (!) 181 kg (399 lb)   BMI 52 64 kg/m²   Physical Exam   Constitutional: He is oriented to person, place, and time  He appears well-developed and well-nourished  No distress  HENT:   Head: Normocephalic and atraumatic  Eyes: Pupils are equal, round, and reactive to light  EOM are normal  No scleral icterus  Neck: Normal range of motion  Neck supple  No JVD present  No thyromegaly present  Cardiovascular: Normal rate  Exam reveals no gallop and no friction rub  Murmur (Grade 1/6 short outflow) heard  Pulmonary/Chest: Effort normal and breath sounds normal  No stridor  No respiratory distress  He has no wheezes  He has no rales  Abdominal: Soft  Bowel sounds are normal  He exhibits no distension and no mass  There is no tenderness  Musculoskeletal: Normal range of motion  He exhibits edema (Chronic +2 bilateral lower extremity edema)  He exhibits no deformity  Legs are soft today however   Neurological: He is alert and oriented to person, place, and time  Coordination normal    Skin: Skin is warm and dry  No erythema  No pallor  Psychiatric: He has a normal mood and affect   His behavior is normal        Lab Review:   Appointment on 11/27/2018   Component Date Value    Protime 11/27/2018 43 1*    INR 11/27/2018 4 84*   Appointment on 10/11/2018   Component Date Value    Sodium 10/11/2018 135*    Potassium 10/11/2018 4 0     Chloride 10/11/2018 100     CO2 10/11/2018 25  ANION GAP 10/11/2018 10     BUN 10/11/2018 13     Creatinine 10/11/2018 0 96     Glucose 10/11/2018 128     Calcium 10/11/2018 8 9     eGFR 10/11/2018 89      No results found  Assessment:       1  Coronary artery disease involving native coronary artery of native heart without angina pectoris  POCT ECG    nitroglycerin (NITROSTAT) 0 4 mg SL tablet   2  H/O heart artery stent     3  S/P AVR     4  Chronic atrial fibrillation (HCC)     5  Obstructive sleep apnea          Plan:       Magy Monroe is without signs or symptoms reminiscent of angina, decompensated heart failure nor electrical instability  His obesity and lymphedema continue, he will stay on daily diuretic therapy  His AFib is rate controlled and anticoagulated  Goal INR 2 0-3 0  He has had no falls nor overt bleeding  Recent echocardiogram revealed normal bioprosthetic aortic valve function and a mildly improved LV systolic function, now only mildly overall reduced, he is aware of antibiotic prophylaxis required prior to applicable dental and surgical procedures  He will continue with CPAP  No changes made today  Return office in 6 months  He will call with any progressive dyspnea, edema, certainly any chest pains particularly with exertion or any other concerning symptoms prior to this

## 2018-12-10 NOTE — PATIENT INSTRUCTIONS
Aortic Valve Replacement   WHAT YOU NEED TO KNOW:   Aortic valve replacement is surgery to put a new aortic valve in your heart  Your aortic valve separates the lower section of your heart from your aorta  The aorta is the large blood vessel that carries blood from your heart to your body  Your aortic valve opens and closes to let blood flow from your heart  When your aortic valve does not open or close as it should, the amount of blood that your heart can pump to your body decreases  DISCHARGE INSTRUCTIONS:   Medicines:   · Antiplatelets  help prevent blood clots  This medicine makes it more likely for you to bleed or bruise  · Blood thinners    help prevent blood clots  Examples of blood thinners include heparin and warfarin  Clots can cause strokes, heart attacks, and death  The following are general safety guidelines to follow while you are taking a blood thinner:    ¨ Watch for bleeding and bruising while you take blood thinners  Watch for bleeding from your gums or nose  Watch for blood in your urine and bowel movements  Use a soft washcloth on your skin, and a soft toothbrush to brush your teeth  This can keep your skin and gums from bleeding  If you shave, use an electric shaver  Do not play contact sports  ¨ Tell your dentist and other healthcare providers that you take anticoagulants  Wear a bracelet or necklace that says you take this medicine  ¨ Do not start or stop any medicines unless your healthcare provider tells you to  Many medicines cannot be used with blood thinners  ¨ Tell your healthcare provider right away if you forget to take the medicine, or if you take too much  ¨ Warfarin  is a blood thinner that you may need to take  The following are things you should be aware of if you take warfarin  § Foods and medicines can affect the amount of warfarin in your blood  Do not make major changes to your diet while you take warfarin   Warfarin works best when you eat about the same amount of vitamin K every day  Vitamin K is found in green leafy vegetables and certain other foods  Ask for more information about what to eat when you are taking warfarin  § You will need to see your healthcare provider for follow-up visits when you are on warfarin  You will need regular blood tests  These tests are used to decide how much medicine you need  · Heart medicine: This medicine is given to strengthen or regulate your heartbeat  It also may help your heart in other ways  Talk with your caregiver to find out what your heart medicine is and why you are taking it  · Blood pressure medicine: This is given to lower your blood pressure  A controlled blood pressure helps protect your organs, such as your heart, lungs, brain, and kidneys  Take your blood pressure medicine exactly as directed  · Antibiotics: This medicine will help kill germs that may get into your blood and cause an infection in your heart  Healthcare providers may tell you to take antibiotics before and after dental work, surgery, and some procedures  This is important after heart valve disease  · Take your medicine as directed  Contact your healthcare provider if you think your medicine is not helping or if you have side effects  Tell him or her if you are allergic to any medicine  Keep a list of the medicines, vitamins, and herbs you take  Include the amounts, and when and why you take them  Bring the list or the pill bottles to follow-up visits  Carry your medicine list with you in case of an emergency  Follow up with your healthcare provider as directed:  Write down your questions so you remember to ask them during your visits  Care for your wound:  Ask healthcare providers how to take care of your incision wound  Check your wound, clean it, and change the bandages each day  If the bandage gets dirty or falls off, put on a new one  Mouth care: Take care of your teeth and gums   Brush and floss your teeth, and see your dentist regularly  You may help prevent an infection in your heart if you do this  Tell your dentist that you have had heart valve surgery  Cardiac rehabilitation:  Your cardiologist or heart surgeon may recommend that you attend cardiac rehabilitation (rehab)  This is a program run by specialists who will help you safely strengthen your heart and prevent more heart disease  The plan includes exercise, relaxation, stress management, and heart-healthy nutrition  Healthcare providers will also check to make sure any medicines you are taking are working  The plan may also include instructions for when you can drive, return to work, and do other normal daily activities  Good nutrition for your heart:  Get enough calories, protein, vitamins, and minerals to help prevent poor nutrition and muscle wasting  You may be told to eat foods low in cholesterol or sodium (salt)  You also may be told to limit saturated and trans fats  Do eat foods that contain healthy fats, such as walnuts, salmon, and canola and soybean oils  Eat foods that help protect the heart, including plenty of fruits and vegetables, nuts, and sources of fiber  Ask what a healthy weight is for you  Set goals to reach and stay at that weight  Contact your healthcare provider if:   · You have a fever  · Your wound area is painful, red, or oozing fluid  · You feel too tired for normal activities weeks after your surgery  · Your hands, ankles, or feet are swollen  · You urinate less, or not at all  · You feel tired or weak and are short of breath  · Your arm or leg feels warm, tender, and painful  It may look swollen and red  · You have questions or concerns about your condition or care  Seek care immediately or call 911 if:   · You have blood in your bowel movements or urine  You are bleeding from your nose, mouth, or incision wound  · You have a severe headache  This may feel like the worst headache of your life  · You have weakness or numbness in your arm, leg, or face  This may happen on only 1 side of your body  · You feel lightheaded, dizzy, or sick to your stomach  You may have cold sweats and bluish skin, lips, or nail beds  · You have trouble breathing or are coughing up blood  You may have more pain when you take deep breaths or cough  · You have chest pain, or discomfort that spreads to your arms, jaw, or back, or sudden back pain  · You are confused or have problems speaking or understanding speech  You have vision changes or loss of vision  © 2017 [employer] [employer]  Information is for End User's use only and may not be sold, redistributed or otherwise used for commercial purposes  All illustrations and images included in CareNotes® are the copyrighted property of [employer]  or [employer]  The above information is an  only  It is not intended as medical advice for individual conditions or treatments  Talk to your doctor, nurse or pharmacist before following any medical regimen to see if it is safe and effective for you

## 2018-12-11 ENCOUNTER — APPOINTMENT (OUTPATIENT)
Dept: LAB | Facility: HOSPITAL | Age: 53
End: 2018-12-11
Payer: MEDICARE

## 2018-12-11 ENCOUNTER — ANTICOAG VISIT (OUTPATIENT)
Dept: FAMILY MEDICINE CLINIC | Facility: CLINIC | Age: 53
End: 2018-12-11

## 2018-12-11 LAB
INR PPP: 2.37 (ref 0.86–1.17)
PROTHROMBIN TIME: 24.8 SECONDS (ref 11.8–14.2)

## 2018-12-11 PROCEDURE — 36415 COLL VENOUS BLD VENIPUNCTURE: CPT

## 2018-12-11 PROCEDURE — 85610 PROTHROMBIN TIME: CPT

## 2018-12-17 DIAGNOSIS — Z79.4 TYPE 2 DIABETES MELLITUS WITHOUT COMPLICATION, WITH LONG-TERM CURRENT USE OF INSULIN (HCC): Primary | ICD-10-CM

## 2018-12-17 DIAGNOSIS — E11.9 TYPE 2 DIABETES MELLITUS WITHOUT COMPLICATION, WITH LONG-TERM CURRENT USE OF INSULIN (HCC): Primary | ICD-10-CM

## 2018-12-19 ENCOUNTER — APPOINTMENT (OUTPATIENT)
Dept: LAB | Facility: HOSPITAL | Age: 53
End: 2018-12-19
Payer: MEDICARE

## 2018-12-19 ENCOUNTER — ANTICOAG VISIT (OUTPATIENT)
Dept: FAMILY MEDICINE CLINIC | Facility: CLINIC | Age: 53
End: 2018-12-19

## 2018-12-19 DIAGNOSIS — I48.91 ATRIAL FIBRILLATION, UNSPECIFIED TYPE (HCC): ICD-10-CM

## 2018-12-19 LAB
INR PPP: 3.18 (ref 0.86–1.17)
PROTHROMBIN TIME: 31.1 SECONDS (ref 11.8–14.2)

## 2018-12-19 PROCEDURE — 36415 COLL VENOUS BLD VENIPUNCTURE: CPT

## 2018-12-19 PROCEDURE — 85610 PROTHROMBIN TIME: CPT

## 2019-01-03 ENCOUNTER — ANTICOAG VISIT (OUTPATIENT)
Dept: FAMILY MEDICINE CLINIC | Facility: CLINIC | Age: 54
End: 2019-01-03

## 2019-01-03 ENCOUNTER — APPOINTMENT (OUTPATIENT)
Dept: LAB | Facility: HOSPITAL | Age: 54
End: 2019-01-03
Payer: MEDICARE

## 2019-01-03 DIAGNOSIS — I48.91 ATRIAL FIBRILLATION, UNSPECIFIED TYPE (HCC): ICD-10-CM

## 2019-01-03 LAB
INR PPP: 2.7 (ref 0.86–1.17)
PROTHROMBIN TIME: 27.4 SECONDS (ref 11.8–14.2)

## 2019-01-03 PROCEDURE — 36415 COLL VENOUS BLD VENIPUNCTURE: CPT

## 2019-01-03 PROCEDURE — 85610 PROTHROMBIN TIME: CPT

## 2019-01-07 DIAGNOSIS — IMO0002 UNCONTROLLED TYPE 2 DIABETES MELLITUS WITH DIABETIC POLYNEUROPATHY, WITH LONG-TERM CURRENT USE OF INSULIN: ICD-10-CM

## 2019-02-11 ENCOUNTER — ANTICOAG VISIT (OUTPATIENT)
Dept: FAMILY MEDICINE CLINIC | Facility: CLINIC | Age: 54
End: 2019-02-11

## 2019-02-11 ENCOUNTER — APPOINTMENT (OUTPATIENT)
Dept: LAB | Facility: HOSPITAL | Age: 54
End: 2019-02-11
Payer: MEDICARE

## 2019-02-11 DIAGNOSIS — I48.91 ATRIAL FIBRILLATION, UNSPECIFIED TYPE (HCC): ICD-10-CM

## 2019-02-11 LAB
INR PPP: 2.5 (ref 0.86–1.17)
PROTHROMBIN TIME: 25.8 SECONDS (ref 11.8–14.2)

## 2019-02-11 PROCEDURE — 36415 COLL VENOUS BLD VENIPUNCTURE: CPT

## 2019-02-11 PROCEDURE — 85610 PROTHROMBIN TIME: CPT

## 2019-02-13 DIAGNOSIS — I48.91 ATRIAL FIBRILLATION, UNSPECIFIED TYPE (HCC): ICD-10-CM

## 2019-02-13 RX ORDER — WARFARIN SODIUM 10 MG/1
10 TABLET ORAL DAILY
Qty: 90 TABLET | Refills: 5 | Status: SHIPPED | OUTPATIENT
Start: 2019-02-13 | End: 2020-05-12 | Stop reason: SDUPTHER

## 2019-02-22 ENCOUNTER — ANTICOAG VISIT (OUTPATIENT)
Dept: FAMILY MEDICINE CLINIC | Facility: CLINIC | Age: 54
End: 2019-02-22

## 2019-02-22 ENCOUNTER — APPOINTMENT (OUTPATIENT)
Dept: LAB | Facility: HOSPITAL | Age: 54
End: 2019-02-22
Payer: MEDICARE

## 2019-02-22 DIAGNOSIS — I48.91 ATRIAL FIBRILLATION, UNSPECIFIED TYPE (HCC): ICD-10-CM

## 2019-02-22 DIAGNOSIS — IMO0002 UNCONTROLLED TYPE 2 DIABETES MELLITUS WITH DIABETIC POLYNEUROPATHY, WITH LONG-TERM CURRENT USE OF INSULIN: ICD-10-CM

## 2019-02-22 LAB
EST. AVERAGE GLUCOSE BLD GHB EST-MCNC: 214 MG/DL
HBA1C MFR BLD: 9.1 % (ref 4.2–6.3)
INR PPP: 2.97 (ref 0.86–1.17)
PROTHROMBIN TIME: 29.5 SECONDS (ref 11.8–14.2)

## 2019-02-22 PROCEDURE — 83036 HEMOGLOBIN GLYCOSYLATED A1C: CPT

## 2019-02-22 PROCEDURE — 36415 COLL VENOUS BLD VENIPUNCTURE: CPT

## 2019-02-22 PROCEDURE — 85610 PROTHROMBIN TIME: CPT

## 2019-02-25 ENCOUNTER — OFFICE VISIT (OUTPATIENT)
Dept: FAMILY MEDICINE CLINIC | Facility: CLINIC | Age: 54
End: 2019-02-25
Payer: MEDICARE

## 2019-02-25 VITALS
HEIGHT: 73 IN | SYSTOLIC BLOOD PRESSURE: 130 MMHG | WEIGHT: 315 LBS | BODY MASS INDEX: 41.75 KG/M2 | DIASTOLIC BLOOD PRESSURE: 78 MMHG

## 2019-02-25 DIAGNOSIS — J44.9 COPD MIXED TYPE (HCC): ICD-10-CM

## 2019-02-25 DIAGNOSIS — I48.20 CHRONIC ATRIAL FIBRILLATION (HCC): ICD-10-CM

## 2019-02-25 DIAGNOSIS — E66.01 MORBID OBESITY WITH BMI OF 40.0-44.9, ADULT (HCC): ICD-10-CM

## 2019-02-25 DIAGNOSIS — IMO0002 UNCONTROLLED TYPE 2 DIABETES MELLITUS WITH DIABETIC POLYNEUROPATHY, WITH LONG-TERM CURRENT USE OF INSULIN: Primary | ICD-10-CM

## 2019-02-25 PROCEDURE — 99214 OFFICE O/P EST MOD 30 MIN: CPT | Performed by: FAMILY MEDICINE

## 2019-02-25 NOTE — PROGRESS NOTES
Assessment/Plan:  Reviewed blood work  Patient's hemoglobin A1c went up to 9 1  Patient will try to watch his diet more  We will increase his Buddie Arsalan to 30 units daily  Patient will follow up with pulmonology for his COPD  Continue to follow up with Cardiology  He will get blood work before we see him again in 4 months  Patient not interested in seeing an eye doctor at this time  No problem-specific Assessment & Plan notes found for this encounter  Diagnoses and all orders for this visit:    Uncontrolled type 2 diabetes mellitus with diabetic polyneuropathy, with long-term current use of insulin (HCC)  -     insulin degludec (TRESIBA FLEXTOUCH) 100 units/mL injection pen; Inject 30 Units under the skin daily  -     CBC and differential; Future  -     Comprehensive metabolic panel; Future  -     Hemoglobin A1C; Future  -     Lipid Panel with Direct LDL reflex; Future    COPD mixed type (HCC)  -     CBC and differential; Future    Morbid obesity with BMI of 40 0-44 9, adult (HCC)    Chronic atrial fibrillation (HCC)  -     CBC and differential; Future  -     Comprehensive metabolic panel; Future  -     Lipid Panel with Direct LDL reflex; Future  -     Protime-INR; Future          Subjective:      Patient ID: Omar Gomez is a 52 y o  male  Patient here today for follow-up  Patient denies any chest pain  Continues to have his breathing problems, it is stable for now  He is scheduled to see pulmonology next month  Reviewed his blood work, his hemoglobin A1c went up to 9 1  Patient admits that he has not been eating as well as he should  Diabetes   He presents for his follow-up diabetic visit  He has type 2 diabetes mellitus  His disease course has been worsening  There are no hypoglycemic associated symptoms  There are no diabetic associated symptoms  There are no hypoglycemic complications  Diabetic complications include peripheral neuropathy   Risk factors for coronary artery disease include diabetes mellitus, dyslipidemia, male sex, obesity, hypertension and sedentary lifestyle  Current diabetic treatment includes insulin injections and oral agent (monotherapy)  He is compliant with treatment all of the time  His weight is stable  He is following a generally unhealthy diet  When asked about meal planning, he reported none  He has not had a previous visit with a dietitian  He never participates in exercise  His home blood glucose trend is increasing steadily  An ACE inhibitor/angiotensin II receptor blocker is being taken  He does not see a podiatrist Eye exam is not current  Breathing Problem   He complains of difficulty breathing  This is a chronic problem  The problem occurs daily  The problem has been unchanged  His symptoms are aggravated by strenuous activity  His symptoms are alleviated by beta-agonist  He reports moderate improvement on treatment  His past medical history is significant for COPD  The following portions of the patient's history were reviewed and updated as appropriate:   He  has a past medical history of Asthma, Athscl heart disease of native coronary artery w/o ang pctrs, Atrial fibrillation ([facility] ), Cardiomyopathy ([facility] ), Closed fracture of fibula, COLD (chronic obstructive lung disease) ([facility] ), Coronary angioplasty status, History of echocardiogram (04/19/2017), Hyperlipidemia, Hypertension, Presence of prosthetic heart valve, Restless legs syndrome, and Sleep apnea    He   Patient Active Problem List    Diagnosis Date Noted    Seasonal allergic rhinitis due to pollen 09/17/2018    Chronic pain of left knee 06/18/2018    Acute exacerbation of chronic obstructive bronchitis ([facility] ) 03/23/2018    Multiple pulmonary nodules determined by computed tomography of lung 03/23/2018    Restless leg syndrome 03/01/2018    Hypersomnia 03/01/2018    Morbid obesity with BMI of 40 0-44 9, adult ([facility] ) 03/01/2018    PLMD (periodic limb movement disorder) 03/01/2018    Palpitations 03/01/2018    Aortic stenosis 10/18/2016    S/P AVR 10/10/2016    Uncontrolled type 2 diabetes mellitus with diabetic polyneuropathy, with long-term current use of insulin ([facility] ) 03/04/2016    Asthma 08/21/2012    Atrial fibrillation ([place] [address] ) 08/21/2012    COPD mixed type ([place] [address] ) 08/21/2012    Other hyperlipidemia 08/21/2012    Essential hypertension 08/21/2012    Obstructive sleep apnea 08/21/2012     He  has a past surgical history that includes Coronary angioplasty with stent (07/29/2010) and Aortic valve replacement (10/07/2016)  His family history includes Atrial fibrillation in his mother; Heart attack in his father; Heart failure in his father; Stroke in his father  He  reports that he quit smoking about 2 years ago  His smoking use included cigarettes  He quit after 35 00 years of use  He has never used smokeless tobacco  He reports that he drank alcohol  He reports that he does not use drugs    Current Outpatient Medications   Medication Sig Dispense Refill    albuterol (2 5 mg/3 mL) 0 083 % nebulizer solution Inhale      albuterol (VENTOLIN HFA) 90 mcg/act inhaler Inhale 2 puffs every 6 (six) hours as needed for shortness of breath 1 Inhaler 5    aspirin 81 MG tablet Take by mouth      atorvastatin (LIPITOR) 10 mg tablet Take 1 tablet (10 mg total) by mouth daily 90 tablet 3    benazepril (LOTENSIN) 10 mg tablet Take 1 tablet (10 mg total) by mouth daily 90 tablet 3    diltiazem (CARDIZEM CD) 240 mg 24 hr capsule Take 1 capsule (240 mg total) by mouth 2 (two) times a day 180 capsule 3    fluticasone (FLONASE) 50 mcg/act nasal spray 2 sprays into each nostril daily 16 g 3    Fluticasone-Umeclidin-Vilant 100-62 5-25 MCG/INH AEPB Inhale 1 puff daily 1 each 11    furosemide (LASIX) 40 mg tablet Take 1 tablet (40 mg total) by mouth daily 90 tablet 3    insulin degludec (TRESIBA FLEXTOUCH) 100 units/mL injection pen Inject 30 Units under the skin daily 5 pen 1    Insulin Pen Needle (BD PEN NEEDLE MANJEET U/F) 32G X 4 MM MISC by Does not apply route daily 100 each 5    metFORMIN (GLUCOPHAGE) 1000 MG tablet Take 1 tablet (1,000 mg total) by mouth 2 (two) times a day 180 tablet 1    metoprolol tartrate (LOPRESSOR) 50 mg tablet Take 1 tablet (50 mg total) by mouth 2 (two) times a day 180 tablet 3    nitroglycerin (NITROSTAT) 0 4 mg SL tablet Place 1 tablet (0 4 mg total) under the tongue every 5 (five) minutes as needed for chest pain 100 tablet 3    potassium chloride (K-DUR) 10 mEq tablet       warfarin (COUMADIN) 10 mg tablet Take 1 tablet (10 mg total) by mouth daily 90 tablet 5    warfarin (COUMADIN) 2 mg tablet Take 1 tablet (2 mg total) by mouth daily 90 tablet 5     No current facility-administered medications for this visit        Current Outpatient Medications on File Prior to Visit   Medication Sig    albuterol (2 5 mg/3 mL) 0 083 % nebulizer solution Inhale    albuterol (VENTOLIN HFA) 90 mcg/act inhaler Inhale 2 puffs every 6 (six) hours as needed for shortness of breath    aspirin 81 MG tablet Take by mouth    atorvastatin (LIPITOR) 10 mg tablet Take 1 tablet (10 mg total) by mouth daily    benazepril (LOTENSIN) 10 mg tablet Take 1 tablet (10 mg total) by mouth daily    diltiazem (CARDIZEM CD) 240 mg 24 hr capsule Take 1 capsule (240 mg total) by mouth 2 (two) times a day    fluticasone (FLONASE) 50 mcg/act nasal spray 2 sprays into each nostril daily    Fluticasone-Umeclidin-Vilant 100-62 5-25 MCG/INH AEPB Inhale 1 puff daily    furosemide (LASIX) 40 mg tablet Take 1 tablet (40 mg total) by mouth daily    Insulin Pen Needle (BD PEN NEEDLE MANJEET U/F) 32G X 4 MM MISC by Does not apply route daily    metFORMIN (GLUCOPHAGE) 1000 MG tablet Take 1 tablet (1,000 mg total) by mouth 2 (two) times a day    metoprolol tartrate (LOPRESSOR) 50 mg tablet Take 1 tablet (50 mg total) by mouth 2 (two) times a day    nitroglycerin (NITROSTAT) 0 4 mg SL tablet Place 1 tablet (0 4 mg total) under the tongue every 5 (five) minutes as needed for chest pain    potassium chloride (K-DUR) 10 mEq tablet     warfarin (COUMADIN) 10 mg tablet Take 1 tablet (10 mg total) by mouth daily    warfarin (COUMADIN) 2 mg tablet Take 1 tablet (2 mg total) by mouth daily    [DISCONTINUED] insulin degludec (TRESIBA FLEXTOUCH) 100 units/mL injection pen Inject 20 Units under the skin daily     No current facility-administered medications on file prior to visit  He is allergic to fluticasone-salmeterol       Review of Systems   Constitutional: Negative  Respiratory:        As per HPI   Cardiovascular: Negative  Gastrointestinal: Negative  Genitourinary: Negative  Objective:      /78   Ht 6' 1" (1 854 m)   Wt (!) 183 kg (404 lb)   BMI 53 30 kg/m²          Physical Exam   Constitutional: He is oriented to person, place, and time  He appears well-developed and well-nourished  No distress  HENT:   Head: Normocephalic and atraumatic  Eyes: Conjunctivae are normal    Cardiovascular: Normal rate and normal heart sounds  An irregularly irregular rhythm present  Exam reveals no gallop and no friction rub  No murmur heard  Pulmonary/Chest: Effort normal and breath sounds normal  No respiratory distress  He has no wheezes  He has no rales  Musculoskeletal: He exhibits no edema  Neurological: He is alert and oriented to person, place, and time  Skin: He is not diaphoretic  Psychiatric: He has a normal mood and affect  His behavior is normal  Judgment and thought content normal    Vitals reviewed

## 2019-03-05 DIAGNOSIS — I10 ESSENTIAL HYPERTENSION: Primary | ICD-10-CM

## 2019-03-05 DIAGNOSIS — I48.91 ATRIAL FIBRILLATION, UNSPECIFIED TYPE (HCC): ICD-10-CM

## 2019-03-05 RX ORDER — POTASSIUM CHLORIDE 750 MG/1
10 TABLET, FILM COATED, EXTENDED RELEASE ORAL DAILY
Qty: 90 TABLET | Refills: 3 | Status: SHIPPED | OUTPATIENT
Start: 2019-03-05 | End: 2020-04-30 | Stop reason: SDUPTHER

## 2019-03-18 ENCOUNTER — HOSPITAL ENCOUNTER (OUTPATIENT)
Dept: PULMONOLOGY | Facility: HOSPITAL | Age: 54
Discharge: HOME | End: 2019-03-18
Attending: INTERNAL MEDICINE
Payer: MEDICARE

## 2019-03-18 DIAGNOSIS — J44.9 COPD MIXED TYPE (HCC): ICD-10-CM

## 2019-03-18 PROCEDURE — 94060 EVALUATION OF WHEEZING: CPT | Performed by: INTERNAL MEDICINE

## 2019-03-18 PROCEDURE — 94726 PLETHYSMOGRAPHY LUNG VOLUMES: CPT | Performed by: INTERNAL MEDICINE

## 2019-03-18 PROCEDURE — 94729 DIFFUSING CAPACITY: CPT

## 2019-03-18 PROCEDURE — 94727 GAS DIL/WSHOT DETER LNG VOL: CPT

## 2019-03-18 PROCEDURE — 94060 EVALUATION OF WHEEZING: CPT

## 2019-03-18 PROCEDURE — 94729 DIFFUSING CAPACITY: CPT | Performed by: INTERNAL MEDICINE

## 2019-03-18 PROCEDURE — 94760 N-INVAS EAR/PLS OXIMETRY 1: CPT

## 2019-03-18 RX ORDER — ALBUTEROL SULFATE 2.5 MG/3ML
2.5 SOLUTION RESPIRATORY (INHALATION) ONCE AS NEEDED
Status: COMPLETED | OUTPATIENT
Start: 2019-03-18 | End: 2019-03-18

## 2019-03-18 RX ADMIN — ALBUTEROL SULFATE 2.5 MG: 2.5 SOLUTION RESPIRATORY (INHALATION) at 11:11

## 2019-03-21 ENCOUNTER — OFFICE VISIT (OUTPATIENT)
Dept: PULMONOLOGY | Facility: HOSPITAL | Age: 54
End: 2019-03-21
Payer: MEDICARE

## 2019-03-21 VITALS
OXYGEN SATURATION: 95 % | HEIGHT: 73 IN | DIASTOLIC BLOOD PRESSURE: 78 MMHG | BODY MASS INDEX: 41.75 KG/M2 | WEIGHT: 315 LBS | HEART RATE: 99 BPM | SYSTOLIC BLOOD PRESSURE: 122 MMHG

## 2019-03-21 DIAGNOSIS — I42.8 OTHER CARDIOMYOPATHY (HCC): ICD-10-CM

## 2019-03-21 DIAGNOSIS — J44.9 COPD MIXED TYPE (HCC): ICD-10-CM

## 2019-03-21 DIAGNOSIS — I25.10 CORONARY ARTERY DISEASE INVOLVING NATIVE CORONARY ARTERY OF NATIVE HEART WITHOUT ANGINA PECTORIS: ICD-10-CM

## 2019-03-21 DIAGNOSIS — G47.33 OBSTRUCTIVE SLEEP APNEA: Primary | ICD-10-CM

## 2019-03-21 PROCEDURE — 99214 OFFICE O/P EST MOD 30 MIN: CPT | Performed by: INTERNAL MEDICINE

## 2019-03-21 NOTE — LETTER
March 21, 2019     Janel Garcia, DO  [address] [facility] [address]    Patient: Linette Stanford   YOB: 1965   Date of Visit: 3/21/2019       Dear Dr Yonathan Khan: Thank you for referring Natalia Quezada to me for evaluation  Below are my notes for this consultation  If you have questions, please do not hesitate to call me  I look forward to following your patient along with you  Sincerely,        Lorraine Mcdaniel MD        CC: No Recipients  Lorraine Mcdaniel MD  3/21/2019  9:17 AM  Sign at close encounter  Assessment/Plan:    Obstructive sleep apnea  Continue to wear CPAP, check w/ [facility] regarding getting a mask that may be a better fit, or even a nasal pillow    COPD mixed type ([address] )  Continue Trelegy since it is working well for him  PFT shows moderate obstructive ventilatory defect indicating COPD       Diagnoses and all orders for this visit:    Obstructive sleep apnea    Coronary artery disease involving native coronary artery of native heart without angina pectoris    Other cardiomyopathy ([address] )    COPD mixed type ([address] )  -     fluticasone-umeclidinium-vilanterol (TRELEGY ELLIPTA) 100-62 5-25 MCG/INH inhaler; Inhale 1 puff daily          Subjective:      Patient ID: Linette Stanford is a 52 y o  male  Doing well w/ both his inhaler and his CPAP  He sometimes forgets the Trelegy inhaler and says he notices the difference when he wakes up the next morning, otherwise it is working for him  He has a small leak from his mask when he moves during his sleep but is otherwise comfortable w/ his CPAP machine  He has no new issues  I reviewed his PFT w/ him and he has a moderate obstructive vent defect w/ minimal change w/ bronchodilators  This is COPD rather than asthma since asthma would be reversible        The following portions of the patient's history were reviewed and updated as appropriate: allergies, current medications, past family history, past medical history, past social history, past surgical history and problem list     Review of Systems   All other systems reviewed and are negative  Objective:      /78 (BP Location: Left arm, Patient Position: Sitting)   Pulse 99   Ht 6' 1" (1 854 m)   Wt (!) 182 kg (401 lb)   SpO2 95%   BMI 52 91 kg/m²           Physical Exam   Constitutional: He is oriented to person, place, and time  He appears well-developed and well-nourished  No distress  obese   HENT:   Head: Normocephalic and atraumatic  Dry tongue but without exudates  Narrowed oropharynx   Eyes: Pupils are equal, round, and reactive to light  Conjunctivae are normal  No scleral icterus  Neck: Normal range of motion  Neck supple  Short and stocky   Cardiovascular: Normal rate, regular rhythm, normal heart sounds and intact distal pulses  Exam reveals no gallop and no friction rub  No murmur heard  Pulmonary/Chest: Effort normal and breath sounds normal  No stridor  No respiratory distress  He has no wheezes  He has no rales  He exhibits no tenderness  Abdominal: Soft  Bowel sounds are normal  He exhibits no distension  There is no tenderness  Musculoskeletal: Normal range of motion  He exhibits no edema  Neurological: He is alert and oriented to person, place, and time  Skin: Skin is warm and dry  Psychiatric: He has a normal mood and affect  His behavior is normal    Nursing note and vitals reviewed

## 2019-03-21 NOTE — PROGRESS NOTES
Assessment/Plan:    Obstructive sleep apnea  Continue to wear CPAP, check w/ [facility] regarding getting a mask that may be a better fit, or even a nasal pillow    COPD mixed type ([address] )  Continue Trelegy since it is working well for him  PFT shows moderate obstructive ventilatory defect indicating COPD       Diagnoses and all orders for this visit:    Obstructive sleep apnea    Coronary artery disease involving native coronary artery of native heart without angina pectoris    Other cardiomyopathy ([address] )    COPD mixed type ([address] )  -     fluticasone-umeclidinium-vilanterol (TRELEGY ELLIPTA) 100-62 5-25 MCG/INH inhaler; Inhale 1 puff daily          Subjective:      Patient ID: Eduardo Moffett is a 52 y o  male  Doing well w/ both his inhaler and his CPAP  He sometimes forgets the Trelegy inhaler and says he notices the difference when he wakes up the next morning, otherwise it is working for him  He has a small leak from his mask when he moves during his sleep but is otherwise comfortable w/ his CPAP machine  He has no new issues  I reviewed his PFT w/ him and he has a moderate obstructive vent defect w/ minimal change w/ bronchodilators  This is COPD rather than asthma since asthma would be reversible  The following portions of the patient's history were reviewed and updated as appropriate: allergies, current medications, past family history, past medical history, past social history, past surgical history and problem list     Review of Systems   All other systems reviewed and are negative  Objective:      /78 (BP Location: Left arm, Patient Position: Sitting)   Pulse 99   Ht 6' 1" (1 854 m)   Wt (!) 182 kg (401 lb)   SpO2 95%   BMI 52 91 kg/m²          Physical Exam   Constitutional: He is oriented to person, place, and time  He appears well-developed and well-nourished  No distress  obese   HENT:   Head: Normocephalic and atraumatic  Dry tongue but without exudates    Narrowed oropharynx   Eyes: Pupils are equal, round, and reactive to light  Conjunctivae are normal  No scleral icterus  Neck: Normal range of motion  Neck supple  Short and stocky   Cardiovascular: Normal rate, regular rhythm, normal heart sounds and intact distal pulses  Exam reveals no gallop and no friction rub  No murmur heard  Pulmonary/Chest: Effort normal and breath sounds normal  No stridor  No respiratory distress  He has no wheezes  He has no rales  He exhibits no tenderness  Abdominal: Soft  Bowel sounds are normal  He exhibits no distension  There is no tenderness  Musculoskeletal: Normal range of motion  He exhibits no edema  Neurological: He is alert and oriented to person, place, and time  Skin: Skin is warm and dry  Psychiatric: He has a normal mood and affect  His behavior is normal    Nursing note and vitals reviewed

## 2019-03-21 NOTE — ASSESSMENT & PLAN NOTE
Continue Trelegy since it is working well for him    PFT shows moderate obstructive ventilatory defect indicating COPD

## 2019-03-21 NOTE — PATIENT INSTRUCTIONS
Obstructive sleep apnea  Continue to wear CPAP, check w/ [facility] regarding getting a mask that may be a better fit, or even a nasal pillow    COPD mixed type ([address] )  Continue Trelegy since it is working well for him    PFT shows moderate obstructive ventilatory defect indicating COPD

## 2019-03-21 NOTE — ASSESSMENT & PLAN NOTE
Continue to wear CPAP, check w/ [facility] regarding getting a mask that may be a better fit, or even a nasal pillow

## 2019-03-25 DIAGNOSIS — IMO0002 UNCONTROLLED TYPE 2 DIABETES MELLITUS WITH DIABETIC POLYNEUROPATHY, WITH LONG-TERM CURRENT USE OF INSULIN: ICD-10-CM

## 2019-03-25 DIAGNOSIS — E11.8 TYPE 2 DIABETES MELLITUS WITH COMPLICATION, UNSPECIFIED WHETHER LONG TERM INSULIN USE: ICD-10-CM

## 2019-03-25 RX ORDER — BENAZEPRIL HYDROCHLORIDE 10 MG/1
10 TABLET ORAL DAILY
Qty: 90 TABLET | Refills: 1 | Status: SHIPPED | OUTPATIENT
Start: 2019-03-25 | End: 2019-09-30 | Stop reason: SDUPTHER

## 2019-04-03 ENCOUNTER — ANTICOAG VISIT (OUTPATIENT)
Dept: FAMILY MEDICINE CLINIC | Facility: CLINIC | Age: 54
End: 2019-04-03

## 2019-04-03 ENCOUNTER — APPOINTMENT (OUTPATIENT)
Dept: LAB | Facility: HOSPITAL | Age: 54
End: 2019-04-03
Payer: MEDICARE

## 2019-04-03 DIAGNOSIS — I48.91 ATRIAL FIBRILLATION, UNSPECIFIED TYPE (HCC): ICD-10-CM

## 2019-04-03 LAB
INR PPP: 3.02 (ref 0.86–1.17)
PROTHROMBIN TIME: 29.9 SECONDS (ref 11.8–14.2)

## 2019-04-03 PROCEDURE — 36415 COLL VENOUS BLD VENIPUNCTURE: CPT

## 2019-04-03 PROCEDURE — 85610 PROTHROMBIN TIME: CPT

## 2019-05-06 DIAGNOSIS — IMO0002 UNCONTROLLED TYPE 2 DIABETES MELLITUS WITH DIABETIC POLYNEUROPATHY, WITH LONG-TERM CURRENT USE OF INSULIN: ICD-10-CM

## 2019-05-08 DIAGNOSIS — Z71.89 COMPLEX CARE COORDINATION: Primary | ICD-10-CM

## 2019-05-13 ENCOUNTER — PATIENT OUTREACH (OUTPATIENT)
Dept: FAMILY MEDICINE CLINIC | Facility: CLINIC | Age: 54
End: 2019-05-13

## 2019-05-13 DIAGNOSIS — E78.49 OTHER HYPERLIPIDEMIA: ICD-10-CM

## 2019-05-13 RX ORDER — ATORVASTATIN CALCIUM 10 MG/1
10 TABLET, FILM COATED ORAL DAILY
Qty: 90 TABLET | Refills: 3 | Status: SHIPPED | OUTPATIENT
Start: 2019-05-13 | End: 2020-05-12 | Stop reason: SDUPTHER

## 2019-05-16 ENCOUNTER — APPOINTMENT (OUTPATIENT)
Dept: LAB | Facility: HOSPITAL | Age: 54
End: 2019-05-16
Payer: MEDICARE

## 2019-05-16 ENCOUNTER — OFFICE VISIT (OUTPATIENT)
Dept: SLEEP CENTER | Facility: CLINIC | Age: 54
End: 2019-05-16
Payer: MEDICARE

## 2019-05-16 ENCOUNTER — ANTICOAG VISIT (OUTPATIENT)
Dept: FAMILY MEDICINE CLINIC | Facility: CLINIC | Age: 54
End: 2019-05-16

## 2019-05-16 VITALS
DIASTOLIC BLOOD PRESSURE: 76 MMHG | SYSTOLIC BLOOD PRESSURE: 120 MMHG | WEIGHT: 315 LBS | BODY MASS INDEX: 41.75 KG/M2 | HEIGHT: 73 IN | HEART RATE: 74 BPM | OXYGEN SATURATION: 100 %

## 2019-05-16 DIAGNOSIS — R00.2 PALPITATIONS: ICD-10-CM

## 2019-05-16 DIAGNOSIS — G25.81 RESTLESS LEG SYNDROME: ICD-10-CM

## 2019-05-16 DIAGNOSIS — J44.9 COPD MIXED TYPE (HCC): ICD-10-CM

## 2019-05-16 DIAGNOSIS — J30.1 SEASONAL ALLERGIC RHINITIS DUE TO POLLEN: ICD-10-CM

## 2019-05-16 DIAGNOSIS — G47.10 HYPERSOMNIA: ICD-10-CM

## 2019-05-16 DIAGNOSIS — E66.01 MORBID OBESITY WITH BMI OF 40.0-44.9, ADULT (HCC): ICD-10-CM

## 2019-05-16 DIAGNOSIS — I48.20 CHRONIC ATRIAL FIBRILLATION (HCC): ICD-10-CM

## 2019-05-16 DIAGNOSIS — I10 ESSENTIAL HYPERTENSION: ICD-10-CM

## 2019-05-16 DIAGNOSIS — IMO0002 UNCONTROLLED TYPE 2 DIABETES MELLITUS WITH DIABETIC POLYNEUROPATHY, WITH LONG-TERM CURRENT USE OF INSULIN: ICD-10-CM

## 2019-05-16 DIAGNOSIS — J34.89 DRY NOSE: ICD-10-CM

## 2019-05-16 DIAGNOSIS — R68.2 DRY MOUTH: ICD-10-CM

## 2019-05-16 DIAGNOSIS — G47.33 OBSTRUCTIVE SLEEP APNEA: Primary | ICD-10-CM

## 2019-05-16 PROCEDURE — 99214 OFFICE O/P EST MOD 30 MIN: CPT | Performed by: INTERNAL MEDICINE

## 2019-06-13 ENCOUNTER — OFFICE VISIT (OUTPATIENT)
Dept: CARDIOLOGY CLINIC | Facility: CLINIC | Age: 54
End: 2019-06-13
Payer: MEDICARE

## 2019-06-13 VITALS
HEART RATE: 74 BPM | SYSTOLIC BLOOD PRESSURE: 124 MMHG | DIASTOLIC BLOOD PRESSURE: 80 MMHG | BODY MASS INDEX: 41.75 KG/M2 | HEIGHT: 73 IN | WEIGHT: 315 LBS

## 2019-06-13 DIAGNOSIS — I48.20 CHRONIC ATRIAL FIBRILLATION (HCC): ICD-10-CM

## 2019-06-13 DIAGNOSIS — Z95.2 S/P AVR: Primary | ICD-10-CM

## 2019-06-13 DIAGNOSIS — I10 ESSENTIAL HYPERTENSION: ICD-10-CM

## 2019-06-13 DIAGNOSIS — Z95.5 H/O HEART ARTERY STENT: ICD-10-CM

## 2019-06-13 DIAGNOSIS — I25.10 CORONARY ARTERY DISEASE INVOLVING NATIVE CORONARY ARTERY OF NATIVE HEART WITHOUT ANGINA PECTORIS: ICD-10-CM

## 2019-06-13 DIAGNOSIS — R60.9 DEPENDENT EDEMA: ICD-10-CM

## 2019-06-13 PROCEDURE — 99214 OFFICE O/P EST MOD 30 MIN: CPT | Performed by: INTERNAL MEDICINE

## 2019-06-13 PROCEDURE — 93000 ELECTROCARDIOGRAM COMPLETE: CPT | Performed by: INTERNAL MEDICINE

## 2019-07-02 ENCOUNTER — ANTICOAG VISIT (OUTPATIENT)
Dept: FAMILY MEDICINE CLINIC | Facility: CLINIC | Age: 54
End: 2019-07-02

## 2019-07-02 ENCOUNTER — APPOINTMENT (OUTPATIENT)
Dept: LAB | Facility: HOSPITAL | Age: 54
End: 2019-07-02
Payer: MEDICARE

## 2019-07-02 DIAGNOSIS — I48.20 CHRONIC ATRIAL FIBRILLATION (HCC): ICD-10-CM

## 2019-07-02 DIAGNOSIS — IMO0002 UNCONTROLLED TYPE 2 DIABETES MELLITUS WITH DIABETIC POLYNEUROPATHY, WITH LONG-TERM CURRENT USE OF INSULIN: ICD-10-CM

## 2019-07-02 DIAGNOSIS — J44.9 COPD MIXED TYPE (HCC): ICD-10-CM

## 2019-07-02 LAB
ALBUMIN SERPL BCP-MCNC: 3.4 G/DL (ref 3.5–5)
ALP SERPL-CCNC: 105 U/L (ref 46–116)
ALT SERPL W P-5'-P-CCNC: 17 U/L (ref 12–78)
ANION GAP SERPL CALCULATED.3IONS-SCNC: 7 MMOL/L (ref 4–13)
AST SERPL W P-5'-P-CCNC: 18 U/L (ref 5–45)
BASOPHILS # BLD AUTO: 0.05 THOUSANDS/ΜL (ref 0–0.1)
BASOPHILS NFR BLD AUTO: 1 % (ref 0–1)
BILIRUB SERPL-MCNC: 0.6 MG/DL (ref 0.2–1)
BUN SERPL-MCNC: 14 MG/DL (ref 5–25)
CALCIUM SERPL-MCNC: 8.7 MG/DL (ref 8.3–10.1)
CHLORIDE SERPL-SCNC: 100 MMOL/L (ref 100–108)
CHOLEST SERPL-MCNC: 117 MG/DL (ref 50–200)
CO2 SERPL-SCNC: 28 MMOL/L (ref 21–32)
CREAT SERPL-MCNC: 0.93 MG/DL (ref 0.6–1.3)
EOSINOPHIL # BLD AUTO: 0.33 THOUSAND/ΜL (ref 0–0.61)
EOSINOPHIL NFR BLD AUTO: 3 % (ref 0–6)
ERYTHROCYTE [DISTWIDTH] IN BLOOD BY AUTOMATED COUNT: 13.2 % (ref 11.6–15.1)
EST. AVERAGE GLUCOSE BLD GHB EST-MCNC: 183 MG/DL
GFR SERPL CREATININE-BSD FRML MDRD: 92 ML/MIN/1.73SQ M
GLUCOSE P FAST SERPL-MCNC: 154 MG/DL (ref 65–99)
HBA1C MFR BLD: 8 % (ref 4.2–6.3)
HCT VFR BLD AUTO: 45.9 % (ref 36.5–49.3)
HDLC SERPL-MCNC: 23 MG/DL (ref 40–60)
HGB BLD-MCNC: 15.2 G/DL (ref 12–17)
IMM GRANULOCYTES # BLD AUTO: 0.03 THOUSAND/UL (ref 0–0.2)
IMM GRANULOCYTES NFR BLD AUTO: 0 % (ref 0–2)
INR PPP: 2.6 (ref 0.84–1.19)
LDLC SERPL CALC-MCNC: 65 MG/DL (ref 0–100)
LYMPHOCYTES # BLD AUTO: 1.96 THOUSANDS/ΜL (ref 0.6–4.47)
LYMPHOCYTES NFR BLD AUTO: 20 % (ref 14–44)
MCH RBC QN AUTO: 30.7 PG (ref 26.8–34.3)
MCHC RBC AUTO-ENTMCNC: 33.1 G/DL (ref 31.4–37.4)
MCV RBC AUTO: 93 FL (ref 82–98)
MONOCYTES # BLD AUTO: 0.64 THOUSAND/ΜL (ref 0.17–1.22)
MONOCYTES NFR BLD AUTO: 7 % (ref 4–12)
NEUTROPHILS # BLD AUTO: 6.61 THOUSANDS/ΜL (ref 1.85–7.62)
NEUTS SEG NFR BLD AUTO: 69 % (ref 43–75)
NRBC BLD AUTO-RTO: 0 /100 WBCS
PLATELET # BLD AUTO: 290 THOUSANDS/UL (ref 149–390)
PMV BLD AUTO: 9 FL (ref 8.9–12.7)
POTASSIUM SERPL-SCNC: 4.1 MMOL/L (ref 3.5–5.3)
PROT SERPL-MCNC: 7.6 G/DL (ref 6.4–8.2)
PROTHROMBIN TIME: 28.2 SECONDS (ref 11.6–14.5)
RBC # BLD AUTO: 4.95 MILLION/UL (ref 3.88–5.62)
SODIUM SERPL-SCNC: 135 MMOL/L (ref 136–145)
TRIGL SERPL-MCNC: 144 MG/DL
WBC # BLD AUTO: 9.62 THOUSAND/UL (ref 4.31–10.16)

## 2019-07-02 PROCEDURE — 85610 PROTHROMBIN TIME: CPT

## 2019-07-02 PROCEDURE — 80053 COMPREHEN METABOLIC PANEL: CPT

## 2019-07-02 PROCEDURE — 80061 LIPID PANEL: CPT

## 2019-07-02 PROCEDURE — 83036 HEMOGLOBIN GLYCOSYLATED A1C: CPT

## 2019-07-02 PROCEDURE — 36415 COLL VENOUS BLD VENIPUNCTURE: CPT

## 2019-07-02 PROCEDURE — 85025 COMPLETE CBC W/AUTO DIFF WBC: CPT

## 2019-08-05 DIAGNOSIS — IMO0002 UNCONTROLLED TYPE 2 DIABETES MELLITUS WITH DIABETIC POLYNEUROPATHY, WITH LONG-TERM CURRENT USE OF INSULIN: ICD-10-CM

## 2019-08-13 DIAGNOSIS — I48.91 ATRIAL FIBRILLATION, UNSPECIFIED TYPE (HCC): ICD-10-CM

## 2019-08-13 RX ORDER — WARFARIN SODIUM 2 MG/1
2 TABLET ORAL
Qty: 90 TABLET | Refills: 5 | Status: SHIPPED | OUTPATIENT
Start: 2019-08-13 | End: 2020-10-29 | Stop reason: SDUPTHER

## 2019-08-28 DIAGNOSIS — I10 HYPERTENSION, UNSPECIFIED TYPE: ICD-10-CM

## 2019-08-28 RX ORDER — DILTIAZEM HYDROCHLORIDE 240 MG/1
240 CAPSULE, COATED, EXTENDED RELEASE ORAL 2 TIMES DAILY
Qty: 180 CAPSULE | Refills: 3 | Status: SHIPPED | OUTPATIENT
Start: 2019-08-28 | End: 2020-08-21 | Stop reason: SDUPTHER

## 2019-08-30 ENCOUNTER — ANTICOAG VISIT (OUTPATIENT)
Dept: FAMILY MEDICINE CLINIC | Facility: CLINIC | Age: 54
End: 2019-08-30

## 2019-08-30 ENCOUNTER — APPOINTMENT (OUTPATIENT)
Dept: LAB | Facility: HOSPITAL | Age: 54
End: 2019-08-30
Payer: MEDICARE

## 2019-09-30 DIAGNOSIS — I25.10 CORONARY ARTERY DISEASE INVOLVING NATIVE CORONARY ARTERY OF NATIVE HEART WITHOUT ANGINA PECTORIS: ICD-10-CM

## 2019-09-30 DIAGNOSIS — I10 ESSENTIAL HYPERTENSION: ICD-10-CM

## 2019-09-30 DIAGNOSIS — IMO0002 UNCONTROLLED TYPE 2 DIABETES MELLITUS WITH DIABETIC POLYNEUROPATHY, WITH LONG-TERM CURRENT USE OF INSULIN: ICD-10-CM

## 2019-09-30 DIAGNOSIS — I42.8 OTHER CARDIOMYOPATHY (HCC): ICD-10-CM

## 2019-09-30 DIAGNOSIS — E11.8 TYPE 2 DIABETES MELLITUS WITH COMPLICATION, UNSPECIFIED WHETHER LONG TERM INSULIN USE: ICD-10-CM

## 2019-09-30 RX ORDER — METOPROLOL TARTRATE 50 MG/1
50 TABLET, FILM COATED ORAL 2 TIMES DAILY
Qty: 180 TABLET | Refills: 3 | Status: SHIPPED | OUTPATIENT
Start: 2019-09-30 | End: 2020-09-22 | Stop reason: SDUPTHER

## 2019-09-30 RX ORDER — BENAZEPRIL HYDROCHLORIDE 10 MG/1
10 TABLET ORAL DAILY
Qty: 90 TABLET | Refills: 1 | Status: SHIPPED | OUTPATIENT
Start: 2019-09-30 | End: 2020-03-25 | Stop reason: SDUPTHER

## 2019-09-30 RX ORDER — FUROSEMIDE 40 MG/1
40 TABLET ORAL DAILY
Qty: 90 TABLET | Refills: 3 | Status: SHIPPED | OUTPATIENT
Start: 2019-09-30 | End: 2020-12-07 | Stop reason: SDUPTHER

## 2019-10-04 ENCOUNTER — APPOINTMENT (OUTPATIENT)
Dept: LAB | Facility: HOSPITAL | Age: 54
End: 2019-10-04
Payer: MEDICARE

## 2019-10-04 ENCOUNTER — ANTICOAG VISIT (OUTPATIENT)
Dept: FAMILY MEDICINE CLINIC | Facility: CLINIC | Age: 54
End: 2019-10-04

## 2019-10-28 ENCOUNTER — OFFICE VISIT (OUTPATIENT)
Dept: FAMILY MEDICINE CLINIC | Facility: CLINIC | Age: 54
End: 2019-10-28
Payer: MEDICARE

## 2019-10-28 VITALS
BODY MASS INDEX: 41.75 KG/M2 | SYSTOLIC BLOOD PRESSURE: 138 MMHG | HEIGHT: 73 IN | DIASTOLIC BLOOD PRESSURE: 78 MMHG | WEIGHT: 315 LBS

## 2019-10-28 DIAGNOSIS — Z23 ENCOUNTER FOR IMMUNIZATION: ICD-10-CM

## 2019-10-28 DIAGNOSIS — Z12.11 SCREENING FOR COLON CANCER: ICD-10-CM

## 2019-10-28 DIAGNOSIS — E66.01 MORBID OBESITY WITH BMI OF 50.0-59.9, ADULT (HCC): ICD-10-CM

## 2019-10-28 DIAGNOSIS — IMO0002 UNCONTROLLED TYPE 2 DIABETES MELLITUS WITH DIABETIC POLYNEUROPATHY, WITH LONG-TERM CURRENT USE OF INSULIN: ICD-10-CM

## 2019-10-28 DIAGNOSIS — Z00.00 MEDICARE ANNUAL WELLNESS VISIT, SUBSEQUENT: Primary | ICD-10-CM

## 2019-10-28 LAB
LEFT EYE DIABETIC RETINOPATHY: NORMAL
LEFT EYE IMAGE QUALITY: NORMAL
LEFT EYE MACULAR EDEMA: NORMAL
LEFT EYE OTHER RETINOPATHY: NORMAL
RIGHT EYE DIABETIC RETINOPATHY: NORMAL
RIGHT EYE IMAGE QUALITY: NORMAL
RIGHT EYE MACULAR EDEMA: NORMAL
RIGHT EYE OTHER RETINOPATHY: NORMAL
SEVERITY (EYE EXAM): NORMAL

## 2019-10-28 PROCEDURE — G0439 PPPS, SUBSEQ VISIT: HCPCS | Performed by: FAMILY MEDICINE

## 2019-10-28 PROCEDURE — G0008 ADMIN INFLUENZA VIRUS VAC: HCPCS | Performed by: FAMILY MEDICINE

## 2019-10-28 PROCEDURE — 90682 RIV4 VACC RECOMBINANT DNA IM: CPT | Performed by: FAMILY MEDICINE

## 2019-10-28 NOTE — PROGRESS NOTES
Assessment and Plan:   Flu shot given today  Patient does not wish to see a podiatrist at this time  Will check a hemoglobin A1c and microalbumin when he gets his next PT INR  Patient still wishes to hold on seeing an orthopedic for his knee pain  He will think about seeing a bariatric doctor for his weight  We will see him back in 3-4 months or p r n  Problem List Items Addressed This Visit        Endocrine    Uncontrolled type 2 diabetes mellitus with diabetic polyneuropathy, with long-term current use of insulin ([facility] )    Relevant Orders    IRIS Diabetic eye exam    Hemoglobin A1C    Microalbumin / creatinine urine ratio      Other Visit Diagnoses     Medicare annual wellness visit, subsequent    -  Primary    Screening for colon cancer        Relevant Orders    Occult Blood, Fecal Immunochemical    Morbid obesity with BMI of 50 0-59 9, adult ([facility] )        Encounter for immunization        Relevant Orders    influenza vaccine, [id number], quadrivalent, recombinant, PF, 0 5 mL, for patients 18 yr+ ([facility])           Preventive health issues were discussed with patient, and age appropriate screening tests were ordered as noted in patient's After Visit Summary  Personalized health advice and appropriate referrals for health education or preventive services given if needed, as noted in patient's After Visit Summary  History of Present Illness:     Patient presents for Welcome to Medicare visit  Patient Care Team:  Torsten Moralez DO as PCP - General  Juan David Snyder MD     Review of Systems:     Review of Systems   Constitutional: Negative  Respiratory: Negative  Cardiovascular: Negative  Gastrointestinal: Negative  Genitourinary: Negative         Problem List:     Patient Active Problem List   Diagnosis    Aortic stenosis    Atrial fibrillation ([facility] )    COPD mixed type ([facility] )    Uncontrolled type 2 diabetes mellitus with diabetic polyneuropathy, with long-term current use of insulin ([facility] )  Other hyperlipidemia    Essential hypertension    S/P AVR    Obstructive sleep apnea    Restless leg syndrome    Hypersomnia    Morbid obesity with BMI of 40 0-44 9, adult (HCC)    PLMD (periodic limb movement disorder)    Palpitations    Acute exacerbation of chronic obstructive bronchitis ([facility])    Multiple pulmonary nodules determined by computed tomography of lung    Chronic pain of left knee    Seasonal allergic rhinitis due to pollen      Past Medical and Surgical History:     Past Medical History:   Diagnosis Date    Asthma     last assessed 8/21/12    Athscl heart disease of native coronary artery w/o ang pctrs     At[facility] fibrillation ([facility] )     last assessed 8/21/12    Cardiomyopathy [facility])     Closed fracture of fibula     last assessed 10/18/13    COLD (chronic obstructive lung disease) ([facility] )     last assessed 8/21/12    Coronary angioplasty status     History of echocardiogram 04/19/2017    EF 0 50, LVSF is at the lower limits of normal  Mild concentric LV hypertophy  Mild mitral regurg  Normal functioning mechanical AV      Hyperlipidemia     Hypertension     last assessed 8/21/12    Presence of prosthetic heart valve     Restless legs syndrome     last assessed 8/21/12    Sleep apnea     last assessed 8/21/12     Past Surgical History:   Procedure Laterality Date    AORTIC VALVE REPLACEMENT  10/07/2016    AVR replacement, mechanical    CORONARY ANGIOPLASTY WITH STENT PLACEMENT  07/29/2010    EF 40%, Successful bare metal stent mid RCA      Family History:     Family History   Problem Relation Age of Onset    Atrial fibrillation Mother     Heart attack Father         acute    Heart failure Father     Stroke Father         syndrome      Social History:     Social History     Socioeconomic History    Marital status: /Civil Union     Spouse name: None    Number of children: None    Years of education: None    Highest education level: None   Occupational History    None   Social Needs    Financial resource strain: None    Food insecurity:     Worry: None     Inability: None    Transportation needs:     Medical: None     Non-medical: None   Tobacco Use    Smoking status: Former Smoker     Years: 35 00     Types: Cigarettes     Last attempt to quit: 10/2016     Years since quitting: 3 0    Smokeless tobacco: Never Used    Tobacco comment: 5 cigs a day   Substance and Sexual Activity    Alcohol use: Not Currently    Drug use: Never    Sexual activity: None   Lifestyle    Physical activity:     Days per week: None     Minutes per session: None    Stress: None   Relationships    Social connections:     Talks on phone: None     Gets together: None     Attends L[employer] day service: None     Active member of club or organization: None     Attends meetings of clubs or organizations: None     Relationship status: None    Intimate partner violence:     Fear of current or ex partner: None     Emotionally abused: None     Physically abused: None     Forced sexual activity: None   Other Topics Concern    None   Social History Narrative    Always uses seatbelt    Daily caffeine    No living will      Medications and Allergies:     Current Outpatient Medications   Medication Sig Dispense Refill    albuterol (2 5 mg/3 mL) 0 083 % nebulizer solution Inhale       albuterol (VENTOLIN HFA) 90 mcg/act inhaler Inhale 2 puffs every 6 (six) hours as needed for shortness of breath 1 Inhaler 5    aspirin 81 MG tablet Take by mouth      atorvastatin (LIPITOR) 10 mg tablet Take 1 tablet (10 mg total) by mouth daily 90 tablet 3    benazepril (LOTENSIN) 10 mg tablet Take 1 tablet (10 mg total) by mouth daily 90 tablet 1    diltiazem (CARDIZEM CD) 240 mg 24 hr capsule Take 1 capsule (240 mg total) by mouth 2 (two) times a day 180 capsule 3    fluticasone (FLONASE) 50 mcg/act nasal spray 2 sprays into each nostril daily 16 g 3    fluticasone-umeclidinium-vilanterol (TRELEGY ELLIPTA) 100-62 5-25 MCG/INH inhaler Inhale 1 puff daily 1 each 11    furosemide (LASIX) 40 mg tablet Take 1 tablet (40 mg total) by mouth daily 90 tablet 3    insulin degludec (TRESIBA FLEXTOUCH) 100 units/mL injection pen Inject 40 Units under the skin daily 5 pen 3    Insulin Pen Needle (BD PEN NEEDLE MANJEET U/F) 32G X 4 MM MISC by Does not apply route daily 100 each 5    metFORMIN (GLUCOPHAGE) 1000 MG tablet Take 1 tablet (1,000 mg total) by mouth 2 (two) times a day 180 tablet 1    metoprolol tartrate (LOPRESSOR) 50 mg tablet Take 1 tablet (50 mg total) by mouth 2 (two) times a day 180 tablet 3    nitroglycerin (NITROSTAT) 0 4 mg SL tablet Place 1 tablet (0 4 mg total) under the tongue every 5 (five) minutes as needed for chest pain 100 tablet 3    potassium chloride (K-DUR) 10 mEq tablet Take 1 tablet (10 mEq total) by mouth daily 90 tablet 3    warfarin (COUMADIN) 10 mg tablet Take 1 tablet (10 mg total) by mouth daily 90 tablet 5    warfarin (COUMADIN) 2 mg tablet Take 1 tablet (2 mg total) by mouth daily 90 tablet 5     No current facility-administered medications for this visit        Allergies   Allergen Reactions    Fluticasone-Salmeterol Palpitations      Immunizations:     Immunization History   Administered Date(s) Administered    INFLUENZA 10/13/2016, 10/14/2016, 10/23/2017, 10/25/2018    Influenza Quadrivalent Preservative Free 3 years and older IM 11/03/2014, 10/14/2016, 10/23/2017    Influenza TIV (IM) 01/03/2013, 12/16/2013    Influenza, recombinant, quadrivalent,injectable, preservative free 10/25/2018    Pneumococcal Polysaccharide PPV23 12/16/2013, 10/25/2018      Health Maintenance:         Topic Date Due    Hepatitis C Screening  1965    CRC Screening: Colonoscopy  1965    CRC Screening: FOBTx3/FIT  08/26/2013         Topic Date Due    HEPATITIS B VACCINES (1 of 3 - Risk 3-dose series) 08/26/1982    DTaP,Tdap,and Td Vaccines (1 - Tdap) 08/26/1984    INFLUENZA VACCINE 07/01/2019      Medicare Screening Tests and Risk Assessments:     Alta Almazan is here for his Subsequent Wellness visit  Last Medicare Wellness visit information reviewed, patient interviewed and updates made to the record today  Health Risk Assessment:   Patient rates overall health as poor  Patient feels that their physical health rating is same  Eyesight was rated as slightly worse  Hearing was rated as same  Patient feels that their emotional and mental health rating is same  Pain experienced in the last 7 days has been a lot  Patient's pain rating has been 6/10  Patient states that he has experienced no weight loss or gain in last 6 months  Depression Screening:   PHQ-2 Score: 0      Fall Risk Screening: In the past year, patient has experienced: no history of falling in past year      Home Safety:  Patient has trouble with stairs inside or outside of their home  Patient has working smoke alarms and has working carbon monoxide detector  Home safety hazards include: none  Nutrition:   Current diet is Regular  Medications:   Patient is not currently taking any over-the-counter supplements  Patient is able to manage medications  Activities of Daily Living (ADLs)/Instrumental Activities of Daily Living (IADLs):   Walk and transfer into and out of bed and chair?: Yes  Dress and groom yourself?: Yes    Bathe or shower yourself?: Yes    Feed yourself?  Yes  Do your laundry/housekeeping?: Yes  Manage your money, pay your bills and track your expenses?: Yes  Make your own meals?: Yes    Do your own shopping?: Yes    Previous Hospitalizations:   Any hospitalizations or ED visits within the last 12 months?: No      Advance Care Planning:   Living will: No    Durable POA for healthcare: No    Advanced directive: No    Advanced directive counseling given: Yes      Cognitive Screening:   Provider or family/friend/caregiver concerned regarding cognition?: No    PREVENTIVE SCREENINGS      Cardiovascular Screening:    General: History Lipid Disorder and Screening Current      Diabetes Screening:     General: History Diabetes    Due for: Blood Glucose      Colorectal Cancer Screening:     General: Risks and Benefits Discussed    Due for: FOBT/FIT      Abdominal Aortic Aneurysm (AAA) Screening:    Risk factors include: tobacco use      No exam data present     Physical Exam:     /78   Ht 6' 1" (1 854 m)   Wt (!) 186 kg (410 lb 3 2 oz)   BMI 54 12 kg/m²     Physical Exam   Constitutional: He is oriented to person, place, and time  He appears well-developed and well-nourished  No distress  HENT:   Head: Normocephalic and atraumatic  Eyes: Conjunctivae are normal    Cardiovascular: Normal rate and normal heart sounds  An irregularly irregular rhythm present  Exam reveals no gallop and no friction rub  Pulses are no weak pulses  No murmur heard  Pulses:       Dorsalis pedis pulses are 2+ on the right side, and 2+ on the left side  Posterior tibial pulses are 2+ on the right side, and 2+ on the left side  Pulmonary/Chest: Effort normal and breath sounds normal  No respiratory distress  He has no wheezes  He has no rales  Musculoskeletal: He exhibits no edema  Feet:   Right Foot:   Skin Integrity: Negative for ulcer, skin breakdown, erythema, warmth, callus or dry skin  Left Foot:   Skin Integrity: Negative for ulcer, skin breakdown, erythema, warmth, callus or dry skin  Neurological: He is alert and oriented to person, place, and time  Skin: He is not diaphoretic  Psychiatric: He has a normal mood and affect  His behavior is normal  Judgment and thought content normal    Vitals reviewed  Patient's shoes and socks removed  Right Foot/Ankle   Right Foot Inspection  Skin Exam: skin normal and skin intact no dry skin, no warmth, no callus, no erythema, no maceration, no abnormal color, no pre-ulcer, no ulcer and no callus                            Sensory       Monofilament testing: absent  Vascular    The right DP pulse is 2+  The right PT pulse is 2+  Left Foot/Ankle  Left Foot Inspection  Skin Exam: skin normal and skin intactno dry skin, no warmth, no erythema, no maceration, normal color, no pre-ulcer, no ulcer and no callus                                         Sensory       Monofilament: absent  Vascular    The left DP pulse is 2+  The left PT pulse is 2+  Assign Risk Category:  No deformity present; Loss of protective sensation; No weak pulses       Risk: 3      Mckee Prior, DO  BMI Counseling: Body mass index is 54 12 kg/m²  The BMI is above normal  Nutrition recommendations include reducing portion sizes, consuming healthier snacks, moderation in carbohydrate intake, increasing intake of lean protein, reducing intake of saturated fat and trans fat and reducing intake of cholesterol

## 2019-10-28 NOTE — PATIENT INSTRUCTIONS
Medicare Preventive Visit Patient Instructions  Thank you for completing your Welcome to Medicare Visit or Medicare Annual Wellness Visit today  Your next wellness visit will be due in one year (10/28/2020)  The screening/preventive services that you may require over the next 5-10 years are detailed below  Some tests may not apply to you based off risk factors and/or age  Screening tests ordered at today's visit but not completed yet may show as past due  Also, please note that scanned in results may not display below  Preventive Screenings:  Service Recommendations Previous Testing/Comments   Colorectal Cancer Screening  · Colonoscopy    · Fecal Occult Blood Test (FOBT)/Fecal Immunochemical Test (FIT)  · Fecal DNA/Cologuard Test  · Flexible Sigmoidoscopy Age: 54-65 years old   Colonoscopy: every 10 years (May be performed more frequently if at higher risk)  OR  FOBT/FIT: every 1 year  OR  Cologuard: every 3 years  OR  Sigmoidoscopy: every 5 years  Screening may be recommended earlier than age 48 if at higher risk for colorectal cancer  Also, an individualized decision between you and your healthcare provider will decide whether screening between the ages of 74-80 would be appropriate   Colonoscopy: Not on file  FOBT/FIT: Not on file  Cologuard: Not on file  Sigmoidoscopy: Not on file         Prostate Cancer Screening Individualized decision between patient and health care provider in men between ages of 53-78   Medicare will cover every 12 months beginning on the day after your 50th birthday PSA: No results in last 5 years          Hepatitis C Screening Once for adults born between 80 and 1965  More frequently in patients at high risk for Hepatitis C Hep C Antibody: Not on file       Diabetes Screening 1-2 times per year if you're at risk for diabetes or have pre-diabetes Fasting glucose: 154 mg/dL   A1C: 8 0 %    Screening Not Indicated  History Diabetes   Cholesterol Screening Once every 5 years if you don't have a lipid disorder  May order more often based on risk factors  Lipid panel: 07/02/2019    Screening Not Indicated  History Lipid Disorder      Other Preventive Screenings Covered by Medicare:  1  Abdominal Aortic Aneurysm (AAA) Screening: covered once if your at risk  You're considered to be at risk if you have a family history of AAA or a male between the age of 73-68 who smoking at least 100 cigarettes in your lifetime  2  Lung Cancer Screening: covers low dose CT scan once per year if you meet all of the following conditions: (1) Age 50-69; (2) No signs or symptoms of lung cancer; (3) Current smoker or have quit smoking within the last 15 years; (4) You have a tobacco smoking history of at least 30 pack years (packs per day x number of years you smoked); (5) You get a written order from a healthcare provider  3  Glaucoma Screening: covered annually if you're considered high risk: (1) You have diabetes OR (2) Family history of glaucoma OR (3)  aged 48 and older OR (3)  American aged 72 and older  3  Osteoporosis Screening: covered every 2 years if you meet one of the following conditions: (1) Have a vertebral abnormality; (2) On glucocorticoid therapy for more than 3 months; (3) Have primary hyperparathyroidism; (4) On osteoporosis medications and need to assess response to drug therapy  5  HIV Screening: covered annually if you're between the age of 12-76  Also covered annually if you are younger than 13 and older than 72 with risk factors for HIV infection  For pregnant patients, it is covered up to 3 times per pregnancy      Immunizations:  Immunization Recommendations   Influenza Vaccine Annual influenza vaccination during flu season is recommended for all persons aged >= 6 months who do not have contraindications   Pneumococcal Vaccine (Prevnar and Pneumovax)  * Prevnar = PCV13  * Pneumovax = PPSV23 Adults 25-60 years old: 1-3 doses may be recommended based on certain risk factors  Adults 72 years old: Prevnar (PCV13) vaccine recommended followed by Pneumovax (PPSV23) vaccine  If already received PPSV23 since turning 65, then PCV13 recommended at least one year after PPSV23 dose  Hepatitis B Vaccine 3 dose series if at intermediate or high risk (ex: diabetes, end stage renal disease, liver disease)   Tetanus (Td) Vaccine - COST NOT COVERED BY MEDICARE PART B Following completion of primary series, a booster dose should be given every 10 years to maintain immunity against tetanus  Td may also be given as tetanus wound prophylaxis  Tdap Vaccine - COST NOT COVERED BY MEDICARE PART B Recommended at least once for all adults  For pregnant patients, recommended with each pregnancy  Shingles Vaccine (Shingrix) - COST NOT COVERED BY MEDICARE PART B  2 shot series recommended in those aged 48 and above     Health Maintenance Due:      Topic Date Due    Hepatitis C Screening  1965    CRC Screening: Colonoscopy  1965    CRC Screening: FOBTx3/FIT  08/26/2013     Immunizations Due:      Topic Date Due    HEPATITIS B VACCINES (1 of 3 - Risk 3-dose series) 08/26/1982    DTaP,Tdap,and Td Vaccines (1 - Tdap) 08/26/1984    INFLUENZA VACCINE  07/01/2019     Advance Directives   What are advance directives? Advance directives are legal documents that state your wishes and plans for medical care  These plans are made ahead of time in case you lose your ability to make decisions for yourself  Advance directives can apply to any medical decision, such as the treatments you want, and if you want to donate organs  What are the types of advance directives? There are many types of advance directives, and each state has rules about how to use them  You may choose a combination of any of the following:  · Living will: This is a written record of the treatment you want  You can also choose which treatments you do not want, which to limit, and which to stop at a certain time   This includes surgery, medicine, IV fluid, and tube feedings  · Durable power of  for healthcare [facility]): This is a written record that states who you want to make healthcare choices for you when you are unable to make them for yourself  This person, called a proxy, is usually a family member or a friend  You may choose more than 1 proxy  · Do not resuscitate (DNR) order:  A DNR order is used in case your heart stops beating or you stop breathing  It is a request not to have certain forms of treatment, such as CPR  A DNR order may be included in other types of advance directives  · Medical directive: This covers the care that you want if you are in a coma, near death, or unable to make decisions for yourself  You can list the treatments you want for each condition  Treatment may include pain medicine, surgery, blood transfusions, dialysis, IV or tube feedings, and a ventilator (breathing machine)  · Values history: This document has questions about your views, beliefs, and how you feel and think about life  This information can help others choose the care that you would choose  Why are advance directives important? An advance directive helps you control your care  Although spoken wishes may be used, it is better to have your wishes written down  Spoken wishes can be misunderstood, or not followed  Treatments may be given even if you do not want them  An advance directive may make it easier for your family to make difficult choices about your care  Weight Management   Why it is important to manage your weight:  Being overweight increases your risk of health conditions such as heart disease, high blood pressure, type 2 diabetes, and certain types of cancer  It can also increase your risk for osteoarthritis, sleep apnea, and other respiratory problems  Aim for a slow, steady weight loss  Even a small amount of weight loss can lower your risk of health problems    How to lose weight safely:  A safe and healthy way to lose weight is to eat fewer calories and get regular exercise  You can lose up about 1 pound a week by decreasing the number of calories you eat by 500 calories each day  Healthy meal plan for weight management:  A healthy meal plan includes a variety of foods, contains fewer calories, and helps you stay healthy  A healthy meal plan includes the following:  · Eat whole-grain foods more often  A healthy meal plan should contain fiber  Fiber is the part of grains, fruits, and vegetables that is not broken down by your body  Whole-grain foods are healthy and provide extra fiber in your diet  Some examples of whole-grain foods are whole-wheat breads and pastas, oatmeal, brown rice, and bulgur  · Eat a variety of vegetables every day  Include dark, leafy greens such as spinach, kale, oly greens, and mustard greens  Eat yellow and orange vegetables such as carrots, sweet potatoes, and winter squash  · Eat a variety of fruits every day  Choose fresh or canned fruit (canned in its own juice or light syrup) instead of juice  Fruit juice has very little or no fiber  · Eat low-fat dairy foods  Drink fat-free (skim) milk or 1% milk  Eat fat-free yogurt and low-fat cottage cheese  Try low-fat cheeses such as mozzarella and other reduced-fat cheeses  · Choose meat and other protein foods that are low in fat  Choose beans or other legumes such as split peas or lentils  Choose fish, skinless poultry (chicken or turkey), or lean cuts of red meat (beef or pork)  Before you cook meat or poultry, cut off any visible fat  · Use less fat and oil  Try baking foods instead of frying them  Add less fat, such as margarine, sour cream, regular salad dressing and mayonnaise to foods  Eat fewer high-fat foods  Some examples of high-fat foods include french fries, doughnuts, ice cream, and cakes  · Eat fewer sweets  Limit foods and drinks that are high in sugar   This includes candy, cookies, regular soda, and sweetened drinks  Exercise:  Exercise at least 30 minutes per day on most days of the week  Some examples of exercise include walking, biking, dancing, and swimming  You can also fit in more physical activity by taking the stairs instead of the elevator or parking farther away from stores  Ask your healthcare provider about the best exercise plan for you  © Copyright [employer] 2018 Information is for End User's use only and may not be sold, redistributed or otherwise used for commercial purposes  All illustrations and images included in CareNotes® are the copyrighted property of [employer]  or [facility] Preventive Visit Patient Instructions  Thank you for completing your Welcome to Medicare Visit or Medicare Annual Wellness Visit today  Your next wellness visit will be due in one year (10/28/2020)  The screening/preventive services that you may require over the next 5-10 years are detailed below  Some tests may not apply to you based off risk factors and/or age  Screening tests ordered at today's visit but not completed yet may show as past due  Also, please note that scanned in results may not display below  Preventive Screenings:  Service Recommendations Previous Testing/Comments   Colorectal Cancer Screening  · Colonoscopy    · Fecal Occult Blood Test (FOBT)/Fecal Immunochemical Test (FIT)  · Fecal DNA/Cologuard Test  · Flexible Sigmoidoscopy Age: 54-65 years old   Colonoscopy: every 10 years (May be performed more frequently if at higher risk)  OR  FOBT/FIT: every 1 year  OR  Cologuard: every 3 years  OR  Sigmoidoscopy: every 5 years  Screening may be recommended earlier than age 48 if at higher risk for colorectal cancer  Also, an individualized decision between you and your healthcare provider will decide whether screening between the ages of 74-80 would be appropriate   Colonoscopy: Not on file  FOBT/FIT: Not on file  Cologuard: Not on file  Sigmoidoscopy: Not on file Prostate Cancer Screening Individualized decision between patient and health care provider in men between ages of 53-78   Medicare will cover every 12 months beginning on the day after your 50th birthday PSA: No results in last 5 years          Hepatitis C Screening Once for adults born between 80 and 1965  More frequently in patients at high risk for Hepatitis C Hep C Antibody: Not on file       Diabetes Screening 1-2 times per year if you're at risk for diabetes or have pre-diabetes Fasting glucose: 154 mg/dL   A1C: 8 0 %    Screening Not Indicated  History Diabetes   Cholesterol Screening Once every 5 years if you don't have a lipid disorder  May order more often based on risk factors  Lipid panel: 07/02/2019    Screening Not Indicated  History Lipid Disorder      Other Preventive Screenings Covered by Medicare:  6  Abdominal Aortic Aneurysm (AAA) Screening: covered once if your at risk  You're considered to be at risk if you have a family history of AAA or a male between the age of 73-68 who smoking at least 100 cigarettes in your lifetime  7  Lung Cancer Screening: covers low dose CT scan once per year if you meet all of the following conditions: (1) Age 50-69; (2) No signs or symptoms of lung cancer; (3) Current smoker or have quit smoking within the last 15 years; (4) You have a tobacco smoking history of at least 30 pack years (packs per day x number of years you smoked); (5) You get a written order from a healthcare provider  8  Glaucoma Screening: covered annually if you're considered high risk: (1) You have diabetes OR (2) Family history of glaucoma OR (3)  aged 48 and older OR (3)  American aged 72 and older  5   Osteoporosis Screening: covered every 2 years if you meet one of the following conditions: (1) Have a vertebral abnormality; (2) On glucocorticoid therapy for more than 3 months; (3) Have primary hyperparathyroidism; (4) On osteoporosis medications and need to assess response to drug therapy  10  HIV Screening: covered annually if you're between the age of 12-76  Also covered annually if you are younger than 13 and older than 72 with risk factors for HIV infection  For pregnant patients, it is covered up to 3 times per pregnancy  Immunizations:  Immunization Recommendations   Influenza Vaccine Annual influenza vaccination during flu season is recommended for all persons aged >= 6 months who do not have contraindications   Pneumococcal Vaccine (Prevnar and Pneumovax)  * Prevnar = PCV13  * Pneumovax = PPSV23 Adults 25-60 years old: 1-3 doses may be recommended based on certain risk factors  Adults 72 years old: Prevnar (PCV13) vaccine recommended followed by Pneumovax (PPSV23) vaccine  If already received PPSV23 since turning 65, then PCV13 recommended at least one year after PPSV23 dose  Hepatitis B Vaccine 3 dose series if at intermediate or high risk (ex: diabetes, end stage renal disease, liver disease)   Tetanus (Td) Vaccine - COST NOT COVERED BY MEDICARE PART B Following completion of primary series, a booster dose should be given every 10 years to maintain immunity against tetanus  Td may also be given as tetanus wound prophylaxis  Tdap Vaccine - COST NOT COVERED BY MEDICARE PART B Recommended at least once for all adults  For pregnant patients, recommended with each pregnancy  Shingles Vaccine (Shingrix) - COST NOT COVERED BY MEDICARE PART B  2 shot series recommended in those aged 48 and above     Health Maintenance Due:      Topic Date Due    Hepatitis C Screening  1965    CRC Screening: Colonoscopy  1965    CRC Screening: FOBTx3/FIT  08/26/2013     Immunizations Due:      Topic Date Due    HEPATITIS B VACCINES (1 of 3 - Risk 3-dose series) 08/26/1982    DTaP,Tdap,and Td Vaccines (1 - Tdap) 08/26/1984    INFLUENZA VACCINE  07/01/2019     Advance Directives   What are advance directives?   Advance directives are legal documents that state your wishes and plans for medical care  These plans are made ahead of time in case you lose your ability to make decisions for yourself  Advance directives can apply to any medical decision, such as the treatments you want, and if you want to donate organs  What are the types of advance directives? There are many types of advance directives, and each state has rules about how to use them  You may choose a combination of any of the following:  · Living will: This is a written record of the treatment you want  You can also choose which treatments you do not want, which to limit, and which to stop at a certain time  This includes surgery, medicine, IV fluid, and tube feedings  · Durable power of  for healthcare [facility]): This is a written record that states who you want to make healthcare choices for you when you are unable to make them for yourself  This person, called a proxy, is usually a family member or a friend  You may choose more than 1 proxy  · Do not resuscitate (DNR) order:  A DNR order is used in case your heart stops beating or you stop breathing  It is a request not to have certain forms of treatment, such as CPR  A DNR order may be included in other types of advance directives  · Medical directive: This covers the care that you want if you are in a coma, near death, or unable to make decisions for yourself  You can list the treatments you want for each condition  Treatment may include pain medicine, surgery, blood transfusions, dialysis, IV or tube feedings, and a ventilator (breathing machine)  · Values history: This document has questions about your views, beliefs, and how you feel and think about life  This information can help others choose the care that you would choose  Why are advance directives important? An advance directive helps you control your care  Although spoken wishes may be used, it is better to have your wishes written down   Spoken wishes can be misunderstood, or not followed  Treatments may be given even if you do not want them  An advance directive may make it easier for your family to make difficult choices about your care  Weight Management   Why it is important to manage your weight:  Being overweight increases your risk of health conditions such as heart disease, high blood pressure, type 2 diabetes, and certain types of cancer  It can also increase your risk for osteoarthritis, sleep apnea, and other respiratory problems  Aim for a slow, steady weight loss  Even a small amount of weight loss can lower your risk of health problems  How to lose weight safely:  A safe and healthy way to lose weight is to eat fewer calories and get regular exercise  You can lose up about 1 pound a week by decreasing the number of calories you eat by 500 calories each day  Healthy meal plan for weight management:  A healthy meal plan includes a variety of foods, contains fewer calories, and helps you stay healthy  A healthy meal plan includes the following:  · Eat whole-grain foods more often  A healthy meal plan should contain fiber  Fiber is the part of grains, fruits, and vegetables that is not broken down by your body  Whole-grain foods are healthy and provide extra fiber in your diet  Some examples of whole-grain foods are whole-wheat breads and pastas, oatmeal, brown rice, and bulgur  · Eat a variety of vegetables every day  Include dark, leafy greens such as spinach, kale, oly greens, and mustard greens  Eat yellow and orange vegetables such as carrots, sweet potatoes, and winter squash  · Eat a variety of fruits every day  Choose fresh or canned fruit (canned in its own juice or light syrup) instead of juice  Fruit juice has very little or no fiber  · Eat low-fat dairy foods  Drink fat-free (skim) milk or 1% milk  Eat fat-free yogurt and low-fat cottage cheese  Try low-fat cheeses such as mozzarella and other reduced-fat cheeses    · Choose meat and other protein foods that are low in fat  Choose beans or other legumes such as split peas or lentils  Choose fish, skinless poultry (chicken or turkey), or lean cuts of red meat (beef or pork)  Before you cook meat or poultry, cut off any visible fat  · Use less fat and oil  Try baking foods instead of frying them  Add less fat, such as margarine, sour cream, regular salad dressing and mayonnaise to foods  Eat fewer high-fat foods  Some examples of high-fat foods include french fries, doughnuts, ice cream, and cakes  · Eat fewer sweets  Limit foods and drinks that are high in sugar  This includes candy, cookies, regular soda, and sweetened drinks  Exercise:  Exercise at least 30 minutes per day on most days of the week  Some examples of exercise include walking, biking, dancing, and swimming  You can also fit in more physical activity by taking the stairs instead of the elevator or parking farther away from stores  Ask your healthcare provider about the best exercise plan for you  © Copyright [employer] 2018 Information is for End User's use only and may not be sold, redistributed or otherwise used for commercial purposes  All illustrations and images included in CareNotes® are the copyrighted property of A [employer] A M [employer]  or [address] Healthy Diet   AMBULATORY CARE:   A heart healthy diet  is an eating plan low in total fat, unhealthy fats, and sodium (salt)  A heart healthy diet helps decrease your risk for heart disease and stroke  Limit the amount of fat you eat to 25% to 35% of your total daily calories  Limit sodium to less than 2,300 mg each day  Healthy fats:  Healthy fats can help improve cholesterol levels  The risk for heart disease is decreased when cholesterol levels are normal  Choose healthy fats, such as the following:  · Unsaturated fat  is found in foods such as soybean, canola, olive, corn, and safflower oils   It is also found in soft tub margarine that is made with liquid vegetable oil      · Omega-3 fat  is found in certain fish, such as salmon, tuna, and trout, and in walnuts and flaxseed  Unhealthy fats:  Unhealthy fats can cause unhealthy cholesterol levels in your blood and increase your risk of heart disease  Limit unhealthy fats, such as the following:  · Cholesterol  is found in animal foods, such as eggs and lobster, and in dairy products made from whole milk  Limit cholesterol to less than 300 milligrams (mg) each day  You may need to limit cholesterol to 200 mg each day if you have heart disease  · Saturated fat  is found in meats, such as byrd and hamburger  It is also found in chicken or turkey skin, whole milk, and butter  Limit saturated fat to less than 7% of your total daily calories  Limit saturated fat to less than 6% if you have heart disease or are at increased risk for it  · Trans fat  is found in packaged foods, such as potato chips and cookies  It is also in hard margarine, some fried foods, and shortening  Avoid trans fats as much as possible    Heart healthy foods and drinks to include:  Ask your dietitian or healthcare provider how many servings to have from each of the following food groups:  · Grains:      ¨ Whole-wheat breads, cereals, and pastas, and brown rice    ¨ Low-fat, low-sodium crackers and chips    · Vegetables:      ¨ Broccoli, green beans, green peas, and spinach    ¨ Collards, kale, and lima beans    ¨ Carrots, sweet potatoes, tomatoes, and peppers    ¨ Canned vegetables with no salt added    · Fruits:      ¨ Bananas, peaches, pears, and pineapple    ¨ Grapes, raisins, and dates    ¨ Oranges, tangerines, grapefruit, orange juice, and grapefruit juice    ¨ Apricots, mangoes, melons, and papaya    ¨ Raspberries and strawberries    ¨ Canned fruit with no added sugar    · Low-fat dairy products:      ¨ Nonfat (skim) milk, 1% milk, and low-fat almond, cashew, or soy milks fortified with calcium    ¨ Low-fat cheese, regular or frozen yogurt, and cottage cheese    · Meats and proteins , such as lean cuts of beef and pork (loin, leg, round), skinless chicken and turkey, legumes, soy products, egg whites, and nuts  Foods and drinks to limit or avoid:  Ask your dietitian or healthcare provider about these and other foods that are high in unhealthy fat, sodium, and sugar:  · Snack or packaged foods , such as frozen dinners, cookies, macaroni and cheese, and cereals with more than 300 mg of sodium per serving    · Canned or dry mixes  for cakes, soups, sauces, or gravies    · Vegetables with added sodium , such as instant potatoes, vegetables with added sauces, or regular canned vegetables    · Other foods high in sodium , such as ketchup, barbecue sauce, salad dressing, pickles, olives, soy sauce, and miso    · High-fat dairy foods  such as whole or 2% milk, cream cheese, or sour cream, and cheeses     · High-fat protein foods  such as high-fat cuts of beef (T-bone steaks, ribs), chicken or turkey with skin, and organ meats, such as liver    · Cured or smoked meats , such as hot dogs, byrd, and sausage    · Unhealthy fats and oils , such as butter, stick margarine, shortening, and cooking oils such as coconut or palm oil    · Food and drinks high in sugar , such as soft drinks (soda), sports drinks, sweetened tea, candy, cake, cookies, pies, and doughnuts  Other diet guidelines to follow:   · Eat more foods containing omega-3 fats  Eat fish high in omega-3 fats at least 2 times a week  · Limit alcohol  Too much alcohol can damage your heart and raise your blood pressure  Women should limit alcohol to 1 drink a day  Men should limit alcohol to 2 drinks a day  A drink of alcohol is 12 ounces of beer, 5 ounces of wine, or 1½ ounces of liquor  · Choose low-sodium foods  High-sodium foods can lead to high blood pressure  Add little or no salt to food you prepare  Use herbs and spices in place of salt  · Eat more fiber  to help lower cholesterol levels  Eat at least 5 servings of fruits and vegetables each day  Eat 3 ounces of whole-grain foods each day  Legumes (beans) are also a good source of fiber  Lifestyle guidelines:   · Do not smoke  Nicotine and other chemicals in cigarettes and cigars can cause lung and heart damage  Ask your healthcare provider for information if you currently smoke and need help to quit  E-cigarettes or smokeless tobacco still contain nicotine  Talk to your healthcare provider before you use these products  · Exercise regularly  to help you maintain a healthy weight and improve your blood pressure and cholesterol levels  Ask your healthcare provider about the best exercise plan for you  Do not start an exercise program without asking your healthcare provider  Follow up with your healthcare provider as directed:  Write down your questions so you remember to ask them during your visits  © 2017 [facility] Information is for End User's use only and may not be sold, redistributed or otherwise used for commercial purposes  All illustrations and images included in CareNotes® are the copyrighted property of A D A M , Inc  or Roger Guadalupe  The above information is an  only  It is not intended as medical advice for individual conditions or treatments  Talk to your doctor, nurse or pharmacist before following any medical regimen to see if it is safe and effective for you

## 2019-11-07 ENCOUNTER — TELEPHONE (OUTPATIENT)
Dept: FAMILY MEDICINE CLINIC | Facility: CLINIC | Age: 54
End: 2019-11-07

## 2019-11-08 ENCOUNTER — APPOINTMENT (OUTPATIENT)
Dept: LAB | Facility: HOSPITAL | Age: 54
End: 2019-11-08
Payer: MEDICARE

## 2019-11-08 ENCOUNTER — ANTICOAG VISIT (OUTPATIENT)
Dept: FAMILY MEDICINE CLINIC | Facility: CLINIC | Age: 54
End: 2019-11-08

## 2019-11-08 LAB
CREAT UR-MCNC: 148 MG/DL
EST. AVERAGE GLUCOSE BLD GHB EST-MCNC: 174 MG/DL
HBA1C MFR BLD: 7.7 % (ref 4.2–6.3)
MICROALBUMIN UR-MCNC: 32.7 MG/L (ref 0–20)
MICROALBUMIN/CREAT 24H UR: 22 MG/G CREATININE (ref 0–30)

## 2019-11-08 PROCEDURE — 83036 HEMOGLOBIN GLYCOSYLATED A1C: CPT | Performed by: FAMILY MEDICINE

## 2019-11-08 PROCEDURE — 82043 UR ALBUMIN QUANTITATIVE: CPT | Performed by: FAMILY MEDICINE

## 2019-11-08 PROCEDURE — 82570 ASSAY OF URINE CREATININE: CPT | Performed by: FAMILY MEDICINE

## 2019-12-02 ENCOUNTER — APPOINTMENT (OUTPATIENT)
Dept: LAB | Facility: HOSPITAL | Age: 54
End: 2019-12-02
Payer: MEDICARE

## 2019-12-02 ENCOUNTER — ANTICOAG VISIT (OUTPATIENT)
Dept: FAMILY MEDICINE CLINIC | Facility: CLINIC | Age: 54
End: 2019-12-02

## 2019-12-02 DIAGNOSIS — I48.91 ATRIAL FIBRILLATION, UNSPECIFIED TYPE (HCC): Primary | ICD-10-CM

## 2019-12-02 LAB
INR PPP: 3.15 (ref 0.84–1.19)
PROTHROMBIN TIME: 32.8 SECONDS (ref 11.6–14.5)

## 2019-12-02 PROCEDURE — 36415 COLL VENOUS BLD VENIPUNCTURE: CPT

## 2019-12-02 PROCEDURE — 85610 PROTHROMBIN TIME: CPT

## 2019-12-10 DIAGNOSIS — J43.9 PULMONARY EMPHYSEMA, UNSPECIFIED EMPHYSEMA TYPE (HCC): ICD-10-CM

## 2019-12-10 RX ORDER — ALBUTEROL SULFATE 90 UG/1
2 AEROSOL, METERED RESPIRATORY (INHALATION) EVERY 6 HOURS PRN
Qty: 1 INHALER | Refills: 5 | Status: SHIPPED | OUTPATIENT
Start: 2019-12-10 | End: 2020-12-28 | Stop reason: SDUPTHER

## 2019-12-19 ENCOUNTER — OFFICE VISIT (OUTPATIENT)
Dept: CARDIOLOGY CLINIC | Facility: CLINIC | Age: 54
End: 2019-12-19
Payer: MEDICARE

## 2019-12-19 VITALS
HEIGHT: 73 IN | BODY MASS INDEX: 41.75 KG/M2 | HEART RATE: 80 BPM | WEIGHT: 315 LBS | DIASTOLIC BLOOD PRESSURE: 80 MMHG | SYSTOLIC BLOOD PRESSURE: 130 MMHG

## 2019-12-19 DIAGNOSIS — Z95.2 S/P AVR: Primary | ICD-10-CM

## 2019-12-19 DIAGNOSIS — I42.9 CARDIOMYOPATHY, UNSPECIFIED TYPE (HCC): ICD-10-CM

## 2019-12-19 DIAGNOSIS — Z95.5 H/O HEART ARTERY STENT: ICD-10-CM

## 2019-12-19 DIAGNOSIS — I48.91 ATRIAL FIBRILLATION, UNSPECIFIED TYPE (HCC): ICD-10-CM

## 2019-12-19 PROCEDURE — 99214 OFFICE O/P EST MOD 30 MIN: CPT | Performed by: INTERNAL MEDICINE

## 2019-12-19 NOTE — PATIENT INSTRUCTIONS

## 2019-12-19 NOTE — PROGRESS NOTES
Subjective:        Patient ID: Karlene Hawkins is a 62 y o  male  Chief Complaint:  Hudson Haddad is here for routine follow-up  He has AFib, aortic valve disease status post mechanical AVR, sleep apnea on CPAP, CAD status post stenting with mild LV dysfunction  He offers no new cardiac complaints of concern denies any exertional chest pains or tightness  He only gets sore chest if he coughs too much like when he had a cold recently that he is getting over  He has history of chronic sternal nonunion deemed fairly high risk for revision  He is not interested in this anyway  Has had any alarming shortness of breath or sustained concerning palpitations  No presyncope or syncope  No fevers or shaking chills  Lower extremity edema is stable and mild  The following portions of the patient's history were reviewed and updated as appropriate: allergies, current medications, past family history, past medical history, past social history, past surgical history and problem list   Review of Systems   Constitution: Negative for chills, diaphoresis, malaise/fatigue and weight gain  HENT: Negative for nosebleeds and stridor  Eyes: Negative for double vision, vision loss in left eye, vision loss in right eye and visual disturbance  Cardiovascular: Positive for dyspnea on exertion ( stable nonprogressive and mild) and palpitations (Only very brief)  Negative for chest pain, claudication, cyanosis, irregular heartbeat, leg swelling, near-syncope, orthopnea, paroxysmal nocturnal dyspnea and syncope  Respiratory: Negative for cough, shortness of breath, snoring and wheezing  Endocrine: Negative for polydipsia, polyphagia and polyuria  Hematologic/Lymphatic: Negative for bleeding problem  Does not bruise/bleed easily  Skin: Negative for flushing and rash  Musculoskeletal: Positive for arthritis and stiffness  Negative for falls and myalgias     Gastrointestinal: Negative for abdominal pain, heartburn, hematemesis, hematochezia, melena and nausea  Genitourinary: Negative for hematuria  Neurological: Negative for brief paralysis, dizziness, focal weakness, headaches, light-headedness, loss of balance and vertigo  Psychiatric/Behavioral: Negative for altered mental status and substance abuse  Allergic/Immunologic: Negative for hives  Objective:      /80   Pulse 80   Ht 6' 1" (1 854 m)   Wt (!) 181 kg (398 lb)   BMI 52 51 kg/m²   Physical Exam   Constitutional: He is oriented to person, place, and time  He appears well-developed and well-nourished  No distress  HENT:   Head: Normocephalic and atraumatic  Eyes: Pupils are equal, round, and reactive to light  EOM are normal  No scleral icterus  Neck: Normal range of motion  Neck supple  No JVD present  No thyromegaly present  Cardiovascular: Normal rate, regular rhythm and normal heart sounds  Exam reveals no gallop and no friction rub  No murmur heard  Crisp prosthetic click   Pulmonary/Chest: Effort normal and breath sounds normal  No stridor  No respiratory distress  He has no wheezes  He has no rales  Abdominal: Soft  Bowel sounds are normal  He exhibits no distension and no mass  There is no tenderness  Musculoskeletal: Normal range of motion  He exhibits edema (Trace bilateral)  He exhibits no deformity  Neurological: He is alert and oriented to person, place, and time  Coordination normal    Skin: Skin is warm and dry  No erythema  No pallor  Psychiatric: He has a normal mood and affect   His behavior is normal        Lab Review:   Orders Only on 12/02/2019   Component Date Value    Protime 12/02/2019 32 8*    INR 12/02/2019 3 15*   Office Visit on 10/28/2019   Component Date Value    Severity 10/28/2019 NORMAL     Right Eye Diabetic Retin* 10/28/2019 None     Right Eye Macular Edema 10/28/2019 None     Right Eye Other Retinopa* 10/28/2019 None     Right Eye Image Quality 10/28/2019 Gradeable Image     Left Eye Diabetic Retino* 10/28/2019 None     Left Eye Macular Edema 10/28/2019 None     Left Eye Other Retinopat* 10/28/2019 None     Left Eye Image Quality 10/28/2019 Gradeable Image     Result 10/28/2019 Retinal Study Result for Harvey Duran Result 10/28/2019 Cody Roque, a 63 y/o, M (: 2030-1225, MRN: [id number])     Result 10/28/2019 presented to [address] [place] [address] on 10- for a retinal imaging study of the left and right eyes   Result 10/28/2019 Based on the findings of the study, the following is recommended for Cody Roque     Result 10/28/2019 Normal Study: Return for follow up exam in 12 months or next calendar year   Result 10/28/2019 Interpreting Provider's Comments:  No comments provided     Result 10/28/2019 Diagnoses Present: E11 42 - Type 2 diabetes mellitus with diabetic polyneuropathy     Result 10/28/2019 Z79 4 - Long term (current) use of insulin     Result 10/28/2019 E11 65 - Type 2 diabetes mellitus with hyperglycemia     Result 10/28/2019 Right Eye Findings:     Result 10/28/2019 Normal Result  Negative for Diabetic Retinopathy   Result 10/28/2019 Left Eye Findings:     Result 10/28/2019 Normal Result  Negative for Diabetic Retinopathy   Result 10/28/2019 This result was electronically signed by Rossi Ruiz MD, NPI: [id number], Taxonomy: [id number] on 10- 05:25:26 [facility] time   Result 10/28/2019 NOTE:  Any pathology noted on this diabetic retinal evaluation should be confirmed by an appropriate ophthalmic examination   Hemoglobin A1C 2019 7 7*    EAG 2019 174     Creatinine, Ur 2019 148 0     Microalbum  ,U,Random 2019 32 7*    Microalb Creat Ratio 2019 22      No results found      [address]  [address], [facility]  [phone]     Transthoracic Echocardiogram  2D, M-mode, Doppler, and Color Doppler     Study date:  2018     Patient: Emily Elaine Fozia Santoro  MR number: [id number]  Account number: [de-identified]  : 1965  Age: 52 years  Gender: Male  Status: Outpatient  Location: Echo lab  Height: 73 in  Weight: 402 2 lb  BP: 120/ 55 mmHg     Indications: CORONARY ARTERY DISEASE     Diagnoses: I25 10 - Atherosclerotic heart disease of native coronary artery without angina pectoris     Sonographer:  Kaylah Claudio RDCS  Primary Physician:  Tramaine Umanzor DO  Referring Physician: ANAI Amaro  Group:  [facility]  Interpreting Physician:  Cesar Snider DO     SUMMARY     PROCEDURE INFORMATION:  This was a technically difficult study      LEFT VENTRICLE:  Systolic function was mildly reduced  Ejection fraction was estimated in the range of 45 % to 50 %  There was severe hypokinesis of the basal-mid anterior and basal-mid anteroseptal wall(s)  Wall thickness was mildly increased      RIGHT VENTRICLE:  The ventricle was moderately dilated  Systolic function was reduced      LEFT ATRIUM:  The atrium was moderately dilated      RIGHT ATRIUM:  The atrium was moderately dilated      AORTIC VALVE:  A mechanical prosthesis was present  It exhibited normal function  Mean trasvalvular gradient 18 mmHg  There was no significant regurgitation  There was no significant perivalvular regurgitation          Assessment:       1  S/P AVR  Echo complete with contrast if indicated    CANCELED: Echo complete with contrast if indicated   2  Atrial fibrillation, unspecified type ([address] )  Echo complete with contrast if indicated    CANCELED: Echo complete with contrast if indicated   3  Cardiomyopathy, unspecified type ([address] )     4  H/O heart artery stent          Plan:       Nela Fernandez has no signs or symptoms reminiscent of unstable angina status post remote RCA stenting, decompensated heart failure with chronic mild LV dysfunction, nor electrical instability with chronic AFib which is rate controlled and anticoagulated      With AFib and mechanical AVR recommend goal INR of 2 5-3 5  Continue diltiazem and metoprolol for rate control, current dose Lasix seems optimal, continue Ace I for blood pressure control and diabetic nephropathy augmentation, continue statin therapy to recommended goal LDL less than 100, preferably closer to 70  I will see him back in 6 months with echocardiogram ordered prior to that visit  He will call sooner with any concerning potential cardiac symptoms in the meantime

## 2020-01-03 DIAGNOSIS — IMO0002 UNCONTROLLED TYPE 2 DIABETES MELLITUS WITH DIABETIC POLYNEUROPATHY, WITH LONG-TERM CURRENT USE OF INSULIN: ICD-10-CM

## 2020-01-06 ENCOUNTER — ANTICOAG VISIT (OUTPATIENT)
Dept: FAMILY MEDICINE CLINIC | Facility: CLINIC | Age: 55
End: 2020-01-06

## 2020-01-06 ENCOUNTER — APPOINTMENT (OUTPATIENT)
Dept: LAB | Facility: HOSPITAL | Age: 55
End: 2020-01-06
Payer: MEDICARE

## 2020-01-20 DIAGNOSIS — IMO0002 UNCONTROLLED TYPE 2 DIABETES MELLITUS WITH DIABETIC POLYNEUROPATHY, WITH LONG-TERM CURRENT USE OF INSULIN: ICD-10-CM

## 2020-01-21 DIAGNOSIS — Z79.4 TYPE 2 DIABETES MELLITUS WITHOUT COMPLICATION, WITH LONG-TERM CURRENT USE OF INSULIN (HCC): ICD-10-CM

## 2020-01-21 DIAGNOSIS — E11.9 TYPE 2 DIABETES MELLITUS WITHOUT COMPLICATION, WITH LONG-TERM CURRENT USE OF INSULIN (HCC): ICD-10-CM

## 2020-01-21 RX ORDER — PEN NEEDLE, DIABETIC 32GX 5/32"
NEEDLE, DISPOSABLE MISCELLANEOUS
Qty: 100 EACH | Refills: 5 | Status: SHIPPED | OUTPATIENT
Start: 2020-01-21 | End: 2020-12-28 | Stop reason: SDUPTHER

## 2020-03-04 ENCOUNTER — APPOINTMENT (OUTPATIENT)
Dept: LAB | Facility: HOSPITAL | Age: 55
End: 2020-03-04
Payer: MEDICARE

## 2020-03-04 ENCOUNTER — ANTICOAG VISIT (OUTPATIENT)
Dept: FAMILY MEDICINE CLINIC | Facility: CLINIC | Age: 55
End: 2020-03-04

## 2020-03-25 DIAGNOSIS — IMO0002 UNCONTROLLED TYPE 2 DIABETES MELLITUS WITH DIABETIC POLYNEUROPATHY, WITH LONG-TERM CURRENT USE OF INSULIN: ICD-10-CM

## 2020-03-25 DIAGNOSIS — E11.9 TYPE 2 DIABETES MELLITUS WITHOUT COMPLICATION, UNSPECIFIED WHETHER LONG TERM INSULIN USE (HCC): ICD-10-CM

## 2020-03-25 RX ORDER — BENAZEPRIL HYDROCHLORIDE 10 MG/1
10 TABLET ORAL DAILY
Qty: 90 TABLET | Refills: 1 | Status: SHIPPED | OUTPATIENT
Start: 2020-03-25 | End: 2020-09-22 | Stop reason: SDUPTHER

## 2020-04-03 ENCOUNTER — TELEPHONE (OUTPATIENT)
Dept: PULMONOLOGY | Facility: CLINIC | Age: 55
End: 2020-04-03

## 2020-04-03 DIAGNOSIS — J44.9 COPD MIXED TYPE (HCC): ICD-10-CM

## 2020-04-07 ENCOUNTER — APPOINTMENT (OUTPATIENT)
Dept: LAB | Facility: HOSPITAL | Age: 55
End: 2020-04-07
Payer: MEDICARE

## 2020-04-07 ENCOUNTER — ANTICOAG VISIT (OUTPATIENT)
Dept: FAMILY MEDICINE CLINIC | Facility: CLINIC | Age: 55
End: 2020-04-07

## 2020-04-30 DIAGNOSIS — I48.91 ATRIAL FIBRILLATION, UNSPECIFIED TYPE (HCC): ICD-10-CM

## 2020-04-30 DIAGNOSIS — I10 ESSENTIAL HYPERTENSION: ICD-10-CM

## 2020-04-30 RX ORDER — POTASSIUM CHLORIDE 750 MG/1
10 TABLET, FILM COATED, EXTENDED RELEASE ORAL DAILY
Qty: 90 TABLET | Refills: 3 | Status: SHIPPED | OUTPATIENT
Start: 2020-04-30 | End: 2021-06-02

## 2020-05-04 ENCOUNTER — OFFICE VISIT (OUTPATIENT)
Dept: PULMONOLOGY | Facility: CLINIC | Age: 55
End: 2020-05-04
Payer: MEDICARE

## 2020-05-04 VITALS
HEIGHT: 73 IN | SYSTOLIC BLOOD PRESSURE: 126 MMHG | WEIGHT: 315 LBS | OXYGEN SATURATION: 96 % | BODY MASS INDEX: 41.75 KG/M2 | HEART RATE: 100 BPM | DIASTOLIC BLOOD PRESSURE: 80 MMHG

## 2020-05-04 DIAGNOSIS — J44.9 COPD MIXED TYPE (HCC): Primary | ICD-10-CM

## 2020-05-04 PROCEDURE — 3008F BODY MASS INDEX DOCD: CPT | Performed by: INTERNAL MEDICINE

## 2020-05-04 PROCEDURE — 99215 OFFICE O/P EST HI 40 MIN: CPT | Performed by: INTERNAL MEDICINE

## 2020-05-04 PROCEDURE — 3079F DIAST BP 80-89 MM HG: CPT | Performed by: INTERNAL MEDICINE

## 2020-05-04 PROCEDURE — 1036F TOBACCO NON-USER: CPT | Performed by: INTERNAL MEDICINE

## 2020-05-04 PROCEDURE — 3074F SYST BP LT 130 MM HG: CPT | Performed by: INTERNAL MEDICINE

## 2020-05-04 PROCEDURE — 2022F DILAT RTA XM EVC RTNOPTHY: CPT | Performed by: INTERNAL MEDICINE

## 2020-05-12 DIAGNOSIS — E78.49 OTHER HYPERLIPIDEMIA: ICD-10-CM

## 2020-05-12 DIAGNOSIS — I48.91 ATRIAL FIBRILLATION, UNSPECIFIED TYPE (HCC): ICD-10-CM

## 2020-05-12 RX ORDER — ATORVASTATIN CALCIUM 10 MG/1
10 TABLET, FILM COATED ORAL DAILY
Qty: 90 TABLET | Refills: 3 | Status: SHIPPED | OUTPATIENT
Start: 2020-05-12 | End: 2021-05-06 | Stop reason: SDUPTHER

## 2020-05-12 RX ORDER — WARFARIN SODIUM 10 MG/1
10 TABLET ORAL DAILY
Qty: 90 TABLET | Refills: 5 | Status: SHIPPED | OUTPATIENT
Start: 2020-05-12 | End: 2021-05-17

## 2020-05-14 ENCOUNTER — ANTICOAG VISIT (OUTPATIENT)
Dept: FAMILY MEDICINE CLINIC | Facility: CLINIC | Age: 55
End: 2020-05-14

## 2020-05-14 ENCOUNTER — APPOINTMENT (OUTPATIENT)
Dept: LAB | Facility: HOSPITAL | Age: 55
End: 2020-05-14
Payer: MEDICARE

## 2020-05-14 ENCOUNTER — OFFICE VISIT (OUTPATIENT)
Dept: SLEEP CENTER | Facility: CLINIC | Age: 55
End: 2020-05-14
Payer: MEDICARE

## 2020-05-14 VITALS
SYSTOLIC BLOOD PRESSURE: 132 MMHG | WEIGHT: 315 LBS | DIASTOLIC BLOOD PRESSURE: 82 MMHG | HEART RATE: 97 BPM | BODY MASS INDEX: 41.75 KG/M2 | HEIGHT: 73 IN

## 2020-05-14 DIAGNOSIS — I10 ESSENTIAL HYPERTENSION: ICD-10-CM

## 2020-05-14 DIAGNOSIS — J30.1 SEASONAL ALLERGIC RHINITIS DUE TO POLLEN: ICD-10-CM

## 2020-05-14 DIAGNOSIS — E66.01 MORBID OBESITY WITH BMI OF 40.0-44.9, ADULT (HCC): ICD-10-CM

## 2020-05-14 DIAGNOSIS — J44.9 COPD MIXED TYPE (HCC): ICD-10-CM

## 2020-05-14 DIAGNOSIS — I48.20 CHRONIC ATRIAL FIBRILLATION (HCC): ICD-10-CM

## 2020-05-14 DIAGNOSIS — G25.81 RESTLESS LEG SYNDROME: ICD-10-CM

## 2020-05-14 DIAGNOSIS — G47.10 HYPERSOMNIA: ICD-10-CM

## 2020-05-14 DIAGNOSIS — G47.33 OBSTRUCTIVE SLEEP APNEA: Primary | ICD-10-CM

## 2020-05-14 DIAGNOSIS — R68.2 DRY MOUTH: ICD-10-CM

## 2020-05-14 PROCEDURE — 2022F DILAT RTA XM EVC RTNOPTHY: CPT | Performed by: INTERNAL MEDICINE

## 2020-05-14 PROCEDURE — 99214 OFFICE O/P EST MOD 30 MIN: CPT | Performed by: INTERNAL MEDICINE

## 2020-05-14 PROCEDURE — 3008F BODY MASS INDEX DOCD: CPT | Performed by: INTERNAL MEDICINE

## 2020-05-14 PROCEDURE — 3079F DIAST BP 80-89 MM HG: CPT | Performed by: INTERNAL MEDICINE

## 2020-05-14 PROCEDURE — 3075F SYST BP GE 130 - 139MM HG: CPT | Performed by: INTERNAL MEDICINE

## 2020-05-14 PROCEDURE — 1036F TOBACCO NON-USER: CPT | Performed by: INTERNAL MEDICINE

## 2020-05-18 ENCOUNTER — TELEPHONE (OUTPATIENT)
Dept: SLEEP CENTER | Facility: CLINIC | Age: 55
End: 2020-05-18

## 2020-05-29 ENCOUNTER — APPOINTMENT (OUTPATIENT)
Dept: LAB | Facility: HOSPITAL | Age: 55
End: 2020-05-29
Payer: MEDICARE

## 2020-05-29 ENCOUNTER — ANTICOAG VISIT (OUTPATIENT)
Dept: FAMILY MEDICINE CLINIC | Facility: CLINIC | Age: 55
End: 2020-05-29

## 2020-06-05 ENCOUNTER — NURSE TRIAGE (OUTPATIENT)
Dept: OTHER | Facility: OTHER | Age: 55
End: 2020-06-05

## 2020-06-05 DIAGNOSIS — IMO0002 UNCONTROLLED TYPE 2 DIABETES MELLITUS WITH DIABETIC POLYNEUROPATHY, WITH LONG-TERM CURRENT USE OF INSULIN: Primary | ICD-10-CM

## 2020-06-19 ENCOUNTER — ANTICOAG VISIT (OUTPATIENT)
Dept: FAMILY MEDICINE CLINIC | Facility: CLINIC | Age: 55
End: 2020-06-19

## 2020-06-19 ENCOUNTER — APPOINTMENT (OUTPATIENT)
Dept: LAB | Facility: HOSPITAL | Age: 55
End: 2020-06-19
Payer: MEDICARE

## 2020-07-10 DIAGNOSIS — IMO0002 UNCONTROLLED TYPE 2 DIABETES MELLITUS WITH DIABETIC POLYNEUROPATHY, WITH LONG-TERM CURRENT USE OF INSULIN: ICD-10-CM

## 2020-07-21 ENCOUNTER — ANTICOAG VISIT (OUTPATIENT)
Dept: FAMILY MEDICINE CLINIC | Facility: CLINIC | Age: 55
End: 2020-07-21

## 2020-07-21 ENCOUNTER — APPOINTMENT (OUTPATIENT)
Dept: LAB | Facility: HOSPITAL | Age: 55
End: 2020-07-21
Payer: MEDICARE

## 2020-08-21 DIAGNOSIS — I10 HYPERTENSION, UNSPECIFIED TYPE: ICD-10-CM

## 2020-08-21 RX ORDER — DILTIAZEM HYDROCHLORIDE 240 MG/1
240 CAPSULE, COATED, EXTENDED RELEASE ORAL 2 TIMES DAILY
Qty: 180 CAPSULE | Refills: 3 | Status: SHIPPED | OUTPATIENT
Start: 2020-08-21 | End: 2021-08-12 | Stop reason: SDUPTHER

## 2020-08-28 ENCOUNTER — TELEPHONE (OUTPATIENT)
Dept: FAMILY MEDICINE CLINIC | Facility: CLINIC | Age: 55
End: 2020-08-28

## 2020-08-28 DIAGNOSIS — K04.7 TOOTH INFECTION: Primary | ICD-10-CM

## 2020-08-28 RX ORDER — CEPHALEXIN 500 MG/1
1000 CAPSULE ORAL EVERY 12 HOURS SCHEDULED
Qty: 28 CAPSULE | Refills: 0 | Status: SHIPPED | OUTPATIENT
Start: 2020-08-28 | End: 2020-09-04

## 2020-08-28 NOTE — TELEPHONE ENCOUNTER
I put in for cephalexin  Call symptoms continue or increase  Should follow-up with his dentist also

## 2020-08-31 ENCOUNTER — APPOINTMENT (OUTPATIENT)
Dept: LAB | Facility: HOSPITAL | Age: 55
End: 2020-08-31
Payer: MEDICARE

## 2020-08-31 ENCOUNTER — ANTICOAG VISIT (OUTPATIENT)
Dept: FAMILY MEDICINE CLINIC | Facility: CLINIC | Age: 55
End: 2020-08-31

## 2020-09-02 ENCOUNTER — TELEPHONE (OUTPATIENT)
Dept: FAMILY MEDICINE CLINIC | Facility: CLINIC | Age: 55
End: 2020-09-02

## 2020-09-02 NOTE — TELEPHONE ENCOUNTER
He would have had to stop his Coumadin a couple days in order to get a tooth pulled  Have him take it tonight, see what they say tomorrow at his appointment, we can make a plan on when to stop his Coumadin if he has to have a tooth pulled

## 2020-09-02 NOTE — TELEPHONE ENCOUNTER
PT GOING TO DENTIST TOMORROW, MIGHT GET TOOTH PULLED OUT  SHOULD HE NOT TAKE HIS COUMADIN TONIGHT  HE ISN'T SURE OF THEY ARE GOING TO DO THAT, JUST WANTED TO CHECK WITH YOU JUST IN CASE  HE ALSO SAID THANK YOU FOR THE ANTIBIOTIC, IT HELPED

## 2020-09-03 ENCOUNTER — HOSPITAL ENCOUNTER (OUTPATIENT)
Dept: NON INVASIVE DIAGNOSTICS | Facility: CLINIC | Age: 55
Discharge: HOME | End: 2020-09-03
Payer: MEDICARE

## 2020-09-03 ENCOUNTER — OFFICE VISIT (OUTPATIENT)
Dept: CARDIOLOGY CLINIC | Facility: CLINIC | Age: 55
End: 2020-09-03
Payer: MEDICARE

## 2020-09-03 VITALS
BODY MASS INDEX: 41.75 KG/M2 | DIASTOLIC BLOOD PRESSURE: 82 MMHG | HEART RATE: 82 BPM | WEIGHT: 315 LBS | HEIGHT: 73 IN | SYSTOLIC BLOOD PRESSURE: 138 MMHG

## 2020-09-03 DIAGNOSIS — J44.9 COPD, MODERATE (HCC): Primary | ICD-10-CM

## 2020-09-03 DIAGNOSIS — Z95.2 S/P AVR: ICD-10-CM

## 2020-09-03 DIAGNOSIS — I10 ESSENTIAL HYPERTENSION: Primary | ICD-10-CM

## 2020-09-03 DIAGNOSIS — I48.91 ATRIAL FIBRILLATION, UNSPECIFIED TYPE (HCC): ICD-10-CM

## 2020-09-03 DIAGNOSIS — G47.33 OBSTRUCTIVE SLEEP APNEA: ICD-10-CM

## 2020-09-03 DIAGNOSIS — Z95.5 H/O HEART ARTERY STENT: ICD-10-CM

## 2020-09-03 DIAGNOSIS — J44.1 ACUTE EXACERBATION OF CHRONIC OBSTRUCTIVE BRONCHITIS (HCC): ICD-10-CM

## 2020-09-03 PROCEDURE — 93306 TTE W/DOPPLER COMPLETE: CPT | Performed by: INTERNAL MEDICINE

## 2020-09-03 PROCEDURE — 93306 TTE W/DOPPLER COMPLETE: CPT

## 2020-09-03 PROCEDURE — 99214 OFFICE O/P EST MOD 30 MIN: CPT | Performed by: INTERNAL MEDICINE

## 2020-09-03 PROCEDURE — 93000 ELECTROCARDIOGRAM COMPLETE: CPT | Performed by: INTERNAL MEDICINE

## 2020-09-03 NOTE — PROGRESS NOTES
Patient was previously on Trelegy with excellent symptom control  When attempting to deescalate therapy to LABA/LAMA patient experience more shortness of breath wheezing  He would like to restart trelegy  Prescription sent to pharmacy   Anoro taken off med list

## 2020-09-03 NOTE — PROGRESS NOTES
Subjective:        Patient ID: Hilda Joel is a 60 y o  male  Chief Complaint:  Since going off trelogy Angelica Alba feels more short of breath, more wheezing  He is on BiPAP  Denies any chest pain concerning palpitations unusual edema  Is taking his Lasix daily  Echo today revealed preserved LV function with normal prosthetic valve function  The following portions of the patient's history were reviewed and updated as appropriate: allergies, current medications, past family history, past medical history, past social history, past surgical history and problem list   Review of Systems   Constitution: Negative for chills, diaphoresis, malaise/fatigue and weight gain  HENT: Negative for nosebleeds and stridor  Eyes: Negative for double vision, vision loss in left eye, vision loss in right eye and visual disturbance  Cardiovascular: Positive for dyspnea on exertion  Negative for chest pain, claudication, cyanosis, irregular heartbeat, leg swelling, near-syncope, orthopnea, palpitations, paroxysmal nocturnal dyspnea and syncope  Respiratory: Positive for shortness of breath and wheezing  Negative for cough and snoring  Endocrine: Negative for polydipsia, polyphagia and polyuria  Hematologic/Lymphatic: Negative for bleeding problem  Does not bruise/bleed easily  Skin: Negative for flushing and rash  Musculoskeletal: Negative for falls and myalgias  Gastrointestinal: Negative for abdominal pain, heartburn, hematemesis, hematochezia, melena and nausea  Genitourinary: Negative for hematuria  Neurological: Negative for brief paralysis, dizziness, focal weakness, headaches, light-headedness, loss of balance and vertigo  Psychiatric/Behavioral: Negative for altered mental status and substance abuse  Allergic/Immunologic: Negative for hives            Objective:      /82   Pulse 82   Ht 6' 1" (1 854 m)   Wt (!) 174 kg (383 lb)   BMI 50 53 kg/m²   Physical Exam   Constitutional: He is oriented to person, place, and time  He appears well-developed and well-nourished  No distress  HENT:   Head: Normocephalic and atraumatic  Eyes: Pupils are equal, round, and reactive to light  EOM are normal  No scleral icterus  Neck: Normal range of motion  Neck supple  No JVD present  No thyromegaly present  Cardiovascular: Normal rate, regular rhythm and normal heart sounds  Exam reveals no gallop and no friction rub  No murmur heard  Pulmonary/Chest: Effort normal  No stridor  No respiratory distress  He has wheezes  He has no rales  Moderate expiratory wheezing bilaterally, scattered diffuse rhonchi   Abdominal: Soft  Bowel sounds are normal  He exhibits no distension and no mass  There is no abdominal tenderness  Musculoskeletal: Normal range of motion  General: Edema (Tense moderate edema bilaterally lower extremities which is chronic) present  No deformity  Neurological: He is alert and oriented to person, place, and time  Coordination normal    Skin: Skin is warm and dry  No erythema  No pallor  Psychiatric: He has a normal mood and affect  His behavior is normal  Judgment and thought content normal        Lab Review:   Ancillary Orders on 07/10/2020   Component Date Value    Protime 08/31/2020 28 8*    INR 08/31/2020 2 80*   Ancillary Orders on 06/12/2020   Component Date Value    Protime 07/21/2020 28 4*    INR 07/21/2020 2 63*   Ancillary Orders on 05/15/2020   Component Date Value    Protime 06/19/2020 28 4*    INR 06/19/2020 2 63*   Ancillary Orders on 04/17/2020   Component Date Value    Protime 05/29/2020 25 8*    INR 05/29/2020 2 33*   Ancillary Orders on 03/20/2020   Component Date Value    Protime 05/14/2020 24 5*    INR 05/14/2020 2 18*     No results found  Assessment:       1  Essential hypertension  POCT ECG    Comprehensive metabolic panel    CBC and differential    NT-BNP PRO   2   Atrial fibrillation, unspecified type ([address] )  Comprehensive metabolic panel    CBC and differential    NT-BNP PRO   3  S/P AVR  Comprehensive metabolic panel    CBC and differential    NT-BNP PRO   4  Obstructive sleep apnea     5  Acute exacerbation of chronic obstructive bronchitis ([address] )     6  H/O heart artery stent          Plan:       I reached out to Pulmonary to see if he needs medication adjustment, encouraged Amee Mayo to try his nebulizer 3 or 4 times a day for the next several days to try and combat his acute bronchospasm  If his symptoms worsen he knows to go to the emergency room or call 911  He has chronic volume overload but he does not examine in acute pulmonary edema, hear no rales, his edema is chronic, and morbid obesity playing a role here  He will continue on daily diuretic therapy  I will check a BNP along with a CMP and CBC which is due  He has not been seeing his primary due to the pandemic he says  Encouraged he reconsider this  He is not having any anginal symptoms post remote RCA stenting  He remains on aspirin therapy  Beta-blocker therapy relatively contraindicated he will continue on diltiazem therapy for AFib ventricular rate control which is adequate  Coumadin dose is currently therapeutic, goal 2 0-3 0 INR  He has nitroglycerin, has not used any, but knows the proper use of such  Reiterated the need for SBE antibiotic prophylaxis prior to dental and certain surgical procedures  I will see him back in 6 months, I made no specific changes today other than encouraged nebulizer use in the near term

## 2020-09-22 DIAGNOSIS — E11.9 TYPE 2 DIABETES MELLITUS WITHOUT COMPLICATION, UNSPECIFIED WHETHER LONG TERM INSULIN USE (HCC): ICD-10-CM

## 2020-09-22 DIAGNOSIS — IMO0002 UNCONTROLLED TYPE 2 DIABETES MELLITUS WITH DIABETIC POLYNEUROPATHY, WITH LONG-TERM CURRENT USE OF INSULIN: ICD-10-CM

## 2020-09-22 DIAGNOSIS — I10 ESSENTIAL HYPERTENSION: ICD-10-CM

## 2020-09-22 RX ORDER — BENAZEPRIL HYDROCHLORIDE 10 MG/1
10 TABLET ORAL DAILY
Qty: 90 TABLET | Refills: 1 | Status: SHIPPED | OUTPATIENT
Start: 2020-09-22 | End: 2021-03-16 | Stop reason: SDUPTHER

## 2020-09-22 RX ORDER — METOPROLOL TARTRATE 50 MG/1
50 TABLET, FILM COATED ORAL 2 TIMES DAILY
Qty: 180 TABLET | Refills: 3 | Status: SHIPPED | OUTPATIENT
Start: 2020-09-22 | End: 2021-09-16 | Stop reason: SDUPTHER

## 2020-10-09 ENCOUNTER — ANTICOAG VISIT (OUTPATIENT)
Dept: FAMILY MEDICINE CLINIC | Facility: CLINIC | Age: 55
End: 2020-10-09

## 2020-10-09 ENCOUNTER — APPOINTMENT (OUTPATIENT)
Dept: LAB | Facility: HOSPITAL | Age: 55
End: 2020-10-09
Payer: MEDICARE

## 2020-10-09 DIAGNOSIS — I10 ESSENTIAL HYPERTENSION: ICD-10-CM

## 2020-10-09 DIAGNOSIS — I48.91 ATRIAL FIBRILLATION, UNSPECIFIED TYPE (HCC): ICD-10-CM

## 2020-10-09 DIAGNOSIS — Z95.2 S/P AVR: ICD-10-CM

## 2020-10-09 LAB
ALBUMIN SERPL BCP-MCNC: 3.3 G/DL (ref 3.5–5)
ALP SERPL-CCNC: 96 U/L (ref 46–116)
ALT SERPL W P-5'-P-CCNC: 21 U/L (ref 12–78)
ANION GAP SERPL CALCULATED.3IONS-SCNC: 6 MMOL/L (ref 4–13)
AST SERPL W P-5'-P-CCNC: 14 U/L (ref 5–45)
BASOPHILS # BLD AUTO: 0.08 THOUSANDS/ΜL (ref 0–0.1)
BASOPHILS NFR BLD AUTO: 1 % (ref 0–1)
BILIRUB SERPL-MCNC: 0.4 MG/DL (ref 0.2–1)
BUN SERPL-MCNC: 15 MG/DL (ref 5–25)
CALCIUM ALBUM COR SERPL-MCNC: 9.4 MG/DL (ref 8.3–10.1)
CALCIUM SERPL-MCNC: 8.8 MG/DL (ref 8.3–10.1)
CHLORIDE SERPL-SCNC: 98 MMOL/L (ref 100–108)
CO2 SERPL-SCNC: 27 MMOL/L (ref 21–32)
CREAT SERPL-MCNC: 1.03 MG/DL (ref 0.6–1.3)
EOSINOPHIL # BLD AUTO: 0.32 THOUSAND/ΜL (ref 0–0.61)
EOSINOPHIL NFR BLD AUTO: 3 % (ref 0–6)
ERYTHROCYTE [DISTWIDTH] IN BLOOD BY AUTOMATED COUNT: 13.2 % (ref 11.6–15.1)
GFR SERPL CREATININE-BSD FRML MDRD: 80 ML/MIN/1.73SQ M
GLUCOSE SERPL-MCNC: 351 MG/DL (ref 65–140)
HCT VFR BLD AUTO: 48 % (ref 36.5–49.3)
HGB BLD-MCNC: 16.3 G/DL (ref 12–17)
IMM GRANULOCYTES # BLD AUTO: 0.04 THOUSAND/UL (ref 0–0.2)
IMM GRANULOCYTES NFR BLD AUTO: 0 % (ref 0–2)
LYMPHOCYTES # BLD AUTO: 2.2 THOUSANDS/ΜL (ref 0.6–4.47)
LYMPHOCYTES NFR BLD AUTO: 20 % (ref 14–44)
MCH RBC QN AUTO: 31.4 PG (ref 26.8–34.3)
MCHC RBC AUTO-ENTMCNC: 34 G/DL (ref 31.4–37.4)
MCV RBC AUTO: 93 FL (ref 82–98)
MONOCYTES # BLD AUTO: 0.7 THOUSAND/ΜL (ref 0.17–1.22)
MONOCYTES NFR BLD AUTO: 6 % (ref 4–12)
NEUTROPHILS # BLD AUTO: 7.73 THOUSANDS/ΜL (ref 1.85–7.62)
NEUTS SEG NFR BLD AUTO: 70 % (ref 43–75)
NRBC BLD AUTO-RTO: 0 /100 WBCS
NT-PROBNP SERPL-MCNC: 599 PG/ML
PLATELET # BLD AUTO: 262 THOUSANDS/UL (ref 149–390)
PMV BLD AUTO: 9.4 FL (ref 8.9–12.7)
POTASSIUM SERPL-SCNC: 4.4 MMOL/L (ref 3.5–5.3)
PROT SERPL-MCNC: 7.4 G/DL (ref 6.4–8.2)
RBC # BLD AUTO: 5.19 MILLION/UL (ref 3.88–5.62)
SODIUM SERPL-SCNC: 131 MMOL/L (ref 136–145)
WBC # BLD AUTO: 11.07 THOUSAND/UL (ref 4.31–10.16)

## 2020-10-09 PROCEDURE — 83880 ASSAY OF NATRIURETIC PEPTIDE: CPT

## 2020-10-09 PROCEDURE — 85025 COMPLETE CBC W/AUTO DIFF WBC: CPT | Performed by: INTERNAL MEDICINE

## 2020-10-09 PROCEDURE — 80053 COMPREHEN METABOLIC PANEL: CPT | Performed by: INTERNAL MEDICINE

## 2020-10-29 ENCOUNTER — OFFICE VISIT (OUTPATIENT)
Dept: FAMILY MEDICINE CLINIC | Facility: CLINIC | Age: 55
End: 2020-10-29
Payer: MEDICARE

## 2020-10-29 VITALS
HEIGHT: 73 IN | WEIGHT: 315 LBS | BODY MASS INDEX: 41.75 KG/M2 | SYSTOLIC BLOOD PRESSURE: 132 MMHG | TEMPERATURE: 95.4 F | DIASTOLIC BLOOD PRESSURE: 82 MMHG

## 2020-10-29 DIAGNOSIS — Z12.5 PROSTATE CANCER SCREENING: ICD-10-CM

## 2020-10-29 DIAGNOSIS — I10 ESSENTIAL HYPERTENSION: ICD-10-CM

## 2020-10-29 DIAGNOSIS — Z00.00 MEDICARE ANNUAL WELLNESS VISIT, SUBSEQUENT: Primary | ICD-10-CM

## 2020-10-29 DIAGNOSIS — Z12.11 COLON CANCER SCREENING: ICD-10-CM

## 2020-10-29 DIAGNOSIS — I48.91 ATRIAL FIBRILLATION, UNSPECIFIED TYPE (HCC): ICD-10-CM

## 2020-10-29 DIAGNOSIS — Z23 NEED FOR INFLUENZA VACCINATION: ICD-10-CM

## 2020-10-29 DIAGNOSIS — IMO0002 UNCONTROLLED TYPE 2 DIABETES MELLITUS WITH DIABETIC POLYNEUROPATHY, WITH LONG-TERM CURRENT USE OF INSULIN: ICD-10-CM

## 2020-10-29 DIAGNOSIS — E78.49 OTHER HYPERLIPIDEMIA: ICD-10-CM

## 2020-10-29 DIAGNOSIS — E66.01 MORBID OBESITY WITH BMI OF 50.0-59.9, ADULT (HCC): ICD-10-CM

## 2020-10-29 LAB
LEFT EYE DIABETIC RETINOPATHY: NORMAL
LEFT EYE IMAGE QUALITY: NORMAL
LEFT EYE MACULAR EDEMA: NORMAL
LEFT EYE OTHER RETINOPATHY: NORMAL
RIGHT EYE DIABETIC RETINOPATHY: NORMAL
RIGHT EYE IMAGE QUALITY: NORMAL
RIGHT EYE MACULAR EDEMA: NORMAL
RIGHT EYE OTHER RETINOPATHY: NORMAL
SEVERITY (EYE EXAM): NORMAL
SL AMB POCT HEMOGLOBIN AIC: 11 (ref ?–6.5)

## 2020-10-29 PROCEDURE — G0439 PPPS, SUBSEQ VISIT: HCPCS | Performed by: FAMILY MEDICINE

## 2020-10-29 PROCEDURE — G0008 ADMIN INFLUENZA VIRUS VAC: HCPCS | Performed by: FAMILY MEDICINE

## 2020-10-29 PROCEDURE — 92250 FUNDUS PHOTOGRAPHY W/I&R: CPT | Performed by: FAMILY MEDICINE

## 2020-10-29 PROCEDURE — 90682 RIV4 VACC RECOMBINANT DNA IM: CPT | Performed by: FAMILY MEDICINE

## 2020-10-29 PROCEDURE — 83036 HEMOGLOBIN GLYCOSYLATED A1C: CPT | Performed by: FAMILY MEDICINE

## 2020-10-29 RX ORDER — INSULIN LISPRO 100 [IU]/ML
10 INJECTION, SOLUTION INTRAVENOUS; SUBCUTANEOUS
Qty: 5 PEN | Refills: 3 | Status: SHIPPED | OUTPATIENT
Start: 2020-10-29 | End: 2020-10-29

## 2020-10-29 RX ORDER — INSULIN ASPART 100 [IU]/ML
10 INJECTION, SOLUTION INTRAVENOUS; SUBCUTANEOUS
Qty: 15 ML | Refills: 5 | Status: SHIPPED | OUTPATIENT
Start: 2020-10-29 | End: 2020-12-14 | Stop reason: SDUPTHER

## 2020-10-29 RX ORDER — INSULIN LISPRO 100 [IU]/ML
INJECTION, SOLUTION INTRAVENOUS; SUBCUTANEOUS
Qty: 15 ML | Refills: 3 | OUTPATIENT
Start: 2020-10-29

## 2020-10-29 RX ORDER — WARFARIN SODIUM 2 MG/1
2 TABLET ORAL
Qty: 90 TABLET | Refills: 3 | Status: SHIPPED | OUTPATIENT
Start: 2020-10-29 | End: 2021-11-10 | Stop reason: SDUPTHER

## 2020-11-07 ENCOUNTER — NURSE TRIAGE (OUTPATIENT)
Dept: OTHER | Facility: OTHER | Age: 55
End: 2020-11-07

## 2020-11-07 DIAGNOSIS — IMO0002 UNCONTROLLED TYPE 2 DIABETES MELLITUS WITH DIABETIC POLYNEUROPATHY, WITH LONG-TERM CURRENT USE OF INSULIN: ICD-10-CM

## 2020-11-07 RX ORDER — INSULIN DEGLUDEC INJECTION 100 U/ML
40 INJECTION, SOLUTION SUBCUTANEOUS DAILY
Qty: 5 PEN | Refills: 0 | Status: SHIPPED | OUTPATIENT
Start: 2020-11-07 | End: 2020-12-14 | Stop reason: SDUPTHER

## 2020-11-16 ENCOUNTER — LAB (OUTPATIENT)
Dept: LAB | Facility: HOSPITAL | Age: 55
End: 2020-11-16
Payer: MEDICARE

## 2020-11-16 ENCOUNTER — ANTICOAG VISIT (OUTPATIENT)
Dept: FAMILY MEDICINE CLINIC | Facility: CLINIC | Age: 55
End: 2020-11-16

## 2020-11-25 ENCOUNTER — OFFICE VISIT (OUTPATIENT)
Dept: PULMONOLOGY | Facility: CLINIC | Age: 55
End: 2020-11-25
Payer: MEDICARE

## 2020-11-25 VITALS
WEIGHT: 315 LBS | DIASTOLIC BLOOD PRESSURE: 80 MMHG | TEMPERATURE: 96 F | SYSTOLIC BLOOD PRESSURE: 140 MMHG | BODY MASS INDEX: 41.75 KG/M2 | HEART RATE: 99 BPM | OXYGEN SATURATION: 95 % | HEIGHT: 73 IN

## 2020-11-25 DIAGNOSIS — J44.9 COPD, MODERATE (HCC): ICD-10-CM

## 2020-11-25 PROCEDURE — 99213 OFFICE O/P EST LOW 20 MIN: CPT | Performed by: INTERNAL MEDICINE

## 2020-12-07 DIAGNOSIS — I42.8 OTHER CARDIOMYOPATHY (HCC): ICD-10-CM

## 2020-12-07 DIAGNOSIS — I25.10 CORONARY ARTERY DISEASE INVOLVING NATIVE CORONARY ARTERY OF NATIVE HEART WITHOUT ANGINA PECTORIS: ICD-10-CM

## 2020-12-14 DIAGNOSIS — IMO0002 UNCONTROLLED TYPE 2 DIABETES MELLITUS WITH DIABETIC POLYNEUROPATHY, WITH LONG-TERM CURRENT USE OF INSULIN: ICD-10-CM

## 2020-12-14 RX ORDER — INSULIN ASPART 100 [IU]/ML
20 INJECTION, SOLUTION INTRAVENOUS; SUBCUTANEOUS
Qty: 15 ML | Refills: 5
Start: 2020-12-14 | End: 2021-01-18

## 2020-12-14 RX ORDER — INSULIN DEGLUDEC INJECTION 100 U/ML
40 INJECTION, SOLUTION SUBCUTANEOUS DAILY
Qty: 5 PEN | Refills: 3 | Status: SHIPPED | OUTPATIENT
Start: 2020-12-14 | End: 2021-01-18

## 2020-12-16 RX ORDER — FUROSEMIDE 40 MG/1
40 TABLET ORAL DAILY
Qty: 90 TABLET | Refills: 3 | OUTPATIENT
Start: 2020-12-16 | End: 2021-06-02

## 2020-12-28 DIAGNOSIS — E11.9 TYPE 2 DIABETES MELLITUS WITHOUT COMPLICATION, WITH LONG-TERM CURRENT USE OF INSULIN (HCC): ICD-10-CM

## 2020-12-28 DIAGNOSIS — J43.9 PULMONARY EMPHYSEMA, UNSPECIFIED EMPHYSEMA TYPE (HCC): ICD-10-CM

## 2020-12-28 DIAGNOSIS — Z79.4 TYPE 2 DIABETES MELLITUS WITHOUT COMPLICATION, WITH LONG-TERM CURRENT USE OF INSULIN (HCC): ICD-10-CM

## 2020-12-28 RX ORDER — ALBUTEROL SULFATE 90 UG/1
2 AEROSOL, METERED RESPIRATORY (INHALATION) EVERY 6 HOURS PRN
Qty: 1 INHALER | Refills: 5 | Status: SHIPPED | OUTPATIENT
Start: 2020-12-28 | End: 2021-02-25 | Stop reason: SDUPTHER

## 2020-12-28 RX ORDER — PEN NEEDLE, DIABETIC 32GX 5/32"
NEEDLE, DISPOSABLE MISCELLANEOUS 2 TIMES DAILY
Qty: 100 EACH | Refills: 5 | Status: SHIPPED | OUTPATIENT
Start: 2020-12-28 | End: 2021-11-02

## 2021-01-08 ENCOUNTER — PATIENT OUTREACH (OUTPATIENT)
Dept: FAMILY MEDICINE CLINIC | Facility: CLINIC | Age: 56
End: 2021-01-08

## 2021-01-08 DIAGNOSIS — Z71.89 COMPLEX CARE COORDINATION: Primary | ICD-10-CM

## 2021-01-12 ENCOUNTER — PATIENT OUTREACH (OUTPATIENT)
Dept: FAMILY MEDICINE CLINIC | Facility: CLINIC | Age: 56
End: 2021-01-12

## 2021-01-12 NOTE — PROGRESS NOTES
Outpatient Care Management Note:  Outreach call attempted to [place]  Message left for patient to please return call  Contact information left on message

## 2021-01-15 ENCOUNTER — PATIENT OUTREACH (OUTPATIENT)
Dept: FAMILY MEDICINE CLINIC | Facility: CLINIC | Age: 56
End: 2021-01-15

## 2021-01-15 ENCOUNTER — ANTICOAG VISIT (OUTPATIENT)
Dept: FAMILY MEDICINE CLINIC | Facility: CLINIC | Age: 56
End: 2021-01-15

## 2021-01-15 ENCOUNTER — APPOINTMENT (OUTPATIENT)
Dept: LAB | Facility: HOSPITAL | Age: 56
End: 2021-01-15
Payer: MEDICARE

## 2021-01-15 DIAGNOSIS — E78.49 OTHER HYPERLIPIDEMIA: ICD-10-CM

## 2021-01-15 DIAGNOSIS — Z12.5 PROSTATE CANCER SCREENING: ICD-10-CM

## 2021-01-15 DIAGNOSIS — IMO0002 UNCONTROLLED TYPE 2 DIABETES MELLITUS WITH DIABETIC POLYNEUROPATHY, WITH LONG-TERM CURRENT USE OF INSULIN: ICD-10-CM

## 2021-01-15 DIAGNOSIS — I48.91 ATRIAL FIBRILLATION, UNSPECIFIED TYPE (HCC): Primary | ICD-10-CM

## 2021-01-15 DIAGNOSIS — I10 ESSENTIAL HYPERTENSION: ICD-10-CM

## 2021-01-15 LAB
ALBUMIN SERPL BCP-MCNC: 3.3 G/DL (ref 3.5–5)
ALP SERPL-CCNC: 110 U/L (ref 46–116)
ALT SERPL W P-5'-P-CCNC: 29 U/L (ref 12–78)
ANION GAP SERPL CALCULATED.3IONS-SCNC: 7 MMOL/L (ref 4–13)
AST SERPL W P-5'-P-CCNC: 15 U/L (ref 5–45)
BASOPHILS # BLD AUTO: 0.08 THOUSANDS/ΜL (ref 0–0.1)
BASOPHILS NFR BLD AUTO: 1 % (ref 0–1)
BILIRUB SERPL-MCNC: 0.4 MG/DL (ref 0.2–1)
BUN SERPL-MCNC: 12 MG/DL (ref 5–25)
CALCIUM ALBUM COR SERPL-MCNC: 9.3 MG/DL (ref 8.3–10.1)
CALCIUM SERPL-MCNC: 8.7 MG/DL (ref 8.3–10.1)
CHLORIDE SERPL-SCNC: 98 MMOL/L (ref 100–108)
CHOLEST SERPL-MCNC: 127 MG/DL (ref 50–200)
CO2 SERPL-SCNC: 28 MMOL/L (ref 21–32)
CREAT SERPL-MCNC: 0.83 MG/DL (ref 0.6–1.3)
CREAT UR-MCNC: 231 MG/DL
EOSINOPHIL # BLD AUTO: 0.25 THOUSAND/ΜL (ref 0–0.61)
EOSINOPHIL NFR BLD AUTO: 3 % (ref 0–6)
ERYTHROCYTE [DISTWIDTH] IN BLOOD BY AUTOMATED COUNT: 13.3 % (ref 11.6–15.1)
EST. AVERAGE GLUCOSE BLD GHB EST-MCNC: 243 MG/DL
GFR SERPL CREATININE-BSD FRML MDRD: 98 ML/MIN/1.73SQ M
GLUCOSE SERPL-MCNC: 277 MG/DL (ref 65–140)
HBA1C MFR BLD: 10.1 %
HCT VFR BLD AUTO: 47.8 % (ref 36.5–49.3)
HDLC SERPL-MCNC: 27 MG/DL
HGB BLD-MCNC: 15.8 G/DL (ref 12–17)
IMM GRANULOCYTES # BLD AUTO: 0.03 THOUSAND/UL (ref 0–0.2)
IMM GRANULOCYTES NFR BLD AUTO: 0 % (ref 0–2)
INR PPP: 2.48 (ref 0.84–1.19)
LDLC SERPL CALC-MCNC: 79 MG/DL (ref 0–100)
LYMPHOCYTES # BLD AUTO: 1.77 THOUSANDS/ΜL (ref 0.6–4.47)
LYMPHOCYTES NFR BLD AUTO: 18 % (ref 14–44)
MCH RBC QN AUTO: 30.5 PG (ref 26.8–34.3)
MCHC RBC AUTO-ENTMCNC: 33.1 G/DL (ref 31.4–37.4)
MCV RBC AUTO: 92 FL (ref 82–98)
MICROALBUMIN UR-MCNC: 173 MG/L (ref 0–20)
MICROALBUMIN/CREAT 24H UR: 75 MG/G CREATININE (ref 0–30)
MONOCYTES # BLD AUTO: 0.63 THOUSAND/ΜL (ref 0.17–1.22)
MONOCYTES NFR BLD AUTO: 6 % (ref 4–12)
NEUTROPHILS # BLD AUTO: 7.31 THOUSANDS/ΜL (ref 1.85–7.62)
NEUTS SEG NFR BLD AUTO: 72 % (ref 43–75)
NRBC BLD AUTO-RTO: 0 /100 WBCS
PLATELET # BLD AUTO: 312 THOUSANDS/UL (ref 149–390)
PMV BLD AUTO: 9 FL (ref 8.9–12.7)
POTASSIUM SERPL-SCNC: 4.4 MMOL/L (ref 3.5–5.3)
PROT SERPL-MCNC: 7.9 G/DL (ref 6.4–8.2)
PROTHROMBIN TIME: 26.2 SECONDS (ref 11.6–14.5)
PSA SERPL-MCNC: 0.2 NG/ML (ref 0–4)
RBC # BLD AUTO: 5.18 MILLION/UL (ref 3.88–5.62)
SODIUM SERPL-SCNC: 133 MMOL/L (ref 136–145)
TRIGL SERPL-MCNC: 105 MG/DL
TSH SERPL DL<=0.05 MIU/L-ACNC: 3.16 UIU/ML (ref 0.36–3.74)
WBC # BLD AUTO: 10.07 THOUSAND/UL (ref 4.31–10.16)

## 2021-01-15 PROCEDURE — 80061 LIPID PANEL: CPT

## 2021-01-15 PROCEDURE — 82043 UR ALBUMIN QUANTITATIVE: CPT

## 2021-01-15 PROCEDURE — 84443 ASSAY THYROID STIM HORMONE: CPT

## 2021-01-15 PROCEDURE — 83036 HEMOGLOBIN GLYCOSYLATED A1C: CPT

## 2021-01-15 PROCEDURE — 36415 COLL VENOUS BLD VENIPUNCTURE: CPT

## 2021-01-15 PROCEDURE — G0103 PSA SCREENING: HCPCS

## 2021-01-15 PROCEDURE — 82570 ASSAY OF URINE CREATININE: CPT

## 2021-01-15 PROCEDURE — 85610 PROTHROMBIN TIME: CPT

## 2021-01-15 PROCEDURE — 80053 COMPREHEN METABOLIC PANEL: CPT

## 2021-01-15 PROCEDURE — 85025 COMPLETE CBC W/AUTO DIFF WBC: CPT

## 2021-01-15 NOTE — PROGRESS NOTES
Outpatient Care Management Note:  Outreach call placed to Melita  Introduced myself and my role in assisting him in managing his DM  He is not interested in outreach  Per Melita, he was started on a an additional insulin at his last PCP appointment and there has been some improvement in his glucose levels  He has a follow up with his PCP on 2/25/2021

## 2021-01-18 DIAGNOSIS — IMO0002 UNCONTROLLED TYPE 2 DIABETES MELLITUS WITH DIABETIC POLYNEUROPATHY, WITH LONG-TERM CURRENT USE OF INSULIN: ICD-10-CM

## 2021-01-18 RX ORDER — INSULIN ASPART 100 [IU]/ML
30 INJECTION, SOLUTION INTRAVENOUS; SUBCUTANEOUS
Qty: 15 ML | Refills: 5
Start: 2021-01-18 | End: 2021-02-03 | Stop reason: SDUPTHER

## 2021-01-18 RX ORDER — INSULIN DEGLUDEC INJECTION 100 U/ML
50 INJECTION, SOLUTION SUBCUTANEOUS DAILY
Qty: 5 PEN | Refills: 3
Start: 2021-01-18 | End: 2021-02-19 | Stop reason: SDUPTHER

## 2021-02-03 DIAGNOSIS — IMO0002 UNCONTROLLED TYPE 2 DIABETES MELLITUS WITH DIABETIC POLYNEUROPATHY, WITH LONG-TERM CURRENT USE OF INSULIN: ICD-10-CM

## 2021-02-03 RX ORDER — INSULIN ASPART 100 [IU]/ML
30 INJECTION, SOLUTION INTRAVENOUS; SUBCUTANEOUS
Qty: 15 ML | Refills: 5 | Status: SHIPPED | OUTPATIENT
Start: 2021-02-03 | End: 2021-02-12

## 2021-02-05 ENCOUNTER — OFFICE VISIT (OUTPATIENT)
Dept: ENDOCRINOLOGY | Facility: CLINIC | Age: 56
End: 2021-02-05
Payer: MEDICARE

## 2021-02-05 VITALS
WEIGHT: 315 LBS | HEIGHT: 73 IN | HEART RATE: 82 BPM | BODY MASS INDEX: 41.75 KG/M2 | SYSTOLIC BLOOD PRESSURE: 140 MMHG | DIASTOLIC BLOOD PRESSURE: 78 MMHG | OXYGEN SATURATION: 96 %

## 2021-02-05 DIAGNOSIS — E78.49 OTHER HYPERLIPIDEMIA: ICD-10-CM

## 2021-02-05 DIAGNOSIS — I10 ESSENTIAL HYPERTENSION: ICD-10-CM

## 2021-02-05 DIAGNOSIS — G47.33 OBSTRUCTIVE SLEEP APNEA: ICD-10-CM

## 2021-02-05 DIAGNOSIS — IMO0002 UNCONTROLLED TYPE 2 DIABETES MELLITUS WITH DIABETIC POLYNEUROPATHY, WITH LONG-TERM CURRENT USE OF INSULIN: Primary | ICD-10-CM

## 2021-02-05 PROCEDURE — 99204 OFFICE O/P NEW MOD 45 MIN: CPT | Performed by: NURSE PRACTITIONER

## 2021-02-05 NOTE — PATIENT INSTRUCTIONS
Elen[facility] [place]     1  Continue with your medication as is for now  Start testing your blood sugar 3-4x daily  Write the numbers down in your sugar log  Send me the log every 1-2 weeks  2  I will review your numbers and call with insulin instructions  3  After we have your HgA1C below 8%, we can start thinking about the other medications that we discussed

## 2021-02-05 NOTE — PROGRESS NOTES
New Patient Progress Note      Chief Complaint   Patient presents with    Diabetes Type 2        History of Present Illness:   Casey Parisi is a 60 y o  male with HTN, HLD, THIEN, and type 2 diabetes with long term use of insulin  Reports complications of CAD and neuropathy  Current regimen:   Tresiba 50 units daily  Novolog 30 units with dinner  Metformin 1000 mg BID    Onset: Diagnosed 5 years ago through routine bloodwork  +fm hx in mother    Medications: Metformin  Patient believes he was taking Tradjenta for a period of time  This helped him lose weight but was not sufficiently controlling his blood sugar  He was placed on basal insulin and recently (10/2020) started on Novolog once daily with dinner  Blood Sugars: Tests approximately every other day at different times of day    Two nights ago he was 235 in the evening  This morning his sugar was 227  He reports polydipsia, polyuria, and blurred vision  He is symptomatic of lower blood sugars with sweating, lightheadedness, and tremor  He has never checked his BG when having these symptoms  Meter: Relion    Diet: 3x; wakes up [id number], may eat in the morning around 6  BF: cupcake, cookie, toast; maybe egg sandwich, white bread  L: bailey, PB&J, can of soup  D: protein, starch (potato), and vegetable    Exercise: yard work, less with cold weather    Influenza vaccine: Up to date    Pneumovax: +    Ophthalmology: 10/29/2020; negative for retinopathy    Podiatry/Foot care: + neuropathy for a few years; performs self care    Dentist: Not seen a dentist; denies dental pain    Diabetes education/nutrition: Referral provided    For his HTN, he is taking 10 mg of benazepril, 240 mg of diltiazem BID, and 50 mg of metoprolol BID  For his HLD, he is taking 10 mg of atorvastatin  He denies myalgias  For his THIEN, he uses his CPAP nightly  Patient had an AVR approximately 5 years ago  He is on coumadin for ac therapy   Patient uses daily nebulizer/inhaled steroid/Trelegy to treat his COPD  Patient Active Problem List   Diagnosis    Aortic stenosis    Atrial fibrillation ([facility] )    COPD mixed type ([place] [address] )    Uncontrolled type 2 diabetes mellitus with diabetic polyneuropathy, with long-term current use of insulin ([facility])    Other hyperlipidemia    Essential hypertension    S/P AVR    Obstructive sleep apnea    Restless leg syndrome    Hypersomnia    Morbid obesity with BMI of 40 0-44 9, adult ([facility])    PLMD (periodic limb movement disorder)    Palpitations    Acute exacerbation of chronic obstructive bronchitis ([facility])    Multiple pulmonary nodules determined by computed tomography of lung    Chronic pain of left knee    Seasonal allergic rhinitis due to pollen      Past Medical History:   Diagnosis Date    Asthma     last assessed 8/21/12    Athscl heart disease of native coronary artery w/o ang pctrs     Atrial fibrillation ([facility] )     last assessed 8/21/12    Cardiomyopathy ([facility] )     Closed fracture of fibula     last assessed 10/18/13    COLD (chronic obstructive lung disease) ([place] [address] )     last assessed 8/21/12    Coronary angioplasty status     History of echocardiogram 04/19/2017    EF 0 50, LVSF is at the lower limits of normal  Mild concentric LV hypertophy  Mild mitral regurg  Normal functioning mechanical AV      Hyperlipidemia     Hypertension     last assessed 8/21/12    Presence of prosthetic heart valve     Restless legs syndrome     last assessed 8/21/12    Sleep apnea     last assessed 8/21/12      Past Surgical History:   Procedure Laterality Date    AORTIC VALVE REPLACEMENT  10/07/2016    AVR replacement, mechanical    CORONARY ANGIOPLASTY WITH STENT PLACEMENT  07/29/2010    EF 40%, Successful bare metal stent mid RCA      Family History   Problem Relation Age of Onset    Atrial fibrillation Mother     Diabetes type II Mother     Heart attack Father         acute    Heart failure Father     Stroke Father syndrome     Social History     Tobacco Use    Smoking status: Former Smoker     Years: 35 00     Types: Cigarettes     Quit date: 10/2016     Years since quittin 3    Smokeless tobacco: Never Used    Tobacco comment: 5 cigs a day   Substance Use Topics    Alcohol use: Not Currently     Allergies   Allergen Reactions    Fluticasone-Salmeterol Palpitations         Current Outpatient Medications:     albuterol (2 5 mg/3 mL) 0 083 % nebulizer solution, Inhale , Disp: , Rfl:     albuterol (Ventolin HFA) 90 mcg/act inhaler, Inhale 2 puffs every 6 (six) hours as needed for shortness of breath, Disp: 1 Inhaler, Rfl: 5    aspirin 81 MG tablet, Take by mouth, Disp: , Rfl:     atorvastatin (LIPITOR) 10 mg tablet, Take 1 tablet (10 mg total) by mouth daily, Disp: 90 tablet, Rfl: 3    benazepril (LOTENSIN) 10 mg tablet, Take 1 tablet (10 mg total) by mouth daily, Disp: 90 tablet, Rfl: 1    diltiazem (CARDIZEM CD) 240 mg 24 hr capsule, Take 1 capsule (240 mg total) by mouth 2 (two) times a day, Disp: 180 capsule, Rfl: 3    fluticasone-umeclidinium-vilanterol (TRELEGY) 100-62 5-25 MCG/INH inhaler, Inhale 1 puff daily Rinse mouth after use , Disp: 2 Inhaler, Rfl: 5    furosemide (LASIX) 40 mg tablet, Take 1 tablet (40 mg total) by mouth daily, Disp: 90 tablet, Rfl: 3    insulin aspart (NovoLOG FlexPen) 100 UNIT/ML injection pen, Inject 30 Units under the skin daily with dinner, Disp: 15 mL, Rfl: 5    insulin degludec (Tresiba FlexTouch) 100 units/mL injection pen, Inject 50 Units under the skin daily, Disp: 5 pen, Rfl: 3    Insulin Pen Needle (BD Pen Needle Fany U/F) 32G X 4 MM MISC, Inject as directed 2 (two) times a day, Disp: 100 each, Rfl: 5    metFORMIN (GLUCOPHAGE) 1000 MG tablet, Take 1 tablet (1,000 mg total) by mouth 2 (two) times a day, Disp: 180 tablet, Rfl: 1    metoprolol tartrate (LOPRESSOR) 50 mg tablet, Take 1 tablet (50 mg total) by mouth 2 (two) times a day, Disp: 180 tablet, Rfl: 3   nitroglycerin (NITROSTAT) 0 4 mg SL tablet, Place 1 tablet (0 4 mg total) under the tongue every 5 (five) minutes as needed for chest pain, Disp: 100 tablet, Rfl: 3    potassium chloride (K-DUR) 10 mEq tablet, Take 1 tablet (10 mEq total) by mouth daily, Disp: 90 tablet, Rfl: 3    warfarin (COUMADIN) 10 mg tablet, Take 1 tablet (10 mg total) by mouth daily, Disp: 90 tablet, Rfl: 5    warfarin (COUMADIN) 2 mg tablet, Take 1 tablet (2 mg total) by mouth daily (Patient taking differently: Take 2 mg by mouth daily except on monday), Disp: 90 tablet, Rfl: 3    Review of Systems   Constitutional: Positive for fatigue  Negative for activity change, appetite change and unexpected weight change  HENT: Negative for dental problem, trouble swallowing and voice change  Eyes: Positive for visual disturbance  Respiratory: Positive for chest tightness, shortness of breath and wheezing  Negative for cough  Cardiovascular: Negative for chest pain, palpitations and leg swelling  Gastrointestinal: Negative for constipation, diarrhea, nausea and vomiting  Endocrine: Positive for polydipsia and polyuria  Negative for polyphagia  Genitourinary: Positive for frequency  Musculoskeletal: Positive for arthralgias and back pain  Negative for gait problem, joint swelling and myalgias  Skin: Negative for wound  Neurological: Positive for numbness  Negative for dizziness, weakness, light-headedness and headaches  Hematological: Bruises/bleeds easily  Psychiatric/Behavioral: Positive for sleep disturbance  Negative for decreased concentration and dysphoric mood  The patient is not nervous/anxious  Physical Exam:  Body mass index is 51 96 kg/m²    /78 (BP Location: Left arm, Patient Position: Sitting, Cuff Size: Large)   Pulse 82   Ht 6' 1" (1 854 m)   Wt (!) 179 kg (393 lb 12 8 oz)   SpO2 96%   BMI 51 96 kg/m²    Wt Readings from Last 3 Encounters:   02/05/21 (!) 179 kg (393 lb 12 8 oz)   11/25/20 (!) 173 kg (380 lb 9 6 oz)   10/29/20 (!) 175 kg (386 lb)       Physical Exam  Vitals signs reviewed  Constitutional:       General: He is not in acute distress  Appearance: He is well-developed  He is obese  He is not ill-appearing  HENT:      Head: Normocephalic and atraumatic  Comments: Mask in place  Eyes:      Pupils: Pupils are equal, round, and reactive to light  Neck:      Musculoskeletal: Normal range of motion and neck supple  Thyroid: No thyromegaly  Cardiovascular:      Rate and Rhythm: Normal rate and regular rhythm  Pulses: Normal pulses  Pulmonary:      Effort: Pulmonary effort is normal       Breath sounds: Wheezing (expiratory) and rhonchi present  Comments: Patient did not take his inhaler or neb treatment today  Abdominal:      General: Bowel sounds are normal  There is no distension  Palpations: Abdomen is soft  Tenderness: There is no abdominal tenderness  Musculoskeletal:      Right lower leg: No edema  Left lower leg: No edema  Lymphadenopathy:      Cervical: No cervical adenopathy  Skin:     General: Skin is warm and dry  Capillary Refill: Capillary refill takes less than 2 seconds  Neurological:      Mental Status: He is alert and oriented to person, place, and time  Gait: Gait normal    Psychiatric:         Mood and Affect: Mood normal          Behavior: Behavior normal          Thought Content:  Thought content normal          Judgment: Judgment normal            Labs:   Lab Results   Component Value Date    HGBA1C 10 1 (H) 01/15/2021    HGBA1C 11 0 (A) 10/29/2020    HGBA1C 7 7 (H) 11/08/2019     Lab Results   Component Value Date    CREATININE 0 83 01/15/2021    CREATININE 1 03 10/09/2020    CREATININE 0 93 07/02/2019    BUN 12 01/15/2021     09/01/2013    K 4 4 01/15/2021    CL 98 (L) 01/15/2021    CO2 28 01/15/2021     eGFR   Date Value Ref Range Status   01/15/2021 98 ml/min/1 73sq m Final     Lab Results Component Value Date    CHOL 180 09/01/2013    HDL 27 (L) 01/15/2021    TRIG 105 01/15/2021     Lab Results   Component Value Date    ALT 29 01/15/2021    AST 15 01/15/2021    ALKPHOS 110 01/15/2021    BILITOT 0 6 09/01/2013     Lab Results   Component Value Date    RDY6SZYXMAAG 3 159 01/15/2021    WNV3EMACDLPZ 3 299 10/04/2018    OGQ7JXAVLKBG 3 238 07/22/2016     No results found for: FREET4, TSI    Impression & Plan:    Problem List Items Addressed This Visit        Endocrine    Uncontrolled type 2 diabetes mellitus with diabetic polyneuropathy, with long-term current use of insulin ([facility] ) - Primary     Diabetes is uncontrolled  Start testing blood sugars 3-4x daily  Provide glucose log every two weeks for review  At this time, will maintain patient on basal-bolus therapy  He will need three meal time injections plus basal insulin  Discussed starting DPP-4, SGLT-2, or GLP-1, which would be idea as the patient's BMI is 51  Counseled on  The pathophysiology of diabetes  Counseled on the negative impact of uncontrolled diabetes including retinopathy, nephropathy, and worsening neuropathy  Strongly encouraged patient to follow up with diabetes educator for MNT  Lab Results   Component Value Date    HGBA1C 10 1 (H) 01/15/2021            Relevant Orders    Ambulatory referral to Diabetic Education    Hemoglobin A1C    Comprehensive metabolic panel    Lipid Panel with Direct LDL reflex    Microalbumin / creatinine urine ratio       Respiratory    Obstructive sleep apnea     Continue CPAP  Cardiovascular and Mediastinum    Essential hypertension     BP stable at 140/78  Continue current medication  Other    Other hyperlipidemia     Check fasting lipid panel prior to next visit  Continue statin                  Orders Placed This Encounter   Procedures    Hemoglobin A1C     Standing Status:   Future     Standing Expiration Date:   2/5/2022    Comprehensive metabolic panel     This is a patient instruction: Patient fasting for 8 hours or longer recommended  Standing Status:   Future     Standing Expiration Date:   2/5/2022    Lipid Panel with Direct LDL reflex     This is a patient instruction: This test requires patient fasting for 10-12 hours or longer  Drinking of black coffee or black tea is acceptable  Standing Status:   Future     Standing Expiration Date:   2/5/2022    Microalbumin / creatinine urine ratio     Standing Status:   Future     Standing Expiration Date:   2/5/2022    Ambulatory referral to Diabetic Education     Standing Status:   Future     Standing Expiration Date:   2/5/2022     Referral Priority:   Routine     Referral Type:   Consult - AMB     Referral Reason:   Specialty Services Required     Requested Specialty:   Diabetes Services     Number of Visits Requested:   1     Expiration Date:   2/5/2022       Patient Instructions   [facility]     1  Continue with your medication as is for now  Start testing your blood sugar 3-4x daily  Write the numbers down in your sugar log  Send me the log every 1-2 weeks  2  I will review your numbers and call with insulin instructions  3  After we have your HgA1C below 8%, we can start thinking about the other medications that we discussed  Discussed with the patient and all questioned fully answered  He will call me if any problems arise  Follow-up appointment in 3 months       Counseled patient on diagnostic results, prognosis, risk and benefit of treatment options, instruction for management, importance of treatment compliance, Risk  factor reduction and impressions    CHRISTINE Izquierdo

## 2021-02-12 ENCOUNTER — TELEPHONE (OUTPATIENT)
Dept: ENDOCRINOLOGY | Facility: CLINIC | Age: 56
End: 2021-02-12

## 2021-02-12 DIAGNOSIS — IMO0002 UNCONTROLLED TYPE 2 DIABETES MELLITUS WITH DIABETIC POLYNEUROPATHY, WITH LONG-TERM CURRENT USE OF INSULIN: ICD-10-CM

## 2021-02-12 RX ORDER — INSULIN ASPART 100 [IU]/ML
INJECTION, SOLUTION INTRAVENOUS; SUBCUTANEOUS
Qty: 15 ML | Refills: 5
Start: 2021-02-12 | End: 2021-02-15 | Stop reason: SDUPTHER

## 2021-02-12 NOTE — TELEPHONE ENCOUNTER
Blood sugar log    Medications:  novolog  30 units with dinner  tresiba 50 units daily  Metformin 1000mg BID

## 2021-02-12 NOTE — TELEPHONE ENCOUNTER
Sugar log reviewed  Recommend taking 10 units of novolog with breakfast and lunch  Continue with 30 for dinner  Continue tresiba 50 and metformin 1000 BID

## 2021-02-18 RX ORDER — INSULIN ASPART 100 [IU]/ML
INJECTION, SOLUTION INTRAVENOUS; SUBCUTANEOUS
Qty: 15 ML | Refills: 5 | Status: SHIPPED | OUTPATIENT
Start: 2021-02-18 | End: 2021-03-18

## 2021-02-19 DIAGNOSIS — IMO0002 UNCONTROLLED TYPE 2 DIABETES MELLITUS WITH DIABETIC POLYNEUROPATHY, WITH LONG-TERM CURRENT USE OF INSULIN: ICD-10-CM

## 2021-02-19 RX ORDER — INSULIN DEGLUDEC INJECTION 100 U/ML
50 INJECTION, SOLUTION SUBCUTANEOUS DAILY
Qty: 5 PEN | Refills: 3 | Status: SHIPPED | OUTPATIENT
Start: 2021-02-19 | End: 2021-03-18 | Stop reason: DRUGHIGH

## 2021-02-24 ENCOUNTER — APPOINTMENT (OUTPATIENT)
Dept: LAB | Facility: HOSPITAL | Age: 56
End: 2021-02-24
Payer: MEDICARE

## 2021-02-24 ENCOUNTER — ANTICOAG VISIT (OUTPATIENT)
Dept: FAMILY MEDICINE CLINIC | Facility: CLINIC | Age: 56
End: 2021-02-24

## 2021-02-25 ENCOUNTER — OFFICE VISIT (OUTPATIENT)
Dept: FAMILY MEDICINE CLINIC | Facility: CLINIC | Age: 56
End: 2021-02-25
Payer: MEDICARE

## 2021-02-25 VITALS
WEIGHT: 315 LBS | DIASTOLIC BLOOD PRESSURE: 82 MMHG | HEIGHT: 73 IN | BODY MASS INDEX: 41.75 KG/M2 | TEMPERATURE: 96 F | SYSTOLIC BLOOD PRESSURE: 142 MMHG

## 2021-02-25 DIAGNOSIS — G89.29 CHRONIC PAIN OF LEFT KNEE: ICD-10-CM

## 2021-02-25 DIAGNOSIS — M25.562 CHRONIC PAIN OF LEFT KNEE: ICD-10-CM

## 2021-02-25 DIAGNOSIS — J44.9 COPD MIXED TYPE (HCC): ICD-10-CM

## 2021-02-25 DIAGNOSIS — IMO0002 UNCONTROLLED TYPE 2 DIABETES MELLITUS WITH DIABETIC POLYNEUROPATHY, WITH LONG-TERM CURRENT USE OF INSULIN: Primary | ICD-10-CM

## 2021-02-25 DIAGNOSIS — J43.9 PULMONARY EMPHYSEMA, UNSPECIFIED EMPHYSEMA TYPE (HCC): ICD-10-CM

## 2021-02-25 PROCEDURE — 99214 OFFICE O/P EST MOD 30 MIN: CPT | Performed by: FAMILY MEDICINE

## 2021-02-25 RX ORDER — ALBUTEROL SULFATE 2.5 MG/3ML
2.5 SOLUTION RESPIRATORY (INHALATION) EVERY 6 HOURS PRN
Qty: 30 VIAL | Refills: 3 | Status: SHIPPED | OUTPATIENT
Start: 2021-02-25 | End: 2021-08-05 | Stop reason: SDUPTHER

## 2021-02-25 RX ORDER — TRAMADOL HYDROCHLORIDE 50 MG/1
50 TABLET ORAL
Qty: 30 TABLET | Refills: 0 | Status: SHIPPED | OUTPATIENT
Start: 2021-02-25 | End: 2021-05-12

## 2021-02-25 RX ORDER — ALBUTEROL SULFATE 90 UG/1
2 AEROSOL, METERED RESPIRATORY (INHALATION) EVERY 6 HOURS PRN
Qty: 1 INHALER | Refills: 5 | Status: SHIPPED | OUTPATIENT
Start: 2021-02-25 | End: 2021-08-02 | Stop reason: SDUPTHER

## 2021-02-25 NOTE — PROGRESS NOTES
Assessment/Plan:   for his left knee pain, I put in for some tramadol to take at bedtime to help  PDMP website reviewed, no red flags  He will call us in the next week or 2 if the pain continues, and we will refer him to Orthopedics if that is the case  Patient will get his INR done again in 2 weeks  Continue to follow up with Cardiology in endocrinology as scheduled  Patient wishes to hold on seeing a podiatrist at this time  Problem List Items Addressed This Visit        Endocrine    Uncontrolled type 2 diabetes mellitus with diabetic polyneuropathy, with long-term current use of insulin ([facility]) - Primary       Respiratory    COPD mixed type ([facility] [address] )    Relevant Medications    albuterol (2 5 mg/3 mL) 0 083 % nebulizer solution       Other    Chronic pain of left knee    Relevant Medications    traMADol (ULTRAM) 50 mg tablet           Diagnoses and all orders for this visit:    Uncontrolled type 2 diabetes mellitus with diabetic polyneuropathy, with long-term current use of insulin ([facility])    Chronic pain of left knee  -     traMADol (ULTRAM) 50 mg tablet; Take 1 tablet (50 mg total) by mouth daily at bedtime as needed for moderate pain or severe pain    COPD mixed type ([facility])  -     albuterol (2 5 mg/3 mL) 0 083 % nebulizer solution; Take 1 vial (2 5 mg total) by nebulization every 6 (six) hours as needed for wheezing or shortness of breath        No problem-specific Assessment & Plan notes found for this encounter  Subjective:      Patient ID: Adriana Carney is a 60 y o  male  Patient here today for follow-up  Patient denies any chest pain or  Increased shortness of breath  Patient has seen endocrinology  Patient has been having left knee pain radiating into the left hip especially at night  This is been happening for the last couple weeks  Diabetes  He presents for his follow-up diabetic visit  He has type 2 diabetes mellitus  His disease course has been stable   There are no hypoglycemic associated symptoms  There are no diabetic associated symptoms  Diabetic complications include peripheral neuropathy  Risk factors for coronary artery disease include diabetes mellitus, dyslipidemia, hypertension and male sex  Current diabetic treatment includes insulin injections and oral agent (monotherapy)  He is compliant with treatment all of the time  His weight is stable  He is following a generally healthy diet  When asked about meal planning, he reported none  He has not had a previous visit with a dietitian  He rarely participates in exercise  There is no change in his home blood glucose trend  An ACE inhibitor/angiotensin II receptor blocker is not being taken  Knee Pain   The pain is present in the left knee  The quality of the pain is described as aching  The pain is severe  The pain has been fluctuating since onset  The symptoms are aggravated by weight bearing, movement and palpation  The following portions of the patient's history were reviewed and updated as appropriate:   He has a past medical history of Asthma, Athscl heart disease of native coronary artery w/o ang pctrs, Atrial fibrillation ([facility] ), Cardiomyopathy ([facility] ), Closed fracture of fibula, COLD (chronic obstructive lung disease) ([facility] ), Coronary angioplasty status, History of echocardiogram (04/19/2017), Hyperlipidemia, Hypertension, Presence of prosthetic heart valve, Restless legs syndrome, and Sleep apnea  ,  does not have any pertinent problems on file  ,   has a past surgical history that includes Coronary angioplasty with stent (07/29/2010) and Aortic valve replacement (10/07/2016)  ,  family history includes Atrial fibrillation in his mother; Diabetes type II in his mother; Heart attack in his father; Heart failure in his father; Stroke in his father  ,   reports that he quit smoking about 4 years ago  His smoking use included cigarettes  He quit after 35 00 years of use   He has never used smokeless tobacco  He reports previous alcohol use  He reports that he does not use drugs  ,  is allergic to fluticasone-salmeterol     Current Outpatient Medications   Medication Sig Dispense Refill    albuterol (2 5 mg/3 mL) 0 083 % nebulizer solution Take 1 vial (2 5 mg total) by nebulization every 6 (six) hours as needed for wheezing or shortness of breath 30 vial 3    aspirin 81 MG tablet Take by mouth      atorvastatin (LIPITOR) 10 mg tablet Take 1 tablet (10 mg total) by mouth daily 90 tablet 3    benazepril (LOTENSIN) 10 mg tablet Take 1 tablet (10 mg total) by mouth daily 90 tablet 1    diltiazem (CARDIZEM CD) 240 mg 24 hr capsule Take 1 capsule (240 mg total) by mouth 2 (two) times a day 180 capsule 3    fluticasone-umeclidinium-vilanterol (TRELEGY) 100-62 5-25 MCG/INH inhaler Inhale 1 puff daily Rinse mouth after use  2 Inhaler 5    furosemide (LASIX) 40 mg tablet Take 1 tablet (40 mg total) by mouth daily 90 tablet 3    insulin aspart (NovoLOG FlexPen) 100 UNIT/ML injection pen Inject 10 units with breakfast and lunch and 30 units with dinner   15 mL 5    insulin degludec Fabiola Иван FlexTouch) 100 units/mL injection pen Inject 50 Units under the skin daily 5 pen 3    Insulin Pen Needle (BD Pen Needle Fany U/F) 32G X 4 MM MISC Inject as directed 2 (two) times a day 100 each 5    metFORMIN (GLUCOPHAGE) 1000 MG tablet Take 1 tablet (1,000 mg total) by mouth 2 (two) times a day 180 tablet 1    metoprolol tartrate (LOPRESSOR) 50 mg tablet Take 1 tablet (50 mg total) by mouth 2 (two) times a day 180 tablet 3    potassium chloride (K-DUR) 10 mEq tablet Take 1 tablet (10 mEq total) by mouth daily 90 tablet 3    warfarin (COUMADIN) 10 mg tablet Take 1 tablet (10 mg total) by mouth daily 90 tablet 5    warfarin (COUMADIN) 2 mg tablet Take 1 tablet (2 mg total) by mouth daily (Patient taking differently: Take 2 mg by mouth daily except on monday) 90 tablet 3    albuterol (Ventolin HFA) 90 mcg/act inhaler Inhale 2 puffs every 6 (six) hours as needed for shortness of breath Must be brand necessary 1 Inhaler 5    nitroglycerin (NITROSTAT) 0 4 mg SL tablet Place 1 tablet (0 4 mg total) under the tongue every 5 (five) minutes as needed for chest pain (Patient not taking: Reported on 2/25/2021) 100 tablet 3    traMADol (ULTRAM) 50 mg tablet Take 1 tablet (50 mg total) by mouth daily at bedtime as needed for moderate pain or severe pain 30 tablet 0     No current facility-administered medications for this visit  Review of Systems   Constitutional: Negative  Respiratory: Negative  Cardiovascular: Negative  Gastrointestinal: Negative  Genitourinary: Negative  Musculoskeletal: Positive for arthralgias (  Left knee)  Objective:  Vitals:    02/25/21 0852   BP: 142/82   Temp: (!) 96 °F (35 6 °C)   Weight: (!) 179 kg (394 lb 9 6 oz)   Height: 6' 1" (1 854 m)     Body mass index is 52 06 kg/m²  Physical Exam  Vitals signs reviewed  Constitutional:       General: He is not in acute distress  Appearance: Normal appearance  He is well-developed  He is not diaphoretic  HENT:      Head: Normocephalic and atraumatic  Eyes:      Conjunctiva/sclera: Conjunctivae normal    Cardiovascular:      Rate and Rhythm: Normal rate  Rhythm irregular  Pulses: no weak pulses          Dorsalis pedis pulses are 2+ on the right side and 2+ on the left side  Posterior tibial pulses are 2+ on the right side and 2+ on the left side  Heart sounds: Normal heart sounds  No murmur  No friction rub  No gallop  Pulmonary:      Effort: Pulmonary effort is normal  No respiratory distress  Breath sounds: Normal breath sounds  No wheezing or rales  Musculoskeletal:      Right lower leg: No edema  Left lower leg: No edema  Feet:      Right foot:      Skin integrity: No ulcer, skin breakdown, erythema, warmth, callus or dry skin        Left foot:      Skin integrity: No ulcer, skin breakdown, erythema, warmth, callus or dry skin    Neurological:      Mental Status: He is alert and oriented to person, place, and time  Psychiatric:         Mood and Affect: Mood normal          Behavior: Behavior normal          Thought Content: Thought content normal          Judgment: Judgment normal        Patient's shoes and socks removed  Right Foot/Ankle   Right Foot Inspection  Skin Exam: skin normal and skin intact no dry skin, no warmth, no callus, no erythema, no maceration, no abnormal color, no pre-ulcer, no ulcer and no callus                            Sensory       Monofilament testing: absent  Vascular    The right DP pulse is 2+  The right PT pulse is 2+  Left Foot/Ankle  Left Foot Inspection  Skin Exam: skin normal and skin intactno dry skin, no warmth, no erythema, no maceration, normal color, no pre-ulcer, no ulcer and no callus                                         Sensory       Monofilament: absent  Vascular    The left DP pulse is 2+  The left PT pulse is 2+  Assign Risk Category:  No deformity present; Loss of protective sensation;  No weak pulses       Risk: 3

## 2021-02-26 ENCOUNTER — TELEPHONE (OUTPATIENT)
Dept: ENDOCRINOLOGY | Facility: CLINIC | Age: 56
End: 2021-02-26

## 2021-02-26 DIAGNOSIS — IMO0002 UNCONTROLLED TYPE 2 DIABETES MELLITUS WITH DIABETIC POLYNEUROPATHY, WITH LONG-TERM CURRENT USE OF INSULIN: ICD-10-CM

## 2021-02-26 NOTE — TELEPHONE ENCOUNTER
Sugar log reviewed  Please make the following changes:   Increase Tresiba to 60 units daily  NovoLog 12 units with breakfast and lunch and 36 units with dinner  Continue with metformin 1000 mg twice daily     send the next blood sugar log in 2 weeks    Thank you

## 2021-02-26 NOTE — TELEPHONE ENCOUNTER
Blood sugar log    Medications:  tresiba 50 units daily  novolog 10 units with breakfast and lunch,30 units with dinner  Metformin 1000mg BID

## 2021-03-04 ENCOUNTER — OFFICE VISIT (OUTPATIENT)
Dept: CARDIOLOGY CLINIC | Facility: CLINIC | Age: 56
End: 2021-03-04
Payer: MEDICARE

## 2021-03-04 VITALS
BODY MASS INDEX: 41.75 KG/M2 | HEART RATE: 72 BPM | DIASTOLIC BLOOD PRESSURE: 82 MMHG | SYSTOLIC BLOOD PRESSURE: 130 MMHG | WEIGHT: 315 LBS | HEIGHT: 73 IN

## 2021-03-04 DIAGNOSIS — I50.32 CHRONIC DIASTOLIC CONGESTIVE HEART FAILURE (HCC): Primary | ICD-10-CM

## 2021-03-04 DIAGNOSIS — Z95.2 S/P AVR: ICD-10-CM

## 2021-03-04 DIAGNOSIS — G47.33 OBSTRUCTIVE SLEEP APNEA: ICD-10-CM

## 2021-03-04 DIAGNOSIS — J44.9 COPD MIXED TYPE (HCC): ICD-10-CM

## 2021-03-04 DIAGNOSIS — Z95.5 H/O HEART ARTERY STENT: ICD-10-CM

## 2021-03-04 DIAGNOSIS — I48.91 ATRIAL FIBRILLATION, UNSPECIFIED TYPE (HCC): ICD-10-CM

## 2021-03-04 DIAGNOSIS — I10 ESSENTIAL HYPERTENSION: ICD-10-CM

## 2021-03-04 PROCEDURE — 99214 OFFICE O/P EST MOD 30 MIN: CPT | Performed by: INTERNAL MEDICINE

## 2021-03-04 NOTE — PATIENT INSTRUCTIONS
A-fib (Atrial Fibrillation)   AMBULATORY CARE:   Atrial fibrillation (a-fib)  is an irregular heartbeat  It reduces your heart's ability to pump blood through your body  A-fib may come and go, or it may be a long-term condition  A-fib can cause life-threatening blood clots, stroke, or heart failure  It is important to treat and manage a-fib to help prevent these problems  Common signs and symptoms include the following:   · A heartbeat that races, pounds, or flutters    · Weakness, severe tiredness, or confusion    · Feeling lightheaded, sweaty, dizzy, or faint    · Shortness of breath or anxiety    · Chest pain or pressure    Call your local emergency number (911 in the [address] [facility]) if:   · You have any of the following signs of a heart attack:      ? Squeezing, pressure, or pain in your chest    ? You may  also have any of the following:     ? Discomfort or pain in your back, neck, jaw, stomach, or arm    ? Shortness of breath    ? Nausea or vomiting    ? Lightheadedness or a sudden cold sweat    · You have any of the following signs of a stroke:      ? Numbness or drooping on one side of your face     ? Weakness in an arm or leg    ? Confusion or difficulty speaking    ? Dizziness, a severe headache, or vision loss    Call your cardiologist if:   · Your arm or leg feels warm, tender, and painful  It may look swollen and red  · Your heart rate is more than 110 beats per minute  · You have new or worsening swelling in your legs, feet, ankles, or abdomen  · You are short of breath, even at rest      · You have questions or concerns about your condition or care  Treatment for A-fib:  Conditions that cause a-fib, such as thyroid disease, will be treated  You may also need any of the following:  · Heart medicines  help control your heart rate or rhythm  You may need more than one medicine to treat your symptoms  · Antiplatelet or blood thinner medicines  help prevent blood clots and stroke  · Cardioversion  is a procedure to return your heart rate and rhythm to normal  It can be done using medicines or electric shock  · A-fib ablation  is a procedure that uses energy to burn a small area of heart tissue  This creates scar tissue and prevents electrical signals that cause a-fib  You may need this procedure more than once  Ask for more information on a-fib ablation  · A pacemaker  may be inserted into your heart  A pacemaker is a device that controls your heartbeat  A pacemaker may be inserted during an ablation procedure or surgery  Ask your healthcare provider for more information on pacemakers  · Surgery  may be needed if other procedures do not work  During surgery your healthcare provider will make cuts in the upper part of your heart  The provider will stitch the cuts together to create scar tissue  The scar tissue will prevent electrical signals that cause a-fib  Manage A-fib:   · Know your target heart rate  Learn how to check your pulse and monitor your heart rate  · Know the risks if you choose to drink alcohol  Alcohol can make a-fib hard to manage  Ask your healthcare provider if it is safe for you to drink alcohol  A drink of alcohol is 12 ounces of beer, 5 ounces of wine, or 1½ ounces of liquor  · Do not smoke  Nicotine can cause heart damage and make it more difficult to manage your a-fib  Do not use e-cigarettes or smokeless tobacco in place of cigarettes or to help you quit  They still contain nicotine  Ask your healthcare provider for information if you currently smoke and need help quitting  · Eat heart-healthy foods  Heart healthy foods will help keep your cholesterol low  These include fruits, vegetables, whole-grain breads, low-fat dairy products, beans, lean meats, and fish  Replace butter and margarine with heart-healthy oils such as olive oil and canola oil  · Maintain a healthy weight  Ask your healthcare provider how much you should weigh  Ask him or her to help you create a safe weight loss plan if you are overweight  Even a small goal of a 10% weight loss can improve your heart health  · Get regular physical activity  Physical activity helps improve your heart health  Get at least 150 minutes of moderate aerobic physical activity each week  Your healthcare provider can help you create an activity plan  · Manage other health conditions  This includes high blood pressure or cholesterol, sleep apnea, diabetes, and other heart conditions  Take medicine as directed and follow your treatment plan  Follow up with your cardiologist as directed: You will need regular blood tests and monitoring  Write down your questions so you remember to ask them during your visits  © Copyright [address] Information is for End User's use only and may not be sold, redistributed or otherwise used for commercial purposes  All illustrations and images included in CareNotes® are the copyrighted property of [employer]  or [facility] Kalen P[facility]rez   The above information is an  only  It is not intended as medical advice for individual conditions or treatments  Talk to your doctor, nurse or pharmacist before following any medical regimen to see if it is safe and effective for you

## 2021-03-04 NOTE — PROGRESS NOTES
Subjective:        Patient ID: Jeanne Petty is a 60 y o  male  Chief Complaint:  Juan Bridges is here for routine follow-up  Does note moderate dyspnea on exertion at times  Lower extremity edema is chronic, moderate, and stable  He is using his CPAP, without oxygen  Checks his home oxygen level occasionally gets in the 85 range  No chest pains tightness or pressure  Chronic occasional sternal pain with range of motion of upper thorax and digital palpation over sternum but this is not new  No fevers chills or rigors  Admits to occasionally missing his Lasix if he is going out for the day  Still smoking occasionally  Knows he should quit  The following portions of the patient's history were reviewed and updated as appropriate: allergies, current medications, past family history, past medical history, past social history, past surgical history and problem list   Review of Systems   Constitution: Negative for chills, diaphoresis, malaise/fatigue and weight gain  HENT: Negative for nosebleeds and stridor  Eyes: Negative for double vision, vision loss in left eye, vision loss in right eye and visual disturbance  Cardiovascular: Positive for dyspnea on exertion and leg swelling  Negative for chest pain, claudication, cyanosis, irregular heartbeat, near-syncope, orthopnea, palpitations, paroxysmal nocturnal dyspnea and syncope  Respiratory: Positive for shortness of breath  Negative for cough, snoring and wheezing  Endocrine: Negative for polydipsia, polyphagia and polyuria  Hematologic/Lymphatic: Negative for bleeding problem  Does not bruise/bleed easily  Skin: Negative for flushing and rash  Musculoskeletal: Negative for falls and myalgias  Gastrointestinal: Negative for abdominal pain, heartburn, hematemesis, hematochezia, melena and nausea  Genitourinary: Negative for hematuria     Neurological: Negative for brief paralysis, dizziness, focal weakness, headaches, light-headedness, loss of balance and vertigo  Psychiatric/Behavioral: Negative for altered mental status and substance abuse  Allergic/Immunologic: Negative for hives  Objective:      /82   Pulse 72   Ht 6' 1" (1 854 m)   Wt (!) 178 kg (393 lb)   BMI 51 85 kg/m²   Physical Exam   Constitutional: He is oriented to person, place, and time  He appears well-developed and well-nourished  No distress  HENT:   Head: Normocephalic and atraumatic  Eyes: Pupils are equal, round, and reactive to light  EOM are normal  No scleral icterus  Neck: Normal range of motion  Neck supple  No JVD present  No thyromegaly present  Cardiovascular: Normal rate  Exam reveals no gallop and no friction rub  Murmur (Grade 1/6 ELIZABETH at base no diastolic murmur) heard  Pulmonary/Chest: Effort normal and breath sounds normal  No stridor  No respiratory distress  He has no wheezes  He has no rales  Abdominal: Soft  Bowel sounds are normal  He exhibits no distension and no mass  There is no abdominal tenderness  Musculoskeletal: Normal range of motion  General: Edema present  No deformity  Neurological: He is alert and oriented to person, place, and time  Coordination normal    Skin: Skin is warm and dry  No erythema  No pallor  Psychiatric: He has a normal mood and affect   His behavior is normal        Lab Review:   Ancillary Orders on 02/12/2021   Component Date Value    Protime 02/24/2021 32 7*    INR 02/24/2021 3 29*   Appointment on 01/15/2021   Component Date Value    Sodium 01/15/2021 133*    Potassium 01/15/2021 4 4     Chloride 01/15/2021 98*    CO2 01/15/2021 28     ANION GAP 01/15/2021 7     BUN 01/15/2021 12     Creatinine 01/15/2021 0 83     Glucose 01/15/2021 277*    Calcium 01/15/2021 8 7     Corrected Calcium 01/15/2021 9 3     AST 01/15/2021 15     ALT 01/15/2021 29     Alkaline Phosphatase 01/15/2021 110     Total Protein 01/15/2021 7 9     Albumin 01/15/2021 3 3*    Total Bilirubin 01/15/2021 0 40     eGFR 01/15/2021 98     WBC 01/15/2021 10 07     RBC 01/15/2021 5 18     Hemoglobin 01/15/2021 15 8     Hematocrit 01/15/2021 47 8     MCV 01/15/2021 92     MCH 01/15/2021 30 5     MCHC 01/15/2021 33 1     RDW 01/15/2021 13 3     MPV 01/15/2021 9 0     Platelets 15/60/4235 312     nRBC 01/15/2021 0     Neutrophils Relative 01/15/2021 72     Immat GRANS % 01/15/2021 0     Lymphocytes Relative 01/15/2021 18     Monocytes Relative 01/15/2021 6     Eosinophils Relative 01/15/2021 3     Basophils Relative 01/15/2021 1     Neutrophils Absolute 01/15/2021 7 31     Immature Grans Absolute 01/15/2021 0 03     Lymphocytes Absolute 01/15/2021 1 77     Monocytes Absolute 01/15/2021 0 63     Eosinophils Absolute 01/15/2021 0 25     Basophils Absolute 01/15/2021 0 08     Hemoglobin A1C 01/15/2021 10 1*    EAG 01/15/2021 243     Cholesterol 01/15/2021 127     Triglycerides 01/15/2021 105     HDL, Direct 01/15/2021 27*    LDL Calculated 01/15/2021 79     Creatinine, Ur 01/15/2021 231 0     Microalbum  ,U,Random 01/15/2021 173 0*    Microalb Creat Ratio 01/15/2021 75*    TSH 3RD GENERATON 01/15/2021 3 159     PSA 01/15/2021 0 2    Orders Only on 01/15/2021   Component Date Value    Protime 01/15/2021 26 2*    INR 01/15/2021 2 48*     No results found  Assessment:       1  Chronic diastolic congestive heart failure (HCC)  NT-BNP PRO    Echo complete with contrast if indicated    CANCELED: Echo complete with contrast if indicated   2  S/P AVR  Echo complete with contrast if indicated    CANCELED: Echo complete with contrast if indicated   3  [facility], [facility] ([facility] )  Echo complete with contrast if indicated    CANCELED: Echo complete with contrast if indicated   4  H/O heart artery stent     5  Essential hypertension     6  Obstructive sleep apnea     7   COPD mixed type ([facility] )          Plan:       I think most of [place] is dyspnea secondary to significant obesity, alveolar hypoventilation, deconditioning, and bronchospasm/lung disease suggested by exam today  Admittedly he has chronic diastolic heart failure/volume excess from his morbid obesity as well  September echo revealed preserved LV systolic function appropriate valve function, auscultation of aortic prosthesis is also unremarkable today  I believe his AFib is also well rate controlled  I did encourage him to take his diuretic daily  Medications are optimal though I did encourage him to take his diuretic daily  I ordered a BNP as well to make sure there is no rise here  Reiterated need for SBE antibiotic prophylaxis  Offered him Assessment for home O2 qualification, he wanted to wait and get pulmonary's opinion which I strongly advise he do  I believe his cardiac meds are otherwise optimal and made no other changes  Ordered no acute testing  I will see him back in 6 months with echocardiogram planned surrounding that visit  Let me know if you think you need me to see him sooner

## 2021-03-11 ENCOUNTER — LAB (OUTPATIENT)
Dept: LAB | Facility: HOSPITAL | Age: 56
End: 2021-03-11
Payer: MEDICARE

## 2021-03-11 ENCOUNTER — ANTICOAG VISIT (OUTPATIENT)
Dept: FAMILY MEDICINE CLINIC | Facility: CLINIC | Age: 56
End: 2021-03-11

## 2021-03-11 DIAGNOSIS — I48.91 ATRIAL FIBRILLATION, UNSPECIFIED TYPE (HCC): ICD-10-CM

## 2021-03-11 DIAGNOSIS — I50.32 CHRONIC DIASTOLIC CONGESTIVE HEART FAILURE (HCC): ICD-10-CM

## 2021-03-11 LAB
INR PPP: 3.45 (ref 0.84–1.19)
NT-PROBNP SERPL-MCNC: 596 PG/ML
PROTHROMBIN TIME: 34 SECONDS (ref 11.6–14.5)

## 2021-03-11 PROCEDURE — 85610 PROTHROMBIN TIME: CPT

## 2021-03-11 PROCEDURE — 83880 ASSAY OF NATRIURETIC PEPTIDE: CPT

## 2021-03-11 PROCEDURE — 36415 COLL VENOUS BLD VENIPUNCTURE: CPT

## 2021-03-15 ENCOUNTER — TELEPHONE (OUTPATIENT)
Dept: ENDOCRINOLOGY | Facility: CLINIC | Age: 56
End: 2021-03-15

## 2021-03-15 DIAGNOSIS — IMO0002 UNCONTROLLED TYPE 2 DIABETES MELLITUS WITH DIABETIC POLYNEUROPATHY, WITH LONG-TERM CURRENT USE OF INSULIN: Primary | ICD-10-CM

## 2021-03-15 NOTE — TELEPHONE ENCOUNTER
Blood sugar log    Medications:  Tresiba 50 units daily  Novolog 10 breakfast and lunch / 30 units at dinner  Metformin 1000mg BID

## 2021-03-16 DIAGNOSIS — E11.9 TYPE 2 DIABETES MELLITUS WITHOUT COMPLICATION, UNSPECIFIED WHETHER LONG TERM INSULIN USE (HCC): ICD-10-CM

## 2021-03-16 DIAGNOSIS — IMO0002 UNCONTROLLED TYPE 2 DIABETES MELLITUS WITH DIABETIC POLYNEUROPATHY, WITH LONG-TERM CURRENT USE OF INSULIN: ICD-10-CM

## 2021-03-16 RX ORDER — BENAZEPRIL HYDROCHLORIDE 10 MG/1
10 TABLET ORAL DAILY
Qty: 90 TABLET | Refills: 1 | Status: SHIPPED | OUTPATIENT
Start: 2021-03-16 | End: 2021-09-16 | Stop reason: SDUPTHER

## 2021-03-16 NOTE — TELEPHONE ENCOUNTER
Called to verify medications the patient is taking  He is taking it as followed    Tresiba 60 units daily  NovoLog 12 units with breakfast and lunch and 36 units with dinner  Metformin 1000 mg twice daily

## 2021-03-18 RX ORDER — INSULIN DEGLUDEC 200 U/ML
INJECTION, SOLUTION SUBCUTANEOUS
Qty: 6 PEN | Refills: 2 | Status: SHIPPED | OUTPATIENT
Start: 2021-03-18 | End: 2021-09-22

## 2021-03-18 RX ORDER — INSULIN ASPART 100 [IU]/ML
INJECTION, SOLUTION INTRAVENOUS; SUBCUTANEOUS
Qty: 15 ML | Refills: 5 | Status: SHIPPED | OUTPATIENT
Start: 2021-03-18 | End: 2021-03-31

## 2021-03-26 ENCOUNTER — IMMUNIZATIONS (OUTPATIENT)
Dept: FAMILY MEDICINE CLINIC | Facility: HOSPITAL | Age: 56
End: 2021-03-26

## 2021-03-26 ENCOUNTER — ANTICOAG VISIT (OUTPATIENT)
Dept: FAMILY MEDICINE CLINIC | Facility: CLINIC | Age: 56
End: 2021-03-26

## 2021-03-26 ENCOUNTER — APPOINTMENT (OUTPATIENT)
Dept: LAB | Facility: HOSPITAL | Age: 56
End: 2021-03-26
Payer: MEDICARE

## 2021-03-26 DIAGNOSIS — Z23 ENCOUNTER FOR IMMUNIZATION: Primary | ICD-10-CM

## 2021-03-26 DIAGNOSIS — I48.91 ATRIAL FIBRILLATION, UNSPECIFIED TYPE (HCC): ICD-10-CM

## 2021-03-26 LAB
INR PPP: 2.7 (ref 0.84–1.19)
PROTHROMBIN TIME: 28.1 SECONDS (ref 11.6–14.5)

## 2021-03-26 PROCEDURE — 36415 COLL VENOUS BLD VENIPUNCTURE: CPT

## 2021-03-26 PROCEDURE — 0011A SARS-COV-2 / COVID-19 MRNA VACCINE (MODERNA) 100 MCG: CPT

## 2021-03-26 PROCEDURE — 91301 SARS-COV-2 / COVID-19 MRNA VACCINE (MODERNA) 100 MCG: CPT

## 2021-03-26 PROCEDURE — 85610 PROTHROMBIN TIME: CPT

## 2021-03-30 ENCOUNTER — TELEPHONE (OUTPATIENT)
Dept: ENDOCRINOLOGY | Facility: CLINIC | Age: 56
End: 2021-03-30

## 2021-03-30 DIAGNOSIS — IMO0002 UNCONTROLLED TYPE 2 DIABETES MELLITUS WITH DIABETIC POLYNEUROPATHY, WITH LONG-TERM CURRENT USE OF INSULIN: ICD-10-CM

## 2021-03-30 NOTE — TELEPHONE ENCOUNTER
Blood sugar log    Medications:  Novolog 12 units breakfast and lunch and 36 units at dinner  Tresiba 66 units daily  Metformin 1000 mg BID

## 2021-03-31 RX ORDER — INSULIN ASPART 100 [IU]/ML
INJECTION, SOLUTION INTRAVENOUS; SUBCUTANEOUS
Qty: 15 ML | Refills: 5
Start: 2021-03-31 | End: 2021-08-02 | Stop reason: SDUPTHER

## 2021-03-31 NOTE — TELEPHONE ENCOUNTER
Reviewed  Increase Humalog to 14 units prior to breakfast and lunch  The rest can remain the same  Thank you

## 2021-04-13 ENCOUNTER — OFFICE VISIT (OUTPATIENT)
Dept: PULMONOLOGY | Facility: CLINIC | Age: 56
End: 2021-04-13
Payer: MEDICARE

## 2021-04-13 VITALS
SYSTOLIC BLOOD PRESSURE: 144 MMHG | HEART RATE: 80 BPM | DIASTOLIC BLOOD PRESSURE: 76 MMHG | OXYGEN SATURATION: 91 % | BODY MASS INDEX: 41.75 KG/M2 | TEMPERATURE: 96.5 F | WEIGHT: 315 LBS | HEIGHT: 73 IN

## 2021-04-13 DIAGNOSIS — G47.33 OBSTRUCTIVE SLEEP APNEA: ICD-10-CM

## 2021-04-13 DIAGNOSIS — J30.2 SEASONAL ALLERGIC RHINITIS, UNSPECIFIED TRIGGER: ICD-10-CM

## 2021-04-13 DIAGNOSIS — J44.9 COPD, MODERATE (HCC): ICD-10-CM

## 2021-04-13 DIAGNOSIS — IMO0002 UNCONTROLLED TYPE 2 DIABETES MELLITUS WITH DIABETIC POLYNEUROPATHY, WITH LONG-TERM CURRENT USE OF INSULIN: ICD-10-CM

## 2021-04-13 DIAGNOSIS — I48.91 ATRIAL FIBRILLATION, UNSPECIFIED TYPE (HCC): ICD-10-CM

## 2021-04-13 DIAGNOSIS — I35.0 AORTIC VALVE STENOSIS, ETIOLOGY OF CARDIAC VALVE DISEASE UNSPECIFIED: ICD-10-CM

## 2021-04-13 DIAGNOSIS — J44.1 COPD WITH ACUTE EXACERBATION (HCC): Primary | ICD-10-CM

## 2021-04-13 DIAGNOSIS — E66.01 MORBID OBESITY WITH BMI OF 50.0-59.9, ADULT (HCC): ICD-10-CM

## 2021-04-13 DIAGNOSIS — F17.210 CIGARETTE NICOTINE DEPENDENCE WITHOUT COMPLICATION: ICD-10-CM

## 2021-04-13 PROCEDURE — 99215 OFFICE O/P EST HI 40 MIN: CPT | Performed by: PHYSICIAN ASSISTANT

## 2021-04-13 RX ORDER — CETIRIZINE HYDROCHLORIDE 10 MG/1
10 TABLET ORAL DAILY
Qty: 30 TABLET | Refills: 1 | Status: SHIPPED | OUTPATIENT
Start: 2021-04-13

## 2021-04-13 RX ORDER — PREDNISONE 10 MG/1
TABLET ORAL
Qty: 30 TABLET | Refills: 0 | Status: SHIPPED | OUTPATIENT
Start: 2021-04-13 | End: 2021-05-12

## 2021-04-13 NOTE — ASSESSMENT & PLAN NOTE
Patient currently uses Flonase 1 spray each nostril for seasonal allergies and reports some improvement  Recommend trial of Zyrtec 10 mg p o  daily before bedtime  He is agreeable to this

## 2021-04-13 NOTE — ASSESSMENT & PLAN NOTE
Status post mechanical valve replacement  Can be contributing to dyspnea  Medical management per Cardiology

## 2021-04-13 NOTE — PROGRESS NOTES
Pulmonary Follow Up Note   Toribio Living 60 y o  male MRN: [id number]  4/13/2021      Assessment:    COPD, moderate ([facility] )    Patient will remain on Trelegy Ellipta 1 puff daily and as needed albuterol HFA/ nebs  No refills needed at this time  COPD with acute exacerbation (HCC)    Patient averages 2 exacerbations a year and is currently in 1 now  Based on symptoms and physical exam findings  Recommend prednisone taper starting at 40 mg and tapering by 10 mg every 3 days until completion  Recommended increase use of his albuterol nebulizer while acutely ill  If he does not note significant improvement he is instructed to go to the emergency department  Uncontrolled type 2 diabetes mellitus with diabetic polyneuropathy, with long-term current use of insulin (HCC)    Lab Results   Component Value Date    HGBA1C 10 1 (H) 01/15/2021     Monitor blood glucose levels closely in the setting of systemic steroid use  Obstructive sleep apnea    Reports compliance on his CPAP  Continue CPAP at current settings  Patient notes benefit since initiating PAP therapy  Will continue  Seasonal allergic rhinitis    Patient currently uses Flonase 1 spray each nostril for seasonal allergies and reports some improvement  Recommend trial of Zyrtec 10 mg p o  daily before bedtime  He is agreeable to this  Atrial fibrillation [facility])    Medical management per Cardiology  Continue anticoagulation with Coumadin  INR therapeutic  Aortic stenosis    Status post mechanical valve replacement  Can be contributing to dyspnea  Medical management per Cardiology  Morbid obesity with BMI of 50 0-59 9, adult ([facility] )    Contributing to patient's breathlessness  Lifestyle modifications including decreased caloric intake, healthier diet options and increased activity was recommended  Cigarette nicotine dependence without complication    Smoking cessation discussed for 4 minutes today in the office    He would like to quit smoking but wants to do it on his own at 4st   I offered my help if he changes mind including both counseling and pharmacotherapy options  He has a candidate for low-dose lung cancer screening CT and is agreeable to having this placed now  I will call him with results  Plan:    Diagnoses and all orders for this visit:    COPD with acute exacerbation ([place] [address] )  -     predniSONE 10 mg tablet; Take 4 tablets by mouth for 3 days then 3 tablets for 3 days then 2 tablets daily for 3 days then 1 tablet daily for 3 days    Seasonal allergic rhinitis, unspecified trigger  -     cetirizine (ZyrTEC) 10 mg tablet; Take 1 tablet (10 mg total) by mouth daily    Cigarette nicotine dependence without complication  -     CT lung screening program; Future    COPD, moderate ([place] [address] )    Uncontrolled type 2 diabetes mellitus with diabetic polyneuropathy, with long-term current use of insulin ([facility])    Obstructive sleep apnea    Atrial fibrillation, unspecified type ([place] [address] )    Aortic valve stenosis, etiology of cardiac valve disease unspecified    Morbid obesity with BMI of 50 0-59 9, adult ([place] [address] )        Return in about 6 months (around 10/13/2021)  History of Present Illness   HPI:  Uri Templeton is a 60 y o  male who   Presents to the office today for routine follow-up  Patient has past medical history positive for moderate COPD, THIEN on CPAP, morbid obesity, cardiologic myopathy, atrial fibrillation, hypertension hyperlipidemia  Patient last saw Dr Dl Kelsey about 6 months ago for COPD  Since then he has been doing relatively well but in the last week notes progressively worsening shortness of breath, cough and wheeze  He is bringing up white sputum  Denies fevers or chills  No sick contacts  He attributes increase in symptoms due to the change in weather  Denies chest pain, palpitations, pleurisy  He has chronic lower extremity edema unchanged from previously  Denies significant changes in his weight  No hemoptysis    Patient Is a current some day smoker stating that he quit 2 weeks ago but previously had a 1 pack per day history for last 40 years  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 asbestos, silica, coal, dust, chemicals  Review of Systems   All other systems reviewed and are negative  Historical Information   Past Medical History:   Diagnosis Date    Asthma     last assessed 12    Athscl heart disease of native coronary artery w/o ang pctrs     Atrial fibrillation ([facility] )     last assessed 12    Cardiomyopathy [facility])     Closed fracture of fibula     last assessed 10/18/13    COLD (chronic obstructive lung disease) ([facility] )     last assessed 12    Coronary angioplasty status     History of echocardiogram 2017    EF 0 50, LVSF is at the lower limits of normal  Mild concentric LV hypertophy  Mild mitral regurg  Normal functioning mechanical AV      Hyperlipidemia     Hypertension     last assessed 12    Presence of prosthetic heart valve     Restless legs syndrome     last assessed 12    Sleep apnea     last assessed 12     Past Surgical History:   Procedure Laterality Date    AORTIC VALVE REPLACEMENT  10/07/2016    AVR replacement, mechanical    CORONARY ANGIOPLASTY WITH STENT PLACEMENT  2010    EF 40%, Successful bare metal stent mid RCA     Family History   Problem Relation Age of Onset    Atrial fibrillation Mother     Diabetes type II Mother     Heart attack Father         acute    Heart failure Father     Stroke Father         syndrome       Social History     Tobacco Use   Smoking Status Current Some Day Smoker    Years: 35 00    Types: Cigarettes    Last attempt to quit: 10/2016    Years since quittin 5   Smokeless Tobacco Never Used   Tobacco Comment    5 cigs a day         Meds/Allergies     Current Outpatient Medications:     albuterol (2 5 mg/3 mL) 0 083 % nebulizer solution, Take 1 vial (2 5 mg total) by nebulization every 6 (six) hours as needed for wheezing or shortness of breath, Disp: 30 vial, Rfl: 3    albuterol (Ventolin HFA) 90 mcg/act inhaler, Inhale 2 puffs every 6 (six) hours as needed for shortness of breath Must be brand necessary, Disp: 1 Inhaler, Rfl: 5    aspirin 81 MG tablet, Take by mouth, Disp: , Rfl:     atorvastatin (LIPITOR) 10 mg tablet, Take 1 tablet (10 mg total) by mouth daily, Disp: 90 tablet, Rfl: 3    benazepril (LOTENSIN) 10 mg tablet, Take 1 tablet (10 mg total) by mouth daily, Disp: 90 tablet, Rfl: 1    diltiazem (CARDIZEM CD) 240 mg 24 hr capsule, Take 1 capsule (240 mg total) by mouth 2 (two) times a day, Disp: 180 capsule, Rfl: 3    fluticasone-umeclidinium-vilanterol (TRELEGY) 100-62 5-25 MCG/INH inhaler, Inhale 1 puff daily Rinse mouth after use , Disp: 2 Inhaler, Rfl: 5    furosemide (LASIX) 40 mg tablet, Take 1 tablet (40 mg total) by mouth daily, Disp: 90 tablet, Rfl: 3    insulin aspart (NovoLOG FlexPen) 100 UNIT/ML injection pen, Inject 14 units with breakfast and lunch and 36 units with dinner , Disp: 15 mL, Rfl: 5    insulin degludec Shayne Ovens FlexTouch) 200 units/mL CONCENTRATED U-200 injection pen, Inject 66 units daily  , Disp: 6 pen, Rfl: 2    Insulin Pen Needle (BD Pen Needle Fany U/F) 32G X 4 MM MISC, Inject as directed 2 (two) times a day, Disp: 100 each, Rfl: 5    metFORMIN (GLUCOPHAGE) 1000 MG tablet, Take 1 tablet (1,000 mg total) by mouth 2 (two) times a day, Disp: 180 tablet, Rfl: 1    metoprolol tartrate (LOPRESSOR) 50 mg tablet, Take 1 tablet (50 mg total) by mouth 2 (two) times a day, Disp: 180 tablet, Rfl: 3    nitroglycerin (NITROSTAT) 0 4 mg SL tablet, Place 1 tablet (0 4 mg total) under the tongue every 5 (five) minutes as needed for chest pain, Disp: 100 tablet, Rfl: 3    potassium chloride (K-DUR) 10 mEq tablet, Take 1 tablet (10 mEq total) by mouth daily, Disp: 90 tablet, Rfl: 3    warfarin (COUMADIN) 10 mg tablet, Take 1 tablet (10 mg total) by mouth daily, Disp: 90 tablet, Rfl: 5    warfarin (COUMADIN) 2 mg tablet, Take 1 tablet (2 mg total) by mouth daily (Patient taking differently: Take 2 mg by mouth daily except on monday), Disp: 90 tablet, Rfl: 3    cetirizine (ZyrTEC) 10 mg tablet, Take 1 tablet (10 mg total) by mouth daily, Disp: 30 tablet, Rfl: 1    predniSONE 10 mg tablet, Take 4 tablets by mouth for 3 days then 3 tablets for 3 days then 2 tablets daily for 3 days then 1 tablet daily for 3 days, Disp: 30 tablet, Rfl: 0    traMADol (ULTRAM) 50 mg tablet, Take 1 tablet (50 mg total) by mouth daily at bedtime as needed for moderate pain or severe pain (Patient not taking: Reported on 4/13/2021), Disp: 30 tablet, Rfl: 0  Allergies   Allergen Reactions    Fluticasone-Salmeterol Palpitations       Vitals: Blood pressure 144/76, pulse 80, temperature (!) 96 5 °F (35 8 °C), temperature source Tympanic, height 6' 1" (1 854 m), weight (!) 184 kg (405 lb), SpO2 91 %  Body mass index is 53 43 kg/m²  Oxygen Therapy  SpO2: 91 %  Oxygen Therapy: None (Room air)    Physical Exam  Physical Exam  Vitals signs reviewed  Constitutional:       Appearance: Normal appearance  He is well-developed  He is obese  HENT:      Head: Normocephalic and atraumatic  Right Ear: External ear normal       Left Ear: External ear normal    Eyes:      Extraocular Movements: Extraocular movements intact  Pupils: Pupils are equal, round, and reactive to light  Neck:      Musculoskeletal: Normal range of motion and neck supple  Cardiovascular:      Rate and Rhythm: Normal rate and regular rhythm  Pulses: Normal pulses  Heart sounds: Normal heart sounds  No murmur  Pulmonary:      Effort: Pulmonary effort is normal  No respiratory distress  Breath sounds: No stridor  Wheezing present  No rhonchi or rales  Abdominal:      Palpations: Abdomen is soft  Tenderness: There is no abdominal tenderness  Hernia: No hernia is present     Musculoskeletal: Normal range of motion  General: No tenderness or deformity  Right lower leg: Edema present  Left lower leg: Edema present  Skin:     General: Skin is warm and dry  Capillary Refill: Capillary refill takes less than 2 seconds  Neurological:      General: No focal deficit present  Mental Status: He is alert and oriented to person, place, and time  Mental status is at baseline  Psychiatric:         Behavior: Behavior normal          Thought Content: Thought content normal          Judgment: Judgment normal          Labs: I have personally reviewed pertinent lab results  , ABG: No results found for: PHART, VED6XHC, PO2ART, YSH6GMX, T7WHWNKG, BEART, SOURCE, BNP: No results found for: BNP, CBC: No results found for: WBC, HGB, HCT, MCV, PLT, ADJUSTEDWBC, MCH, MCHC, RDW, MPV, NRBC, CMP: No results found for: SODIUM, K, CL, CO2, ANIONGAP, BUN, CREATININE, GLUCOSE, CALCIUM, AST, ALT, ALKPHOS, PROT, BILITOT, EGFR, PT/INR: No results found for: PT, INR, Troponin: No results found for: TROPONINI  Lab Results   Component Value Date    WBC 10 07 01/15/2021    HGB 15 8 01/15/2021    HCT 47 8 01/15/2021    MCV 92 01/15/2021     01/15/2021     Lab Results   Component Value Date    GLUCOSE 119 09/01/2013    CALCIUM 8 7 01/15/2021     09/01/2013    K 4 4 01/15/2021    CO2 28 01/15/2021    CL 98 (L) 01/15/2021    BUN 12 01/15/2021    CREATININE 0 83 01/15/2021     No results found for: IGE  Lab Results   Component Value Date    ALT 29 01/15/2021    AST 15 01/15/2021    ALKPHOS 110 01/15/2021    BILITOT 0 6 09/01/2013       Imaging and other studies: I have personally reviewed pertinent reports  and I have personally reviewed pertinent films in PACS       CT chest without contrast 09/05/2018   Numerous small pulmonary nodules stable since 2015  No new or suspicious nodules  No acute consolidations  Unchanged dense sheet like posterior pleural calcifications bilaterally    No pleural masses  No effusion or pneumothorax  Cardiomegaly  Aortic valve replacement  Coronary atherosclerosis  Probable stents and right and left circumflex coronary arteries  No pericardial thickening or effusion  Pulmonary function testing:  Performed   03/18/2019  FEV1/FVC ratio  61%   FEV1  69% predicted  FVC  80% predicted  TLC  83 % predicted  RV  81 % predicted  DLCO corrected for hemoglobin  76 % predicted   moderate obstructive airflow defect  There is significant response to  Bronchodilators  Normal lung volumes  Mildly impaired diffusion capacity  Other Studies: I have personally reviewed pertinent reports

## 2021-04-13 NOTE — ASSESSMENT & PLAN NOTE
Patient will remain on Trelegy Ellipta 1 puff daily and as needed albuterol HFA/ nebs  No refills needed at this time

## 2021-04-13 NOTE — ASSESSMENT & PLAN NOTE
Contributing to patient's breathlessness  Lifestyle modifications including decreased caloric intake, healthier diet options and increased activity was recommended

## 2021-04-13 NOTE — ASSESSMENT & PLAN NOTE
Reports compliance on his CPAP  Continue CPAP at current settings  Patient notes benefit since initiating PAP therapy  Will continue

## 2021-04-13 NOTE — ASSESSMENT & PLAN NOTE
Patient averages 2 exacerbations a year and is currently in 1 now  Based on symptoms and physical exam findings  Recommend prednisone taper starting at 40 mg and tapering by 10 mg every 3 days until completion  Recommended increase use of his albuterol nebulizer while acutely ill  If he does not note significant improvement he is instructed to go to the emergency department

## 2021-04-13 NOTE — ASSESSMENT & PLAN NOTE
Smoking cessation discussed for 4 minutes today in the office  He would like to quit smoking but wants to do it on his own at 1st   I offered my help if he changes mind including both counseling and pharmacotherapy options  He has a candidate for low-dose lung cancer screening CT and is agreeable to having this placed now  I will call him with results

## 2021-04-13 NOTE — ASSESSMENT & PLAN NOTE
Lab Results   Component Value Date    HGBA1C 10 1 (H) 01/15/2021     Monitor blood glucose levels closely in the setting of systemic steroid use

## 2021-04-28 ENCOUNTER — IMMUNIZATIONS (OUTPATIENT)
Dept: FAMILY MEDICINE CLINIC | Facility: HOSPITAL | Age: 56
End: 2021-04-28

## 2021-04-28 ENCOUNTER — LAB (OUTPATIENT)
Dept: LAB | Facility: HOSPITAL | Age: 56
End: 2021-04-28
Payer: MEDICARE

## 2021-04-28 ENCOUNTER — ANTICOAG VISIT (OUTPATIENT)
Dept: FAMILY MEDICINE CLINIC | Facility: CLINIC | Age: 56
End: 2021-04-28

## 2021-04-28 DIAGNOSIS — IMO0002 UNCONTROLLED TYPE 2 DIABETES MELLITUS WITH DIABETIC POLYNEUROPATHY, WITH LONG-TERM CURRENT USE OF INSULIN: ICD-10-CM

## 2021-04-28 DIAGNOSIS — Z23 ENCOUNTER FOR IMMUNIZATION: Primary | ICD-10-CM

## 2021-04-28 DIAGNOSIS — I48.91 ATRIAL FIBRILLATION, UNSPECIFIED TYPE (HCC): ICD-10-CM

## 2021-04-28 LAB
ALBUMIN SERPL BCP-MCNC: 3.2 G/DL (ref 3.5–5)
ALP SERPL-CCNC: 87 U/L (ref 46–116)
ALT SERPL W P-5'-P-CCNC: 29 U/L (ref 12–78)
ANION GAP SERPL CALCULATED.3IONS-SCNC: 3 MMOL/L (ref 4–13)
AST SERPL W P-5'-P-CCNC: 16 U/L (ref 5–45)
BILIRUB SERPL-MCNC: 0.44 MG/DL (ref 0.2–1)
BUN SERPL-MCNC: 14 MG/DL (ref 5–25)
CALCIUM ALBUM COR SERPL-MCNC: 9.3 MG/DL (ref 8.3–10.1)
CALCIUM SERPL-MCNC: 8.7 MG/DL (ref 8.3–10.1)
CHLORIDE SERPL-SCNC: 100 MMOL/L (ref 100–108)
CHOLEST SERPL-MCNC: 107 MG/DL (ref 50–200)
CO2 SERPL-SCNC: 32 MMOL/L (ref 21–32)
CREAT SERPL-MCNC: 0.83 MG/DL (ref 0.6–1.3)
CREAT UR-MCNC: 236 MG/DL
EST. AVERAGE GLUCOSE BLD GHB EST-MCNC: 183 MG/DL
GFR SERPL CREATININE-BSD FRML MDRD: 98 ML/MIN/1.73SQ M
GLUCOSE SERPL-MCNC: 147 MG/DL (ref 65–140)
HBA1C MFR BLD: 8 %
HDLC SERPL-MCNC: 34 MG/DL
INR PPP: 2.43 (ref 0.84–1.19)
LDLC SERPL CALC-MCNC: 58 MG/DL (ref 0–100)
MICROALBUMIN UR-MCNC: 50.8 MG/L (ref 0–20)
MICROALBUMIN/CREAT 24H UR: 22 MG/G CREATININE (ref 0–30)
POTASSIUM SERPL-SCNC: 4.2 MMOL/L (ref 3.5–5.3)
PROT SERPL-MCNC: 7.6 G/DL (ref 6.4–8.2)
PROTHROMBIN TIME: 25.9 SECONDS (ref 11.6–14.5)
SODIUM SERPL-SCNC: 135 MMOL/L (ref 136–145)
TRIGL SERPL-MCNC: 75 MG/DL

## 2021-04-28 PROCEDURE — 82570 ASSAY OF URINE CREATININE: CPT

## 2021-04-28 PROCEDURE — 91301 SARS-COV-2 / COVID-19 MRNA VACCINE (MODERNA) 100 MCG: CPT

## 2021-04-28 PROCEDURE — 83036 HEMOGLOBIN GLYCOSYLATED A1C: CPT

## 2021-04-28 PROCEDURE — 0012A SARS-COV-2 / COVID-19 MRNA VACCINE (MODERNA) 100 MCG: CPT

## 2021-04-28 PROCEDURE — 80053 COMPREHEN METABOLIC PANEL: CPT

## 2021-04-28 PROCEDURE — 80061 LIPID PANEL: CPT

## 2021-04-28 PROCEDURE — 82043 UR ALBUMIN QUANTITATIVE: CPT

## 2021-04-28 PROCEDURE — 36415 COLL VENOUS BLD VENIPUNCTURE: CPT

## 2021-04-28 PROCEDURE — 85610 PROTHROMBIN TIME: CPT

## 2021-04-28 NOTE — RESULT ENCOUNTER NOTE
Please call patient about his labs  Hemoglobin A1c is significantly improved to 8%  This is down from 10 1% 3 months ago and 11% 6 months ago  Kidney and liver function are normal     Lipid panel stable

## 2021-05-06 DIAGNOSIS — E78.49 OTHER HYPERLIPIDEMIA: ICD-10-CM

## 2021-05-06 RX ORDER — ATORVASTATIN CALCIUM 10 MG/1
10 TABLET, FILM COATED ORAL DAILY
Qty: 90 TABLET | Refills: 3 | Status: SHIPPED | OUTPATIENT
Start: 2021-05-06 | End: 2022-04-27 | Stop reason: SDUPTHER

## 2021-05-11 NOTE — PROGRESS NOTES
Established Patient Progress Note      Chief Complaint   Patient presents with    Diabetes Type 2        History of Present Illness:   Blu Wooten is a 60 y o  male with   Hypertension, hyperlipidemia, and type 2 diabetes with long term use of insulin since 2016  Reports complications of  neuropathy  Denies recent illness or hospitalizations  Denies recent severe hypoglycemic or severe hyperglycemic episodes  Denies any issues with his current regimen  home glucose monitoring: are performed regularly 3-4x daily    Since last visit, patient saw pulmonology for COPD exacerbation  He was placed on a prednisone taper that he has completed  He denies noticeable increase in blood sugar  He reports an improvement in his diet  He has stopped eating white bread and drinking soda and iced tea  Increase physical activity is limited due to shortness of breath  He is following with pulmonology  Home blood glucose readings:   Before breakfast: 100-130  Before lunch: Does not routinely check  Before dinner: 150-200  Bedtime: 100-170, 200*    Component      Latest Ref Rng & Units 10/29/2020 1/15/2021 4/28/2021   Hemoglobin A1C      Normal 3 8-5 6%; PreDiabetic 5 7-6 4%; Diabetic >=6 5%; Glycemic control for adults with diabetes <7 0% % 11 0 (A) 10 1 (H) 8 0 (H)   eAG, EST AVG Glucose      mg/dl  243 183        Current regimen:   Novolog 14 units with BF, 14 units with lunch, and 36 units with dinner  Tresiba 66 units daily  Metformin 1,000 mg BID      Last Eye Exam: 10/29/2021  Last Foot Exam: UTD; +neuropathy      For hyperlipidemia, he is taking 10 mg of Lipitor daily  He denies myalgias  For hypertension, he is taking 240 mg of diltiazem twice daily , 50 mg of metoprolol tartrate b i d , and 10 mg of benazepril daily  He denies headache, orthostatic hypotension, and cough      Patient Active Problem List   Diagnosis    Aortic stenosis    [facility] [address] [facility]    COPD with acute exacerbation ([facility]    Uncontrolled type 2 diabetes mellitus with diabetic polyneuropathy, with long-term current use of insulin ([facility])    Other hyperlipidemia    Essential hypertension    S/P AVR    Obstructive sleep apnea    Restless leg syndrome    Hypersomnia    Morbid obesity with BMI of 50 0-59 9, adult ([facility])    PLMD (periodic limb movement disorder)    Palpitations    Acute exacerbation of chronic obstructive bronchitis ([facility])    Multiple pulmonary nodules determined by computed tomography of lung    Chronic pain of left knee    Seasonal allergic rhinitis    Cigarette nicotine dependence without complication    COPD, moderate ([facility])      Past Medical History:   Diagnosis Date    Asthma     last assessed 8/21/12    Athscl heart disease of native coronary artery w/o ang pctrs     Atrial fibrillation ([facility] [address] )     last assessed 8/21/12    Cardiomyopathy [facility])     Closed fracture of fibula     last assessed 10/18/13    [facility]     last assessed 8/21/12    Coronary angioplasty status     History of echocardiogram 04/19/2017    EF 0 50, LVSF is at the lower limits of normal  Mild concentric LV hypertophy  Mild mitral regurg  Normal functioning mechanical AV      Hyperlipidemia     Hypertension     last assessed 8/21/12    Presence of prosthetic heart valve     Restless legs syndrome     last assessed 8/21/12    Sleep apnea     last assessed 8/21/12      Past Surgical History:   Procedure Laterality Date    AORTIC VALVE REPLACEMENT  10/07/2016    AVR replacement, mechanical    CORONARY ANGIOPLASTY WITH STENT PLACEMENT  07/29/2010    EF 40%, Successful bare metal stent mid RCA      Family History   Problem Relation Age of Onset    Atrial fibrillation Mother     Diabetes type II Mother     Heart attack Father         acute    Heart failure Father     Stroke Father         syndrome     Social History     Tobacco Use    Smoking status: Current Some Day Smoker     Years: 35 00 Types: Cigarettes     Last attempt to quit: 10/2016     Years since quittin 6    Smokeless tobacco: Never Used    Tobacco comment: 5 cigs a day   Substance Use Topics    Alcohol use: Not Currently     Allergies   Allergen Reactions    Fluticasone-Salmeterol Palpitations         Current Outpatient Medications:     albuterol (2 5 mg/3 mL) 0 083 % nebulizer solution, Take 1 vial (2 5 mg total) by nebulization every 6 (six) hours as needed for wheezing or shortness of breath, Disp: 30 vial, Rfl: 3    albuterol (Ventolin HFA) 90 mcg/act inhaler, Inhale 2 puffs every 6 (six) hours as needed for shortness of breath Must be brand necessary, Disp: 1 Inhaler, Rfl: 5    aspirin 81 MG tablet, Take by mouth, Disp: , Rfl:     atorvastatin (LIPITOR) 10 mg tablet, Take 1 tablet (10 mg total) by mouth daily, Disp: 90 tablet, Rfl: 3    benazepril (LOTENSIN) 10 mg tablet, Take 1 tablet (10 mg total) by mouth daily, Disp: 90 tablet, Rfl: 1    cetirizine (ZyrTEC) 10 mg tablet, Take 1 tablet (10 mg total) by mouth daily, Disp: 30 tablet, Rfl: 1    diltiazem (CARDIZEM CD) 240 mg 24 hr capsule, Take 1 capsule (240 mg total) by mouth 2 (two) times a day, Disp: 180 capsule, Rfl: 3    fluticasone-umeclidinium-vilanterol (TRELEGY) 100-62 5-25 MCG/INH inhaler, Inhale 1 puff daily Rinse mouth after use , Disp: 2 Inhaler, Rfl: 5    furosemide (LASIX) 40 mg tablet, Take 1 tablet (40 mg total) by mouth daily, Disp: 90 tablet, Rfl: 3    insulin aspart (NovoLOG FlexPen) 100 UNIT/ML injection pen, Inject 14 units with breakfast and lunch and 36 units with dinner , Disp: 15 mL, Rfl: 5    insulin degludec (Tresiba FlexTouch) 200 units/mL CONCENTRATED U-200 injection pen, Inject 66 units daily  , Disp: 6 pen, Rfl: 2    Insulin Pen Needle (BD Pen Needle Fany U/F) 32G X 4 MM MISC, Inject as directed 2 (two) times a day, Disp: 100 each, Rfl: 5    metFORMIN (GLUCOPHAGE) 1000 MG tablet, Take 1 tablet (1,000 mg total) by mouth 2 (two) times a day, Disp: 180 tablet, Rfl: 1    metoprolol tartrate (LOPRESSOR) 50 mg tablet, Take 1 tablet (50 mg total) by mouth 2 (two) times a day, Disp: 180 tablet, Rfl: 3    nitroglycerin (NITROSTAT) 0 4 mg SL tablet, Place 1 tablet (0 4 mg total) under the tongue every 5 (five) minutes as needed for chest pain, Disp: 100 tablet, Rfl: 3    potassium chloride (K-DUR) 10 mEq tablet, Take 1 tablet (10 mEq total) by mouth daily, Disp: 90 tablet, Rfl: 3    warfarin (COUMADIN) 10 mg tablet, Take 1 tablet (10 mg total) by mouth daily, Disp: 90 tablet, Rfl: 5    warfarin (COUMADIN) 2 mg tablet, Take 1 tablet (2 mg total) by mouth daily (Patient taking differently: Take 2 mg by mouth daily except on monday), Disp: 90 tablet, Rfl: 3    Review of Systems   Constitutional: Negative for activity change, appetite change, fatigue and unexpected weight change  HENT: Negative for sore throat, trouble swallowing and voice change  Eyes: Negative for visual disturbance  Respiratory: Positive for shortness of breath  Negative for cough and chest tightness  Cardiovascular: Negative for chest pain, palpitations and leg swelling  Gastrointestinal: Negative for constipation, diarrhea, nausea and vomiting  Endocrine: Negative for polydipsia, polyphagia and polyuria  Genitourinary: Negative for frequency  Musculoskeletal: Negative for arthralgias, back pain and myalgias  Skin: Negative for wound  Allergic/Immunologic: Positive for environmental allergies  Negative for food allergies  Neurological: Negative for dizziness, weakness, light-headedness, numbness and headaches  Psychiatric/Behavioral: Negative for decreased concentration, dysphoric mood and sleep disturbance  The patient is not nervous/anxious  Physical Exam:  Body mass index is 53 83 kg/m²    /66 (BP Location: Left arm, Cuff Size: Adult)   Pulse 97   Ht 6' 1" (1 854 m)   Wt (!) 185 kg (408 lb)   SpO2 92%   BMI 53 83 kg/m²    Wt Readings from Last 3 Encounters:   05/12/21 (!) 185 kg (408 lb)   04/13/21 (!) 184 kg (405 lb)   03/04/21 (!) 178 kg (393 lb)       Physical Exam  Vitals signs reviewed  Constitutional:       General: He is not in acute distress  Appearance: He is well-developed  He is obese  He is not ill-appearing  HENT:      Head: Normocephalic and atraumatic  Comments: Mask in place  Eyes:      Pupils: Pupils are equal, round, and reactive to light  Neck:      Musculoskeletal: Normal range of motion and neck supple  Thyroid: No thyromegaly  Cardiovascular:      Rate and Rhythm: Normal rate and regular rhythm  Pulses: Normal pulses  Heart sounds: Normal heart sounds  Pulmonary:      Effort: Pulmonary effort is normal       Breath sounds: Wheezing (expiratory) present  No rhonchi  Abdominal:      General: Bowel sounds are normal  There is no distension  Palpations: Abdomen is soft  Tenderness: There is no abdominal tenderness  Musculoskeletal:      Right lower leg: No edema  Left lower leg: No edema  Lymphadenopathy:      Cervical: No cervical adenopathy  Skin:     General: Skin is warm and dry  Capillary Refill: Capillary refill takes less than 2 seconds  Neurological:      Mental Status: He is alert and oriented to person, place, and time  Gait: Gait normal    Psychiatric:         Mood and Affect: Mood normal          Behavior: Behavior normal          Thought Content:  Thought content normal          Judgment: Judgment normal            Labs:   Lab Results   Component Value Date    HGBA1C 8 0 (H) 04/28/2021    HGBA1C 10 1 (H) 01/15/2021    HGBA1C 11 0 (A) 10/29/2020     Lab Results   Component Value Date    CREATININE 0 83 04/28/2021    CREATININE 0 83 01/15/2021    CREATININE 1 03 10/09/2020    BUN 14 04/28/2021     09/01/2013    K 4 2 04/28/2021     04/28/2021    CO2 32 04/28/2021     eGFR   Date Value Ref Range Status   04/28/2021 98 ml/min/1 73sq m Final     Lab Results   Component Value Date    CHOL 180 09/01/2013    HDL 34 (L) 04/28/2021    TRIG 75 04/28/2021     Lab Results   Component Value Date    ALT 29 04/28/2021    AST 16 04/28/2021    ALKPHOS 87 04/28/2021    BILITOT 0 6 09/01/2013     Lab Results   Component Value Date    ZKE6TOJOHMDN 3 159 01/15/2021    YMH7QEDUGRNA 3 299 10/04/2018    LRV1KFOPHWAF 3 238 07/22/2016     No results found for: FREET4, TSI    Impression & Plan:    Problem List Items Addressed This Visit        Endocrine    Uncontrolled type 2 diabetes mellitus with diabetic polyneuropathy, with long-term current use of insulin ([facility] [address] ) - Primary     Patient's control is improving  Discussed adding GLP 1 Ozempic to assist with A1c reduction, weight loss, and cardiac protection  Discussed addition of SGL T2 for hemoglobin A1c reduction and renal protection  At this time, patient would prefer to have another 3 months in which he can focus on diet and exercise  Continue current regimen  Check hemoglobin A1c in 3 months prior to next appointment  Hemoglobin A1c goal is less than 7%  Lab Results   Component Value Date    HGBA1C 8 0 (H) 04/28/2021            Relevant Orders    HEMOGLOBIN A1C W/ EAG ESTIMATION Lab Collect       Respiratory    Obstructive sleep apnea      Continue CPAP  Cardiovascular and Mediastinum    Essential hypertension       /66  Continue current regimen  Orders Placed This Encounter   Procedures    HEMOGLOBIN A1C W/ EAG ESTIMATION Lab Collect     Standing Status:   Future     Standing Expiration Date:   5/12/2022       Patient Instructions   1  Continue to focus on diet and physical activity  2  Continue medication regimen  At next appointment, if HgA1C is still above 7%, we will again discuss adding either Ozempic, once weekly injection, or Deon He, or Invokana, which is a daily pill  Discussed with the patient and all questioned fully answered   He will call me if any problems arise  Follow-up appointment in 3 months       Counseled patient on diagnostic results, prognosis, risk and benefit of treatment options, instruction for management, importance of treatment compliance, Risk  factor reduction and impressions    CHRISTINE Davila

## 2021-05-12 ENCOUNTER — OFFICE VISIT (OUTPATIENT)
Dept: ENDOCRINOLOGY | Facility: CLINIC | Age: 56
End: 2021-05-12
Payer: MEDICARE

## 2021-05-12 VITALS
HEIGHT: 73 IN | WEIGHT: 315 LBS | SYSTOLIC BLOOD PRESSURE: 132 MMHG | BODY MASS INDEX: 41.75 KG/M2 | OXYGEN SATURATION: 92 % | HEART RATE: 97 BPM | DIASTOLIC BLOOD PRESSURE: 66 MMHG

## 2021-05-12 DIAGNOSIS — G47.33 OBSTRUCTIVE SLEEP APNEA: ICD-10-CM

## 2021-05-12 DIAGNOSIS — I10 ESSENTIAL HYPERTENSION: ICD-10-CM

## 2021-05-12 DIAGNOSIS — IMO0002 UNCONTROLLED TYPE 2 DIABETES MELLITUS WITH DIABETIC POLYNEUROPATHY, WITH LONG-TERM CURRENT USE OF INSULIN: Primary | ICD-10-CM

## 2021-05-12 PROCEDURE — 99214 OFFICE O/P EST MOD 30 MIN: CPT | Performed by: NURSE PRACTITIONER

## 2021-05-12 NOTE — PATIENT INSTRUCTIONS
1  Continue to focus on diet and physical activity  2  Continue medication regimen  At next appointment, if HgA1C is still above 7%, we will again discuss adding either Ozempic, once weekly injection, or Sherpriscilall Brome, or Invokana, which is a daily pill

## 2021-05-15 DIAGNOSIS — I48.91 ATRIAL FIBRILLATION, UNSPECIFIED TYPE (HCC): ICD-10-CM

## 2021-05-17 DIAGNOSIS — I48.91 ATRIAL FIBRILLATION, UNSPECIFIED TYPE (HCC): ICD-10-CM

## 2021-05-17 RX ORDER — WARFARIN SODIUM 10 MG/1
10 TABLET ORAL DAILY
Qty: 90 TABLET | Refills: 3 | Status: SHIPPED | OUTPATIENT
Start: 2021-05-17 | End: 2022-05-09 | Stop reason: SDUPTHER

## 2021-05-17 RX ORDER — WARFARIN SODIUM 10 MG/1
TABLET ORAL
Qty: 90 TABLET | Refills: 3 | Status: SHIPPED | OUTPATIENT
Start: 2021-05-17 | End: 2021-05-17 | Stop reason: SDUPTHER

## 2021-05-25 ENCOUNTER — LAB (OUTPATIENT)
Dept: LAB | Facility: HOSPITAL | Age: 56
End: 2021-05-25
Payer: MEDICARE

## 2021-05-25 ENCOUNTER — ANTICOAG VISIT (OUTPATIENT)
Dept: FAMILY MEDICINE CLINIC | Facility: CLINIC | Age: 56
End: 2021-05-25

## 2021-05-25 DIAGNOSIS — I48.91 ATRIAL FIBRILLATION, UNSPECIFIED TYPE (HCC): ICD-10-CM

## 2021-05-25 LAB
INR PPP: 3.21 (ref 0.84–1.19)
PROTHROMBIN TIME: 32.1 SECONDS (ref 11.6–14.5)

## 2021-05-25 PROCEDURE — 85610 PROTHROMBIN TIME: CPT

## 2021-05-25 PROCEDURE — 36415 COLL VENOUS BLD VENIPUNCTURE: CPT

## 2021-05-26 ENCOUNTER — OFFICE VISIT (OUTPATIENT)
Dept: FAMILY MEDICINE CLINIC | Facility: CLINIC | Age: 56
End: 2021-05-26
Payer: MEDICARE

## 2021-05-26 ENCOUNTER — APPOINTMENT (OUTPATIENT)
Dept: RADIOLOGY | Facility: MEDICAL CENTER | Age: 56
End: 2021-05-26
Payer: MEDICARE

## 2021-05-26 VITALS
HEART RATE: 78 BPM | DIASTOLIC BLOOD PRESSURE: 78 MMHG | TEMPERATURE: 97.4 F | OXYGEN SATURATION: 94 % | WEIGHT: 315 LBS | HEIGHT: 73 IN | BODY MASS INDEX: 41.75 KG/M2 | SYSTOLIC BLOOD PRESSURE: 136 MMHG

## 2021-05-26 DIAGNOSIS — I10 ESSENTIAL HYPERTENSION: ICD-10-CM

## 2021-05-26 DIAGNOSIS — E87.70 HYPERVOLEMIA, UNSPECIFIED HYPERVOLEMIA TYPE: Primary | ICD-10-CM

## 2021-05-26 DIAGNOSIS — E87.70 HYPERVOLEMIA, UNSPECIFIED HYPERVOLEMIA TYPE: ICD-10-CM

## 2021-05-26 DIAGNOSIS — R60.0 LOWER LEG EDEMA: ICD-10-CM

## 2021-05-26 PROCEDURE — 71046 X-RAY EXAM CHEST 2 VIEWS: CPT

## 2021-05-26 PROCEDURE — 99214 OFFICE O/P EST MOD 30 MIN: CPT | Performed by: FAMILY MEDICINE

## 2021-05-26 NOTE — PROGRESS NOTES
Assessment/Plan:    I believe patient has fluid overload  We will have him increase his Lasix to 80 mg daily along with potassium 20 mEq daily for 5 days  He will weigh himself daily and call us if his symptoms improve her not  We will get a chest x-ray on him today  We will see him next week for follow-up  He will call us with any problems beforehand  Problem List Items Addressed This Visit        Cardiovascular and Mediastinum    Essential hypertension    Relevant Orders    XR chest pa & lateral    Basic metabolic panel      Other Visit Diagnoses     Hypervolemia, unspecified hypervolemia type    -  Primary    Relevant Orders    XR chest pa & lateral    Basic metabolic panel    Lower leg edema        Relevant Orders    XR chest pa & lateral    Basic metabolic panel           Diagnoses and all orders for this visit:    Hypervolemia, unspecified hypervolemia type  -     XR chest pa & lateral; Future  -     Basic metabolic panel    Lower leg edema  -     XR chest pa & lateral; Future  -     Basic metabolic panel    Essential hypertension  -     XR chest pa & lateral; Future  -     Basic metabolic panel        No problem-specific Assessment & Plan notes found for this encounter  Subjective:      Patient ID: Blanco Valencia is a 60 y o  male  Patient here today stating for the past month or so has been feeling increasingly short of breath, especially with walking  Saw pulmonology about 6 weeks ago, was treated with prednisone for COPD exacerbation  Felt a little better after that  Patient states he has been gaining weight, and feeling "full" in the belly area  He has tried his nebulizer treatments without much relief  Patient did state last week took a couple extra doses of Lasix for couple days and felt better  He has been getting increased swelling in his legs  They are very tight  He denies any increased chest pain  Patient states his pulse ox at home is ranging 85-86%    Looking back at notes even from Cardiology, patient has had similar readings in the past     Shortness of Breath  This is a new problem  The current episode started 1 to 4 weeks ago  The problem occurs constantly  The problem has been gradually worsening  Associated symptoms include leg swelling  Pertinent negatives include no chest pain or fever  The symptoms are aggravated by any activity  He has tried beta agonist inhalers for the symptoms  The treatment provided no relief  His past medical history is significant for COPD  The following portions of the patient's history were reviewed and updated as appropriate:   He has a past medical history of Asthma, Athscl heart disease of native coronary artery w/o ang pctrs, Atrial fibrillation ([facility] [address] [facility] Cardiomyopathy ([facility] [address] ), Closed fracture of fibula, [facility] Coronary angioplasty status, History of echocardiogram (04/19/2017), Hyperlipidemia, Hypertension, Presence of prosthetic heart valve, Restless legs syndrome, and Sleep apnea  ,  does not have any pertinent problems on file  ,   has a past surgical history that includes Coronary angioplasty with stent (07/29/2010) and Aortic valve replacement (10/07/2016)  ,  family history includes Atrial fibrillation in his mother; Diabetes type II in his mother; Heart attack in his father; Heart failure in his father; Stroke in his father  ,   reports that he quit smoking about 4 years ago  His smoking use included cigarettes  He quit after 35 00 years of use  He has never used smokeless tobacco  He reports previous alcohol use  He reports that he does not use drugs  ,  is allergic to fluticasone-salmeterol     Current Outpatient Medications   Medication Sig Dispense Refill    albuterol (2 5 mg/3 mL) 0 083 % nebulizer solution Take 1 vial (2 5 mg total) by nebulization every 6 (six) hours as needed for wheezing or shortness of breath 30 vial 3    albuterol (Ventolin HFA) 90 mcg/act inhaler Inhale 2 puffs every 6 (six) hours as needed for shortness of breath Must be brand necessary 1 Inhaler 5    aspirin 81 MG tablet Take by mouth      atorvastatin (LIPITOR) 10 mg tablet Take 1 tablet (10 mg total) by mouth daily 90 tablet 3    benazepril (LOTENSIN) 10 mg tablet Take 1 tablet (10 mg total) by mouth daily 90 tablet 1    cetirizine (ZyrTEC) 10 mg tablet Take 1 tablet (10 mg total) by mouth daily 30 tablet 1    diltiazem (CARDIZEM CD) 240 mg 24 hr capsule Take 1 capsule (240 mg total) by mouth 2 (two) times a day 180 capsule 3    fluticasone-umeclidinium-vilanterol (TRELEGY) 100-62 5-25 MCG/INH inhaler Inhale 1 puff daily Rinse mouth after use  2 Inhaler 5    furosemide (LASIX) 40 mg tablet Take 1 tablet (40 mg total) by mouth daily 90 tablet 3    insulin aspart (NovoLOG FlexPen) 100 UNIT/ML injection pen Inject 14 units with breakfast and lunch and 36 units with dinner  15 mL 5    insulin degludec Jarold Boyers FlexTouch) 200 units/mL CONCENTRATED U-200 injection pen Inject 66 units daily  6 pen 2    Insulin Pen Needle (BD Pen Needle Fany U/F) 32G X 4 MM MISC Inject as directed 2 (two) times a day 100 each 5    metFORMIN (GLUCOPHAGE) 1000 MG tablet Take 1 tablet (1,000 mg total) by mouth 2 (two) times a day 180 tablet 1    metoprolol tartrate (LOPRESSOR) 50 mg tablet Take 1 tablet (50 mg total) by mouth 2 (two) times a day 180 tablet 3    nitroglycerin (NITROSTAT) 0 4 mg SL tablet Place 1 tablet (0 4 mg total) under the tongue every 5 (five) minutes as needed for chest pain 100 tablet 3    potassium chloride (K-DUR) 10 mEq tablet Take 1 tablet (10 mEq total) by mouth daily 90 tablet 3    warfarin (COUMADIN) 10 mg tablet Take 1 tablet (10 mg total) by mouth daily 90 tablet 3    warfarin (COUMADIN) 2 mg tablet Take 1 tablet (2 mg total) by mouth daily (Patient taking differently: Take 2 mg by mouth daily except on monday) 90 tablet 3     No current facility-administered medications for this visit          Review of Systems   Constitutional: Negative  Negative for fever  Respiratory: Positive for shortness of breath  Cardiovascular: Positive for leg swelling  Negative for chest pain and palpitations  Gastrointestinal: Negative  Genitourinary: Negative  Objective:  Vitals:    05/26/21 0945 05/26/21 1032   BP: 136/78    Pulse: 91 78   Temp: (!) 97 4 °F (36 3 °C)    SpO2: (!) 86% 94%   Weight: (!) 187 kg (412 lb 9 6 oz)    Height: 6' 1" (1 854 m)      Body mass index is 54 44 kg/m²  Physical Exam  Vitals signs reviewed  Constitutional:       General: He is not in acute distress  Appearance: Normal appearance  He is well-developed  He is not diaphoretic  HENT:      Head: Normocephalic and atraumatic  Eyes:      Conjunctiva/sclera: Conjunctivae normal    Cardiovascular:      Rate and Rhythm: Normal rate and regular rhythm  Heart sounds: Normal heart sounds  No murmur  No friction rub  No gallop  Pulmonary:      Effort: Pulmonary effort is normal  No respiratory distress  Breath sounds: Rhonchi (  Mild rhonchi in the bases bilateral) present  No wheezing or rales  Musculoskeletal:      Right lower leg: Edema present  Left lower leg: Edema present  Neurological:      General: No focal deficit present  Mental Status: He is alert and oriented to person, place, and time  Psychiatric:         Mood and Affect: Mood normal          Behavior: Behavior normal          Thought Content:  Thought content normal          Judgment: Judgment normal

## 2021-06-02 ENCOUNTER — OFFICE VISIT (OUTPATIENT)
Dept: FAMILY MEDICINE CLINIC | Facility: CLINIC | Age: 56
End: 2021-06-02
Payer: MEDICARE

## 2021-06-02 VITALS
OXYGEN SATURATION: 92 % | DIASTOLIC BLOOD PRESSURE: 78 MMHG | TEMPERATURE: 97 F | SYSTOLIC BLOOD PRESSURE: 124 MMHG | BODY MASS INDEX: 41.75 KG/M2 | HEART RATE: 66 BPM | HEIGHT: 73 IN | WEIGHT: 315 LBS

## 2021-06-02 DIAGNOSIS — I25.10 CORONARY ARTERY DISEASE INVOLVING NATIVE CORONARY ARTERY OF NATIVE HEART WITHOUT ANGINA PECTORIS: ICD-10-CM

## 2021-06-02 DIAGNOSIS — E87.70 HYPERVOLEMIA, UNSPECIFIED HYPERVOLEMIA TYPE: Primary | ICD-10-CM

## 2021-06-02 DIAGNOSIS — I48.91 ATRIAL FIBRILLATION, UNSPECIFIED TYPE (HCC): ICD-10-CM

## 2021-06-02 DIAGNOSIS — I10 ESSENTIAL HYPERTENSION: ICD-10-CM

## 2021-06-02 DIAGNOSIS — I42.8 OTHER CARDIOMYOPATHY (HCC): ICD-10-CM

## 2021-06-02 PROCEDURE — 99213 OFFICE O/P EST LOW 20 MIN: CPT | Performed by: FAMILY MEDICINE

## 2021-06-02 RX ORDER — FUROSEMIDE 80 MG
80 TABLET ORAL DAILY
Qty: 90 TABLET | Refills: 3 | Status: SHIPPED | OUTPATIENT
Start: 2021-06-02 | End: 2022-05-31 | Stop reason: SDUPTHER

## 2021-06-02 RX ORDER — POTASSIUM CHLORIDE 1500 MG/1
20 TABLET, FILM COATED, EXTENDED RELEASE ORAL DAILY
Qty: 90 TABLET | Refills: 3 | Status: SHIPPED | OUTPATIENT
Start: 2021-06-02 | End: 2022-05-31 | Stop reason: SDUPTHER

## 2021-06-02 RX ORDER — FUROSEMIDE 80 MG
80 TABLET ORAL DAILY
Qty: 90 TABLET | Refills: 3 | OUTPATIENT
Start: 2021-06-02 | End: 2021-06-02 | Stop reason: SDUPTHER

## 2021-06-02 NOTE — PROGRESS NOTES
Assessment/Plan:  Since patient noticed improvement on the 80 mg of furosemide daily, we will keep him on that dose along with 20 mEq of potassium daily  He will get a BMP early next week along with his PT/ INR  We will also repeat a chest x-ray  He will call us if he continues to feel short of breath  We will see him back in 2 weeks or p r n  Problem List Items Addressed This Visit        Cardiovascular and Mediastinum    Atrial fibrillation (HCC)    Relevant Medications    potassium chloride 20 MEQ TBCR    Other Relevant Orders    XR chest pa & lateral    Essential hypertension    Relevant Medications    potassium chloride 20 MEQ TBCR    furosemide (LASIX) 80 mg tablet    Other Relevant Orders    XR chest pa & lateral      Other Visit Diagnoses     Hypervolemia, unspecified hypervolemia type    -  Primary    Relevant Orders    XR chest pa & lateral    Coronary artery disease involving native coronary artery of native heart without angina pectoris        Relevant Medications    furosemide (LASIX) 80 mg tablet    Other Relevant Orders    XR chest pa & lateral    Other cardiomyopathy (HCC)        Relevant Medications    furosemide (LASIX) 80 mg tablet    Other Relevant Orders    XR chest pa & lateral           Diagnoses and all orders for this visit:    Hypervolemia, unspecified hypervolemia type  -     XR chest pa & lateral; Future    Essential hypertension  -     potassium chloride 20 MEQ TBCR; Take 1 tablet (20 mEq total) by mouth daily  -     XR chest pa & lateral; Future    Atrial fibrillation, unspecified type (HCC)  -     potassium chloride 20 MEQ TBCR; Take 1 tablet (20 mEq total) by mouth daily  -     XR chest pa & lateral; Future    Coronary artery disease involving native coronary artery of native heart without angina pectoris  -     furosemide (LASIX) 80 mg tablet;  Take 1 tablet (80 mg total) by mouth daily  -     XR chest pa & lateral; Future    Other cardiomyopathy (HCC)  -     furosemide (LASIX) 80 mg tablet; Take 1 tablet (80 mg total) by mouth daily  -     XR chest pa & lateral; Future        No problem-specific Assessment & Plan notes found for this encounter  Subjective:      Patient ID: Toribio Magallanes is a 60 y o  male  Patient here today for follow-up on his CHF  Patient did notice improvement when he was taking the 80 mg of furosemide daily  Yesterday was the 1st day he went back to the 40mg dose  He is not taking furosemide today  Patient feels that restarting to feel more short of breath again, though not as bad as he did when he saw me last week  He denies any chest pain  Congestive Heart Failure  Presents for follow-up visit  Associated symptoms include edema and shortness of breath (Improved)  The symptoms have been improving  Compliance with total regimen is %  Compliance with diet is %  Compliance with medications is %  The following portions of the patient's history were reviewed and updated as appropriate:   He has a past medical history of Asthma, Athscl heart disease of native coronary artery w/o ang pctrs, Atrial fibrillation ([facility] [address] ), Cardiomyopathy ([facility] [address] ), Closed fracture of fibula, [facility] ), Coronary angioplasty status, History of echocardiogram (04/19/2017), Hyperlipidemia, Hypertension, Presence of prosthetic heart valve, Restless legs syndrome, and Sleep apnea  ,  does not have any pertinent problems on file  ,   has a past surgical history that includes Coronary angioplasty with stent (07/29/2010) and Aortic valve replacement (10/07/2016)  ,  family history includes Atrial fibrillation in his mother; Diabetes type II in his mother; Heart attack in his father; Heart failure in his father; Stroke in his father  ,   reports that he quit smoking about 4 years ago  His smoking use included cigarettes  He quit after 35 00 years of use  He has never used smokeless tobacco  He reports previous alcohol use   He reports that he does not use drugs  ,  is allergic to fluticasone-salmeterol     Current Outpatient Medications   Medication Sig Dispense Refill    albuterol (2 5 mg/3 mL) 0 083 % nebulizer solution Take 1 vial (2 5 mg total) by nebulization every 6 (six) hours as needed for wheezing or shortness of breath 30 vial 3    albuterol (Ventolin HFA) 90 mcg/act inhaler Inhale 2 puffs every 6 (six) hours as needed for shortness of breath Must be brand necessary 1 Inhaler 5    aspirin 81 MG tablet Take by mouth      atorvastatin (LIPITOR) 10 mg tablet Take 1 tablet (10 mg total) by mouth daily 90 tablet 3    benazepril (LOTENSIN) 10 mg tablet Take 1 tablet (10 mg total) by mouth daily 90 tablet 1    cetirizine (ZyrTEC) 10 mg tablet Take 1 tablet (10 mg total) by mouth daily 30 tablet 1    diltiazem (CARDIZEM CD) 240 mg 24 hr capsule Take 1 capsule (240 mg total) by mouth 2 (two) times a day 180 capsule 3    fluticasone-umeclidinium-vilanterol (TRELEGY) 100-62 5-25 MCG/INH inhaler Inhale 1 puff daily Rinse mouth after use  2 Inhaler 5    furosemide (LASIX) 80 mg tablet Take 1 tablet (80 mg total) by mouth daily 90 tablet 3    insulin aspart (NovoLOG FlexPen) 100 UNIT/ML injection pen Inject 14 units with breakfast and lunch and 36 units with dinner  15 mL 5    insulin degludec Tre White FlexTouch) 200 units/mL CONCENTRATED U-200 injection pen Inject 66 units daily   6 pen 2    Insulin Pen Needle (BD Pen Needle Fany U/F) 32G X 4 MM MISC Inject as directed 2 (two) times a day 100 each 5    metFORMIN (GLUCOPHAGE) 1000 MG tablet Take 1 tablet (1,000 mg total) by mouth 2 (two) times a day 180 tablet 1    metoprolol tartrate (LOPRESSOR) 50 mg tablet Take 1 tablet (50 mg total) by mouth 2 (two) times a day 180 tablet 3    nitroglycerin (NITROSTAT) 0 4 mg SL tablet Place 1 tablet (0 4 mg total) under the tongue every 5 (five) minutes as needed for chest pain 100 tablet 3    potassium chloride 20 MEQ TBCR Take 1 tablet (20 mEq total) by mouth daily 90 tablet 3    warfarin (COUMADIN) 10 mg tablet Take 1 tablet (10 mg total) by mouth daily 90 tablet 3    warfarin (COUMADIN) 2 mg tablet Take 1 tablet (2 mg total) by mouth daily (Patient taking differently: Take 2 mg by mouth daily except on monday) 90 tablet 3     No current facility-administered medications for this visit  Review of Systems   Constitutional: Negative  Respiratory: Positive for shortness of breath (Improved)  Cardiovascular: Positive for leg swelling ( stable)  Gastrointestinal: Negative  Genitourinary: Negative  Objective:  Vitals:    06/02/21 1214   BP: 124/78   Pulse: 66   Temp: (!) 97 °F (36 1 °C)   SpO2: 92%   Weight: (!) 187 kg (412 lb)   Height: 6' 1" (1 854 m)     Body mass index is 54 36 kg/m²  Physical Exam  Vitals signs reviewed  Constitutional:       General: He is not in acute distress  Appearance: Normal appearance  He is well-developed  He is not diaphoretic  HENT:      Head: Normocephalic and atraumatic  Eyes:      Conjunctiva/sclera: Conjunctivae normal    Cardiovascular:      Rate and Rhythm: Normal rate and regular rhythm  Heart sounds: Normal heart sounds  No murmur  No friction rub  No gallop  Pulmonary:      Effort: Pulmonary effort is normal  No respiratory distress  Breath sounds: Normal breath sounds  No wheezing or rales  Musculoskeletal:      Right lower leg: Edema present  Left lower leg: Edema present  Neurological:      General: No focal deficit present  Mental Status: He is alert and oriented to person, place, and time  Psychiatric:         Mood and Affect: Mood normal          Behavior: Behavior normal          Thought Content:  Thought content normal          Judgment: Judgment normal

## 2021-06-15 ENCOUNTER — HOSPITAL ENCOUNTER (OUTPATIENT)
Dept: RADIOLOGY | Facility: HOSPITAL | Age: 56
Discharge: HOME | End: 2021-06-15
Payer: MEDICARE

## 2021-06-15 ENCOUNTER — ANTICOAG VISIT (OUTPATIENT)
Dept: FAMILY MEDICINE CLINIC | Facility: CLINIC | Age: 56
End: 2021-06-15

## 2021-06-15 ENCOUNTER — APPOINTMENT (OUTPATIENT)
Dept: LAB | Facility: HOSPITAL | Age: 56
End: 2021-06-15
Payer: MEDICARE

## 2021-06-15 DIAGNOSIS — E87.70 HYPERVOLEMIA, UNSPECIFIED HYPERVOLEMIA TYPE: ICD-10-CM

## 2021-06-15 DIAGNOSIS — I42.8 OTHER CARDIOMYOPATHY (HCC): ICD-10-CM

## 2021-06-15 DIAGNOSIS — I48.91 ATRIAL FIBRILLATION, UNSPECIFIED TYPE (HCC): ICD-10-CM

## 2021-06-15 DIAGNOSIS — I25.10 CORONARY ARTERY DISEASE INVOLVING NATIVE CORONARY ARTERY OF NATIVE HEART WITHOUT ANGINA PECTORIS: ICD-10-CM

## 2021-06-15 DIAGNOSIS — I10 ESSENTIAL HYPERTENSION: ICD-10-CM

## 2021-06-15 LAB
ANION GAP SERPL CALCULATED.3IONS-SCNC: 6 MMOL/L (ref 4–13)
BUN SERPL-MCNC: 11 MG/DL (ref 5–25)
CALCIUM SERPL-MCNC: 9 MG/DL (ref 8.3–10.1)
CHLORIDE SERPL-SCNC: 100 MMOL/L (ref 100–108)
CO2 SERPL-SCNC: 32 MMOL/L (ref 21–32)
CREAT SERPL-MCNC: 0.8 MG/DL (ref 0.6–1.3)
GFR SERPL CREATININE-BSD FRML MDRD: 99 ML/MIN/1.73SQ M
GLUCOSE SERPL-MCNC: 145 MG/DL (ref 65–140)
INR PPP: 2.85 (ref 0.84–1.19)
POTASSIUM SERPL-SCNC: 4.1 MMOL/L (ref 3.5–5.3)
PROTHROMBIN TIME: 29.2 SECONDS (ref 11.6–14.5)
SODIUM SERPL-SCNC: 138 MMOL/L (ref 136–145)

## 2021-06-15 PROCEDURE — 85610 PROTHROMBIN TIME: CPT

## 2021-06-15 PROCEDURE — 80048 BASIC METABOLIC PNL TOTAL CA: CPT | Performed by: FAMILY MEDICINE

## 2021-06-15 PROCEDURE — 36415 COLL VENOUS BLD VENIPUNCTURE: CPT | Performed by: FAMILY MEDICINE

## 2021-06-15 PROCEDURE — 71046 X-RAY EXAM CHEST 2 VIEWS: CPT

## 2021-06-16 ENCOUNTER — TELEPHONE (OUTPATIENT)
Dept: FAMILY MEDICINE CLINIC | Facility: CLINIC | Age: 56
End: 2021-06-16

## 2021-06-16 ENCOUNTER — OFFICE VISIT (OUTPATIENT)
Dept: FAMILY MEDICINE CLINIC | Facility: CLINIC | Age: 56
End: 2021-06-16
Payer: MEDICARE

## 2021-06-16 VITALS
OXYGEN SATURATION: 94 % | TEMPERATURE: 97 F | DIASTOLIC BLOOD PRESSURE: 82 MMHG | SYSTOLIC BLOOD PRESSURE: 138 MMHG | HEIGHT: 73 IN | WEIGHT: 315 LBS | HEART RATE: 80 BPM | BODY MASS INDEX: 41.75 KG/M2

## 2021-06-16 DIAGNOSIS — E87.70 HYPERVOLEMIA, UNSPECIFIED HYPERVOLEMIA TYPE: Primary | ICD-10-CM

## 2021-06-16 PROCEDURE — 99213 OFFICE O/P EST LOW 20 MIN: CPT | Performed by: FAMILY MEDICINE

## 2021-06-16 NOTE — PROGRESS NOTES
Assessment/Plan:  Since patient isn't doing much better, we will get him to see his cardiologist ASAP  We will try to get his chest x-ray read today  Comparing the 2 x-rays that he has had done, there does not seem to be much difference  For now, he will continue the increased dose of furosemide at 80 mg  BMP was stable  He will call us if he has not heard from Cardiology for an appointment or if his symptoms continue or increase  Otherwise we will see him back as scheduled  He will also wear his compression stockings  Problem List Items Addressed This Visit     None      Visit Diagnoses     Hypervolemia, unspecified hypervolemia type    -  Primary           Diagnoses and all orders for this visit:    Hypervolemia, unspecified hypervolemia type        No problem-specific Assessment & Plan notes found for this encounter  Subjective:      Patient ID: Sheila New is a 60 y o  male  Patient here today for follow-up on his fluid overload  Patient states he feels slightly better  He has episodes of trouble breathing  This past Monday he laid down in the afternoon, got up and had trouble breathing for a while  No chest pain with it  No fever chills  Patient's BMP yesterday showed his kidney function is good  Patient did have his chest x-ray done yesterday  Breathing Problem  He complains of difficulty breathing and shortness of breath (Off and on )  This is a recurrent problem  The problem occurs daily  The problem has been waxing and waning  Pertinent negatives include no chest pain or fever  His symptoms are aggravated by lying down  His symptoms are alleviated by rest  Risk factors for lung disease include smoking/tobacco exposure         The following portions of the patient's history were reviewed and updated as appropriate:   He has a past medical history of Asthma, Athscl heart disease of native coronary artery w/o ang pctrs, Atrial fibrillation ([facility] [address] ), Cardiomyopathy ([facility] [address] ), Closed fracture of fibula, COLD (chronic obstructive lung disease) ([facility] [address] ), Coronary angioplasty status, History of echocardiogram (04/19/2017), Hyperlipidemia, Hypertension, Presence of prosthetic heart valve, Restless legs syndrome, and Sleep apnea  ,  does not have any pertinent problems on file  ,   has a past surgical history that includes Coronary angioplasty with stent (07/29/2010) and Aortic valve replacement (10/07/2016)  ,  family history includes Atrial fibrillation in his mother; Diabetes type II in his mother; Heart attack in his father; Heart failure in his father; Stroke in his father  ,   reports that he quit smoking about 4 years ago  His smoking use included cigarettes  He quit after 35 00 years of use  He has never used smokeless tobacco  He reports previous alcohol use  He reports that he does not use drugs  ,  is allergic to fluticasone-salmeterol     Current Outpatient Medications   Medication Sig Dispense Refill    albuterol (2 5 mg/3 mL) 0 083 % nebulizer solution Take 1 vial (2 5 mg total) by nebulization every 6 (six) hours as needed for wheezing or shortness of breath 30 vial 3    albuterol (Ventolin HFA) 90 mcg/act inhaler Inhale 2 puffs every 6 (six) hours as needed for shortness of breath Must be brand necessary 1 Inhaler 5    aspirin 81 MG tablet Take by mouth      atorvastatin (LIPITOR) 10 mg tablet Take 1 tablet (10 mg total) by mouth daily 90 tablet 3    benazepril (LOTENSIN) 10 mg tablet Take 1 tablet (10 mg total) by mouth daily 90 tablet 1    cetirizine (ZyrTEC) 10 mg tablet Take 1 tablet (10 mg total) by mouth daily 30 tablet 1    diltiazem (CARDIZEM CD) 240 mg 24 hr capsule Take 1 capsule (240 mg total) by mouth 2 (two) times a day 180 capsule 3    fluticasone-umeclidinium-vilanterol (TRELEGY) 100-62 5-25 MCG/INH inhaler Inhale 1 puff daily Rinse mouth after use   2 Inhaler 5    furosemide (LASIX) 80 mg tablet Take 1 tablet (80 mg total) by mouth daily 90 tablet 3    insulin aspart (NovoLOG FlexPen) 100 UNIT/ML injection pen Inject 14 units with breakfast and lunch and 36 units with dinner  15 mL 5    insulin degludec Mariana La FlexTouch) 200 units/mL CONCENTRATED U-200 injection pen Inject 66 units daily  6 pen 2    Insulin Pen Needle (BD Pen Needle Fany U/F) 32G X 4 MM MISC Inject as directed 2 (two) times a day 100 each 5    metFORMIN (GLUCOPHAGE) 1000 MG tablet Take 1 tablet (1,000 mg total) by mouth 2 (two) times a day 180 tablet 1    metoprolol tartrate (LOPRESSOR) 50 mg tablet Take 1 tablet (50 mg total) by mouth 2 (two) times a day 180 tablet 3    nitroglycerin (NITROSTAT) 0 4 mg SL tablet Place 1 tablet (0 4 mg total) under the tongue every 5 (five) minutes as needed for chest pain 100 tablet 3    potassium chloride 20 MEQ TBCR Take 1 tablet (20 mEq total) by mouth daily 90 tablet 3    warfarin (COUMADIN) 10 mg tablet Take 1 tablet (10 mg total) by mouth daily 90 tablet 3    warfarin (COUMADIN) 2 mg tablet Take 1 tablet (2 mg total) by mouth daily (Patient taking differently: Take 2 mg by mouth daily except on monday) 90 tablet 3     No current facility-administered medications for this visit  Review of Systems   Constitutional: Negative  Negative for fever  Respiratory: Positive for shortness of breath (Off and on )  Cardiovascular: Positive for leg swelling  Negative for chest pain and palpitations  Gastrointestinal: Negative  Genitourinary: Negative  Objective:  Vitals:    06/16/21 1110 06/16/21 1138   BP: 138/82    Pulse:  80   Temp: (!) 97 °F (36 1 °C)    SpO2:  94%   Weight: (!) 186 kg (410 lb 3 2 oz)    Height: 6' 1" (1 854 m)      Body mass index is 54 12 kg/m²  Physical Exam  Vitals reviewed  Constitutional:       General: He is not in acute distress  Appearance: Normal appearance  He is well-developed  He is not diaphoretic  HENT:      Head: Normocephalic and atraumatic     Eyes:      Conjunctiva/sclera: Conjunctivae normal  Cardiovascular:      Rate and Rhythm: Normal rate  Rhythm regularly irregular  Heart sounds: Normal heart sounds  No murmur heard  No friction rub  No gallop  Pulmonary:      Effort: Pulmonary effort is normal  No respiratory distress  Breath sounds: Normal breath sounds  No wheezing or rales  Musculoskeletal:      Right lower leg: No edema  Left lower leg: No edema  Neurological:      Mental Status: He is alert and oriented to person, place, and time  Psychiatric:         Mood and Affect: Mood normal          Behavior: Behavior normal          Thought Content:  Thought content normal          Judgment: Judgment normal

## 2021-06-16 NOTE — TELEPHONE ENCOUNTER
SPOKE TO CARLOS AT X-RAY, SHE WILL HAVE THEM READ IT  SPOKE TO DR GABRIEL OFFICE, SHE HAS APPT Friday   SHE WILL CONTACT PATIENT WITH DATE AND TIME

## 2021-06-16 NOTE — TELEPHONE ENCOUNTER
Please call x-ray department to have his chest x-ray that he had done yesterday read  Also, call Dr Vesna Bucio office, needs to be seen this week  Patient has increased fluid  I doubled up on his Lasix, has not been quite effective for him  no

## 2021-06-18 ENCOUNTER — OFFICE VISIT (OUTPATIENT)
Dept: CARDIOLOGY CLINIC | Facility: CLINIC | Age: 56
End: 2021-06-18
Payer: MEDICARE

## 2021-06-18 VITALS
HEIGHT: 73 IN | HEART RATE: 80 BPM | DIASTOLIC BLOOD PRESSURE: 70 MMHG | WEIGHT: 315 LBS | SYSTOLIC BLOOD PRESSURE: 120 MMHG | BODY MASS INDEX: 41.75 KG/M2

## 2021-06-18 DIAGNOSIS — R06.02 SOB (SHORTNESS OF BREATH): Primary | ICD-10-CM

## 2021-06-18 DIAGNOSIS — Z95.2 S/P AVR: ICD-10-CM

## 2021-06-18 DIAGNOSIS — I48.91 ATRIAL FIBRILLATION, UNSPECIFIED TYPE (HCC): ICD-10-CM

## 2021-06-18 DIAGNOSIS — I10 ESSENTIAL HYPERTENSION: ICD-10-CM

## 2021-06-18 PROCEDURE — 99214 OFFICE O/P EST MOD 30 MIN: CPT | Performed by: NURSE PRACTITIONER

## 2021-06-18 NOTE — PROGRESS NOTES
Patient ID: Eduardo Moffett is a 60 y o  male  Plan:      Atrial fibrillation (HCC)  Rate controlled  Continue lopressor 50 mg bid, Cardizem 240 mg daily  Coumadin for stroke risk reduction, managed by PCP    SOB (shortness of breath)  Multifactorial and I believe more pulmonary related  Recent echo results reviewed; scheduled for repeat in the fall prior to his follow up visit with Dr Srikanth Garcia  We can move this earlier, but he would like to wait for other testing to be completed  Script given for BNP    Essential hypertension  Blood pressure well controlled  Continue lopressor 50 mg bid, cardizem 240 mg daily    S/P AVR  Mechanical AVR, 2016  Required continued anticoagulation and SBE prophylaxis        Follow up Plan/Summary Comments:  Magy Monroe has had some ongoing breathing issues for quite some time  He has COPD and was evaluated and treated for an acute exacerbation by Pulmonary back in April  He notes he felt better with prednisone taper  Review of his recent chest imaging suggests that this is more so related to a pulmonary issue than a cardiac issue  I did order a BNP for further assessment  He is scheduled for an echocardiogram in a few months prior to his office visit with Dr Srikanth Garcia  I offered to move this up  He is in the process of being scheduled for a high-resolution CT of the chest and would like to wait for those results before changing his echo appointment  I advised him to continue his Lasix 80 mg daily for now  I will re-evaluate once I review his labs  HPI:   I had the pleasure of seeing Magy Monroe in the office today for a problem visit  Magy Monroe has been following closely with his family doctor for the past few weeks for worsening shortness of breath  Magy Monroe has been experiencing shortness of breath over the past few months  Additionally, he has lower extremity edema  At an office visit with his PCP 05/26/2021, Lasix was increased from 40 mg daily to 80 mg daily    He noted some mild improvement, but continues to experience a sensation of not getting enough air, both at rest and with activity  He notes that back in March, he could comfortably carry the groceries into his home  He noted worsening shortness of breath with this activity shortly there after  Currently, he reports some improvement, but he has not returned to his baseline  Of note, he was treated for an exacerbation of COPD with a steroid taper in April at the direction of Pulmonary Medicine  He denies orthopnea and nocturnal dyspnea  He has sleep apnea and reports compliance with his CPAP machine  Barbstephan Tate denies any chest pain, pressure, tightness, burning, palpitations, lightheadedness, dizziness, syncope  His weight is noted to be down slightly since increasing his dose of Lasix  He does have chronic lower extremity edema which is improved in the morning and worse by the end of the day  Review of Systems   10  point ROS  was otherwise non pertinent or negative except as per HPI or as below  Gait: Normal      Most recent or relevant cardiac/vascular testing:    Echo 09/03/2020 EF 55%, mild LVH   Mild MR, mechanical AVR with normal function, mild TR        Objective:     /70   Pulse 80   Ht 6' 1" (1 854 m)   Wt (!) 184 kg (405 lb)   BMI 53 43 kg/m²     PHYSICAL EXAM:    General:  Normal appearance, no acute distress  Eyes:  Anicteric  Oral mucosa:  Wearing a mask  Neck:  No JVD  Carotid upstrokes are brisk without bruits  No masses  Chest:  Scattered inspiratory/ expiratory wheezes throughout anterior and posterior fields  Cardiac:  Irregularly irregular  No palpable PMI  Normal S1 and S2  Mechanical valve sounds noted  Abdomen:  Soft and nontender  No palpable organomegaly or aortic enlargement  Extremities:  + 2-+3  Bilateral lower extremity edema  Musculoskeletal:  Symmetric     Vascular: Pedal pulses are intact, chronic venous stasis changes noted to bilateral lower extremities  Neuro:  Grossly symmetric  Psych:  Alert and oriented x3  Allergies   Allergen Reactions    Fluticasone-Salmeterol Palpitations       Current Outpatient Medications:     albuterol (2 5 mg/3 mL) 0 083 % nebulizer solution, Take 1 vial (2 5 mg total) by nebulization every 6 (six) hours as needed for wheezing or shortness of breath, Disp: 30 vial, Rfl: 3    albuterol (Ventolin HFA) 90 mcg/act inhaler, Inhale 2 puffs every 6 (six) hours as needed for shortness of breath Must be brand necessary, Disp: 1 Inhaler, Rfl: 5    aspirin 81 MG tablet, Take by mouth, Disp: , Rfl:     atorvastatin (LIPITOR) 10 mg tablet, Take 1 tablet (10 mg total) by mouth daily, Disp: 90 tablet, Rfl: 3    benazepril (LOTENSIN) 10 mg tablet, Take 1 tablet (10 mg total) by mouth daily, Disp: 90 tablet, Rfl: 1    cetirizine (ZyrTEC) 10 mg tablet, Take 1 tablet (10 mg total) by mouth daily, Disp: 30 tablet, Rfl: 1    diltiazem (CARDIZEM CD) 240 mg 24 hr capsule, Take 1 capsule (240 mg total) by mouth 2 (two) times a day, Disp: 180 capsule, Rfl: 3    fluticasone-umeclidinium-vilanterol (TRELEGY) 100-62 5-25 MCG/INH inhaler, Inhale 1 puff daily Rinse mouth after use , Disp: 2 Inhaler, Rfl: 5    furosemide (LASIX) 80 mg tablet, Take 1 tablet (80 mg total) by mouth daily, Disp: 90 tablet, Rfl: 3    insulin aspart (NovoLOG FlexPen) 100 UNIT/ML injection pen, Inject 14 units with breakfast and lunch and 36 units with dinner , Disp: 15 mL, Rfl: 5    insulin degludec Selma Irons FlexTouch) 200 units/mL CONCENTRATED U-200 injection pen, Inject 66 units daily  , Disp: 6 pen, Rfl: 2    Insulin Pen Needle (BD Pen Needle Fany U/F) 32G X 4 MM MISC, Inject as directed 2 (two) times a day, Disp: 100 each, Rfl: 5    metFORMIN (GLUCOPHAGE) 1000 MG tablet, Take 1 tablet (1,000 mg total) by mouth 2 (two) times a day, Disp: 180 tablet, Rfl: 1    metoprolol tartrate (LOPRESSOR) 50 mg tablet, Take 1 tablet (50 mg total) by mouth 2 (two) times a day, Disp: 180 tablet, Rfl: 3    nitroglycerin (NITROSTAT) 0 4 mg SL tablet, Place 1 tablet (0 4 mg total) under the tongue every 5 (five) minutes as needed for chest pain, Disp: 100 tablet, Rfl: 3    potassium chloride 20 MEQ TBCR, Take 1 tablet (20 mEq total) by mouth daily, Disp: 90 tablet, Rfl: 3    warfarin (COUMADIN) 10 mg tablet, Take 1 tablet (10 mg total) by mouth daily, Disp: 90 tablet, Rfl: 3    warfarin (COUMADIN) 2 mg tablet, Take 1 tablet (2 mg total) by mouth daily (Patient taking differently: Take 2 mg by mouth daily except on monday), Disp: 90 tablet, Rfl: 3  Past Medical History:   Diagnosis Date    Asthma     last assessed 8/21/12    Athscl heart disease of native coronary artery w/o ang pctrs     [facility]     last assessed 8/21/12    Cardiomyopathy [facility])     Closed fracture of fibula     last assessed 10/18/13    C[facility] )     last assessed 8/21/12    Coronary angioplasty status     History of echocardiogram 04/19/2017    EF 0 50, LVSF is at the lower limits of normal  Mild concentric LV hypertophy  Mild mitral regurg  Normal functioning mechanical AV      Hyperlipidemia     Hypertension     last assessed 8/21/12    Presence of prosthetic heart valve     Restless legs syndrome     last assessed 8/21/12    Sleep apnea     last assessed 8/21/12     Past Surgical History:   Procedure Laterality Date    AORTIC VALVE REPLACEMENT  10/07/2016    AVR replacement, mechanical    CORONARY ANGIOPLASTY WITH STENT PLACEMENT  07/29/2010    EF 40%, Successful bare metal stent mid RCA       CMP:   Lab Results   Component Value Date     09/01/2013    K 4 1 06/15/2021    K 4 2 09/01/2013     06/15/2021    CL 98 09/01/2013    CO2 32 06/15/2021    CO2 27 8 09/01/2013    BUN 11 06/15/2021    BUN 11 09/01/2013    CREATININE 0 80 06/15/2021    CREATININE 0 71 09/01/2013    GLUCOSE 119 09/01/2013    EGFR 99 06/15/2021     Lipid Profile: Lab Results   Component Value Date    CHOL 180 2013    TRIG 75 2021    TRIG 110 2013    HDL 34 (L) 2021    HDL 29 2013         Social History     Tobacco Use   Smoking Status Former Smoker    Years: 35 00    Types: Cigarettes    Quit date: 10/2016    Years since quittin 7   Smokeless Tobacco Never Used   Tobacco Comment    quit 1 week ago

## 2021-06-18 NOTE — ASSESSMENT & PLAN NOTE
Rate controlled  Continue lopressor 50 mg bid, Cardizem 240 mg daily  Coumadin for stroke risk reduction, managed by PCP

## 2021-06-18 NOTE — ASSESSMENT & PLAN NOTE
Multifactorial and I believe more pulmonary related  Recent echo results reviewed; scheduled for repeat in the fall prior to his follow up visit with Dr Tremaine Cabrera  We can move this earlier, but he would like to wait for other testing to be completed    Script given for BNP

## 2021-06-22 ENCOUNTER — APPOINTMENT (OUTPATIENT)
Dept: LAB | Facility: HOSPITAL | Age: 56
End: 2021-06-22
Payer: MEDICARE

## 2021-06-22 DIAGNOSIS — R06.02 SOB (SHORTNESS OF BREATH): ICD-10-CM

## 2021-06-22 LAB — NT-PROBNP SERPL-MCNC: 647 PG/ML

## 2021-06-22 PROCEDURE — 83880 ASSAY OF NATRIURETIC PEPTIDE: CPT

## 2021-06-22 PROCEDURE — 36415 COLL VENOUS BLD VENIPUNCTURE: CPT

## 2021-06-28 ENCOUNTER — HOSPITAL ENCOUNTER (OUTPATIENT)
Dept: CT IMAGING | Facility: HOSPITAL | Age: 56
Discharge: HOME | End: 2021-06-28
Payer: MEDICARE

## 2021-06-28 DIAGNOSIS — R93.89 ABNORMAL CHEST X-RAY: ICD-10-CM

## 2021-06-28 PROCEDURE — G1004 CDSM NDSC: HCPCS

## 2021-06-28 PROCEDURE — 71250 CT THORAX DX C-: CPT

## 2021-07-01 ENCOUNTER — OFFICE VISIT (OUTPATIENT)
Dept: SLEEP CENTER | Facility: CLINIC | Age: 56
End: 2021-07-01
Payer: MEDICARE

## 2021-07-01 VITALS
SYSTOLIC BLOOD PRESSURE: 106 MMHG | TEMPERATURE: 96.7 F | BODY MASS INDEX: 41.75 KG/M2 | HEIGHT: 73 IN | WEIGHT: 315 LBS | OXYGEN SATURATION: 96 % | HEART RATE: 85 BPM | DIASTOLIC BLOOD PRESSURE: 70 MMHG

## 2021-07-01 DIAGNOSIS — G47.10 HYPERSOMNIA: ICD-10-CM

## 2021-07-01 DIAGNOSIS — J44.9 COPD MIXED TYPE (HCC): ICD-10-CM

## 2021-07-01 DIAGNOSIS — I10 ESSENTIAL HYPERTENSION: ICD-10-CM

## 2021-07-01 DIAGNOSIS — R68.2 DRY MOUTH: ICD-10-CM

## 2021-07-01 DIAGNOSIS — G25.81 RESTLESS LEG SYNDROME: ICD-10-CM

## 2021-07-01 DIAGNOSIS — G47.33 OBSTRUCTIVE SLEEP APNEA: Primary | ICD-10-CM

## 2021-07-01 DIAGNOSIS — E66.01 MORBID OBESITY WITH BMI OF 40.0-44.9, ADULT (HCC): ICD-10-CM

## 2021-07-01 DIAGNOSIS — I48.20 CHRONIC ATRIAL FIBRILLATION (HCC): ICD-10-CM

## 2021-07-01 DIAGNOSIS — J30.1 SEASONAL ALLERGIC RHINITIS DUE TO POLLEN: ICD-10-CM

## 2021-07-01 PROCEDURE — 99214 OFFICE O/P EST MOD 30 MIN: CPT | Performed by: INTERNAL MEDICINE

## 2021-07-01 NOTE — PATIENT INSTRUCTIONS
Continuous Positive Airway Pressure (CPAP) therapy was prescribed to you as a medical necessity and there are risks of discontinuing  use of the device, some of which may be long term  Symptoms you experienced before using CPAP may return such as snoring, apneas, excessive daytime sleepiness, hypertension, cardiac arrhythmias, risk of stroke, congestive heart-failure, exacerbation of COPD and potential respiratory failure  Ultimately, it is a personal decision for you to make if you continue use of an affected device or discontinue until a replacement is provided  Unfortunately, [employer] has not yet provided us with information about available devices  You can visit their website at [email or web address]  [email or web address] to register your device and to learn more about how [employer] plans to replace your device  Another option would be to check with your medical equipment provider to determine if you are eligible for a new machine through your insurance, if not you can pay out of pocket for a new machine  We are able to provide you with a script, please include your mask type  Nursing Support:  When: Monday through Friday 7A-5PM except holidays  Where: Our direct line is [phone]  If you are having a true emergency please call 911  In the event that the line is busy or it is after hours please leave a voice message and we will return your call  Please speak clearly, leaving your full name, birth date, best number to reach you and the reason for your call  Medication refills: We will need the name of the medication, the dosage, the ordering provider, whether you get a 30 or 90 day refill, and the pharmacy name and address  Medications will be ordered by the provider only  Nurses cannot call in prescriptions  Please allow 7 days for medication refills  Physician requested updates:  If your provider requested that you call with an update after starting medication, please be ready to provide us the medication and dosage, what time you take your medication, the time you attempt to fall asleep, time you fall asleep, when you wake up, and what time you get out of bed  Sleep Study Results: We will contact you with sleep study results and/or next steps after the physician has reviewed your testing

## 2021-07-01 NOTE — PROGRESS NOTES
Follow-Up Note - Seth Flores  60 y o  male  :1965  QPJ:[id number]    CC: I saw this patient for follow-up in clinic today for Sleep disordered breathing, Coexisting Sleep and Medical Problems  Results of prior studies in 2017:  The diagnostic portion of a split study demonstrated AHI of 59 5 per hour, higher while supine  Minimum oxygen saturation was 86 % and he spent 69 6% of time asleep during this portion of the study with saturations below 90%  During the therapeutic portion, he appeared to do best with BiPAP at 21/16 cm H2O  He developed central events and continued to have some snoring  The AHI was 8 8 per hour at this setting but minimum oxygen saturation maintained above 90%    PFSH, Problem List, Medications & Allergies were reviewed in EMR  Interval changes: none reported  He  has a past medical history of Asthma, Athscl heart disease of native coronary artery w/o ang pctrs, Atrial fibrillation ([facility] ), [facility] ), Closed fracture of fibula, COLD (chronic obstructive lung disease) ([facility] ), Coronary angioplasty status, History of echocardiogram (2017), Hyperlipidemia, Hypertension, Presence of prosthetic heart valve, Restless legs syndrome, and Sleep apnea  He has a current medication list which includes the following prescription(s): albuterol, albuterol, aspirin, atorvastatin, benazepril, cetirizine, diltiazem, fluticasone-umeclidinium-vilanterol, furosemide, insulin aspart, tresiba flextouch, bd pen needle ashley u/f, metformin, metoprolol tartrate, nitroglycerin, potassium chloride er, warfarin, and warfarin  PHYSIOLOGICAL DATA REVIEW AND INTERPRETATION:   using PAP > 4 hours/night 100%   Estimated PAUL 11 8/hour with pressure of 22/15cm H2O @90th percentile; respiratory events may be related to mask leaks - data shows for approximately 2 hours a night; Compliance: excellent; Sleep disordered breathing:stable but out of target range; Patient reports has not been using So Clean to sanitize the machine  SUBJECTIVE: Regarding use of PAP, Dutch Boxer reports:   · He is experiencing some adverse effects: dry mouth/throat and nasal congestion  · He is benefiting from use: sleeping better   · Restless leg symptoms are not disturbing sleep  Sleep Routine: Dutch Boxer reports getting 7 hrs sleep work nights and more on weekends; he has no difficulty initiating or maintaining sleep   He arises spontaneously and usually feels refreshed  Dutch Boxer reports Excessive Daytime Sleepiness, feels like napping & does when has the opportunity but rated himself at Total score: 7 /24 on the [place] Sleepiness Scale  Habits: reports that he quit smoking about 4 years ago  His smoking use included cigarettes  He quit after 35 00 years of use  He has never used smokeless tobacco ,  reports previous alcohol use ,  reports no history of drug use , Caffeine use: moderate , Exercise routine: none   ROS: as attached  Significant for weight gain of around 40 lb since his last visit a year ago  He has nasal symptoms due to allergies and has been experiencing increased shortness of breath for which he is under investigation  He has swelling of his legs but no increase  He is reporting no other cardiac symptoms  EXAM: /70 (BP Location: Left arm, Cuff Size: Large)   Pulse 85   Temp (!) 96 7 °F (35 9 °C) (Temporal)   Ht 6' 1" (1 854 m)   Wt (!) 185 kg (407 lb)   SpO2 96%   BMI 53 70 kg/m²     Patient is well groomed; well appearing  H&N: EOMI; NC/AT:no facial pressure marks, no rashes  Skin/Extrem: col & hydration normal; no edema  Psych: cooperativeand in no distress  Mental state:appears normal   Resp: Respiratory effort is normal  CNS: Alert, orientated, clear & coherent speech  Physical findings otherwise essentially unchanged from previous  IMPRESSION: Problem List Items & Comorbidities Addressed this Visit    1  Obstructive sleep apnea     2   Restless leg syndrome 3  Dry mouth     4  Hypersomnia     5  COPD mixed type ([facility] )     6  Seasonal allergic rhinitis due to pollen     7  Chronic atrial fibrillation (HCC)     8  Essential hypertension     9  Morbid obesity with BMI of 40 0-44 9, adult ([facility] )         PLAN:  I reviewed results of prior studies and physiologic data with the patient  Notified regarding recall of [employer] devices and the recommendation to discontinue use pending resolution of degradation of the foam by replacing it  I discussed treatment options with risks and benefits  Patient understands risks of discontinuing PAP vs continuing use while awaiting resolution of the new issue  Instructed patient to register  machine with [employer] and to follow progress on their website  Including web sites of DME provider, [facility] and [facility]  looking out for notifications  Treatment is medically necessary and patient elected to continue BiPAP while awaiting remediation  Care of equipment, methods to improve comfort using PAP and importance of compliance with therapy were discussed  He was instructed not to use So Clean or other cleaning devices unless approved by  a and FDA  Pressure setting:  Continue 22/15 cm H2O cmH2O  Rx provided to replace the machine, supplies and Care coordinated with DME provider  He will need refitting of his interface to minimize Mask leaks  No medication is needed for the restless leg symptoms at this time  Discussed strategies for weight reduction  Also advised allowing sufficient opportunity for sleep  Follow-up is advised in 1 year or sooner if needed to monitor progress, compliance and to adjust therapy  Thank you for allowing me to participate in the care of this patient      Sincerely,    Authenticated electronically by Aneta Overton MD on 05/63/55   Board Certified Specialist

## 2021-07-01 NOTE — PROGRESS NOTES
Review of Systems      Genitourinary need to urinate more than twice a night and difficulty with erection   Cardiology ankle/leg swelling   Gastrointestinal none   Neurology need to move extremities, numbness/tingling of an extremity and balance problems   Constitutional none   Integumentary rash or dry skin   Psychiatry none   Musculoskeletal joint pain and legs twitching/jerking   Pulmonary shortness of breath with activity, wheezing and frequent cough   ENT none   Endocrine excessive thirst and frequent urination   Hematological none

## 2021-07-02 ENCOUNTER — TELEPHONE (OUTPATIENT)
Dept: SLEEP CENTER | Facility: CLINIC | Age: 56
End: 2021-07-02

## 2021-07-08 ENCOUNTER — TELEPHONE (OUTPATIENT)
Dept: PULMONOLOGY | Facility: CLINIC | Age: 56
End: 2021-07-08

## 2021-07-08 ENCOUNTER — TELEPHONE (OUTPATIENT)
Dept: FAMILY MEDICINE CLINIC | Facility: CLINIC | Age: 56
End: 2021-07-08

## 2021-07-08 DIAGNOSIS — R91.8 MULTIPLE PULMONARY NODULES DETERMINED BY COMPUTED TOMOGRAPHY OF LUNG: Primary | ICD-10-CM

## 2021-07-08 DIAGNOSIS — IMO0002 UNCONTROLLED TYPE 2 DIABETES MELLITUS WITH DIABETIC POLYNEUROPATHY, WITH LONG-TERM CURRENT USE OF INSULIN: ICD-10-CM

## 2021-07-08 NOTE — TELEPHONE ENCOUNTER
Pt already scheduled for 07/31/2021 for PET Scan and then an appointment with us 08/05/2021       ----- [facility] at 7/8/2021  8:45 AM EDT -----  Please help patient schedule PET-CT asap and reschedule pulmonary visit for a couple days afterwards

## 2021-07-08 NOTE — TELEPHONE ENCOUNTER
----- [facility] at 7/8/2021  8:46 AM EDT -----  I asked my office to schedule the pet and his follow up afterwards for as soon as possible  Thanks for the heads up

## 2021-07-12 ENCOUNTER — ANTICOAG VISIT (OUTPATIENT)
Dept: FAMILY MEDICINE CLINIC | Facility: CLINIC | Age: 56
End: 2021-07-12

## 2021-07-12 ENCOUNTER — APPOINTMENT (OUTPATIENT)
Dept: LAB | Facility: HOSPITAL | Age: 56
End: 2021-07-12
Payer: MEDICARE

## 2021-07-12 DIAGNOSIS — IMO0002 UNCONTROLLED TYPE 2 DIABETES MELLITUS WITH DIABETIC POLYNEUROPATHY, WITH LONG-TERM CURRENT USE OF INSULIN: ICD-10-CM

## 2021-07-12 DIAGNOSIS — I48.91 ATRIAL FIBRILLATION, UNSPECIFIED TYPE (HCC): ICD-10-CM

## 2021-07-12 LAB
ALBUMIN SERPL BCP-MCNC: 3.2 G/DL (ref 3.5–5)
ALP SERPL-CCNC: 93 U/L (ref 46–116)
ALT SERPL W P-5'-P-CCNC: 30 U/L (ref 12–78)
ANION GAP SERPL CALCULATED.3IONS-SCNC: 5 MMOL/L (ref 4–13)
AST SERPL W P-5'-P-CCNC: 18 U/L (ref 5–45)
BASOPHILS # BLD AUTO: 0.06 THOUSANDS/ΜL (ref 0–0.1)
BASOPHILS NFR BLD AUTO: 1 % (ref 0–1)
BILIRUB SERPL-MCNC: 0.48 MG/DL (ref 0.2–1)
BUN SERPL-MCNC: 11 MG/DL (ref 5–25)
CALCIUM ALBUM COR SERPL-MCNC: 9.4 MG/DL (ref 8.3–10.1)
CALCIUM SERPL-MCNC: 8.8 MG/DL (ref 8.3–10.1)
CHLORIDE SERPL-SCNC: 101 MMOL/L (ref 100–108)
CO2 SERPL-SCNC: 30 MMOL/L (ref 21–32)
CREAT SERPL-MCNC: 0.83 MG/DL (ref 0.6–1.3)
EOSINOPHIL # BLD AUTO: 0.34 THOUSAND/ΜL (ref 0–0.61)
EOSINOPHIL NFR BLD AUTO: 4 % (ref 0–6)
ERYTHROCYTE [DISTWIDTH] IN BLOOD BY AUTOMATED COUNT: 15.9 % (ref 11.6–15.1)
EST. AVERAGE GLUCOSE BLD GHB EST-MCNC: 148 MG/DL
GFR SERPL CREATININE-BSD FRML MDRD: 98 ML/MIN/1.73SQ M
GLUCOSE SERPL-MCNC: 124 MG/DL (ref 65–140)
HBA1C MFR BLD: 6.8 %
HCT VFR BLD AUTO: 46.2 % (ref 36.5–49.3)
HGB BLD-MCNC: 14.5 G/DL (ref 12–17)
IMM GRANULOCYTES # BLD AUTO: 0.04 THOUSAND/UL (ref 0–0.2)
IMM GRANULOCYTES NFR BLD AUTO: 0 % (ref 0–2)
INR PPP: 2.69 (ref 0.84–1.19)
LYMPHOCYTES # BLD AUTO: 1.43 THOUSANDS/ΜL (ref 0.6–4.47)
LYMPHOCYTES NFR BLD AUTO: 16 % (ref 14–44)
MCH RBC QN AUTO: 28 PG (ref 26.8–34.3)
MCHC RBC AUTO-ENTMCNC: 31.4 G/DL (ref 31.4–37.4)
MCV RBC AUTO: 89 FL (ref 82–98)
MONOCYTES # BLD AUTO: 0.7 THOUSAND/ΜL (ref 0.17–1.22)
MONOCYTES NFR BLD AUTO: 8 % (ref 4–12)
NEUTROPHILS # BLD AUTO: 6.65 THOUSANDS/ΜL (ref 1.85–7.62)
NEUTS SEG NFR BLD AUTO: 71 % (ref 43–75)
NRBC BLD AUTO-RTO: 0 /100 WBCS
PLATELET # BLD AUTO: 271 THOUSANDS/UL (ref 149–390)
PMV BLD AUTO: 9 FL (ref 8.9–12.7)
POTASSIUM SERPL-SCNC: 4.2 MMOL/L (ref 3.5–5.3)
PROT SERPL-MCNC: 8.1 G/DL (ref 6.4–8.2)
PROTHROMBIN TIME: 28 SECONDS (ref 11.6–14.5)
RBC # BLD AUTO: 5.18 MILLION/UL (ref 3.88–5.62)
SODIUM SERPL-SCNC: 136 MMOL/L (ref 136–145)
WBC # BLD AUTO: 9.22 THOUSAND/UL (ref 4.31–10.16)

## 2021-07-12 PROCEDURE — 85025 COMPLETE CBC W/AUTO DIFF WBC: CPT | Performed by: FAMILY MEDICINE

## 2021-07-12 PROCEDURE — 83036 HEMOGLOBIN GLYCOSYLATED A1C: CPT

## 2021-07-12 PROCEDURE — 80053 COMPREHEN METABOLIC PANEL: CPT | Performed by: FAMILY MEDICINE

## 2021-07-12 PROCEDURE — 85610 PROTHROMBIN TIME: CPT

## 2021-07-12 PROCEDURE — 36415 COLL VENOUS BLD VENIPUNCTURE: CPT | Performed by: FAMILY MEDICINE

## 2021-07-30 ENCOUNTER — HOSPITAL ENCOUNTER (OUTPATIENT)
Dept: RADIOLOGY | Age: 56
Discharge: HOME | End: 2021-07-30
Payer: MEDICARE

## 2021-07-30 DIAGNOSIS — C78.02 MALIGNANT NEOPLASM METASTATIC TO BOTH LUNGS (HCC): ICD-10-CM

## 2021-07-30 DIAGNOSIS — C78.01 MALIGNANT NEOPLASM METASTATIC TO BOTH LUNGS (HCC): ICD-10-CM

## 2021-07-30 LAB — GLUCOSE SERPL-MCNC: 83 MG/DL (ref 65–140)

## 2021-07-30 PROCEDURE — A9552 F18 FDG: HCPCS

## 2021-07-30 PROCEDURE — G1004 CDSM NDSC: HCPCS

## 2021-07-30 PROCEDURE — 82948 REAGENT STRIP/BLOOD GLUCOSE: CPT

## 2021-07-30 PROCEDURE — 78815 PET IMAGE W/CT SKULL-THIGH: CPT

## 2021-08-02 DIAGNOSIS — J43.9 PULMONARY EMPHYSEMA, UNSPECIFIED EMPHYSEMA TYPE (HCC): ICD-10-CM

## 2021-08-02 DIAGNOSIS — IMO0002 UNCONTROLLED TYPE 2 DIABETES MELLITUS WITH DIABETIC POLYNEUROPATHY, WITH LONG-TERM CURRENT USE OF INSULIN: ICD-10-CM

## 2021-08-02 RX ORDER — INSULIN ASPART 100 [IU]/ML
INJECTION, SOLUTION INTRAVENOUS; SUBCUTANEOUS
Qty: 18 ML | Refills: 5 | Status: SHIPPED | OUTPATIENT
Start: 2021-08-02 | End: 2021-08-02 | Stop reason: SDUPTHER

## 2021-08-02 RX ORDER — INSULIN ASPART 100 [IU]/ML
INJECTION, SOLUTION INTRAVENOUS; SUBCUTANEOUS
Qty: 18 ML | Refills: 5 | Status: SHIPPED | OUTPATIENT
Start: 2021-08-02 | End: 2022-02-03 | Stop reason: SDUPTHER

## 2021-08-02 RX ORDER — ALBUTEROL SULFATE 90 UG/1
2 AEROSOL, METERED RESPIRATORY (INHALATION) EVERY 6 HOURS PRN
Qty: 18 G | Refills: 3 | Status: SHIPPED | OUTPATIENT
Start: 2021-08-02 | End: 2022-08-08 | Stop reason: SDUPTHER

## 2021-08-05 ENCOUNTER — OFFICE VISIT (OUTPATIENT)
Dept: PULMONOLOGY | Facility: CLINIC | Age: 56
End: 2021-08-05
Payer: MEDICARE

## 2021-08-05 ENCOUNTER — PREP FOR PROCEDURE (OUTPATIENT)
Dept: PULMONOLOGY | Facility: CLINIC | Age: 56
End: 2021-08-05

## 2021-08-05 VITALS
SYSTOLIC BLOOD PRESSURE: 115 MMHG | WEIGHT: 315 LBS | OXYGEN SATURATION: 94 % | TEMPERATURE: 97.7 F | DIASTOLIC BLOOD PRESSURE: 66 MMHG | HEART RATE: 82 BPM | HEIGHT: 73 IN | RESPIRATION RATE: 18 BRPM | BODY MASS INDEX: 41.75 KG/M2

## 2021-08-05 DIAGNOSIS — F17.210 CIGARETTE NICOTINE DEPENDENCE WITHOUT COMPLICATION: ICD-10-CM

## 2021-08-05 DIAGNOSIS — E66.01 MORBID OBESITY WITH BMI OF 50.0-59.9, ADULT (HCC): ICD-10-CM

## 2021-08-05 DIAGNOSIS — J44.9 COPD, MODERATE (HCC): Primary | ICD-10-CM

## 2021-08-05 DIAGNOSIS — J44.9 COPD MIXED TYPE (HCC): ICD-10-CM

## 2021-08-05 DIAGNOSIS — I48.91 ATRIAL FIBRILLATION, UNSPECIFIED TYPE (HCC): ICD-10-CM

## 2021-08-05 DIAGNOSIS — G47.33 OBSTRUCTIVE SLEEP APNEA: ICD-10-CM

## 2021-08-05 DIAGNOSIS — R91.8 MULTIPLE PULMONARY NODULES DETERMINED BY COMPUTED TOMOGRAPHY OF LUNG: Primary | ICD-10-CM

## 2021-08-05 PROCEDURE — 99214 OFFICE O/P EST MOD 30 MIN: CPT | Performed by: PHYSICIAN ASSISTANT

## 2021-08-05 RX ORDER — ALBUTEROL SULFATE 2.5 MG/3ML
2.5 SOLUTION RESPIRATORY (INHALATION) EVERY 6 HOURS PRN
Qty: 360 ML | Refills: 2 | Status: SHIPPED | OUTPATIENT
Start: 2021-08-05

## 2021-08-05 NOTE — ASSESSMENT & PLAN NOTE
Recommend that patient remain on Trelegy Ellipta 1 puff daily and as needed albuterol HFA 2 puffs q 6 hours  Encouraged more routine use of his albuterol nebs 4 times daily as needed  ProAir refills sent to pharmacy

## 2021-08-05 NOTE — ASSESSMENT & PLAN NOTE
Rate controlled in the office today  Medical management per Cardiology  Recommend patient hold his Coumadin this Saturday, 4 days prior to procedure then resume the night of the procedure at his usual dose  Discussed via TT with Dr Hernesto Duran

## 2021-08-05 NOTE — H&P (VIEW-ONLY)
Pulmonary Follow Up Note   Mehdi Teague 60 y o  male MRN: [id number]  8/5/2021      Assessment:    COPD, moderate ([address] )   Recommend that patient remain on Trelegy Ellipta 1 puff daily and as needed albuterol HFA 2 puffs q 6 hours  Encouraged more routine use of his albuterol nebs 4 times daily as needed  ProAir refills sent to pharmacy  Atrial fibrillation ([address] )  Rate controlled in the office today  Medical management per Cardiology  Recommend patient hold his Coumadin this Saturday, 4 days prior to procedure then resume the night of the procedure at his usual dose  Discussed via TT with Dr Apolinar Salas  Multiple pulmonary nodules determined by computed tomography of lung  Reviewed results of high-resolution CT chest and PET-CT with patient and his wife today in the office  Findings reveal diffuse nodular opacities bilaterally with PET avidity  There is no evidence of primary lesion  There are a few enlarged lymph nodes in the mediastinum and right inguinal region with mild FDG uptake  Differential includes metastatic cancer vs other inflammatory or infectious process  Recommend bronchoscopy with transbronchial biopsy and BAL next Wednesday 8/11 in [facility]  Films were reviewed by Dr Denisse Pa who is in agreement with procedure  Kwan Gonsalez will be reaching out to patient regarding scheduling  He knows to hold his Coumadin 4 days prior to procedure and resume the night of the bronch at his usual dose  Cigarette nicotine dependence without complication   Congratulated patient on recent cessation  Encouraged him to keep up the good work  Plan:    Diagnoses and all orders for this visit:    COPD, moderate ([address] )    COPD mixed type ([address] )  -     albuterol (2 5 mg/3 mL) 0 083 % nebulizer solution;  Take 3 mL (2 5 mg total) by nebulization every 6 (six) hours as needed for wheezing or shortness of breath    Obstructive sleep apnea    Cigarette nicotine dependence without complication    Atrial fibrillation, unspecified type ([facility] )    Morbid obesity with BMI of 50 0-59 9, adult ([facility] )        Return in about 4 weeks (around 9/2/2021)  History of Present Illness   HPI:  Mehdi Teague is a 60 y o  male who   Presents to the office today for routine follow-up  The patient has past medical history positive for moderate COPD, THIEN on BiPAP, morbid obesity,  Cardiomyopathy, atrial fibrillation  On Coumadin, hypertension, hyperlipidemia  Since last time patient was seen patient has developed worsening shortness of breath, dyspnea on exertion  Thought to originally be caused by volume overload he was having his diuretics minutes by PCP and Cardiology  He then had a chest x-ray that showed diffuse nodular opacities bilaterally prompting a high-resolution CT that revealed the same  PET-CT followed that showed heterogeneous radiotracer uptake in bilateral lung fields corresponding with diffuse nodularity on CT  No specific primary lesion was identified  There is also only mild FDG uptake in a few enlarged lymph nodes in the mediastinum and right inguinal region  Objectively, patient reports that he does have shortness of breath both at rest and worse with any sort of exertion  He also reports a chronic cough that is productive of some sputum but denies hemoptysis  He also reports off and on wheezing  Denies chest pain, palpitations, pleurisy  He does have chronic lower extremity edema for which he takes furosemide 80 mg p o  daily  Denies abnormal weight loss, decreased appetite/anorexia, night sweats or hemoptysis  He is not currently working  Previously worked as a will improved pain   He was exposed to fumes  His wife also admits that he used to work as a  and had significant exposures to brake dust and some asbestos  Patient has 1 dog at home and his wife takes care for bird  Patient states that he does not interact with the bird very much nor clean his cage at all       Patient and his wife deny change in their environment  Denies mold or fungal exposures  Denies recent travel  No sick contacts  Review of Systems   All other systems reviewed and are negative  Historical Information   Past Medical History:   Diagnosis Date    Asthma     last assessed 12    Athscl heart disease of native coronary artery w/o ang pctrs     [facility]     last assessed 12    Cardiomyopathy [facility])     Closed fracture of fibula     last assessed 10/18/13    COLD (chronic obstructive lung disease) ([facility] )     last assessed 12    Coronary angioplasty status     History of echocardiogram 2017    EF 0 50, LVSF is at the lower limits of normal  Mild concentric LV hypertophy  Mild mitral regurg  Normal functioning mechanical AV      Hyperlipidemia     Hypertension     last assessed 12    Presence of prosthetic heart valve     Restless legs syndrome     last assessed 12    Sleep apnea     last assessed 12     Past Surgical History:   Procedure Laterality Date    AORTIC VALVE REPLACEMENT  10/07/2016    AVR replacement, mechanical    CORONARY ANGIOPLASTY WITH STENT PLACEMENT  2010    EF 40%, Successful bare metal stent mid RCA     Family History   Problem Relation Age of Onset    Atrial fibrillation Mother     Diabetes type II Mother     Heart attack Father         acute    Heart failure Father     Stroke Father         syndrome       Social History     Tobacco Use   Smoking Status Former Smoker    Years: 35 00    Types: Cigarettes    Quit date: 10/2016    Years since quittin 8   Smokeless Tobacco Never Used   Tobacco Comment    quit 1 week ago          Meds/Allergies     Current Outpatient Medications:     albuterol (2 5 mg/3 mL) 0 083 % nebulizer solution, Take 3 mL (2 5 mg total) by nebulization every 6 (six) hours as needed for wheezing or shortness of breath, Disp: 360 mL, Rfl: 2    albuterol (Ventolin HFA) 90 mcg/act inhaler, Inhale 2 puffs every 6 (six) hours as needed for shortness of breath Must be brand necessary, Disp: 18 g, Rfl: 3    aspirin 81 MG tablet, Take by mouth, Disp: , Rfl:     atorvastatin (LIPITOR) 10 mg tablet, Take 1 tablet (10 mg total) by mouth daily, Disp: 90 tablet, Rfl: 3    benazepril (LOTENSIN) 10 mg tablet, Take 1 tablet (10 mg total) by mouth daily, Disp: 90 tablet, Rfl: 1    cetirizine (ZyrTEC) 10 mg tablet, Take 1 tablet (10 mg total) by mouth daily, Disp: 30 tablet, Rfl: 1    diltiazem (CARDIZEM CD) 240 mg 24 hr capsule, Take 1 capsule (240 mg total) by mouth 2 (two) times a day, Disp: 180 capsule, Rfl: 3    fluticasone-umeclidinium-vilanterol (TRELEGY) 100-62 5-25 MCG/INH inhaler, Inhale 1 puff daily Rinse mouth after use , Disp: 2 Inhaler, Rfl: 5    furosemide (LASIX) 80 mg tablet, Take 1 tablet (80 mg total) by mouth daily, Disp: 90 tablet, Rfl: 3    insulin aspart (NovoLOG FlexPen) 100 UNIT/ML injection pen, Inject 14 units with breakfast and lunch and 36 units with dinner , Disp: 18 mL, Rfl: 5    insulin degludec Orvel Barak FlexTouch) 200 units/mL CONCENTRATED U-200 injection pen, Inject 66 units daily  , Disp: 6 pen, Rfl: 2    Insulin Pen Needle (BD Pen Needle Fany U/F) 32G X 4 MM MISC, Inject as directed 2 (two) times a day, Disp: 100 each, Rfl: 5    metFORMIN (GLUCOPHAGE) 1000 MG tablet, Take 1 tablet (1,000 mg total) by mouth 2 (two) times a day, Disp: 180 tablet, Rfl: 1    metoprolol tartrate (LOPRESSOR) 50 mg tablet, Take 1 tablet (50 mg total) by mouth 2 (two) times a day, Disp: 180 tablet, Rfl: 3    nitroglycerin (NITROSTAT) 0 4 mg SL tablet, Place 1 tablet (0 4 mg total) under the tongue every 5 (five) minutes as needed for chest pain, Disp: 100 tablet, Rfl: 3    potassium chloride 20 MEQ TBCR, Take 1 tablet (20 mEq total) by mouth daily, Disp: 90 tablet, Rfl: 3    warfarin (COUMADIN) 10 mg tablet, Take 1 tablet (10 mg total) by mouth daily, Disp: 90 tablet, Rfl: 3    warfarin (COUMADIN) 2 mg tablet, Take 1 tablet (2 mg total) by mouth daily (Patient taking differently: Take 2 mg by mouth daily except on monday), Disp: 90 tablet, Rfl: 3  Allergies   Allergen Reactions    Fluticasone-Salmeterol Palpitations       Vitals: Blood pressure 115/66, pulse 82, temperature 97 7 °F (36 5 °C), temperature source Tympanic, resp  rate 18, height 6' 1" (1 854 m), weight (!) 185 kg (407 lb 12 8 oz), SpO2 94 %  Body mass index is 53 8 kg/m²  Oxygen Therapy  SpO2: 94 % (room air)    Physical Exam  Physical Exam  Vitals reviewed  Constitutional:       Appearance: Normal appearance  He is well-developed  HENT:      Head: Normocephalic and atraumatic  Right Ear: External ear normal       Left Ear: External ear normal       Nose: Nose normal       Mouth/Throat:      Mouth: Mucous membranes are moist       Pharynx: Oropharynx is clear  Eyes:      Extraocular Movements: Extraocular movements intact  Pupils: Pupils are equal, round, and reactive to light  Cardiovascular:      Rate and Rhythm: Normal rate and regular rhythm  Pulses: Normal pulses  Heart sounds: Normal heart sounds  No murmur heard  Pulmonary:      Effort: Pulmonary effort is normal  No respiratory distress  Breath sounds: No stridor  Wheezing (faint expiratory wheeze in upper lobes bilaterally) present  No rhonchi or rales  Abdominal:      Palpations: Abdomen is soft  Tenderness: There is no abdominal tenderness  Hernia: No hernia is present  Musculoskeletal:         General: No swelling, tenderness or deformity  Normal range of motion  Cervical back: Normal range of motion and neck supple  Skin:     General: Skin is warm and dry  Capillary Refill: Capillary refill takes less than 2 seconds  Neurological:      General: No focal deficit present  Mental Status: He is alert and oriented to person, place, and time  Mental status is at baseline     Psychiatric:         Behavior: Behavior normal          Thought Content: Thought content normal          Judgment: Judgment normal          Labs: I have personally reviewed pertinent lab results  , ABG: No results found for: PHART, XIN4WXN, PO2ART, PEJ9OWC, U7FLSDTW, BEART, SOURCE, BNP: No results found for: BNP, CBC: No results found for: WBC, HGB, HCT, MCV, PLT, ADJUSTEDWBC, MCH, MCHC, RDW, MPV, NRBC, CMP: No results found for: SODIUM, K, CL, CO2, ANIONGAP, BUN, CREATININE, GLUCOSE, CALCIUM, AST, ALT, ALKPHOS, PROT, BILITOT, EGFR, PT/INR: No results found for: PT, INR, Troponin: No results found for: TROPONINI  Lab Results   Component Value Date    WBC 9 22 07/12/2021    HGB 14 5 07/12/2021    HCT 46 2 07/12/2021    MCV 89 07/12/2021     07/12/2021     Lab Results   Component Value Date    GLUCOSE 119 09/01/2013    CALCIUM 8 8 07/12/2021     09/01/2013    K 4 2 07/12/2021    CO2 30 07/12/2021     07/12/2021    BUN 11 07/12/2021    CREATININE 0 83 07/12/2021     No results found for: IGE  Lab Results   Component Value Date    ALT 30 07/12/2021    AST 18 07/12/2021    ALKPHOS 93 07/12/2021    BILITOT 0 6 09/01/2013       Imaging and other studies: I have personally reviewed pertinent reports  and I have personally reviewed pertinent films in PACS       PET-CT 07/30/2021    heterogeneous radiotracer uptake in bilateral lungs more intense on the right corresponding with diffuse nodularity on CT  Concern for metastatic disease versus other inflammatory/infectious process  Mild FDG uptake in a few enlarged lymph nodes in the mediastinum and right inguinal region  No suspicious hypermetabolic primary lesion identified  Pulmonary function testing:  Performed 3/18/2019  FEV1/FVC ratio 61%   FEV1 69% predicted  FVC 80% predicted  There is significant response to bronchodilators  TLC 83 % predicted  RV 81 % predicted  DLCO corrected for hemoglobin 76 % predicted    Moderate obstructive airflow defect    Significant response to bronchodilator  Normal lung volumes  Mildly impaired diffusion capacity  Other Studies: I have personally reviewed pertinent reports  Echocardiogram 09/03/2020   EF 55%  No regional wall motion abnormality  Wall thickness mildly increased  Mild concentric hypertrophy  Right ventricular systolic function normal   Mild mitral regurgitation  Mechanical aortic valve bioprosthesis present  Mild tricuspid regurgitation

## 2021-08-05 NOTE — ASSESSMENT & PLAN NOTE
Reviewed results of high-resolution CT chest and PET-CT with patient and his wife today in the office  Findings reveal diffuse nodular opacities bilaterally with PET avidity  There is no evidence of primary lesion  There are a few enlarged lymph nodes in the mediastinum and right inguinal region with mild FDG uptake  Differential includes metastatic cancer vs other inflammatory or infectious process  Recommend bronchoscopy with transbronchial biopsy and BAL next Wednesday 8/11 in [facility]  Films were reviewed by Dr Anjum Cole who is in agreement with procedure  Lonnie Carlisle will be reaching out to patient regarding scheduling  He knows to hold his Coumadin 4 days prior to procedure and resume the night of the bronch at his usual dose

## 2021-08-05 NOTE — PROGRESS NOTES
Pulmonary Follow Up Note   Katia Oconnell 60 y o  male MRN: [id number]  8/5/2021      Assessment:    COPD, moderate ([address] )   Recommend that patient remain on Trelegy Ellipta 1 puff daily and as needed albuterol HFA 2 puffs q 6 hours  Encouraged more routine use of his albuterol nebs 4 times daily as needed  ProAir refills sent to pharmacy  Atrial fibrillation ([address] )  Rate controlled in the office today  Medical management per Cardiology  Recommend patient hold his Coumadin this Saturday, 4 days prior to procedure then resume the night of the procedure at his usual dose  Discussed via TT with Dr Carolyn Jose  Multiple pulmonary nodules determined by computed tomography of lung  Reviewed results of high-resolution CT chest and PET-CT with patient and his wife today in the office  Findings reveal diffuse nodular opacities bilaterally with PET avidity  There is no evidence of primary lesion  There are a few enlarged lymph nodes in the mediastinum and right inguinal region with mild FDG uptake  Differential includes metastatic cancer vs other inflammatory or infectious process  Recommend bronchoscopy with transbronchial biopsy and BAL next Wednesday 8/11 in [facility]  Films were reviewed by Dr Rosita Valdes who is in agreement with procedure  Ector Braden will be reaching out to patient regarding scheduling  He knows to hold his Coumadin 4 days prior to procedure and resume the night of the bronch at his usual dose  Cigarette nicotine dependence without complication   Congratulated patient on recent cessation  Encouraged him to keep up the good work  Plan:    Diagnoses and all orders for this visit:    COPD, moderate ([address] )    COPD mixed type ([address] )  -     albuterol (2 5 mg/3 mL) 0 083 % nebulizer solution;  Take 3 mL (2 5 mg total) by nebulization every 6 (six) hours as needed for wheezing or shortness of breath    Obstructive sleep apnea    Cigarette nicotine dependence without complication    Atrial fibrillation, unspecified type ([facility] )    Morbid obesity with BMI of 50 0-59 9, adult ([facility] )        Return in about 4 weeks (around 9/2/2021)  History of Present Illness   HPI:  Tonja Colin is a 60 y o  male who   Presents to the office today for routine follow-up  The patient has past medical history positive for moderate COPD, THIEN on BiPAP, morbid obesity,  Cardiomyopathy, atrial fibrillation  On Coumadin, hypertension, hyperlipidemia  Since last time patient was seen patient has developed worsening shortness of breath, dyspnea on exertion  Thought to originally be caused by volume overload he was having his diuretics minutes by PCP and Cardiology  He then had a chest x-ray that showed diffuse nodular opacities bilaterally prompting a high-resolution CT that revealed the same  PET-CT followed that showed heterogeneous radiotracer uptake in bilateral lung fields corresponding with diffuse nodularity on CT  No specific primary lesion was identified  There is also only mild FDG uptake in a few enlarged lymph nodes in the mediastinum and right inguinal region  Objectively, patient reports that he does have shortness of breath both at rest and worse with any sort of exertion  He also reports a chronic cough that is productive of some sputum but denies hemoptysis  He also reports off and on wheezing  Denies chest pain, palpitations, pleurisy  He does have chronic lower extremity edema for which he takes furosemide 80 mg p o  daily  Denies abnormal weight loss, decreased appetite/anorexia, night sweats or hemoptysis  He is not currently working  Previously worked as a will improved pain   He was exposed to fumes  His wife also admits that he used to work as a  and had significant exposures to brake dust and some asbestos  Patient has 1 dog at home and his wife takes care for bird  Patient states that he does not interact with the bird very much nor clean his cage at all       Patient and his wife deny change in their environment  Denies mold or fungal exposures  Denies recent travel  No sick contacts  Review of Systems   All other systems reviewed and are negative  Historical Information   Past Medical History:   Diagnosis Date    Asthma     last assessed 12    Athscl heart disease of native coronary artery w/o ang pctrs     At[facility] fibrillation [facility] )     last assessed 12    Cardiomyopathy [facility])     Closed fracture of fibula     last assessed 10/18/13    COLD (chronic obstructive lung disease) ([facility] )     last assessed 12    Coronary angioplasty status     History of echocardiogram 2017    EF 0 50, LVSF is at the lower limits of normal  Mild concentric LV hypertophy  Mild mitral regurg  Normal functioning mechanical AV      Hyperlipidemia     Hypertension     last assessed 12    Presence of prosthetic heart valve     Restless legs syndrome     last assessed 12    Sleep apnea     last assessed 12     Past Surgical History:   Procedure Laterality Date    AORTIC VALVE REPLACEMENT  10/07/2016    AVR replacement, mechanical    CORONARY ANGIOPLASTY WITH STENT PLACEMENT  2010    EF 40%, Successful bare metal stent mid RCA     Family History   Problem Relation Age of Onset    Atrial fibrillation Mother     Diabetes type II Mother     Heart attack Father         acute    Heart failure Father     Stroke Father         syndrome       Social History     Tobacco Use   Smoking Status Former Smoker    Years: 35 00    Types: Cigarettes    Quit date: 10/2016    Years since quittin 8   Smokeless Tobacco Never Used   Tobacco Comment    quit 1 week ago          Meds/Allergies     Current Outpatient Medications:     albuterol (2 5 mg/3 mL) 0 083 % nebulizer solution, Take 3 mL (2 5 mg total) by nebulization every 6 (six) hours as needed for wheezing or shortness of breath, Disp: 360 mL, Rfl: 2    albuterol (Ventolin HFA) 90 mcg/act inhaler, Inhale 2 puffs every 6 (six) hours as needed for shortness of breath Must be brand necessary, Disp: 18 g, Rfl: 3    aspirin 81 MG tablet, Take by mouth, Disp: , Rfl:     atorvastatin (LIPITOR) 10 mg tablet, Take 1 tablet (10 mg total) by mouth daily, Disp: 90 tablet, Rfl: 3    benazepril (LOTENSIN) 10 mg tablet, Take 1 tablet (10 mg total) by mouth daily, Disp: 90 tablet, Rfl: 1    cetirizine (ZyrTEC) 10 mg tablet, Take 1 tablet (10 mg total) by mouth daily, Disp: 30 tablet, Rfl: 1    diltiazem (CARDIZEM CD) 240 mg 24 hr capsule, Take 1 capsule (240 mg total) by mouth 2 (two) times a day, Disp: 180 capsule, Rfl: 3    fluticasone-umeclidinium-vilanterol (TRELEGY) 100-62 5-25 MCG/INH inhaler, Inhale 1 puff daily Rinse mouth after use , Disp: 2 Inhaler, Rfl: 5    furosemide (LASIX) 80 mg tablet, Take 1 tablet (80 mg total) by mouth daily, Disp: 90 tablet, Rfl: 3    insulin aspart (NovoLOG FlexPen) 100 UNIT/ML injection pen, Inject 14 units with breakfast and lunch and 36 units with dinner , Disp: 18 mL, Rfl: 5    insulin degludec Flaca Caldron FlexTouch) 200 units/mL CONCENTRATED U-200 injection pen, Inject 66 units daily  , Disp: 6 pen, Rfl: 2    Insulin Pen Needle (BD Pen Needle Fany U/F) 32G X 4 MM MISC, Inject as directed 2 (two) times a day, Disp: 100 each, Rfl: 5    metFORMIN (GLUCOPHAGE) 1000 MG tablet, Take 1 tablet (1,000 mg total) by mouth 2 (two) times a day, Disp: 180 tablet, Rfl: 1    metoprolol tartrate (LOPRESSOR) 50 mg tablet, Take 1 tablet (50 mg total) by mouth 2 (two) times a day, Disp: 180 tablet, Rfl: 3    nitroglycerin (NITROSTAT) 0 4 mg SL tablet, Place 1 tablet (0 4 mg total) under the tongue every 5 (five) minutes as needed for chest pain, Disp: 100 tablet, Rfl: 3    potassium chloride 20 MEQ TBCR, Take 1 tablet (20 mEq total) by mouth daily, Disp: 90 tablet, Rfl: 3    warfarin (COUMADIN) 10 mg tablet, Take 1 tablet (10 mg total) by mouth daily, Disp: 90 tablet, Rfl: 3    warfarin (COUMADIN) 2 mg tablet, Take 1 tablet (2 mg total) by mouth daily (Patient taking differently: Take 2 mg by mouth daily except on monday), Disp: 90 tablet, Rfl: 3  Allergies   Allergen Reactions    Fluticasone-Salmeterol Palpitations       Vitals: Blood pressure 115/66, pulse 82, temperature 97 7 °F (36 5 °C), temperature source Tympanic, resp  rate 18, height 6' 1" (1 854 m), weight (!) 185 kg (407 lb 12 8 oz), SpO2 94 %  Body mass index is 53 8 kg/m²  Oxygen Therapy  SpO2: 94 % (room air)    Physical Exam  Physical Exam  Vitals reviewed  Constitutional:       Appearance: Normal appearance  He is well-developed  HENT:      Head: Normocephalic and atraumatic  Right Ear: External ear normal       Left Ear: External ear normal       Nose: Nose normal       Mouth/Throat:      Mouth: Mucous membranes are moist       Pharynx: Oropharynx is clear  Eyes:      Extraocular Movements: Extraocular movements intact  Pupils: Pupils are equal, round, and reactive to light  Cardiovascular:      Rate and Rhythm: Normal rate and regular rhythm  Pulses: Normal pulses  Heart sounds: Normal heart sounds  No murmur heard  Pulmonary:      Effort: Pulmonary effort is normal  No respiratory distress  Breath sounds: No stridor  Wheezing (faint expiratory wheeze in upper lobes bilaterally) present  No rhonchi or rales  Abdominal:      Palpations: Abdomen is soft  Tenderness: There is no abdominal tenderness  Hernia: No hernia is present  Musculoskeletal:         General: No swelling, tenderness or deformity  Normal range of motion  Cervical back: Normal range of motion and neck supple  Skin:     General: Skin is warm and dry  Capillary Refill: Capillary refill takes less than 2 seconds  Neurological:      General: No focal deficit present  Mental Status: He is alert and oriented to person, place, and time  Mental status is at baseline     Psychiatric:         Behavior: Behavior normal          Thought Content: Thought content normal          Judgment: Judgment normal          Labs: I have personally reviewed pertinent lab results  , ABG: No results found for: PHART, KTN3MCI, PO2ART, VIW8TRG, W1ZUJCSO, BEART, SOURCE, BNP: No results found for: BNP, CBC: No results found for: WBC, HGB, HCT, MCV, PLT, ADJUSTEDWBC, MCH, MCHC, RDW, MPV, NRBC, CMP: No results found for: SODIUM, K, CL, CO2, ANIONGAP, BUN, CREATININE, GLUCOSE, CALCIUM, AST, ALT, ALKPHOS, PROT, BILITOT, EGFR, PT/INR: No results found for: PT, INR, Troponin: No results found for: TROPONINI  Lab Results   Component Value Date    WBC 9 22 07/12/2021    HGB 14 5 07/12/2021    HCT 46 2 07/12/2021    MCV 89 07/12/2021     07/12/2021     Lab Results   Component Value Date    GLUCOSE 119 09/01/2013    CALCIUM 8 8 07/12/2021     09/01/2013    K 4 2 07/12/2021    CO2 30 07/12/2021     07/12/2021    BUN 11 07/12/2021    CREATININE 0 83 07/12/2021     No results found for: IGE  Lab Results   Component Value Date    ALT 30 07/12/2021    AST 18 07/12/2021    ALKPHOS 93 07/12/2021    BILITOT 0 6 09/01/2013       Imaging and other studies: I have personally reviewed pertinent reports  and I have personally reviewed pertinent films in PACS       PET-CT 07/30/2021    heterogeneous radiotracer uptake in bilateral lungs more intense on the right corresponding with diffuse nodularity on CT  Concern for metastatic disease versus other inflammatory/infectious process  Mild FDG uptake in a few enlarged lymph nodes in the mediastinum and right inguinal region  No suspicious hypermetabolic primary lesion identified  Pulmonary function testing:  Performed 3/18/2019  FEV1/FVC ratio 61%   FEV1 69% predicted  FVC 80% predicted  There is significant response to bronchodilators  TLC 83 % predicted  RV 81 % predicted  DLCO corrected for hemoglobin 76 % predicted    Moderate obstructive airflow defect    Significant response to bronchodilator  Normal lung volumes  Mildly impaired diffusion capacity  Other Studies: I have personally reviewed pertinent reports  Echocardiogram 09/03/2020   EF 55%  No regional wall motion abnormality  Wall thickness mildly increased  Mild concentric hypertrophy  Right ventricular systolic function normal   Mild mitral regurgitation  Mechanical aortic valve bioprosthesis present  Mild tricuspid regurgitation

## 2021-08-09 ENCOUNTER — TELEPHONE (OUTPATIENT)
Dept: GASTROENTEROLOGY | Facility: HOSPITAL | Age: 56
End: 2021-08-09

## 2021-08-09 ENCOUNTER — TELEPHONE (OUTPATIENT)
Dept: PULMONOLOGY | Facility: CLINIC | Age: 56
End: 2021-08-09

## 2021-08-09 ENCOUNTER — ANESTHESIA EVENT (OUTPATIENT)
Dept: GASTROENTEROLOGY | Facility: HOSPITAL | Age: 56
End: 2021-08-09

## 2021-08-09 NOTE — TELEPHONE ENCOUNTER
Patient scheduled for bronch on 8/10 at [facility] with Dr Shruthi Birch   Patient scheduled for 8 am arrival time for 7 am  Patient to be npo from midnight the night prior

## 2021-08-10 ENCOUNTER — ANESTHESIA (OUTPATIENT)
Dept: GASTROENTEROLOGY | Facility: HOSPITAL | Age: 56
End: 2021-08-10

## 2021-08-10 ENCOUNTER — HOSPITAL ENCOUNTER (OUTPATIENT)
Dept: RADIOLOGY | Facility: HOSPITAL | Age: 56
Setting detail: OUTPATIENT SURGERY
Discharge: HOME | End: 2021-08-10
Payer: MEDICARE

## 2021-08-10 ENCOUNTER — HOSPITAL ENCOUNTER (OUTPATIENT)
Dept: GASTROENTEROLOGY | Facility: HOSPITAL | Age: 56
Setting detail: OUTPATIENT SURGERY
Discharge: HOME | End: 2021-08-10
Attending: INTERNAL MEDICINE
Payer: MEDICARE

## 2021-08-10 VITALS
RESPIRATION RATE: 20 BRPM | WEIGHT: 315 LBS | OXYGEN SATURATION: 94 % | TEMPERATURE: 97.3 F | DIASTOLIC BLOOD PRESSURE: 80 MMHG | BODY MASS INDEX: 41.75 KG/M2 | HEIGHT: 73 IN | SYSTOLIC BLOOD PRESSURE: 119 MMHG | HEART RATE: 81 BPM

## 2021-08-10 DIAGNOSIS — R91.8 MULTIPLE PULMONARY NODULES DETERMINED BY COMPUTED TOMOGRAPHY OF LUNG: ICD-10-CM

## 2021-08-10 LAB — GLUCOSE SERPL-MCNC: 106 MG/DL (ref 65–140)

## 2021-08-10 PROCEDURE — 87252 VIRUS INOCULATION TISSUE: CPT | Performed by: INTERNAL MEDICINE

## 2021-08-10 PROCEDURE — 82948 REAGENT STRIP/BLOOD GLUCOSE: CPT

## 2021-08-10 PROCEDURE — 87116 MYCOBACTERIA CULTURE: CPT | Performed by: INTERNAL MEDICINE

## 2021-08-10 PROCEDURE — 88112 CYTOPATH CELL ENHANCE TECH: CPT | Performed by: PATHOLOGY

## 2021-08-10 PROCEDURE — 94640 AIRWAY INHALATION TREATMENT: CPT

## 2021-08-10 PROCEDURE — 87070 CULTURE OTHR SPECIMN AEROBIC: CPT | Performed by: INTERNAL MEDICINE

## 2021-08-10 PROCEDURE — 71045 X-RAY EXAM CHEST 1 VIEW: CPT

## 2021-08-10 PROCEDURE — 88185 FLOWCYTOMETRY/TC ADD-ON: CPT

## 2021-08-10 PROCEDURE — 87102 FUNGUS ISOLATION CULTURE: CPT | Performed by: INTERNAL MEDICINE

## 2021-08-10 PROCEDURE — 87077 CULTURE AEROBIC IDENTIFY: CPT | Performed by: INTERNAL MEDICINE

## 2021-08-10 PROCEDURE — 89051 BODY FLUID CELL COUNT: CPT | Performed by: PATHOLOGY

## 2021-08-10 PROCEDURE — 87206 SMEAR FLUORESCENT/ACID STAI: CPT | Performed by: INTERNAL MEDICINE

## 2021-08-10 PROCEDURE — 88184 FLOWCYTOMETRY/ TC 1 MARKER: CPT | Performed by: INTERNAL MEDICINE

## 2021-08-10 PROCEDURE — 31624 DX BRONCHOSCOPE/LAVAGE: CPT | Performed by: INTERNAL MEDICINE

## 2021-08-10 RX ORDER — KETAMINE HYDROCHLORIDE 50 MG/ML
INJECTION, SOLUTION, CONCENTRATE INTRAMUSCULAR; INTRAVENOUS AS NEEDED
Status: DISCONTINUED | OUTPATIENT
Start: 2021-08-10 | End: 2021-08-10

## 2021-08-10 RX ORDER — LIDOCAINE HYDROCHLORIDE 10 MG/ML
INJECTION, SOLUTION EPIDURAL; INFILTRATION; INTRACAUDAL; PERINEURAL AS NEEDED
Status: DISCONTINUED | OUTPATIENT
Start: 2021-08-10 | End: 2021-08-10

## 2021-08-10 RX ORDER — ALBUTEROL SULFATE 2.5 MG/3ML
2.5 SOLUTION RESPIRATORY (INHALATION) ONCE AS NEEDED
Status: COMPLETED | OUTPATIENT
Start: 2021-08-10 | End: 2021-08-10

## 2021-08-10 RX ORDER — ONDANSETRON 2 MG/ML
INJECTION INTRAMUSCULAR; INTRAVENOUS AS NEEDED
Status: DISCONTINUED | OUTPATIENT
Start: 2021-08-10 | End: 2021-08-10

## 2021-08-10 RX ORDER — SODIUM CHLORIDE, SODIUM LACTATE, POTASSIUM CHLORIDE, CALCIUM CHLORIDE 600; 310; 30; 20 MG/100ML; MG/100ML; MG/100ML; MG/100ML
INJECTION, SOLUTION INTRAVENOUS CONTINUOUS PRN
Status: DISCONTINUED | OUTPATIENT
Start: 2021-08-10 | End: 2021-08-10

## 2021-08-10 RX ORDER — FENTANYL CITRATE 50 UG/ML
INJECTION, SOLUTION INTRAMUSCULAR; INTRAVENOUS AS NEEDED
Status: DISCONTINUED | OUTPATIENT
Start: 2021-08-10 | End: 2021-08-10

## 2021-08-10 RX ORDER — SUCCINYLCHOLINE/SOD CL,ISO/PF 100 MG/5ML
SYRINGE (ML) INTRAVENOUS AS NEEDED
Status: DISCONTINUED | OUTPATIENT
Start: 2021-08-10 | End: 2021-08-10

## 2021-08-10 RX ORDER — ALBUTEROL SULFATE 2.5 MG/3ML
2.5 SOLUTION RESPIRATORY (INHALATION) ONCE
Status: COMPLETED | OUTPATIENT
Start: 2021-08-10 | End: 2021-08-10

## 2021-08-10 RX ORDER — PROPOFOL 10 MG/ML
INJECTION, EMULSION INTRAVENOUS AS NEEDED
Status: DISCONTINUED | OUTPATIENT
Start: 2021-08-10 | End: 2021-08-10

## 2021-08-10 RX ORDER — MIDAZOLAM HYDROCHLORIDE 2 MG/2ML
INJECTION, SOLUTION INTRAMUSCULAR; INTRAVENOUS AS NEEDED
Status: DISCONTINUED | OUTPATIENT
Start: 2021-08-10 | End: 2021-08-10

## 2021-08-10 RX ORDER — PROPOFOL 10 MG/ML
INJECTION, EMULSION INTRAVENOUS CONTINUOUS PRN
Status: DISCONTINUED | OUTPATIENT
Start: 2021-08-10 | End: 2021-08-10

## 2021-08-10 RX ADMIN — LIDOCAINE HYDROCHLORIDE 50 MG: 10 INJECTION, SOLUTION EPIDURAL; INFILTRATION; INTRACAUDAL at 08:33

## 2021-08-10 RX ADMIN — ALBUTEROL SULFATE 2.5 MG: 2.5 SOLUTION RESPIRATORY (INHALATION) at 09:35

## 2021-08-10 RX ADMIN — FENTANYL CITRATE 50 MCG: 50 INJECTION, SOLUTION INTRAMUSCULAR; INTRAVENOUS at 08:30

## 2021-08-10 RX ADMIN — Medication 100 MG: at 08:33

## 2021-08-10 RX ADMIN — MIDAZOLAM HYDROCHLORIDE 2 MG: 1 INJECTION, SOLUTION INTRAMUSCULAR; INTRAVENOUS at 08:28

## 2021-08-10 RX ADMIN — PROPOFOL 200 MG: 10 INJECTION, EMULSION INTRAVENOUS at 08:33

## 2021-08-10 RX ADMIN — ONDANSETRON 4 MG: 2 INJECTION INTRAMUSCULAR; INTRAVENOUS at 08:28

## 2021-08-10 RX ADMIN — PROPOFOL 150 MCG/KG/MIN: 10 INJECTION, EMULSION INTRAVENOUS at 08:39

## 2021-08-10 RX ADMIN — SODIUM CHLORIDE, SODIUM LACTATE, POTASSIUM CHLORIDE, AND CALCIUM CHLORIDE: .6; .31; .03; .02 INJECTION, SOLUTION INTRAVENOUS at 08:30

## 2021-08-10 RX ADMIN — KETAMINE HYDROCHLORIDE 30 MG: 50 INJECTION INTRAMUSCULAR; INTRAVENOUS at 08:33

## 2021-08-10 RX ADMIN — ALBUTEROL SULFATE 2.5 MG: 2.5 SOLUTION RESPIRATORY (INHALATION) at 12:25

## 2021-08-10 NOTE — INTERVAL H&P NOTE
H&P reviewed  After examining the patient I find no changes in the patients condition since the H&P had been written      Vitals:    08/10/21 0719   BP: 112/64   Pulse: 70   Resp: (!) 24   Temp: (!) 97 3 °F (36 3 °C)   SpO2: 95%     Plan for airway inspection with BAL    Cytology, routine and fungal cx, afb bx, cd4/8 ratio    Scott Peng MD

## 2021-08-10 NOTE — ANESTHESIA PREPROCEDURE EVALUATION
9/2020 - EF55%, nml rv, AV prosthesis nml    2018 PFT's - Interpretation:  · Moderate obstructive airflow defect   · There is significant airway response with the administration of bronchodilator per ATS standards  · Normal Lung volumes  · Normal diffusion capacity    Medical History  History Comments   Asthma last assessed 8/21/12   Closed fracture of fibula last assessed 10/18/13   Atrial fibrillation ([facility] ) last assessed 8/21/12   C[facility]chronic [facility] disease) [facility] last assessed 8/21/12   Hypertension last assessed 8/21/12   Sleep apnea last assessed 8/21/12   Restless legs syndrome last assessed 8/21/12   History of echocardiogram EF 0 50, LVSF is at the lower limits of normal  Mild concentric LV hypertophy  Mild mitral regurg  Normal functioning mechanical AV  Presence of prosthetic heart valve    Athscl heart disease of native coronary artery w/o ang pctrs    Coronary angioplasty status    Cardiomyopathy ([facility] )    Hyperlipidemia    CPAP (continuous positive airway pressure) dependence    Diabetes mellitus ([facility] )      Procedure:  BRONCHOSCOPY    Relevant Problems   ANESTHESIA (within normal limits)      CARDIO   (+) Aortic stenosis   (+) Atrial fibrillation (HCC)   (+) Essential hypertension   (+) Other hyperlipidemia   (+) S/P AVR      ENDO   (+) Uncontrolled type 2 diabetes mellitus with diabetic polyneuropathy, with long-term current use of insulin (HCC)      PULMONARY   (+) COPD with acute exacerbation (HCC)   (+) COPD, moderate (HCC)   (+) Obstructive sleep apnea   (+) SOB (shortness of breath)        Physical Exam    Airway    Mallampati score: III  TM Distance: >3 FB  Neck ROM: full     Dental   No notable dental hx     Cardiovascular  Rate: normal,     Pulmonary  Pulmonary exam normal     Other Findings        Anesthesia Plan  ASA Score- 4     Anesthesia Type- general with ASA Monitors  Additional Monitors:   Airway Plan: ETT            Plan Factors-Exercise tolerance (METS): >4 METS     Chart reviewed  Patient summary reviewed  Patient is not a current smoker  Induction- intravenous  Postoperative Plan- Plan for postoperative opioid use  Informed Consent- Anesthetic plan and risks discussed with patient  I personally reviewed this patient with the CRNA  Discussed and agreed on the Anesthesia Plan with the CRNA  Raji Carolina

## 2021-08-10 NOTE — RESPIRATORY THERAPY NOTE
Pt intubated for bronch  Assisted Dr Sheryle Lily, no lido given, lavaged with 210 saline, got back 45ml back on lavage and 80ml washing, pt rocío well

## 2021-08-12 DIAGNOSIS — I10 HYPERTENSION, UNSPECIFIED TYPE: ICD-10-CM

## 2021-08-12 LAB — SCAN RESULT: NORMAL

## 2021-08-12 RX ORDER — DILTIAZEM HYDROCHLORIDE 240 MG/1
240 CAPSULE, COATED, EXTENDED RELEASE ORAL 2 TIMES DAILY
Qty: 180 CAPSULE | Refills: 3 | Status: SHIPPED | OUTPATIENT
Start: 2021-08-12 | End: 2022-07-13

## 2021-08-12 NOTE — TELEPHONE ENCOUNTER
[address] [facility]  Patient called he needs his Diltiazem  240 mg filled    He would like a 90 day supply with refills       Ditltiazem 240 mg 1 tab BID   180 with 3 refills     Pilo in [place]

## 2021-08-13 LAB
BACTERIA BRONCH AEROBE CULT: ABNORMAL
GRAM STN SPEC: ABNORMAL

## 2021-08-18 ENCOUNTER — OFFICE VISIT (OUTPATIENT)
Dept: PULMONOLOGY | Facility: CLINIC | Age: 56
End: 2021-08-18
Payer: MEDICARE

## 2021-08-18 ENCOUNTER — APPOINTMENT (OUTPATIENT)
Dept: LAB | Facility: HOSPITAL | Age: 56
End: 2021-08-18
Payer: MEDICARE

## 2021-08-18 ENCOUNTER — ANTICOAG VISIT (OUTPATIENT)
Dept: FAMILY MEDICINE CLINIC | Facility: CLINIC | Age: 56
End: 2021-08-18

## 2021-08-18 VITALS
SYSTOLIC BLOOD PRESSURE: 135 MMHG | HEART RATE: 85 BPM | DIASTOLIC BLOOD PRESSURE: 69 MMHG | OXYGEN SATURATION: 94 % | WEIGHT: 315 LBS | HEIGHT: 73 IN | TEMPERATURE: 96.8 F | BODY MASS INDEX: 41.75 KG/M2

## 2021-08-18 DIAGNOSIS — J96.91 RESPIRATORY FAILURE WITH HYPOXIA, UNSPECIFIED CHRONICITY (HCC): ICD-10-CM

## 2021-08-18 DIAGNOSIS — J44.9 COPD, MODERATE (HCC): ICD-10-CM

## 2021-08-18 DIAGNOSIS — J13 PNEUMONIA OF BOTH LUNGS DUE TO STREPTOCOCCUS PNEUMONIAE, UNSPECIFIED PART OF LUNG (HCC): Primary | ICD-10-CM

## 2021-08-18 DIAGNOSIS — F17.210 CIGARETTE NICOTINE DEPENDENCE WITHOUT COMPLICATION: ICD-10-CM

## 2021-08-18 DIAGNOSIS — I48.91 ATRIAL FIBRILLATION, UNSPECIFIED TYPE (HCC): ICD-10-CM

## 2021-08-18 DIAGNOSIS — R91.8 MULTIPLE PULMONARY NODULES DETERMINED BY COMPUTED TOMOGRAPHY OF LUNG: ICD-10-CM

## 2021-08-18 PROBLEM — J15.3 PNEUMONIA OF BOTH LUNGS DUE TO GROUP B STREPTOCOCCUS (HCC): Status: ACTIVE | Noted: 2021-08-18

## 2021-08-18 LAB
INR PPP: 2.27 (ref 0.84–1.19)
PROTHROMBIN TIME: 24.6 SECONDS (ref 11.6–14.5)

## 2021-08-18 PROCEDURE — 94618 PULMONARY STRESS TESTING: CPT | Performed by: PHYSICIAN ASSISTANT

## 2021-08-18 PROCEDURE — 36415 COLL VENOUS BLD VENIPUNCTURE: CPT

## 2021-08-18 PROCEDURE — 85610 PROTHROMBIN TIME: CPT

## 2021-08-18 PROCEDURE — 99214 OFFICE O/P EST MOD 30 MIN: CPT | Performed by: PHYSICIAN ASSISTANT

## 2021-08-18 RX ORDER — LEVOFLOXACIN 750 MG/1
750 TABLET ORAL EVERY 24 HOURS
Qty: 14 TABLET | Refills: 0 | Status: SHIPPED | OUTPATIENT
Start: 2021-08-18 | End: 2021-09-01

## 2021-08-18 NOTE — ASSESSMENT & PLAN NOTE
I reviewed results of bronchoscopy with patient and patient's wife today in the office  Culture reveals strep pneumoniae  Will treat with Levaquin 750 mg p o  daily times 14 days  Will plan to repeat chest x-ray in 4 weeks  If findings are persistent will proceed with repeat high-resolution CT chest, PFTs  and possible repeat bronchoscopy with biopsy

## 2021-08-18 NOTE — ASSESSMENT & PLAN NOTE
6 minutes walk performed in the office today indicates that patient requires 2 L nasal cannula with activities  Home O2 with portability order placed requesting portable oxygen concentrator  Maintain sats greater than 88%  Recommended increased activity as tolerated

## 2021-08-18 NOTE — ASSESSMENT & PLAN NOTE
Likely secondary to strep pneumo based on bronchoscopy results  Will treat accordingly and repeat imaging starting with CXR in 4 weeks

## 2021-08-18 NOTE — ASSESSMENT & PLAN NOTE
Smoking cessation encouraged  Encouraged patient to keep up the good work  Offered my help if needed

## 2021-08-18 NOTE — ASSESSMENT & PLAN NOTE
Continue Trelegy Ellipta 1 puff daily  Continue as needed albuterol HFA 2 puffs q 6 hours p r n  Encouraged more frequent use of his albuterol nebs 3-4 times daily while he is feeling acutely wheezy

## 2021-08-18 NOTE — PROGRESS NOTES
Pulmonary Follow Up Note   Dileep Pritchett 60 y o  male MRN: [id number]  8/18/2021      Assessment:    COPD, moderate (HCC)    Continue Trelegy Ellipta 1 puff daily  Continue as needed albuterol HFA 2 puffs q 6 hours p r n  Encouraged more frequent use of his albuterol nebs 3-4 times daily while he is feeling acutely wheezy  Pneumonia due to Streptococcus pneumoniae [facility])  I reviewed results of bronchoscopy with patient and patient's wife today in the office  Culture reveals strep pneumoniae  Will treat with Levaquin 750 mg p o  daily times 14 days  Will plan to repeat chest x-ray in 4 weeks  If findings are persistent will proceed with repeat high-resolution CT chest, PFTs  and possible repeat bronchoscopy with biopsy  Respiratory failure with hypoxia (HCC)  6 minutes walk performed in the office today indicates that patient requires 2 L nasal cannula with activities  Home O2 with portability order placed requesting portable oxygen concentrator  Maintain sats greater than 88%  Recommended increased activity as tolerated  Cigarette nicotine dependence without complication  Smoking cessation encouraged  Encouraged patient to keep up the good work  Offered my help if needed  Multiple pulmonary nodules determined by computed tomography of lung  Likely secondary to strep pneumo based on bronchoscopy results  Will treat accordingly and repeat imaging starting with CXR in 4 weeks  Plan:    Diagnoses and all orders for this visit:    Pneumonia of both lungs due to Streptococcus pneumoniae, unspecified part of lung (HCC)  -     levofloxacin (LEVAQUIN) 750 mg tablet;  Take 1 tablet (750 mg total) by mouth every 24 hours for 14 days  -     POCT 6 minute walk  -     XR chest pa & lateral; Future    COPD, moderate (HCC)  -     POCT 6 minute walk    Respiratory failure with hypoxia, unspecified chronicity (HCC)  -     Home Oxygen with Portability    Cigarette nicotine dependence without complication    Multiple pulmonary nodules determined by computed tomography of lung        Return in about 4 weeks (around 9/15/2021)  History of Present Illness   HPI:  Su Do is a 60 y o  male who  Presents to the office today for routine follow-up  He has past medical history positive for moderate COPD, THIEN on BiPAP, morbid obesity, cardiomyopathy, atrial fibrillation on Coumadin, hypertension, hyperlipidemia and abnormal CT chest secondary to diffuse nodular opacities  Patient previously had high-resolution CT that showed diffuse nodular opacities bilaterally prompting PET-CT that showed heterogeneous G is radiotracer uptake in bilateral lung fields corresponding with diffuse nodularity on CT  We ranged outpatient bronchoscopy which she had done 08/10/2021  Results were negative for malignancy but Gram stain and culture did grow out strep pneumoniae  During today's visit, patient reports that his respiratory symptoms are unchanged  He continues to have shortness of breath at rest and with any sort of exertion  He also reports a cough that is productive of sputum but denies hemoptysis  He reports that he has off and on wheezing usually related to weather/humidity  He denies chest pain, palpitations, pleurisy  Denies fevers or chills  Review of Systems   All other systems reviewed and are negative        Historical Information   Past Medical History:   Diagnosis Date    Asthma     last assessed 8/21/12    Athscl heart disease of native coronary artery w/o ang pctrs     Atrial fibrillation ([facility] [address] )     last assessed 8/21/12    Cardiomyopathy ([facility] [place] [address] [facility]     Closed fracture of fibula     last assessed 10/18/13    COLD (chronic obstructive lung disease) ([facility] [place] [address] )     last assessed 8/21/12    Coronary angioplasty status     CPAP (continuous positive airway pressure) dependence     Diabetes mellitus ([address] )     History of echocardiogram 04/19/2017    EF 0 50, LVSF is at the lower limits of normal  Mild concentric LV hypertophy  Mild mitral regurg  Normal functioning mechanical AV      Hyperlipidemia     Hypertension     last assessed 12    Presence of prosthetic heart valve     Restless legs syndrome     last assessed 12    Sleep apnea     last assessed 12     Past Surgical History:   Procedure Laterality Date    AORTIC VALVE REPLACEMENT  10/07/2016    AVR replacement, mechanical    CORONARY ANGIOPLASTY WITH STENT PLACEMENT  2010    EF 40%, Successful bare metal stent mid RCA    MENISCECTOMY Left      Family History   Problem Relation Age of Onset    Atrial fibrillation Mother     Diabetes type II Mother     Heart attack Father         acute    Heart failure Father     Stroke Father         syndrome       Social History     Tobacco Use   Smoking Status Current Some Day Smoker    Years: 35 00    Types: Cigarettes    Last attempt to quit: 10/2016    Years since quittin 8   Smokeless Tobacco Never Used   Tobacco Comment    quit 1 week ago          Meds/Allergies     Current Outpatient Medications:     albuterol (2 5 mg/3 mL) 0 083 % nebulizer solution, Take 3 mL (2 5 mg total) by nebulization every 6 (six) hours as needed for wheezing or shortness of breath, Disp: 360 mL, Rfl: 2    albuterol (Ventolin HFA) 90 mcg/act inhaler, Inhale 2 puffs every 6 (six) hours as needed for shortness of breath Must be brand necessary, Disp: 18 g, Rfl: 3    aspirin 81 MG tablet, Take by mouth, Disp: , Rfl:     atorvastatin (LIPITOR) 10 mg tablet, Take 1 tablet (10 mg total) by mouth daily, Disp: 90 tablet, Rfl: 3    benazepril (LOTENSIN) 10 mg tablet, Take 1 tablet (10 mg total) by mouth daily, Disp: 90 tablet, Rfl: 1    cetirizine (ZyrTEC) 10 mg tablet, Take 1 tablet (10 mg total) by mouth daily, Disp: 30 tablet, Rfl: 1    diltiazem (CARDIZEM CD) 240 mg 24 hr capsule, Take 1 capsule (240 mg total) by mouth 2 (two) times a day, Disp: 180 capsule, Rfl: 3   fluticasone-umeclidinium-vilanterol (TRELEGY) 100-62 5-25 MCG/INH inhaler, Inhale 1 puff daily Rinse mouth after use , Disp: 2 Inhaler, Rfl: 5    furosemide (LASIX) 80 mg tablet, Take 1 tablet (80 mg total) by mouth daily, Disp: 90 tablet, Rfl: 3    insulin aspart (NovoLOG FlexPen) 100 UNIT/ML injection pen, Inject 14 units with breakfast and lunch and 36 units with dinner , Disp: 18 mL, Rfl: 5    insulin degludec Gypsy Kiefer FlexTouch) 200 units/mL CONCENTRATED U-200 injection pen, Inject 66 units daily  , Disp: 6 pen, Rfl: 2    Insulin Pen Needle (BD Pen Needle Fany U/F) 32G X 4 MM MISC, Inject as directed 2 (two) times a day, Disp: 100 each, Rfl: 5    metFORMIN (GLUCOPHAGE) 1000 MG tablet, Take 1 tablet (1,000 mg total) by mouth 2 (two) times a day, Disp: 180 tablet, Rfl: 1    metoprolol tartrate (LOPRESSOR) 50 mg tablet, Take 1 tablet (50 mg total) by mouth 2 (two) times a day, Disp: 180 tablet, Rfl: 3    nitroglycerin (NITROSTAT) 0 4 mg SL tablet, Place 1 tablet (0 4 mg total) under the tongue every 5 (five) minutes as needed for chest pain, Disp: 100 tablet, Rfl: 3    potassium chloride 20 MEQ TBCR, Take 1 tablet (20 mEq total) by mouth daily, Disp: 90 tablet, Rfl: 3    warfarin (COUMADIN) 10 mg tablet, Take 1 tablet (10 mg total) by mouth daily, Disp: 90 tablet, Rfl: 3    warfarin (COUMADIN) 2 mg tablet, Take 1 tablet (2 mg total) by mouth daily (Patient taking differently: Take 2 mg by mouth daily except on monday), Disp: 90 tablet, Rfl: 3    levofloxacin (LEVAQUIN) 750 mg tablet, Take 1 tablet (750 mg total) by mouth every 24 hours for 14 days, Disp: 14 tablet, Rfl: 0  Allergies   Allergen Reactions    Fluticasone-Salmeterol Palpitations       Vitals: Blood pressure 135/69, pulse 85, temperature (!) 96 8 °F (36 °C), temperature source Tympanic, height 6' 1" (1 854 m), weight (!) 184 kg (405 lb), SpO2 94 %  Body mass index is 53 43 kg/m²   Oxygen Therapy  SpO2: 94 %  Oxygen Therapy: None (Room air)    Physical Exam  Physical Exam  Vitals reviewed  Constitutional:       Appearance: Normal appearance  He is well-developed  He is obese  HENT:      Head: Normocephalic and atraumatic  Right Ear: External ear normal       Left Ear: External ear normal       Nose: Nose normal       Mouth/Throat:      Mouth: Mucous membranes are moist       Pharynx: Oropharynx is clear  Eyes:      Extraocular Movements: Extraocular movements intact  Pupils: Pupils are equal, round, and reactive to light  Cardiovascular:      Rate and Rhythm: Normal rate and regular rhythm  Pulses: Normal pulses  Heart sounds: Normal heart sounds  No murmur heard  Pulmonary:      Effort: Pulmonary effort is normal  No respiratory distress  Breath sounds: No stridor  Wheezing present  No rhonchi or rales  Abdominal:      Palpations: Abdomen is soft  Tenderness: There is no abdominal tenderness  Hernia: No hernia is present  Musculoskeletal:         General: No swelling, tenderness or deformity  Normal range of motion  Cervical back: Normal range of motion and neck supple  Skin:     General: Skin is warm and dry  Capillary Refill: Capillary refill takes less than 2 seconds  Neurological:      General: No focal deficit present  Mental Status: He is alert and oriented to person, place, and time  Mental status is at baseline  Psychiatric:         Behavior: Behavior normal          Thought Content: Thought content normal          Judgment: Judgment normal          Labs: I have personally reviewed pertinent lab results  , ABG: No results found for: PHART, [id number], PO2ART, YVK9AHI, D1KQMJDU, BEART, SOURCE, BNP: No results found for: BNP, CBC: No results found for: WBC, HGB, HCT, MCV, PLT, ADJUSTEDWBC, MCH, MCHC, RDW, MPV, NRBC, CMP: No results found for: SODIUM, K, CL, CO2, ANIONGAP, BUN, CREATININE, GLUCOSE, CALCIUM, AST, ALT, ALKPHOS, PROT, BILITOT, EGFR, PT/INR:   Lab Results Component Value Date    INR 2 27 (H) 08/18/2021   , Troponin: No results found for: TROPONINI  Lab Results   Component Value Date    WBC 9 22 07/12/2021    HGB 14 5 07/12/2021    HCT 46 2 07/12/2021    MCV 89 07/12/2021     07/12/2021     Lab Results   Component Value Date    GLUCOSE 119 09/01/2013    CALCIUM 8 8 07/12/2021     09/01/2013    K 4 2 07/12/2021    CO2 30 07/12/2021     07/12/2021    BUN 11 07/12/2021    CREATININE 0 83 07/12/2021     No results found for: IGE  Lab Results   Component Value Date    ALT 30 07/12/2021    AST 18 07/12/2021    ALKPHOS 93 07/12/2021    BILITOT 0 6 09/01/2013       Imaging and other studies: I have personally reviewed pertinent reports  and I have personally reviewed pertinent films in PACS       Chest x-ray 08/10/2021   Diffuse reticular nodular opacities bilaterally  Stable calcified pleural plaques  No pneumothorax or pleural effusion    Pulmonary function testing:  Performed  03/18/2019     FEV1/FVC ratio Sixty-one%   FEV1  69% predicted  FVC  80% predicted  There is response to bronchodilators  TLC  83 % predicted  RV  81 % predicted  DLCO corrected for hemoglobin  76 % predicted   moderate obstructive airflow defect  There is significant response to bronchodilator  Normal lung volumes  Normal diffusion capacity  Other Studies: I have personally reviewed pertinent reports  6 minutes walk performed in the office today  Resting on room air SpO2 95% with heart rate 67 beats per minute  Patient ambulated for 4 minutes before SpO2 dropped to 88%  Test was paused to place patient on supplemental oxygen titrated 2 liters/minute for improvement  He completed an additional 3 minutes without SpO2 dropping below 92% with a max heart rate of 107 beats per minute  Total distance 200 m  Evelia dyspnea score 7/ 10  impression:  Patient requires supplemental oxygen at 2 l/min with activities to maintain saturations greater than 88%

## 2021-08-19 ENCOUNTER — TELEPHONE (OUTPATIENT)
Dept: PULMONOLOGY | Facility: CLINIC | Age: 56
End: 2021-08-19

## 2021-08-19 ENCOUNTER — OFFICE VISIT (OUTPATIENT)
Dept: ENDOCRINOLOGY | Facility: CLINIC | Age: 56
End: 2021-08-19
Payer: MEDICARE

## 2021-08-19 VITALS
BODY MASS INDEX: 41.75 KG/M2 | DIASTOLIC BLOOD PRESSURE: 64 MMHG | OXYGEN SATURATION: 93 % | HEART RATE: 103 BPM | WEIGHT: 315 LBS | HEIGHT: 73 IN | SYSTOLIC BLOOD PRESSURE: 128 MMHG

## 2021-08-19 DIAGNOSIS — IMO0002 UNCONTROLLED TYPE 2 DIABETES MELLITUS WITH DIABETIC POLYNEUROPATHY, WITH LONG-TERM CURRENT USE OF INSULIN: Primary | ICD-10-CM

## 2021-08-19 DIAGNOSIS — I10 ESSENTIAL HYPERTENSION: ICD-10-CM

## 2021-08-19 DIAGNOSIS — G47.33 OBSTRUCTIVE SLEEP APNEA: ICD-10-CM

## 2021-08-19 DIAGNOSIS — E78.49 OTHER HYPERLIPIDEMIA: ICD-10-CM

## 2021-08-19 PROCEDURE — 99214 OFFICE O/P EST MOD 30 MIN: CPT | Performed by: NURSE PRACTITIONER

## 2021-08-19 NOTE — PROGRESS NOTES
Established Patient Progress Note      Chief Complaint   Patient presents with    Diabetes Type 2        History of Present Illness:   Tom Thomas is a 60 y o  male with   Hypertension, hyperlipidemia, obstructive sleep apnea, and type 2 diabetes with long term use of insulin since 2016  Reports complications of  neuropathy  Denies recent illness or hospitalizations  Denies recent severe hypoglycemic or severe hyperglycemic episodes  Denies any issues with his current regimen  home glucose monitoring: are performed regularly, 2-4 x daily    Patient did not bring glucometer or glucose logs for review  He reports feeling well  He has no questions or complaints today  Component      Latest Ref Rng & Units 4/28/2021 7/12/2021   Hemoglobin A1C      Normal 3 8-5 6%; PreDiabetic 5 7-6 4%; Diabetic >=6 5%; Glycemic control for adults with diabetes <7 0% % 8 0 (H) 6 8 (H)   eAG, EST AVG Glucose      mg/dl 183 148        Current regimen:   Tresiba 66 units  Novolog 14 units with BF and L and 36 units with dinner  Metformin 1,000 mg BID    Last Eye Exam: 10/29/221;   Negative for diabetic retinopathy  Last Foot Exam:  Up-to-date      For hyperlipidemia, he is taking 10 mg of atorvastatin daily  He denies myalgias  For hypertension, he is taking 10 mg of benazepril daily and 240 mg of diltiazem twice daily  He denies headache and cough      Patient Active Problem List   Diagnosis    Aortic stenosis    [facility]    COPD with acute exacerbation ([facility]    Uncontrolled type 2 diabetes mellitus with diabetic polyneuropathy, with long-term current use of insulin (HCC)    Other hyperlipidemia    Essential hypertension    S/P AVR    Obstructive sleep apnea    Restless leg syndrome    Hypersomnia    Morbid obesity with BMI of 50 0-59 9, adult ([facility])    PLMD (periodic limb movement disorder)    Palpitations    Acute exacerbation of chronic obstructive bronchitis ([facility]    Multiple pulmonary nodules determined by computed tomography of lung    Chronic pain of left knee    Seasonal allergic rhinitis    Cigarette nicotine dependence without complication    COPD, moderate (HCC)    SOB (shortness of breath)    Respiratory failure with hypoxia (HCC)    Pneumonia due to Streptococcus pneumoniae [facility])      Past Medical History:   Diagnosis Date    Asthma     last assessed 12    Athscl heart disease of native coronary artery w/o ang pctrs     Atrial fibrillation ([facility] )     last assessed 12    Cardiomyopathy [facility])     Closed fracture of fibula     last assessed 10/18/13    COLD (chronic obstructive lung disease) ([facility] )     last assessed 12    Coronary angioplasty status     CPAP (continuous positive airway pressure) dependence     Diabetes mellitus ([facility] )     History of echocardiogram 2017    EF 0 50, LVSF is at the lower limits of normal  Mild concentric LV hypertophy  Mild mitral regurg  Normal functioning mechanical AV      Hyperlipidemia     Hypertension     last assessed 12    Presence of prosthetic heart valve     Restless legs syndrome     last assessed 12    Sleep apnea     last assessed 12      Past Surgical History:   Procedure Laterality Date    AORTIC VALVE REPLACEMENT  10/07/2016    AVR replacement, mechanical    CORONARY ANGIOPLASTY WITH STENT PLACEMENT  2010    EF 40%, Successful bare metal stent mid RCA    MENISCECTOMY Left       Family History   Problem Relation Age of Onset    Atrial fibrillation Mother     Diabetes type II Mother     Heart attack Father         acute    Heart failure Father     Stroke Father         syndrome     Social History     Tobacco Use    Smoking status: Current Some Day Smoker     Years: 35 00     Types: Cigarettes     Last attempt to quit: 10/2016     Years since quittin 8    Smokeless tobacco: Never Used    Tobacco comment: quit 1 week ago    Substance Use Topics    Alcohol use: Not Currently Allergies   Allergen Reactions    Fluticasone-Salmeterol Palpitations         Current Outpatient Medications:     albuterol (2 5 mg/3 mL) 0 083 % nebulizer solution, Take 3 mL (2 5 mg total) by nebulization every 6 (six) hours as needed for wheezing or shortness of breath, Disp: 360 mL, Rfl: 2    albuterol (Ventolin HFA) 90 mcg/act inhaler, Inhale 2 puffs every 6 (six) hours as needed for shortness of breath Must be brand necessary, Disp: 18 g, Rfl: 3    aspirin 81 MG tablet, Take by mouth, Disp: , Rfl:     atorvastatin (LIPITOR) 10 mg tablet, Take 1 tablet (10 mg total) by mouth daily, Disp: 90 tablet, Rfl: 3    benazepril (LOTENSIN) 10 mg tablet, Take 1 tablet (10 mg total) by mouth daily, Disp: 90 tablet, Rfl: 1    cetirizine (ZyrTEC) 10 mg tablet, Take 1 tablet (10 mg total) by mouth daily, Disp: 30 tablet, Rfl: 1    diltiazem (CARDIZEM CD) 240 mg 24 hr capsule, Take 1 capsule (240 mg total) by mouth 2 (two) times a day, Disp: 180 capsule, Rfl: 3    fluticasone-umeclidinium-vilanterol (TRELEGY) 100-62 5-25 MCG/INH inhaler, Inhale 1 puff daily Rinse mouth after use , Disp: 2 Inhaler, Rfl: 5    furosemide (LASIX) 80 mg tablet, Take 1 tablet (80 mg total) by mouth daily, Disp: 90 tablet, Rfl: 3    insulin aspart (NovoLOG FlexPen) 100 UNIT/ML injection pen, Inject 14 units with breakfast and lunch and 36 units with dinner , Disp: 18 mL, Rfl: 5    insulin degludec Cyndia Blas FlexTouch) 200 units/mL CONCENTRATED U-200 injection pen, Inject 66 units daily  , Disp: 6 pen, Rfl: 2    Insulin Pen Needle (BD Pen Needle Fany U/F) 32G X 4 MM MISC, Inject as directed 2 (two) times a day, Disp: 100 each, Rfl: 5    levofloxacin (LEVAQUIN) 750 mg tablet, Take 1 tablet (750 mg total) by mouth every 24 hours for 14 days, Disp: 14 tablet, Rfl: 0    metFORMIN (GLUCOPHAGE) 1000 MG tablet, Take 1 tablet (1,000 mg total) by mouth 2 (two) times a day, Disp: 180 tablet, Rfl: 1    metoprolol tartrate (LOPRESSOR) 50 mg tablet, Take 1 tablet (50 mg total) by mouth 2 (two) times a day, Disp: 180 tablet, Rfl: 3    potassium chloride 20 MEQ TBCR, Take 1 tablet (20 mEq total) by mouth daily, Disp: 90 tablet, Rfl: 3    warfarin (COUMADIN) 10 mg tablet, Take 1 tablet (10 mg total) by mouth daily, Disp: 90 tablet, Rfl: 3    warfarin (COUMADIN) 2 mg tablet, Take 1 tablet (2 mg total) by mouth daily (Patient taking differently: Take 2 mg by mouth daily except on monday), Disp: 90 tablet, Rfl: 3    nitroglycerin (NITROSTAT) 0 4 mg SL tablet, Place 1 tablet (0 4 mg total) under the tongue every 5 (five) minutes as needed for chest pain, Disp: 100 tablet, Rfl: 3    Review of Systems   Constitutional: Negative for activity change, appetite change, fatigue and unexpected weight change  HENT: Negative for sore throat, trouble swallowing and voice change  Eyes: Negative for visual disturbance  Respiratory: Positive for cough, shortness of breath and wheezing  Negative for chest tightness  Cardiovascular: Negative for chest pain, palpitations and leg swelling  Gastrointestinal: Negative for constipation, diarrhea, nausea and vomiting  Endocrine: Negative for polydipsia, polyphagia and polyuria  Genitourinary: Negative for frequency  Musculoskeletal: Negative for arthralgias, back pain, joint swelling and myalgias  Skin: Negative for wound  Allergic/Immunologic: Positive for environmental allergies  Negative for food allergies  Neurological: Positive for numbness  Negative for dizziness, weakness, light-headedness and headaches  Hematological: Does not bruise/bleed easily  Psychiatric/Behavioral: Negative for decreased concentration, dysphoric mood and sleep disturbance  The patient is not nervous/anxious  Physical Exam:  Body mass index is 53 41 kg/m²    /64 (BP Location: Left arm, Cuff Size: Adult)   Pulse 103   Ht 6' 1" (1 854 m)   Wt (!) 184 kg (404 lb 12 8 oz)   SpO2 93%   BMI 53 41 kg/m²    Wt Readings from Last 3 Encounters:   08/19/21 (!) 184 kg (404 lb 12 8 oz)   08/18/21 (!) 184 kg (405 lb)   08/10/21 (!) 186 kg (409 lb 9 6 oz)       Physical Exam  Vitals reviewed  Constitutional:       General: He is not in acute distress  Appearance: He is well-developed  He is obese  He is not ill-appearing  HENT:      Head: Normocephalic and atraumatic  Comments: Mask in place  Eyes:      Pupils: Pupils are equal, round, and reactive to light  Neck:      Thyroid: No thyromegaly  Cardiovascular:      Rate and Rhythm: Normal rate and regular rhythm  Pulses: Normal pulses  Heart sounds: Normal heart sounds  Pulmonary:      Effort: Pulmonary effort is normal       Breath sounds: Normal breath sounds  Abdominal:      General: Bowel sounds are normal  There is no distension  Palpations: Abdomen is soft  Tenderness: There is no abdominal tenderness  Musculoskeletal:      Cervical back: Normal range of motion and neck supple  Right lower leg: No edema  Left lower leg: No edema  Lymphadenopathy:      Cervical: No cervical adenopathy  Skin:     General: Skin is warm and dry  Capillary Refill: Capillary refill takes less than 2 seconds  Neurological:      Mental Status: He is alert and oriented to person, place, and time        Gait: Gait normal    Psychiatric:         Mood and Affect: Mood normal          Behavior: Behavior normal            Labs:   Lab Results   Component Value Date    HGBA1C 6 8 (H) 07/12/2021    HGBA1C 8 0 (H) 04/28/2021    HGBA1C 10 1 (H) 01/15/2021     Lab Results   Component Value Date    CREATININE 0 83 07/12/2021    CREATININE 0 80 06/15/2021    CREATININE 0 83 04/28/2021    BUN 11 07/12/2021     09/01/2013    K 4 2 07/12/2021     07/12/2021    CO2 30 07/12/2021     eGFR   Date Value Ref Range Status   07/12/2021 98 ml/min/1 73sq m Final     Lab Results   Component Value Date    CHOL 180 09/01/2013    HDL 34 (L) 04/28/2021 TRIG 75 04/28/2021     Lab Results   Component Value Date    ALT 30 07/12/2021    AST 18 07/12/2021    ALKPHOS 93 07/12/2021    BILITOT 0 6 09/01/2013     Lab Results   Component Value Date    UPR0VYVCFTLY 3 159 01/15/2021    AAV5OJGFLORN 3 299 10/04/2018    ZQB4AFVMYZZH 3 238 07/22/2016     No results found for: FREET4, TSI    Impression & Plan:    Problem List Items Addressed This Visit        Endocrine    Uncontrolled type 2 diabetes mellitus with diabetic polyneuropathy, with long-term current use of insulin ([facility] ) - Primary       Patient is well controlled  Continue current regimen  Continue to focus on healthy diet and regular physical activity  Patient knows to notify me should he require additional steroids for treatment of his COPD  He will also notify me for any episodes of persistent hyperglycemia or hypoglycemia  Reminded patient that it is incredibly important he check his blood sugar every time prior to injecting insulin  Reviewed appropriate surveillance and treatment for hypoglycemia  Lab Results   Component Value Date    HGBA1C 6 8 (H) 07/12/2021            Relevant Orders    HEMOGLOBIN A1C W/ EAG ESTIMATION Lab Collect    Comprehensive metabolic panel Lab Collect    CBC and differential Lab Collect    Microalbumin / creatinine urine ratio Lab Collect    Lipid panel Lab Collect Lab Collect    Ambulatory Referral to Ophthalmology       Respiratory    Obstructive sleep apnea       Continue CPAP  Cardiovascular and Mediastinum    Essential hypertension       BP stable 128/64  Continue current regimen  Other    Other hyperlipidemia       Check fasting lipid panel prior to next appointment  Continue   Statin                 Orders Placed This Encounter   Procedures    HEMOGLOBIN A1C W/ EAG ESTIMATION Lab Collect     Standing Status:   Future     Standing Expiration Date:   8/19/2022    Comprehensive metabolic panel Lab Collect     This is a patient instruction: Patient fasting for 8 hours or longer recommended  Standing Status:   Future     Standing Expiration Date:   8/19/2022    CBC and differential Lab Collect     This is a patient instruction: This test is non-fasting  Please drink two glasses of water morning of bloodwork  Standing Status:   Future     Standing Expiration Date:   8/19/2022    Microalbumin / creatinine urine ratio Lab Collect     Standing Status:   Future     Standing Expiration Date:   8/19/2022    Lipid panel Lab Collect Lab Collect     This is a patient instruction: This test requires patient fasting for 10-12 hours or longer  Drinking of black coffee or black tea is acceptable  Standing Status:   Future     Standing Expiration Date:   8/19/2022    Ambulatory Referral to Ophthalmology     Standing Status:   Future     Standing Expiration Date:   8/19/2022     Referral Priority:   Routine     Referral Type:   Consult - AMB     Referral Reason:   Specialty Services Required     Referred to Provider:   Mendy Clemons MD     Requested Specialty:   Ophthalmology     Number of Visits Requested:   1     Expiration Date:   8/19/2022       There are no Patient Instructions on file for this visit  Discussed with the patient and all questioned fully answered  He will call me if any problems arise  Follow-up appointment in 5 months       Counseled patient on diagnostic results, prognosis, risk and benefit of treatment options, instruction for management, importance of treatment compliance, Risk  factor reduction and impressions    CHRISTINE Ott

## 2021-08-19 NOTE — ASSESSMENT & PLAN NOTE
Patient is well controlled  Continue current regimen  Continue to focus on healthy diet and regular physical activity  Patient knows to notify me should he require additional steroids for treatment of his COPD  He will also notify me for any episodes of persistent hyperglycemia or hypoglycemia  Reminded patient that it is incredibly important he check his blood sugar every time prior to injecting insulin  Reviewed appropriate surveillance and treatment for hypoglycemia    Lab Results   Component Value Date    HGBA1C 6 8 (H) 07/12/2021

## 2021-08-26 DIAGNOSIS — J44.9 COPD, MODERATE (HCC): ICD-10-CM

## 2021-09-09 ENCOUNTER — HOSPITAL ENCOUNTER (OUTPATIENT)
Dept: RADIOLOGY | Facility: HOSPITAL | Age: 56
Discharge: HOME | End: 2021-09-09
Payer: MEDICARE

## 2021-09-09 ENCOUNTER — HOSPITAL ENCOUNTER (OUTPATIENT)
Dept: NON INVASIVE DIAGNOSTICS | Facility: CLINIC | Age: 56
Discharge: HOME | End: 2021-09-09
Payer: MEDICARE

## 2021-09-09 ENCOUNTER — APPOINTMENT (OUTPATIENT)
Dept: LAB | Facility: HOSPITAL | Age: 56
End: 2021-09-09
Payer: MEDICARE

## 2021-09-09 ENCOUNTER — OFFICE VISIT (OUTPATIENT)
Dept: CARDIOLOGY CLINIC | Facility: CLINIC | Age: 56
End: 2021-09-09
Payer: MEDICARE

## 2021-09-09 ENCOUNTER — ANTICOAG VISIT (OUTPATIENT)
Dept: FAMILY MEDICINE CLINIC | Facility: CLINIC | Age: 56
End: 2021-09-09

## 2021-09-09 VITALS
DIASTOLIC BLOOD PRESSURE: 80 MMHG | WEIGHT: 315 LBS | BODY MASS INDEX: 41.75 KG/M2 | SYSTOLIC BLOOD PRESSURE: 122 MMHG | HEART RATE: 82 BPM | HEIGHT: 73 IN

## 2021-09-09 DIAGNOSIS — I10 ESSENTIAL HYPERTENSION: ICD-10-CM

## 2021-09-09 DIAGNOSIS — I25.10 CORONARY ARTERY DISEASE INVOLVING NATIVE CORONARY ARTERY OF NATIVE HEART WITHOUT ANGINA PECTORIS: ICD-10-CM

## 2021-09-09 DIAGNOSIS — E78.49 OTHER HYPERLIPIDEMIA: ICD-10-CM

## 2021-09-09 DIAGNOSIS — I48.91 ATRIAL FIBRILLATION, UNSPECIFIED TYPE (HCC): ICD-10-CM

## 2021-09-09 DIAGNOSIS — Z95.2 S/P AVR: ICD-10-CM

## 2021-09-09 DIAGNOSIS — J13 PNEUMONIA OF BOTH LUNGS DUE TO STREPTOCOCCUS PNEUMONIAE, UNSPECIFIED PART OF LUNG (HCC): ICD-10-CM

## 2021-09-09 DIAGNOSIS — I48.91 ATRIAL FIBRILLATION, UNSPECIFIED TYPE (HCC): Primary | ICD-10-CM

## 2021-09-09 DIAGNOSIS — Z95.5 H/O HEART ARTERY STENT: ICD-10-CM

## 2021-09-09 DIAGNOSIS — I50.32 CHRONIC DIASTOLIC CONGESTIVE HEART FAILURE (HCC): ICD-10-CM

## 2021-09-09 LAB
INR PPP: 2.87 (ref 0.84–1.19)
PROTHROMBIN TIME: 28.4 SECONDS (ref 11.6–14.5)

## 2021-09-09 PROCEDURE — 85610 PROTHROMBIN TIME: CPT

## 2021-09-09 PROCEDURE — 36415 COLL VENOUS BLD VENIPUNCTURE: CPT

## 2021-09-09 PROCEDURE — 71046 X-RAY EXAM CHEST 2 VIEWS: CPT

## 2021-09-09 PROCEDURE — 99214 OFFICE O/P EST MOD 30 MIN: CPT | Performed by: INTERNAL MEDICINE

## 2021-09-09 PROCEDURE — 93306 TTE W/DOPPLER COMPLETE: CPT | Performed by: INTERNAL MEDICINE

## 2021-09-09 PROCEDURE — 93000 ELECTROCARDIOGRAM COMPLETE: CPT | Performed by: INTERNAL MEDICINE

## 2021-09-09 PROCEDURE — 93306 TTE W/DOPPLER COMPLETE: CPT

## 2021-09-09 RX ORDER — NITROGLYCERIN 0.4 MG/1
0.4 TABLET SUBLINGUAL
Qty: 100 TABLET | Refills: 3 | Status: SHIPPED | OUTPATIENT
Start: 2021-09-09

## 2021-09-09 NOTE — PATIENT INSTRUCTIONS
A-fib (Atrial Fibrillation)   AMBULATORY CARE:   Atrial fibrillation (A-fib)  is an irregular heartbeat  It reduces your heart's ability to pump blood through your body  A-fib may come and go, or it may be a long-term condition  A-fib can cause life-threatening blood clots, stroke, or heart failure  It is important to treat and manage A-fib to help prevent these problems  Common signs and symptoms include the following:   · A heartbeat that races, pounds, or flutters    · Weakness, severe tiredness, or confusion    · Feeling lightheaded, sweaty, dizzy, or faint    · Shortness of breath or anxiety    · Chest pain or pressure    Call your local emergency number (911 in the [address] [facility]) or have someone call if:   · You have any of the following signs of a heart attack:      ? Squeezing, pressure, or pain in your chest    ? You may  also have any of the following:     § Discomfort or pain in your back, neck, jaw, stomach, or arm    § Shortness of breath    § Nausea or vomiting    § Lightheadedness or a sudden cold sweat    · You have any of the following signs of a stroke:      ? Numbness or drooping on one side of your face     ? Weakness in an arm or leg    ? Confusion or difficulty speaking    ? Dizziness, a severe headache, or vision loss    Call your doctor or cardiologist if:   · Your arm or leg feels warm, tender, and painful  It may look swollen and red  · Your heart rate is more than 110 beats per minute  · You have new or worsening swelling in your legs, feet, ankles, or abdomen  · You are short of breath, even at rest     · You have questions or concerns about your condition or care  Treatment for A-fib:  Conditions that cause A-fib, such as thyroid disease, will be treated  You may also need any of the following:  · Heart medicines  help control your heart rate or rhythm  You may need more than one medicine to treat your symptoms      · Antiplatelet or blood thinner medicines  help prevent blood clots and stroke  · Cardioversion  is a procedure to return your heart rate and rhythm to normal  It can be done using medicines or electric shock  · A-fib ablation  is a procedure that uses energy to burn a small area of heart tissue  This creates scar tissue and prevents electrical signals that cause A-fib  You may need this procedure more than once  Ask for more information on A-fib ablation  · A pacemaker  may be inserted into your heart  A pacemaker is a device that controls your heartbeat  A pacemaker may be inserted during an ablation procedure or surgery  Ask your healthcare provider for more information on pacemakers  · Surgery  may be needed if other procedures do not work  During surgery your healthcare provider will make cuts in the upper part of your heart  The provider will stitch the cuts together to create scar tissue  The scar tissue will prevent electrical signals that cause A-fib  Manage A-fib:   · Know your target heart rate  Learn how to check your pulse and monitor your heart rate  · Know the risks if you choose to drink alcohol  Alcohol can increase your risk for A-fib or make A-fib harder to manage  Ask your healthcare provider if it is okay for you to drink any alcohol  He or she can help you set limits for the number of drinks you have in 24 hours and in a week  A drink of alcohol is 12 ounces of beer, 5 ounces of wine, or 1½ ounces of liquor  · Do not smoke  Nicotine can cause heart damage and make it more difficult to manage your A-fib  Do not use e-cigarettes or smokeless tobacco in place of cigarettes or to help you quit  They still contain nicotine  Ask your healthcare provider for information if you currently smoke and need help quitting  · Eat heart-healthy foods  Heart healthy foods will help keep your cholesterol low  These include fruits, vegetables, whole-grain breads, low-fat dairy products, beans, lean meats, and fish   Replace butter and margarine with heart-healthy oils such as olive oil and canola oil  · Maintain a healthy weight  Ask your healthcare provider what a healthy weight is for you  Ask him or her to help you create a safe weight loss plan if you are overweight  Even a small goal of a 10% weight loss can improve your heart health  · Get regular physical activity  Physical activity helps improve your heart health  Get at least 150 minutes of moderate aerobic physical activity each week  Your healthcare provider can help you create an activity plan  · Manage other health conditions  This includes high blood pressure or cholesterol, sleep apnea, diabetes, and other heart conditions  Take medicine as directed and follow your treatment plan  Your healthcare provider may need to change a medicine you are taking if it is causing your A-fib  Do not  stop taking any medicine unless directed by your provider  Follow up with your doctor or cardiologist as directed: You will need regular blood tests and monitoring  Write down your questions so you remember to ask them during your visits  © Copyright [employer] 2021 Information is for End User's use only and may not be sold, redistributed or otherwise used for commercial purposes  All illustrations and images included in CareNotes® are the copyrighted property of [employer] M [employer]  or [address] [place], [place] [address]will   The above information is an  only  It is not intended as medical advice for individual conditions or treatments  Talk to your doctor, nurse or pharmacist before following any medical regimen to see if it is safe and effective for you

## 2021-09-09 NOTE — PROGRESS NOTES
Subjective:        Patient ID: Teresa Callaway is a 60 y o  male  Chief Complaint:  Kenny Ventura is here for his routine appointment and to review his echocardiogram which showed normal LV systolic function EF 82% with normal prosthetic aortic valve function  He has been through some pulmonary testing IC, so far chest x-ray CT of chest and PET scan fairly unrevealing, wash bronchoscopy suggested an infection, he took 14 days worth of antibiotics and says he is breathing better  He has also quit smoking that I applauded him for and encouraged greatly  He is not having any chest pains or alarming palpitations  Denies any presyncope or syncope  No unusual edema orthopnea or PND  His dyspnea is moderate stable and nonprogressive, rather chronic  He still weighs over 400 lb  The following portions of the patient's history were reviewed and updated as appropriate: allergies, current medications, past family history, past medical history, past social history, past surgical history and problem list   Review of Systems   Constitutional: Negative for chills, diaphoresis, malaise/fatigue and weight gain  HENT: Negative for nosebleeds and stridor  Eyes: Negative for double vision, vision loss in left eye, vision loss in right eye and visual disturbance  Cardiovascular: Positive for dyspnea on exertion (Chronic mild to moderate nonprogressive) and leg swelling ( chronic nonprogressive)  Negative for chest pain, claudication, cyanosis, irregular heartbeat, near-syncope, orthopnea, palpitations, paroxysmal nocturnal dyspnea and syncope  Respiratory: Positive for wheezing ( improving since quitting smoking and post antibiotics)  Negative for cough, shortness of breath and snoring  Endocrine: Negative for polydipsia, polyphagia and polyuria  Hematologic/Lymphatic: Negative for bleeding problem  Does not bruise/bleed easily  Skin: Negative for flushing and rash     Musculoskeletal: Negative for falls and myalgias  Gastrointestinal: Negative for abdominal pain, heartburn, hematemesis, hematochezia, melena and nausea  Genitourinary: Negative for hematuria  Neurological: Negative for brief paralysis, dizziness, focal weakness, headaches, light-headedness, loss of balance and vertigo  Psychiatric/Behavioral: Negative for altered mental status and substance abuse  Allergic/Immunologic: Negative for hives  Objective:      /80   Pulse 82   Ht 6' 1" (1 854 m)   Wt (!) 184 kg (406 lb)   BMI 53 57 kg/m²   Physical Exam  Constitutional:       General: He is not in acute distress  Appearance: He is well-developed  He is not ill-appearing or diaphoretic  HENT:      Head: Normocephalic and atraumatic  Eyes:      General: No scleral icterus  Pupils: Pupils are equal, round, and reactive to light  Neck:      Thyroid: No thyromegaly  Vascular: No JVD  Cardiovascular:      Rate and Rhythm: Normal rate  Rhythm irregular  Heart sounds: Murmur (Short grade 1 outflow murmur at base no diastolic murmur audible, crisp prosthetic click) heard  No friction rub  No gallop  Pulmonary:      Effort: Pulmonary effort is normal  No respiratory distress  Breath sounds: Normal breath sounds  No stridor  No wheezing or rales  Abdominal:      General: Bowel sounds are normal  There is no distension  Palpations: Abdomen is soft  There is no mass  Tenderness: There is no abdominal tenderness  Musculoskeletal:         General: No deformity  Normal range of motion  Cervical back: Normal range of motion and neck supple  Right lower leg: Edema ( chronic moderate bilateral edema with stasis changes) present  Left lower leg: Edema present  Skin:     General: Skin is warm and dry  Coloration: Skin is not pale  Findings: No erythema  Neurological:      Mental Status: He is alert and oriented to person, place, and time        Coordination: Coordination normal    Psychiatric:         Behavior: Behavior normal          Thought Content: Thought content normal          Judgment: Judgment normal          Lab Review:   Appointment on 08/18/2021   Component Date Value    Protime 08/18/2021 24 6*    INR 08/18/2021 2 [address] [place] [address] Outpatient Visit on 08/10/2021   Component Date Value    POC Glucose 08/10/2021 106     Case Report 08/10/2021                      Value:Non-gynecologic Cytology                          Case: [id number]                                  Authorizing Provider:  Celestino Humphries MD            Collected:           08/10/2021 0908              Ordering Location:     [facility]        Received:            08/10/2021 [address] [facility]                                                           Pathologist:           Giulia Ruelas MD                                                          Specimen:    Lung, Right Upper Lobe Bronchoalveolar Lavage, CELL COUNT                                  Final Diagnosis 08/10/2021                      Value: This result contains rich text formatting which cannot be displayed here   Note 08/10/2021                      Value: This result contains rich text formatting which cannot be displayed here  Levora New Gross Description 08/10/2021                      Value: This result contains rich text formatting which cannot be displayed here   Additional Information 08/10/2021                      Value: This result contains rich text formatting which cannot be displayed here      Fungus (Mycology) Culture 08/10/2021 No Fungus Isolated at 3 Weeks     Bronchial Culture 08/10/2021 1 colony Alpha Hemolytic Strep (Organism type)*    Gram Stain Result 08/10/2021 No Polys     Gram Stain Result 08/10/2021 No organisms seen     AFB Culture 08/10/2021 No AFB Isolated at 3 Weeks     AFB Stain 08/10/2021 No acid fast bacilli seen     Scan Result 08/10/2021 SEE WRITTEN REPORT  Fungus (Mycology) Culture 08/10/2021 No Fungus Isolated at 3 Weeks     Bronchial Culture 08/10/2021 2+ Growth of Streptococcus pneumoniae*    Bronchial Culture 08/10/2021 2+ Growth of      Gram Stain Result 08/10/2021 Rare Polys*    Gram Stain Result 08/10/2021 1+ Gram positive cocci in clusters*    AFB Culture 08/10/2021 No AFB Isolated at 3 Weeks     AFB Stain 08/10/2021 No acid fast bacilli seen    Hospital Outpatient Visit on 07/30/2021   Component Date Value    POC Glucose 07/30/2021 83    Appointment on 07/12/2021   Component Date Value    Protime 07/12/2021 28 0*    INR 07/12/2021 2 69*    Hemoglobin A1C 07/12/2021 6 8*    EAG 07/12/2021 148      No results found  Assessment:       1  Atrial fibrillation, unspecified type ([facility] [address] )  POCT ECG   2  Coronary artery disease involving native coronary artery of native heart without angina pectoris  nitroglycerin (NITROSTAT) 0 4 mg SL tablet   3  S/P AVR     4  H/O heart artery stent     5  Essential hypertension     6  Other hyperlipidemia     7  Chronic diastolic congestive heart failure (HCC)          Plan:       I believe his diastolic heart failure is compensated has he examines at his baseline and his proBNP has been stable, CT not suspicious for pulmonary edema  Prosthetic aortic valve function appropriate, EF preserved via today's echocardiogram    He is angina free post coronary artery stenting  His blood pressure is well controlled, lipids fairly well controlled tolerating statin therapy  AFib is rate controlled and anticoagulated, Coumadin necessary in light of mechanical AVR in concomitant AFib, goal INR 2 5-3 5  Recent H&H stable  No falls or bleeding issues  Reiterated need for SBE antibiotic prophylaxis  Reviewed the remainder of his cardiac meds, feel they are optimal, I recommended no changes  Strongly recommended he follow-up with Pulmonary Medicine regarding his chest x-ray CT and PET scan findings      Encouraged ongoing smoking abstinence, applauded his efforts here  I will see him back in 6 months, let me know if you need me to see him sooner  Asked him to call me sooner with any concerning potential cardiac symptoms in the meantime

## 2021-09-13 LAB
FUNGUS SPEC CULT: NORMAL
FUNGUS SPEC CULT: NORMAL

## 2021-09-16 DIAGNOSIS — IMO0002 UNCONTROLLED TYPE 2 DIABETES MELLITUS WITH DIABETIC POLYNEUROPATHY, WITH LONG-TERM CURRENT USE OF INSULIN: ICD-10-CM

## 2021-09-16 DIAGNOSIS — E11.9 TYPE 2 DIABETES MELLITUS WITHOUT COMPLICATION, UNSPECIFIED WHETHER LONG TERM INSULIN USE (HCC): ICD-10-CM

## 2021-09-16 DIAGNOSIS — I10 ESSENTIAL HYPERTENSION: ICD-10-CM

## 2021-09-16 RX ORDER — BENAZEPRIL HYDROCHLORIDE 10 MG/1
10 TABLET ORAL DAILY
Qty: 90 TABLET | Refills: 1 | Status: SHIPPED | OUTPATIENT
Start: 2021-09-16 | End: 2022-03-08 | Stop reason: SDUPTHER

## 2021-09-16 RX ORDER — METOPROLOL TARTRATE 50 MG/1
50 TABLET, FILM COATED ORAL 2 TIMES DAILY
Qty: 180 TABLET | Refills: 3 | Status: SHIPPED | OUTPATIENT
Start: 2021-09-16

## 2021-09-16 NOTE — TELEPHONE ENCOUNTER
[facility]  Metoprolol tart  50 mg  1 tab BID   180 with 3 refills     [facility] in [place]     Dr Megan Hewitt

## 2021-09-21 ENCOUNTER — OFFICE VISIT (OUTPATIENT)
Dept: PULMONOLOGY | Facility: CLINIC | Age: 56
End: 2021-09-21
Payer: MEDICARE

## 2021-09-21 VITALS
HEIGHT: 73 IN | TEMPERATURE: 97.3 F | DIASTOLIC BLOOD PRESSURE: 72 MMHG | BODY MASS INDEX: 41.75 KG/M2 | WEIGHT: 315 LBS | HEART RATE: 90 BPM | SYSTOLIC BLOOD PRESSURE: 128 MMHG | OXYGEN SATURATION: 95 %

## 2021-09-21 DIAGNOSIS — J44.9 COPD, MODERATE (HCC): Primary | ICD-10-CM

## 2021-09-21 DIAGNOSIS — R91.8 MULTIPLE PULMONARY NODULES DETERMINED BY COMPUTED TOMOGRAPHY OF LUNG: ICD-10-CM

## 2021-09-21 DIAGNOSIS — J96.91 RESPIRATORY FAILURE WITH HYPOXIA, UNSPECIFIED CHRONICITY (HCC): ICD-10-CM

## 2021-09-21 DIAGNOSIS — J13 PNEUMONIA OF BOTH LUNGS DUE TO STREPTOCOCCUS PNEUMONIAE, UNSPECIFIED PART OF LUNG (HCC): ICD-10-CM

## 2021-09-21 PROCEDURE — 94618 PULMONARY STRESS TESTING: CPT | Performed by: PHYSICIAN ASSISTANT

## 2021-09-21 PROCEDURE — 99214 OFFICE O/P EST MOD 30 MIN: CPT | Performed by: PHYSICIAN ASSISTANT

## 2021-09-21 NOTE — ASSESSMENT & PLAN NOTE
Continue Trelegy Ellipta 1 puff daily  Continue as needed albuterol HFA 2 puffs q 6 hours p r n     Continue albuterol nebulized q 6 hours p r n  as well

## 2021-09-21 NOTE — ASSESSMENT & PLAN NOTE
Resolved  6 minute walk today indicates that patient does not require supplemental oxygen at rest or with activities  Patient has a pulse ox at home he will continue to monitor and maintain sats greater than 88%  He knows to call Pulmonary office if he notes desaturations  Recommend increase activity as tolerated

## 2021-09-21 NOTE — PROGRESS NOTES
Pulmonary Follow Up Note   Dileep Pritchett 60 y o  male MRN: [id number]  9/21/2021      Assessment:    Respiratory failure with hypoxia ([address] )    Resolved  6 minute walk today indicates that patient does not require supplemental oxygen at rest or with activities  Patient has a pulse ox at home he will continue to monitor and maintain sats greater than 88%  He knows to call Pulmonary office if he notes desaturations  Recommend increase activity as tolerated  Pneumonia due to Streptococcus pneumoniae (HCC)    Treated Levaquin 750 mg p o  daily x14 days ending 9/2/21  Hold off additional ABX at this time  COPD, moderate (HCC)   Continue Trelegy Ellipta 1 puff daily  Continue as needed albuterol HFA 2 puffs q 6 hours p r n     Continue albuterol nebulized q 6 hours p r n  as well  Multiple pulmonary nodules determined by computed tomography of lung  Reviewed chest x-ray with patient and patient's wife today in the office  Per my interpretation there are still diffuse nodular opacities may be slightly improved compared to a chest x-ray done in August  Nonetheless, given persistence, will plan to arrange high-resolution CT chest in 4 weeks and repeat pulmonary function tests to ensure there isn't any new restriction  He may need repeat bronchoscopy  Plan:    Diagnoses and all orders for this visit:    COPD, moderate ([address] )  -     Complete PFT with post bronchodilator; Future    Multiple pulmonary nodules determined by computed tomography of lung  -     CT chest high resolution; Future    Respiratory failure with hypoxia, unspecified chronicity (HCC)  -     POCT 6 minute walk  -     Discontinue home oxygen    Pneumonia of both lungs due to Streptococcus pneumoniae, unspecified part of lung ([address] )        Return in about 4 weeks (around 10/19/2021)  History of Present Illness   HPI:  Dileep Pritchett is a 60 y o  male who  Presents the office today for routine follow-up    He has past medical history positive for moderate COPD, THIEN on BiPAP,  Morbid obesity, cardiomyopathy, atrial fibrillation on Coumadin, hypertension, hyperlipidemia who we are seeing for abnormal CT chest secondary to diffuse nodular opacities  Previously had high-resolution CT that showed diffuse nodular opacities bilaterally prompting PET-CT that showed heterogeneous uptake of radiotracer in bilateral lung fields corresponding with diffuse nodularity on CT  Outpatient bronchoscopy done 08/10/2021 revealed negative cytology but cultures were consistent with strep pneumonia a  He was treated with 14 day course of Levaquin 750 p o  daily to which he has completed  Since completion of antibiotics patient states that his breathing has significantly improved  He now feels that he is back to his baseline  Denies any worsening cough, sputum production, hemoptysis, wheeze  He has chronic dyspnea on exertion but attributes this to his weight feeling COPD  States that he is able to walk around the grocery store without difficulties now  Denies chest pain, palpitations, lower extremity edema  No fevers or chills  Denies sick contacts  Patient previously worked as a  for an [employer]  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, coal, fumes and chemicals  Review of Systems   All other systems reviewed and are negative        Historical Information   Past Medical History:   Diagnosis Date    Asthma     last assessed 8/21/12    Athscl heart disease of native coronary artery w/o ang pctrs     Atrial fibrillation ([facility] [address] )     last assessed 8/21/12    Cardiomyopathy ([facility] [address] )     Closed fracture of fibula     last assessed 10/18/13    COLD (chronic obstructive lung disease) ([facility] [address] )     last assessed 8/21/12    Coronary angioplasty status     CPAP (continuous positive airway pressure) dependence     Diabetes mellitus ([facility] [address] )     History of echocardiogram 04/19/2017    EF 0 50, LVSF is at the lower limits of normal  Mild concentric LV hypertophy  Mild mitral regurg  Normal functioning mechanical AV      Hyperlipidemia     Hypertension     last assessed 12    Presence of prosthetic heart valve     Restless legs syndrome     last assessed 12    Sleep apnea     last assessed 12     Past Surgical History:   Procedure Laterality Date    AORTIC VALVE REPLACEMENT  10/07/2016    AVR replacement, mechanical    CORONARY ANGIOPLASTY WITH STENT PLACEMENT  2010    EF 40%, Successful bare metal stent mid RCA    MENISCECTOMY Left      Family History   Problem Relation Age of Onset    Atrial fibrillation Mother     Diabetes type II Mother     Heart attack Father         acute    Heart failure Father     Stroke Father         syndrome       Social History     Tobacco Use   Smoking Status Current Some Day Smoker    Years: 35 00    Types: Cigarettes    Last attempt to quit: 10/2016    Years since quittin 9   Smokeless Tobacco Never Used   Tobacco Comment    Quit 2 months ago         Meds/Allergies     Current Outpatient Medications:     albuterol (2 5 mg/3 mL) 0 083 % nebulizer solution, Take 3 mL (2 5 mg total) by nebulization every 6 (six) hours as needed for wheezing or shortness of breath, Disp: 360 mL, Rfl: 2    albuterol (Ventolin HFA) 90 mcg/act inhaler, Inhale 2 puffs every 6 (six) hours as needed for shortness of breath Must be brand necessary, Disp: 18 g, Rfl: 3    aspirin 81 MG tablet, Take by mouth, Disp: , Rfl:     atorvastatin (LIPITOR) 10 mg tablet, Take 1 tablet (10 mg total) by mouth daily, Disp: 90 tablet, Rfl: 3    benazepril (LOTENSIN) 10 mg tablet, Take 1 tablet (10 mg total) by mouth daily, Disp: 90 tablet, Rfl: 1    cetirizine (ZyrTEC) 10 mg tablet, Take 1 tablet (10 mg total) by mouth daily, Disp: 30 tablet, Rfl: 1    diltiazem (CARDIZEM CD) 240 mg 24 hr capsule, Take 1 capsule (240 mg total) by mouth 2 (two) times a day, Disp: 180 capsule, Rfl: 3   fluticasone-umeclidinium-vilanterol (TRELEGY) 100-62 5-25 MCG/INH inhaler, Inhale 1 puff daily Rinse mouth after use , Disp: 2 each, Rfl: 5    furosemide (LASIX) 80 mg tablet, Take 1 tablet (80 mg total) by mouth daily, Disp: 90 tablet, Rfl: 3    insulin aspart (NovoLOG FlexPen) 100 UNIT/ML injection pen, Inject 14 units with breakfast and lunch and 36 units with dinner , Disp: 18 mL, Rfl: 5    insulin degludec Milady Lay FlexTouch) 200 units/mL CONCENTRATED U-200 injection pen, Inject 66 units daily  , Disp: 6 pen, Rfl: 2    Insulin Pen Needle (BD Pen Needle Fany U/F) 32G X 4 MM MISC, Inject as directed 2 (two) times a day, Disp: 100 each, Rfl: 5    metFORMIN (GLUCOPHAGE) 1000 MG tablet, Take 1 tablet (1,000 mg total) by mouth 2 (two) times a day, Disp: 180 tablet, Rfl: 1    metoprolol tartrate (LOPRESSOR) 50 mg tablet, Take 1 tablet (50 mg total) by mouth 2 (two) times a day, Disp: 180 tablet, Rfl: 3    nitroglycerin (NITROSTAT) 0 4 mg SL tablet, Place 1 tablet (0 4 mg total) under the tongue every 5 (five) minutes as needed for chest pain, Disp: 100 tablet, Rfl: 3    potassium chloride 20 MEQ TBCR, Take 1 tablet (20 mEq total) by mouth daily, Disp: 90 tablet, Rfl: 3    warfarin (COUMADIN) 10 mg tablet, Take 1 tablet (10 mg total) by mouth daily, Disp: 90 tablet, Rfl: 3    warfarin (COUMADIN) 2 mg tablet, Take 1 tablet (2 mg total) by mouth daily (Patient taking differently: Take 2 mg by mouth daily except on monday), Disp: 90 tablet, Rfl: 3  Allergies   Allergen Reactions    Fluticasone-Salmeterol Palpitations       Vitals: Blood pressure 128/72, pulse 90, temperature (!) 97 3 °F (36 3 °C), temperature source Tympanic, height 6' 1" (1 854 m), weight (!) 183 kg (403 lb 3 2 oz), SpO2 95 %  Body mass index is 53 2 kg/m²  Oxygen Therapy  SpO2: 95 %  Oxygen Therapy: None (Room air)    Physical Exam  Physical Exam  Vitals reviewed  Constitutional:       Appearance: Normal appearance  He is well-developed  HENT:      Head: Normocephalic and atraumatic  Right Ear: External ear normal       Left Ear: External ear normal    Eyes:      Extraocular Movements: Extraocular movements intact  Pupils: Pupils are equal, round, and reactive to light  Cardiovascular:      Rate and Rhythm: Normal rate and regular rhythm  Pulses: Normal pulses  Heart sounds: Normal heart sounds  No murmur heard  Pulmonary:      Effort: Pulmonary effort is normal  No respiratory distress  Breath sounds: Normal breath sounds  No stridor  No wheezing, rhonchi or rales  Abdominal:      Palpations: Abdomen is soft  Tenderness: There is no abdominal tenderness  Hernia: No hernia is present  Musculoskeletal:         General: No swelling, tenderness or deformity  Normal range of motion  Cervical back: Normal range of motion and neck supple  Skin:     General: Skin is warm and dry  Capillary Refill: Capillary refill takes less than 2 seconds  Neurological:      General: No focal deficit present  Mental Status: He is alert and oriented to person, place, and time  Mental status is at baseline  Psychiatric:         Behavior: Behavior normal          Thought Content: Thought content normal          Judgment: Judgment normal          Labs: I have personally reviewed pertinent lab results  , ABG: No results found for: PHART, RYP1XTW, PO2ART, IJV0OWC, Z9NNIRVJ, BEART, SOURCE, BNP: No results found for: BNP, CBC: No results found for: WBC, HGB, HCT, MCV, PLT, ADJUSTEDWBC, MCH, MCHC, RDW, MPV, NRBC, CMP: No results found for: SODIUM, K, CL, CO2, ANIONGAP, BUN, CREATININE, GLUCOSE, CALCIUM, AST, ALT, ALKPHOS, PROT, BILITOT, EGFR, PT/INR: No results found for: PT, INR, Troponin: No results found for: TROPONINI  Lab Results   Component Value Date    WBC 9 22 07/12/2021    HGB 14 5 07/12/2021    HCT 46 2 07/12/2021    MCV 89 07/12/2021     07/12/2021     Lab Results   Component Value Date GLUCOSE 119 09/01/2013    CALCIUM 8 8 07/12/2021     09/01/2013    K 4 2 07/12/2021    CO2 30 07/12/2021     07/12/2021    BUN 11 07/12/2021    CREATININE 0 83 07/12/2021     No results found for: IGE  Lab Results   Component Value Date    ALT 30 07/12/2021    AST 18 07/12/2021    ALKPHOS 93 07/12/2021    BILITOT 0 6 09/01/2013       Imaging and other studies: I have personally reviewed pertinent reports  and I have personally reviewed pertinent films in PACS       Chest x-ray PA and lateral 09/09/2021   Redemonstration of innumerable lung nodules, per my review appears slightly improved compared to 08/10/2021  Dense bilateral pleural calcifications    Pulmonary function testing:  Performed  03/18/2019  FEV1/FVC ratio  61%   FEV1  69% predicted  FVC  80% predicted   there is response to bronchodilators  TLC  83% predicted  RV  81 % predicted  DLCO corrected for hemoglobin 76 % predicted   moderate obstructive airflow defect  There is significant response to bronchodilators  Normal lung volumes  Normal diffusion capacity  Other Studies: I have personally reviewed pertinent reports  6 minutes walk today   Resting on room air SpO2 94% with heart rate 98 beats per minute  Patient ambulated for 6 minutes without oxygen saturations dropping below 89% with a max heart rate of 115 beats per minute  Total distance 120 m  Evelia dyspnea scored 5/10  Impression:  Patient does not require supplemental oxygen at rest or with activities

## 2021-09-21 NOTE — PROGRESS NOTES
Progress Note - Pulmonary   [place] Pritchett 60 y o  male MRN: [id number]  Unit/Bed#:  Encounter: [id number]    Assessment/Plan:    1  Chief Complaint:    ***    Subjective:    ***    Objective:    Vitals: Blood pressure 128/72, pulse 90, temperature (!) 97 3 °F (36 3 °C), temperature source Tympanic, height 6' 1" (1 854 m), weight (!) 183 kg (403 lb 3 2 oz), SpO2 95 %  ***,Body mass index is 53 2 kg/m²      [unfilled]    Invasive Devices     None                 Physical Exam: ***    Physical Exam    Labs: { ip pulm labs:31378}    ***    Imaging and other studies: {Results Review Statement:23015}

## 2021-09-21 NOTE — ASSESSMENT & PLAN NOTE
Reviewed chest x-ray with patient and patient's wife today in the office  Per my interpretation there are still diffuse nodular opacities may be slightly improved compared to a chest x-ray done in August  Nonetheless, given persistence, will plan to arrange high-resolution CT chest in 4 weeks and repeat pulmonary function tests to ensure there isn't any new restriction  He may need repeat bronchoscopy

## 2021-09-22 ENCOUNTER — TELEPHONE (OUTPATIENT)
Dept: DIABETES SERVICES | Facility: CLINIC | Age: 56
End: 2021-09-22

## 2021-09-22 DIAGNOSIS — IMO0002 UNCONTROLLED TYPE 2 DIABETES MELLITUS WITH DIABETIC POLYNEUROPATHY, WITH LONG-TERM CURRENT USE OF INSULIN: ICD-10-CM

## 2021-09-22 RX ORDER — INSULIN DEGLUDEC 200 U/ML
INJECTION, SOLUTION SUBCUTANEOUS
Qty: 18 ML | Refills: 0 | Status: SHIPPED | OUTPATIENT
Start: 2021-09-22 | End: 2021-11-16

## 2021-09-22 NOTE — TELEPHONE ENCOUNTER
Needs refill on tresiba sent to [facility] on file   Interface order already sent to provider

## 2021-09-27 ENCOUNTER — TELEPHONE (OUTPATIENT)
Dept: PULMONOLOGY | Facility: CLINIC | Age: 56
End: 2021-09-27

## 2021-09-27 NOTE — TELEPHONE ENCOUNTER
Patient calling saying Froilan Kay was going to D/C is oxygen but he has not heard anything from [facility]  Please advise and call patient when order is re-sent

## 2021-09-27 NOTE — TELEPHONE ENCOUNTER
Angelo Oliveira from [facility] called about the D/C of oxygen  This order isn't with [facility], this order needs to go through [address]

## 2021-09-28 LAB
MYCOBACTERIUM SPEC CULT: NORMAL
MYCOBACTERIUM SPEC CULT: NORMAL
RHODAMINE-AURAMINE STN SPEC: NORMAL
RHODAMINE-AURAMINE STN SPEC: NORMAL

## 2021-09-30 NOTE — TELEPHONE ENCOUNTER
Coretta Lazo from [facility] they still havent received the discontinue oxygen order from us  Please fax to [phone]     Coretta Lazo [phone]

## 2021-10-19 ENCOUNTER — HOSPITAL ENCOUNTER (OUTPATIENT)
Dept: CT IMAGING | Facility: HOSPITAL | Age: 56
Discharge: HOME | End: 2021-10-19
Payer: MEDICARE

## 2021-10-19 ENCOUNTER — ANTICOAG VISIT (OUTPATIENT)
Dept: FAMILY MEDICINE CLINIC | Facility: CLINIC | Age: 56
End: 2021-10-19

## 2021-10-19 ENCOUNTER — LAB (OUTPATIENT)
Dept: LAB | Facility: HOSPITAL | Age: 56
End: 2021-10-19
Payer: MEDICARE

## 2021-10-19 DIAGNOSIS — I48.91 ATRIAL FIBRILLATION, UNSPECIFIED TYPE (HCC): ICD-10-CM

## 2021-10-19 DIAGNOSIS — R91.8 MULTIPLE PULMONARY NODULES DETERMINED BY COMPUTED TOMOGRAPHY OF LUNG: ICD-10-CM

## 2021-10-19 LAB
INR PPP: 3.49 (ref 0.84–1.19)
PROTHROMBIN TIME: 33 SECONDS (ref 11.6–14.5)

## 2021-10-19 PROCEDURE — 71250 CT THORAX DX C-: CPT

## 2021-10-19 PROCEDURE — G1004 CDSM NDSC: HCPCS

## 2021-10-19 PROCEDURE — 36415 COLL VENOUS BLD VENIPUNCTURE: CPT

## 2021-10-19 PROCEDURE — 85610 PROTHROMBIN TIME: CPT

## 2021-10-25 ENCOUNTER — OFFICE VISIT (OUTPATIENT)
Dept: PULMONOLOGY | Facility: CLINIC | Age: 56
End: 2021-10-25
Payer: MEDICARE

## 2021-10-25 VITALS
DIASTOLIC BLOOD PRESSURE: 60 MMHG | TEMPERATURE: 97.7 F | OXYGEN SATURATION: 95 % | WEIGHT: 315 LBS | SYSTOLIC BLOOD PRESSURE: 128 MMHG | BODY MASS INDEX: 41.75 KG/M2 | HEART RATE: 91 BPM | HEIGHT: 73 IN

## 2021-10-25 DIAGNOSIS — G47.33 OBSTRUCTIVE SLEEP APNEA: ICD-10-CM

## 2021-10-25 DIAGNOSIS — J44.9 COPD, MODERATE (HCC): Primary | ICD-10-CM

## 2021-10-25 DIAGNOSIS — E66.01 MORBID OBESITY WITH BMI OF 50.0-59.9, ADULT (HCC): ICD-10-CM

## 2021-10-25 DIAGNOSIS — R91.8 MULTIPLE PULMONARY NODULES DETERMINED BY COMPUTED TOMOGRAPHY OF LUNG: ICD-10-CM

## 2021-10-25 PROCEDURE — 99214 OFFICE O/P EST MOD 30 MIN: CPT | Performed by: PHYSICIAN ASSISTANT

## 2021-10-28 ENCOUNTER — TELEPHONE (OUTPATIENT)
Dept: PULMONOLOGY | Facility: CLINIC | Age: 56
End: 2021-10-28

## 2021-10-28 ENCOUNTER — PREP FOR PROCEDURE (OUTPATIENT)
Dept: PULMONOLOGY | Facility: CLINIC | Age: 56
End: 2021-10-28

## 2021-10-28 DIAGNOSIS — R91.8 MULTIPLE PULMONARY NODULES DETERMINED BY COMPUTED TOMOGRAPHY OF LUNG: Primary | ICD-10-CM

## 2021-10-29 ENCOUNTER — TELEPHONE (OUTPATIENT)
Dept: PULMONOLOGY | Facility: CLINIC | Age: 56
End: 2021-10-29

## 2021-11-02 ENCOUNTER — OFFICE VISIT (OUTPATIENT)
Dept: FAMILY MEDICINE CLINIC | Facility: CLINIC | Age: 56
End: 2021-11-02
Payer: MEDICARE

## 2021-11-02 ENCOUNTER — TELEPHONE (OUTPATIENT)
Dept: GASTROENTEROLOGY | Facility: HOSPITAL | Age: 56
End: 2021-11-02

## 2021-11-02 VITALS
HEART RATE: 101 BPM | SYSTOLIC BLOOD PRESSURE: 122 MMHG | TEMPERATURE: 97.2 F | DIASTOLIC BLOOD PRESSURE: 78 MMHG | HEIGHT: 73 IN | BODY MASS INDEX: 41.75 KG/M2 | WEIGHT: 315 LBS

## 2021-11-02 DIAGNOSIS — Z00.00 MEDICARE ANNUAL WELLNESS VISIT, SUBSEQUENT: Primary | ICD-10-CM

## 2021-11-02 DIAGNOSIS — Z79.4 TYPE 2 DIABETES MELLITUS WITHOUT COMPLICATION, WITH LONG-TERM CURRENT USE OF INSULIN (HCC): ICD-10-CM

## 2021-11-02 DIAGNOSIS — Z23 ENCOUNTER FOR IMMUNIZATION: ICD-10-CM

## 2021-11-02 DIAGNOSIS — Z12.11 SCREENING FOR COLON CANCER: ICD-10-CM

## 2021-11-02 DIAGNOSIS — E66.01 MORBID OBESITY WITH BMI OF 50.0-59.9, ADULT (HCC): ICD-10-CM

## 2021-11-02 DIAGNOSIS — E11.9 TYPE 2 DIABETES MELLITUS WITHOUT COMPLICATION, WITH LONG-TERM CURRENT USE OF INSULIN (HCC): ICD-10-CM

## 2021-11-02 DIAGNOSIS — Z23 NEED FOR INFLUENZA VACCINATION: ICD-10-CM

## 2021-11-02 PROCEDURE — G0008 ADMIN INFLUENZA VIRUS VAC: HCPCS | Performed by: FAMILY MEDICINE

## 2021-11-02 PROCEDURE — G0009 ADMIN PNEUMOCOCCAL VACCINE: HCPCS | Performed by: FAMILY MEDICINE

## 2021-11-02 PROCEDURE — 90682 RIV4 VACC RECOMBINANT DNA IM: CPT | Performed by: FAMILY MEDICINE

## 2021-11-02 PROCEDURE — 90670 PCV13 VACCINE IM: CPT | Performed by: FAMILY MEDICINE

## 2021-11-02 PROCEDURE — G0438 PPPS, INITIAL VISIT: HCPCS | Performed by: FAMILY MEDICINE

## 2021-11-02 RX ORDER — PEN NEEDLE, DIABETIC 32GX 5/32"
NEEDLE, DISPOSABLE MISCELLANEOUS 4 TIMES DAILY
Qty: 400 EACH | Refills: 5 | Status: SHIPPED | OUTPATIENT
Start: 2021-11-02

## 2021-11-03 ENCOUNTER — ANESTHESIA EVENT (OUTPATIENT)
Dept: GASTROENTEROLOGY | Facility: HOSPITAL | Age: 56
End: 2021-11-03

## 2021-11-03 ENCOUNTER — HOSPITAL ENCOUNTER (OUTPATIENT)
Dept: RADIOLOGY | Facility: HOSPITAL | Age: 56
Discharge: HOME | End: 2021-11-03
Payer: MEDICARE

## 2021-11-03 ENCOUNTER — HOSPITAL ENCOUNTER (OUTPATIENT)
Dept: RADIOLOGY | Facility: HOSPITAL | Age: 56
Setting detail: OUTPATIENT SURGERY
Discharge: HOME | End: 2021-11-03
Payer: MEDICARE

## 2021-11-03 ENCOUNTER — HOSPITAL ENCOUNTER (OUTPATIENT)
Dept: GASTROENTEROLOGY | Facility: HOSPITAL | Age: 56
Setting detail: OUTPATIENT SURGERY
Discharge: HOME | End: 2021-11-03
Attending: INTERNAL MEDICINE
Payer: MEDICARE

## 2021-11-03 ENCOUNTER — ANESTHESIA (OUTPATIENT)
Dept: GASTROENTEROLOGY | Facility: HOSPITAL | Age: 56
End: 2021-11-03

## 2021-11-03 VITALS
HEIGHT: 73 IN | OXYGEN SATURATION: 92 % | BODY MASS INDEX: 41.75 KG/M2 | SYSTOLIC BLOOD PRESSURE: 109 MMHG | RESPIRATION RATE: 18 BRPM | HEART RATE: 89 BPM | TEMPERATURE: 97.1 F | WEIGHT: 315 LBS | DIASTOLIC BLOOD PRESSURE: 58 MMHG

## 2021-11-03 DIAGNOSIS — R91.8 MULTIPLE PULMONARY NODULES DETERMINED BY COMPUTED TOMOGRAPHY OF LUNG: ICD-10-CM

## 2021-11-03 LAB
GLUCOSE SERPL-MCNC: 104 MG/DL (ref 65–140)
INR PPP: 1.04 (ref 0.84–1.19)
PROTHROMBIN TIME: 13.6 SECONDS (ref 11.6–14.5)

## 2021-11-03 PROCEDURE — 88305 TISSUE EXAM BY PATHOLOGIST: CPT | Performed by: PATHOLOGY

## 2021-11-03 PROCEDURE — 82948 REAGENT STRIP/BLOOD GLUCOSE: CPT

## 2021-11-03 PROCEDURE — 88172 CYTP DX EVAL FNA 1ST EA SITE: CPT | Performed by: PATHOLOGY

## 2021-11-03 PROCEDURE — 87116 MYCOBACTERIA CULTURE: CPT | Performed by: INTERNAL MEDICINE

## 2021-11-03 PROCEDURE — 71045 X-RAY EXAM CHEST 1 VIEW: CPT

## 2021-11-03 PROCEDURE — 87252 VIRUS INOCULATION TISSUE: CPT | Performed by: INTERNAL MEDICINE

## 2021-11-03 PROCEDURE — 85610 PROTHROMBIN TIME: CPT | Performed by: INTERNAL MEDICINE

## 2021-11-03 PROCEDURE — 87206 SMEAR FLUORESCENT/ACID STAI: CPT | Performed by: INTERNAL MEDICINE

## 2021-11-03 PROCEDURE — 31652 BRONCH EBUS SAMPLNG 1/2 NODE: CPT | Performed by: INTERNAL MEDICINE

## 2021-11-03 PROCEDURE — 31628 BRONCHOSCOPY/LUNG BX EACH: CPT | Performed by: INTERNAL MEDICINE

## 2021-11-03 PROCEDURE — 87070 CULTURE OTHR SPECIMN AEROBIC: CPT | Performed by: INTERNAL MEDICINE

## 2021-11-03 PROCEDURE — 88184 FLOWCYTOMETRY/ TC 1 MARKER: CPT | Performed by: INTERNAL MEDICINE

## 2021-11-03 PROCEDURE — 88173 CYTOPATH EVAL FNA REPORT: CPT | Performed by: PATHOLOGY

## 2021-11-03 PROCEDURE — 87102 FUNGUS ISOLATION CULTURE: CPT | Performed by: INTERNAL MEDICINE

## 2021-11-03 PROCEDURE — 88185 FLOWCYTOMETRY/TC ADD-ON: CPT

## 2021-11-03 RX ORDER — CEFAZOLIN SODIUM 1 G/3ML
INJECTION, POWDER, FOR SOLUTION INTRAMUSCULAR; INTRAVENOUS AS NEEDED
Status: DISCONTINUED | OUTPATIENT
Start: 2021-11-03 | End: 2021-11-03

## 2021-11-03 RX ORDER — FENTANYL CITRATE/PF 50 MCG/ML
25 SYRINGE (ML) INJECTION
Status: DISCONTINUED | OUTPATIENT
Start: 2021-11-03 | End: 2021-11-03 | Stop reason: HOSPADM

## 2021-11-03 RX ORDER — SUCCINYLCHOLINE/SOD CL,ISO/PF 100 MG/5ML
SYRINGE (ML) INTRAVENOUS AS NEEDED
Status: DISCONTINUED | OUTPATIENT
Start: 2021-11-03 | End: 2021-11-03

## 2021-11-03 RX ORDER — LABETALOL 20 MG/4 ML (5 MG/ML) INTRAVENOUS SYRINGE
10
Status: DISCONTINUED | OUTPATIENT
Start: 2021-11-03 | End: 2021-11-03 | Stop reason: HOSPADM

## 2021-11-03 RX ORDER — DEXAMETHASONE SODIUM PHOSPHATE 10 MG/ML
INJECTION, SOLUTION INTRAMUSCULAR; INTRAVENOUS AS NEEDED
Status: DISCONTINUED | OUTPATIENT
Start: 2021-11-03 | End: 2021-11-03

## 2021-11-03 RX ORDER — FENTANYL CITRATE 50 UG/ML
INJECTION, SOLUTION INTRAMUSCULAR; INTRAVENOUS AS NEEDED
Status: DISCONTINUED | OUTPATIENT
Start: 2021-11-03 | End: 2021-11-03

## 2021-11-03 RX ORDER — PROPOFOL 10 MG/ML
INJECTION, EMULSION INTRAVENOUS CONTINUOUS PRN
Status: DISCONTINUED | OUTPATIENT
Start: 2021-11-03 | End: 2021-11-03

## 2021-11-03 RX ORDER — GLYCOPYRROLATE 0.2 MG/ML
INJECTION INTRAMUSCULAR; INTRAVENOUS AS NEEDED
Status: DISCONTINUED | OUTPATIENT
Start: 2021-11-03 | End: 2021-11-03

## 2021-11-03 RX ORDER — ONDANSETRON 2 MG/ML
INJECTION INTRAMUSCULAR; INTRAVENOUS AS NEEDED
Status: DISCONTINUED | OUTPATIENT
Start: 2021-11-03 | End: 2021-11-03

## 2021-11-03 RX ORDER — ALBUTEROL SULFATE 2.5 MG/3ML
2.5 SOLUTION RESPIRATORY (INHALATION) ONCE AS NEEDED
Status: DISCONTINUED | OUTPATIENT
Start: 2021-11-03 | End: 2021-11-03 | Stop reason: HOSPADM

## 2021-11-03 RX ORDER — METOCLOPRAMIDE HYDROCHLORIDE 5 MG/ML
10 INJECTION INTRAMUSCULAR; INTRAVENOUS ONCE AS NEEDED
Status: DISCONTINUED | OUTPATIENT
Start: 2021-11-03 | End: 2021-11-03 | Stop reason: HOSPADM

## 2021-11-03 RX ORDER — LIDOCAINE HYDROCHLORIDE 10 MG/ML
INJECTION, SOLUTION EPIDURAL; INFILTRATION; INTRACAUDAL; PERINEURAL AS NEEDED
Status: DISCONTINUED | OUTPATIENT
Start: 2021-11-03 | End: 2021-11-03

## 2021-11-03 RX ORDER — EPHEDRINE SULFATE 50 MG/ML
INJECTION INTRAVENOUS AS NEEDED
Status: DISCONTINUED | OUTPATIENT
Start: 2021-11-03 | End: 2021-11-03

## 2021-11-03 RX ORDER — HYDROMORPHONE HCL/PF 1 MG/ML
0.5 SYRINGE (ML) INJECTION
Status: DISCONTINUED | OUTPATIENT
Start: 2021-11-03 | End: 2021-11-03 | Stop reason: HOSPADM

## 2021-11-03 RX ORDER — NEOSTIGMINE METHYLSULFATE 1 MG/ML
INJECTION INTRAVENOUS AS NEEDED
Status: DISCONTINUED | OUTPATIENT
Start: 2021-11-03 | End: 2021-11-03

## 2021-11-03 RX ORDER — SODIUM CHLORIDE, SODIUM LACTATE, POTASSIUM CHLORIDE, CALCIUM CHLORIDE 600; 310; 30; 20 MG/100ML; MG/100ML; MG/100ML; MG/100ML
125 INJECTION, SOLUTION INTRAVENOUS CONTINUOUS
Status: DISCONTINUED | OUTPATIENT
Start: 2021-11-03 | End: 2021-11-07 | Stop reason: HOSPADM

## 2021-11-03 RX ORDER — HYDRALAZINE HYDROCHLORIDE 20 MG/ML
5 INJECTION INTRAMUSCULAR; INTRAVENOUS
Status: DISCONTINUED | OUTPATIENT
Start: 2021-11-03 | End: 2021-11-03 | Stop reason: HOSPADM

## 2021-11-03 RX ORDER — ONDANSETRON 2 MG/ML
4 INJECTION INTRAMUSCULAR; INTRAVENOUS ONCE AS NEEDED
Status: DISCONTINUED | OUTPATIENT
Start: 2021-11-03 | End: 2021-11-03 | Stop reason: HOSPADM

## 2021-11-03 RX ORDER — ALBUTEROL SULFATE 2.5 MG/3ML
2.5 SOLUTION RESPIRATORY (INHALATION) ONCE AS NEEDED
Status: COMPLETED | OUTPATIENT
Start: 2021-11-03 | End: 2021-11-03

## 2021-11-03 RX ORDER — PROMETHAZINE HYDROCHLORIDE 25 MG/ML
12.5 INJECTION, SOLUTION INTRAMUSCULAR; INTRAVENOUS ONCE AS NEEDED
Status: DISCONTINUED | OUTPATIENT
Start: 2021-11-03 | End: 2021-11-03 | Stop reason: HOSPADM

## 2021-11-03 RX ORDER — CEFAZOLIN SODIUM 1 G/50ML
1000 SOLUTION INTRAVENOUS ONCE
Status: DISCONTINUED | OUTPATIENT
Start: 2021-11-03 | End: 2021-11-07 | Stop reason: HOSPADM

## 2021-11-03 RX ORDER — HYDROMORPHONE HCL IN WATER/PF 6 MG/30 ML
0.2 PATIENT CONTROLLED ANALGESIA SYRINGE INTRAVENOUS
Status: DISCONTINUED | OUTPATIENT
Start: 2021-11-03 | End: 2021-11-03 | Stop reason: HOSPADM

## 2021-11-03 RX ORDER — ROCURONIUM BROMIDE 10 MG/ML
INJECTION, SOLUTION INTRAVENOUS AS NEEDED
Status: DISCONTINUED | OUTPATIENT
Start: 2021-11-03 | End: 2021-11-03

## 2021-11-03 RX ORDER — PROPOFOL 10 MG/ML
INJECTION, EMULSION INTRAVENOUS AS NEEDED
Status: DISCONTINUED | OUTPATIENT
Start: 2021-11-03 | End: 2021-11-03

## 2021-11-03 RX ADMIN — ALBUTEROL SULFATE 2.5 MG: 2.5 SOLUTION RESPIRATORY (INHALATION) at 11:16

## 2021-11-03 RX ADMIN — ROCURONIUM BROMIDE 20 MG: 10 SOLUTION INTRAVENOUS at 09:55

## 2021-11-03 RX ADMIN — SODIUM CHLORIDE, SODIUM LACTATE, POTASSIUM CHLORIDE, AND CALCIUM CHLORIDE: .6; .31; .03; .02 INJECTION, SOLUTION INTRAVENOUS at 10:37

## 2021-11-03 RX ADMIN — EPHEDRINE SULFATE 5 MG: 50 INJECTION, SOLUTION INTRAVENOUS at 09:45

## 2021-11-03 RX ADMIN — CEFAZOLIN SODIUM 1000 MG: 1 INJECTION, POWDER, FOR SOLUTION INTRAMUSCULAR; INTRAVENOUS at 09:36

## 2021-11-03 RX ADMIN — EPHEDRINE SULFATE 10 MG: 50 INJECTION, SOLUTION INTRAVENOUS at 09:57

## 2021-11-03 RX ADMIN — PHENYLEPHRINE HYDROCHLORIDE 40 MCG/MIN: 10 INJECTION INTRAVENOUS at 09:36

## 2021-11-03 RX ADMIN — NEOSTIGMINE METHYLSULFATE 3 MG: 1 INJECTION INTRAVENOUS at 10:48

## 2021-11-03 RX ADMIN — Medication 260 MG: at 09:32

## 2021-11-03 RX ADMIN — LIDOCAINE HYDROCHLORIDE 100 MG: 10 INJECTION, SOLUTION EPIDURAL; INFILTRATION; INTRACAUDAL at 09:32

## 2021-11-03 RX ADMIN — SODIUM CHLORIDE, SODIUM LACTATE, POTASSIUM CHLORIDE, AND CALCIUM CHLORIDE: .6; .31; .03; .02 INJECTION, SOLUTION INTRAVENOUS at 09:25

## 2021-11-03 RX ADMIN — FENTANYL CITRATE 100 MCG: 50 INJECTION, SOLUTION INTRAMUSCULAR; INTRAVENOUS at 09:32

## 2021-11-03 RX ADMIN — PROPOFOL 150 MG: 10 INJECTION, EMULSION INTRAVENOUS at 09:32

## 2021-11-03 RX ADMIN — PROPOFOL 140 MCG/KG/MIN: 10 INJECTION, EMULSION INTRAVENOUS at 09:35

## 2021-11-03 RX ADMIN — ROCURONIUM BROMIDE 30 MG: 10 SOLUTION INTRAVENOUS at 09:35

## 2021-11-03 RX ADMIN — DEXAMETHASONE SODIUM PHOSPHATE 8 MG: 10 INJECTION, SOLUTION INTRAMUSCULAR; INTRAVENOUS at 10:00

## 2021-11-03 RX ADMIN — FENTANYL CITRATE 50 MCG: 50 INJECTION, SOLUTION INTRAMUSCULAR; INTRAVENOUS at 10:16

## 2021-11-03 RX ADMIN — EPHEDRINE SULFATE 5 MG: 50 INJECTION, SOLUTION INTRAVENOUS at 09:38

## 2021-11-03 RX ADMIN — GLYCOPYRROLATE 0.4 MG: 0.2 INJECTION, SOLUTION INTRAMUSCULAR; INTRAVENOUS at 10:48

## 2021-11-03 RX ADMIN — ONDANSETRON 4 MG: 2 INJECTION INTRAMUSCULAR; INTRAVENOUS at 10:34

## 2021-11-05 LAB
BACTERIA BRONCH AEROBE CULT: NORMAL
GRAM STN SPEC: NORMAL
SCAN RESULT: NORMAL

## 2021-11-10 DIAGNOSIS — I48.91 ATRIAL FIBRILLATION, UNSPECIFIED TYPE (HCC): ICD-10-CM

## 2021-11-10 RX ORDER — WARFARIN SODIUM 2 MG/1
2 TABLET ORAL
Qty: 90 TABLET | Refills: 3 | Status: SHIPPED | OUTPATIENT
Start: 2021-11-10

## 2021-11-15 ENCOUNTER — LAB (OUTPATIENT)
Dept: LAB | Facility: HOSPITAL | Age: 56
End: 2021-11-15
Payer: MEDICARE

## 2021-11-15 ENCOUNTER — ANTICOAG VISIT (OUTPATIENT)
Dept: FAMILY MEDICINE CLINIC | Facility: CLINIC | Age: 56
End: 2021-11-15

## 2021-11-15 DIAGNOSIS — IMO0002 UNCONTROLLED TYPE 2 DIABETES MELLITUS WITH DIABETIC POLYNEUROPATHY, WITH LONG-TERM CURRENT USE OF INSULIN: ICD-10-CM

## 2021-11-15 DIAGNOSIS — I48.91 ATRIAL FIBRILLATION, UNSPECIFIED TYPE (HCC): ICD-10-CM

## 2021-11-15 LAB
INR PPP: 2.54 (ref 0.84–1.19)
PROTHROMBIN TIME: 25.9 SECONDS (ref 11.6–14.5)

## 2021-11-15 PROCEDURE — 85610 PROTHROMBIN TIME: CPT

## 2021-11-15 PROCEDURE — 36415 COLL VENOUS BLD VENIPUNCTURE: CPT

## 2021-11-16 DIAGNOSIS — IMO0002 UNCONTROLLED TYPE 2 DIABETES MELLITUS WITH DIABETIC POLYNEUROPATHY, WITH LONG-TERM CURRENT USE OF INSULIN: Primary | ICD-10-CM

## 2021-11-16 RX ORDER — INSULIN DEGLUDEC 200 U/ML
INJECTION, SOLUTION SUBCUTANEOUS
Qty: 18 ML | Refills: 0 | OUTPATIENT
Start: 2021-11-16

## 2021-11-17 RX ORDER — INSULIN ASPART INJECTION 100 [IU]/ML
INJECTION, SOLUTION SUBCUTANEOUS
Qty: 30 ML | Refills: 1 | Status: SHIPPED | OUTPATIENT
Start: 2021-11-17 | End: 2022-02-03 | Stop reason: CLARIF

## 2021-11-19 ENCOUNTER — TELEPHONE (OUTPATIENT)
Dept: OTHER | Facility: HOSPITAL | Age: 56
End: 2021-11-19

## 2021-11-19 ENCOUNTER — TELEPHONE (OUTPATIENT)
Dept: ENDOCRINOLOGY | Facility: CLINIC | Age: 56
End: 2021-11-19

## 2021-11-19 DIAGNOSIS — IMO0002 UNCONTROLLED TYPE 2 DIABETES MELLITUS WITH DIABETIC POLYNEUROPATHY, WITH LONG-TERM CURRENT USE OF INSULIN: Primary | ICD-10-CM

## 2021-11-19 RX ORDER — INSULIN LISPRO 100 [IU]/ML
INJECTION, SOLUTION INTRAVENOUS; SUBCUTANEOUS
Qty: 30 ML | Refills: 1 | Status: SHIPPED | OUTPATIENT
Start: 2021-11-19 | End: 2022-02-03 | Stop reason: CLARIF

## 2021-12-03 ENCOUNTER — LAB (OUTPATIENT)
Dept: LAB | Facility: HOSPITAL | Age: 56
End: 2021-12-03
Payer: MEDICARE

## 2021-12-03 ENCOUNTER — ANTICOAG VISIT (OUTPATIENT)
Dept: FAMILY MEDICINE CLINIC | Facility: CLINIC | Age: 56
End: 2021-12-03

## 2021-12-03 DIAGNOSIS — I48.91 ATRIAL FIBRILLATION, UNSPECIFIED TYPE (HCC): ICD-10-CM

## 2021-12-03 LAB
INR PPP: 2.76 (ref 0.84–1.19)
PROTHROMBIN TIME: 27.5 SECONDS (ref 11.6–14.5)

## 2021-12-03 PROCEDURE — 85610 PROTHROMBIN TIME: CPT

## 2021-12-06 LAB — FUNGUS SPEC CULT: NORMAL

## 2021-12-21 LAB
MYCOBACTERIUM SPEC CULT: NORMAL
RHODAMINE-AURAMINE STN SPEC: NORMAL

## 2022-01-13 ENCOUNTER — APPOINTMENT (OUTPATIENT)
Dept: LAB | Facility: HOSPITAL | Age: 57
End: 2022-01-13
Payer: MEDICARE

## 2022-01-13 ENCOUNTER — ANTICOAG VISIT (OUTPATIENT)
Dept: FAMILY MEDICINE CLINIC | Facility: CLINIC | Age: 57
End: 2022-01-13

## 2022-01-13 DIAGNOSIS — IMO0002 UNCONTROLLED TYPE 2 DIABETES MELLITUS WITH DIABETIC POLYNEUROPATHY, WITH LONG-TERM CURRENT USE OF INSULIN: ICD-10-CM

## 2022-01-13 DIAGNOSIS — I48.91 ATRIAL FIBRILLATION, UNSPECIFIED TYPE (HCC): ICD-10-CM

## 2022-01-13 LAB
ALBUMIN SERPL BCP-MCNC: 3.2 G/DL (ref 3.5–5)
ALP SERPL-CCNC: 90 U/L (ref 46–116)
ALT SERPL W P-5'-P-CCNC: 24 U/L (ref 12–78)
ANION GAP SERPL CALCULATED.3IONS-SCNC: 8 MMOL/L (ref 4–13)
AST SERPL W P-5'-P-CCNC: 15 U/L (ref 5–45)
BASOPHILS # BLD AUTO: 0.08 THOUSANDS/ΜL (ref 0–0.1)
BASOPHILS NFR BLD AUTO: 1 % (ref 0–1)
BILIRUB SERPL-MCNC: 0.48 MG/DL (ref 0.2–1)
BUN SERPL-MCNC: 14 MG/DL (ref 5–25)
CALCIUM ALBUM COR SERPL-MCNC: 9.5 MG/DL (ref 8.3–10.1)
CALCIUM SERPL-MCNC: 8.9 MG/DL (ref 8.3–10.1)
CHLORIDE SERPL-SCNC: 102 MMOL/L (ref 100–108)
CHOLEST SERPL-MCNC: 112 MG/DL
CO2 SERPL-SCNC: 28 MMOL/L (ref 21–32)
CREAT SERPL-MCNC: 0.96 MG/DL (ref 0.6–1.3)
CREAT UR-MCNC: 206 MG/DL
EOSINOPHIL # BLD AUTO: 0.35 THOUSAND/ΜL (ref 0–0.61)
EOSINOPHIL NFR BLD AUTO: 4 % (ref 0–6)
ERYTHROCYTE [DISTWIDTH] IN BLOOD BY AUTOMATED COUNT: 13.8 % (ref 11.6–15.1)
EST. AVERAGE GLUCOSE BLD GHB EST-MCNC: 131 MG/DL
GFR SERPL CREATININE-BSD FRML MDRD: 86 ML/MIN/1.73SQ M
GLUCOSE SERPL-MCNC: 151 MG/DL (ref 65–140)
HBA1C MFR BLD: 6.2 %
HCT VFR BLD AUTO: 44.4 % (ref 36.5–49.3)
HDLC SERPL-MCNC: 28 MG/DL
HGB BLD-MCNC: 14.6 G/DL (ref 12–17)
IMM GRANULOCYTES # BLD AUTO: 0.03 THOUSAND/UL (ref 0–0.2)
IMM GRANULOCYTES NFR BLD AUTO: 0 % (ref 0–2)
INR PPP: 3.7 (ref 0.84–1.19)
LDLC SERPL CALC-MCNC: 64 MG/DL (ref 0–100)
LYMPHOCYTES # BLD AUTO: 1.62 THOUSANDS/ΜL (ref 0.6–4.47)
LYMPHOCYTES NFR BLD AUTO: 19 % (ref 14–44)
MCH RBC QN AUTO: 30.2 PG (ref 26.8–34.3)
MCHC RBC AUTO-ENTMCNC: 32.9 G/DL (ref 31.4–37.4)
MCV RBC AUTO: 92 FL (ref 82–98)
MICROALBUMIN UR-MCNC: 23.9 MG/L (ref 0–20)
MICROALBUMIN/CREAT 24H UR: 12 MG/G CREATININE (ref 0–30)
MONOCYTES # BLD AUTO: 0.59 THOUSAND/ΜL (ref 0.17–1.22)
MONOCYTES NFR BLD AUTO: 7 % (ref 4–12)
NEUTROPHILS # BLD AUTO: 5.9 THOUSANDS/ΜL (ref 1.85–7.62)
NEUTS SEG NFR BLD AUTO: 69 % (ref 43–75)
NONHDLC SERPL-MCNC: 84 MG/DL
PLATELET # BLD AUTO: 295 THOUSANDS/UL (ref 149–390)
PMV BLD AUTO: 9.3 FL (ref 8.9–12.7)
POTASSIUM SERPL-SCNC: 4.2 MMOL/L (ref 3.5–5.3)
PROT SERPL-MCNC: 7.8 G/DL (ref 6.4–8.2)
PROTHROMBIN TIME: 34.5 SECONDS (ref 11.6–14.5)
RBC # BLD AUTO: 4.84 MILLION/UL (ref 3.88–5.62)
SODIUM SERPL-SCNC: 138 MMOL/L (ref 136–145)
TRIGL SERPL-MCNC: 99 MG/DL
WBC # BLD AUTO: 8.57 THOUSAND/UL (ref 4.31–10.16)

## 2022-01-13 PROCEDURE — 36415 COLL VENOUS BLD VENIPUNCTURE: CPT

## 2022-01-13 PROCEDURE — 85610 PROTHROMBIN TIME: CPT

## 2022-01-13 PROCEDURE — 82570 ASSAY OF URINE CREATININE: CPT

## 2022-01-13 PROCEDURE — 83036 HEMOGLOBIN GLYCOSYLATED A1C: CPT

## 2022-01-13 PROCEDURE — 82043 UR ALBUMIN QUANTITATIVE: CPT

## 2022-01-13 PROCEDURE — 80053 COMPREHEN METABOLIC PANEL: CPT

## 2022-01-13 PROCEDURE — 85025 COMPLETE CBC W/AUTO DIFF WBC: CPT

## 2022-01-13 PROCEDURE — 80061 LIPID PANEL: CPT

## 2022-02-03 DIAGNOSIS — IMO0002 UNCONTROLLED TYPE 2 DIABETES MELLITUS WITH DIABETIC POLYNEUROPATHY, WITH LONG-TERM CURRENT USE OF INSULIN: ICD-10-CM

## 2022-02-03 RX ORDER — INSULIN ASPART 100 [IU]/ML
INJECTION, SOLUTION INTRAVENOUS; SUBCUTANEOUS
Qty: 45 ML | Refills: 1 | Status: SHIPPED | OUTPATIENT
Start: 2022-02-03 | End: 2022-07-08 | Stop reason: SDUPTHER

## 2022-02-10 ENCOUNTER — APPOINTMENT (OUTPATIENT)
Dept: LAB | Facility: HOSPITAL | Age: 57
End: 2022-02-10
Payer: MEDICARE

## 2022-02-10 ENCOUNTER — ANTICOAG VISIT (OUTPATIENT)
Dept: FAMILY MEDICINE CLINIC | Facility: CLINIC | Age: 57
End: 2022-02-10

## 2022-02-10 DIAGNOSIS — I48.91 ATRIAL FIBRILLATION, UNSPECIFIED TYPE (HCC): ICD-10-CM

## 2022-02-10 LAB
INR PPP: 3.27 (ref 0.84–1.19)
PROTHROMBIN TIME: 31.4 SECONDS (ref 11.6–14.5)

## 2022-02-10 PROCEDURE — 36415 COLL VENOUS BLD VENIPUNCTURE: CPT

## 2022-02-10 PROCEDURE — 85610 PROTHROMBIN TIME: CPT

## 2022-03-07 DIAGNOSIS — IMO0002 UNCONTROLLED TYPE 2 DIABETES MELLITUS WITH DIABETIC POLYNEUROPATHY, WITH LONG-TERM CURRENT USE OF INSULIN: ICD-10-CM

## 2022-03-07 NOTE — TELEPHONE ENCOUNTER
Patient needs [place] refilled      Scheduled patient follow up appt because he missed his last appt    Pharmacy: [facility]

## 2022-03-08 ENCOUNTER — OFFICE VISIT (OUTPATIENT)
Dept: FAMILY MEDICINE CLINIC | Facility: CLINIC | Age: 57
End: 2022-03-08
Payer: MEDICARE

## 2022-03-08 VITALS
SYSTOLIC BLOOD PRESSURE: 132 MMHG | HEIGHT: 73 IN | TEMPERATURE: 96.5 F | BODY MASS INDEX: 41.75 KG/M2 | WEIGHT: 315 LBS | HEART RATE: 99 BPM | DIASTOLIC BLOOD PRESSURE: 78 MMHG | OXYGEN SATURATION: 97 %

## 2022-03-08 DIAGNOSIS — IMO0002 UNCONTROLLED TYPE 2 DIABETES MELLITUS WITH DIABETIC POLYNEUROPATHY, WITH LONG-TERM CURRENT USE OF INSULIN: ICD-10-CM

## 2022-03-08 DIAGNOSIS — E11.9 TYPE 2 DIABETES MELLITUS WITHOUT COMPLICATION, UNSPECIFIED WHETHER LONG TERM INSULIN USE (HCC): ICD-10-CM

## 2022-03-08 DIAGNOSIS — I10 ESSENTIAL HYPERTENSION: Primary | ICD-10-CM

## 2022-03-08 PROBLEM — F17.210 CIGARETTE NICOTINE DEPENDENCE WITHOUT COMPLICATION: Status: RESOLVED | Noted: 2021-04-13 | Resolved: 2022-03-08

## 2022-03-08 PROCEDURE — 99214 OFFICE O/P EST MOD 30 MIN: CPT | Performed by: FAMILY MEDICINE

## 2022-03-08 RX ORDER — INSULIN DEGLUDEC 200 U/ML
66 INJECTION, SOLUTION SUBCUTANEOUS DAILY
Qty: 36 ML | Refills: 0 | Status: SHIPPED | OUTPATIENT
Start: 2022-03-08 | End: 2022-05-31 | Stop reason: SDUPTHER

## 2022-03-08 RX ORDER — BENAZEPRIL HYDROCHLORIDE 10 MG/1
10 TABLET ORAL DAILY
Qty: 90 TABLET | Refills: 3 | Status: SHIPPED | OUTPATIENT
Start: 2022-03-08

## 2022-03-08 NOTE — PROGRESS NOTES
Assessment/Plan:  Patient will continue same medications and continue to watch diet  Patient does not wish to see a foot doctor at this time  I refilled his medications  Patient can get his PT INR done this week  I will see him back in 4 months or p r n  Problem List Items Addressed This Visit        Endocrine    Uncontrolled type 2 diabetes mellitus with diabetic polyneuropathy, with long-term current use of insulin ([address] )       Cardiovascular and Mediastinum    Essential hypertension - Primary           Diagnoses and all orders for this visit:    Essential hypertension    Uncontrolled type 2 diabetes mellitus with diabetic polyneuropathy, with long-term current use of insulin ([address] )        No problem-specific Assessment & Plan notes found for this encounter  Subjective:      Patient ID: Uri Templeton is a 60 y o  male  Patient here today for follow-up  Patient denies any chest pain or increased shortness of breath  He feels his breathing is back to his baseline  Hemoglobin A1c done in January was 6 2  Patient following up with Cardiology this Thursday    Diabetes  He presents for his follow-up diabetic visit  He has type 2 diabetes mellitus  His disease course has been stable  There are no hypoglycemic associated symptoms  There are no diabetic associated symptoms  There are no hypoglycemic complications  Diabetic complications include peripheral neuropathy  Risk factors for coronary artery disease include dyslipidemia, diabetes mellitus, hypertension and male sex  Current diabetic treatment includes oral agent (monotherapy) and insulin injections  He is compliant with treatment all of the time  His weight is stable  He is following a generally healthy diet  When asked about meal planning, he reported none  He has not had a previous visit with a dietitian  He rarely participates in exercise  His home blood glucose trend is decreasing steadily   An ACE inhibitor/angiotensin II receptor blocker is being taken  Hypertension  This is a chronic problem  The problem is controlled  There are no associated agents to hypertension  Risk factors for coronary artery disease include diabetes mellitus, dyslipidemia and male gender  Past treatments include ACE inhibitors, diuretics and beta blockers  The current treatment provides significant improvement  There are no compliance problems  The following portions of the patient's history were reviewed and updated as appropriate:   He has a past medical history of Asthma, Athscl heart disease of native coronary artery w/o ang pctrs, Atrial fibrillation ([facility] ), Cardiomyopathy ([facility] ), Chronic diastolic (congestive) heart failure ([facility] [facility] ), Closed fracture of fibula, COLD (chronic obstructive lung disease) ([facility] ), Coronary angioplasty status, CPAP (continuous positive airway pressure) dependence, Diabetes mellitus ([facility] ), History of echocardiogram (04/19/2017), Hyperlipidemia, Hypertension, Presence of prosthetic heart valve, Restless legs syndrome, and Sleep apnea  ,  does not have any pertinent problems on file  ,   has a past surgical history that includes Coronary angioplasty with stent (07/29/2010); Aortic valve replacement (10/07/2016); and Meniscectomy (Left)  ,  family history includes Atrial fibrillation in his mother; Diabetes type II in his mother; Heart attack in his father; Heart failure in his father; Stroke in his father  ,   reports that he quit smoking about 5 years ago  His smoking use included cigarettes  He quit after 35 00 years of use  He has never used smokeless tobacco  He reports previous alcohol use  He reports that he does not use drugs  ,  is allergic to fluticasone-salmeterol     Current Outpatient Medications   Medication Sig Dispense Refill    albuterol (2 5 mg/3 mL) 0 083 % nebulizer solution Take 3 mL (2 5 mg total) by nebulization every 6 (six) hours as needed for wheezing or shortness of breath 360 mL 2    albuterol (Ventolin HFA) 90 mcg/act inhaler Inhale 2 puffs every 6 (six) hours as needed for shortness of breath Must be brand necessary 18 g 3    aspirin 81 MG tablet Take by mouth      atorvastatin (LIPITOR) 10 mg tablet Take 1 tablet (10 mg total) by mouth daily 90 tablet 3    diltiazem (CARDIZEM CD) 240 mg 24 hr capsule Take 1 capsule (240 mg total) by mouth 2 (two) times a day 180 capsule 3    fluticasone-umeclidinium-vilanterol (TRELEGY) 100-62 5-25 MCG/INH inhaler Inhale 1 puff daily Rinse mouth after use  2 each 5    furosemide (LASIX) 80 mg tablet Take 1 tablet (80 mg total) by mouth daily 90 tablet 3    insulin aspart (NovoLOG FlexPen) 100 UNIT/ML injection pen Inject 14 units with breakfast and lunch and 36 units with dinner   45 mL 1    insulin degludec Nery Ree FlexTouch) 200 units/mL CONCENTRATED U-200 injection pen Inject 66 Units under the skin daily 36 mL 0    Insulin Pen Needle (BD Pen Needle Fany U/F) 32G X 4 MM MISC Inject as directed 4 (four) times a day 400 each 5    metoprolol tartrate (LOPRESSOR) 50 mg tablet Take 1 tablet (50 mg total) by mouth 2 (two) times a day 180 tablet 3    potassium chloride 20 MEQ TBCR Take 1 tablet (20 mEq total) by mouth daily 90 tablet 3    warfarin (COUMADIN) 10 mg tablet Take 1 tablet (10 mg total) by mouth daily 90 tablet 3    warfarin (COUMADIN) 2 mg tablet Take 1 tablet (2 mg total) by mouth daily 90 tablet 3    benazepril (LOTENSIN) 10 mg tablet Take 1 tablet (10 mg total) by mouth daily 90 tablet 3    cetirizine (ZyrTEC) 10 mg tablet Take 1 tablet (10 mg total) by mouth daily (Patient not taking: Reported on 3/8/2022 ) 30 tablet 1    metFORMIN (GLUCOPHAGE) 1000 MG tablet Take 1 tablet (1,000 mg total) by mouth 2 (two) times a day 180 tablet 3    nitroglycerin (NITROSTAT) 0 4 mg SL tablet Place 1 tablet (0 4 mg total) under the tongue every 5 (five) minutes as needed for chest pain (Patient not taking: Reported on 3/8/2022 ) 100 tablet 3     No current facility-administered medications for this visit  Review of Systems   Constitutional: Negative  Respiratory: Negative  Cardiovascular: Negative  Gastrointestinal: Negative  Genitourinary: Negative  Objective:  Vitals:    03/08/22 1235   BP: 132/78   Pulse: 99   Temp: (!) 96 5 °F (35 8 °C)   SpO2: 97%   Weight: (!) 184 kg (404 lb 9 6 oz)   Height: 6' 1" (1 854 m)     Body mass index is 53 38 kg/m²  Physical Exam  Vitals reviewed  Constitutional:       General: He is not in acute distress  Appearance: Normal appearance  He is well-developed  He is not ill-appearing, toxic-appearing or diaphoretic  HENT:      Head: Normocephalic and atraumatic  Eyes:      Conjunctiva/sclera: Conjunctivae normal    Cardiovascular:      Rate and Rhythm: Normal rate  Rhythm irregularly irregular  Pulses: no weak pulses          Dorsalis pedis pulses are 2+ on the right side and 2+ on the left side  Posterior tibial pulses are 2+ on the right side and 2+ on the left side  Heart sounds: Normal heart sounds  No murmur heard  No friction rub  No gallop  Pulmonary:      Effort: Pulmonary effort is normal  No respiratory distress  Breath sounds: Normal breath sounds  No wheezing, rhonchi or rales  Musculoskeletal:      Right lower leg: No edema  Left lower leg: No edema  Feet:      Right foot:      Skin integrity: No ulcer, skin breakdown, erythema, warmth, callus or dry skin  Left foot:      Skin integrity: No ulcer, skin breakdown, erythema, warmth, callus or dry skin  Neurological:      General: No focal deficit present  Mental Status: He is alert and oriented to person, place, and time  Psychiatric:         Mood and Affect: Mood normal          Behavior: Behavior normal          Thought Content: Thought content normal          Judgment: Judgment normal        Patient's shoes and socks removed      Right Foot/Ankle   Right Foot Inspection  Skin Exam: skin normal and skin intact  No dry skin, no warmth, no callus, no erythema, no maceration, no abnormal color, no pre-ulcer, no ulcer and no callus  Sensory   Monofilament testing: absent    Vascular  The right DP pulse is 2+  The right PT pulse is 2+  Left Foot/Ankle  Left Foot Inspection  Skin Exam: skin normal and skin intact  No dry skin, no warmth, no erythema, no maceration, normal color, no pre-ulcer, no ulcer and no callus  Sensory   Monofilament testing: absent    Vascular  The left DP pulse is 2+  The left PT pulse is 2+       Assign Risk Category  No deformity present  Loss of protective sensation  No weak pulses  Risk: 1

## 2022-03-10 ENCOUNTER — OFFICE VISIT (OUTPATIENT)
Dept: CARDIOLOGY CLINIC | Facility: CLINIC | Age: 57
End: 2022-03-10
Payer: MEDICARE

## 2022-03-10 VITALS
SYSTOLIC BLOOD PRESSURE: 102 MMHG | HEIGHT: 73 IN | BODY MASS INDEX: 41.75 KG/M2 | HEART RATE: 101 BPM | WEIGHT: 315 LBS | DIASTOLIC BLOOD PRESSURE: 60 MMHG

## 2022-03-10 DIAGNOSIS — I50.32 CHRONIC DIASTOLIC CONGESTIVE HEART FAILURE (HCC): ICD-10-CM

## 2022-03-10 DIAGNOSIS — E11.69 DYSLIPIDEMIA ASSOCIATED WITH TYPE 2 DIABETES MELLITUS (HCC): ICD-10-CM

## 2022-03-10 DIAGNOSIS — I48.91 ATRIAL FIBRILLATION, UNSPECIFIED TYPE (HCC): ICD-10-CM

## 2022-03-10 DIAGNOSIS — I10 ESSENTIAL HYPERTENSION: ICD-10-CM

## 2022-03-10 DIAGNOSIS — E78.5 DYSLIPIDEMIA ASSOCIATED WITH TYPE 2 DIABETES MELLITUS (HCC): ICD-10-CM

## 2022-03-10 DIAGNOSIS — Z95.5 H/O HEART ARTERY STENT: ICD-10-CM

## 2022-03-10 DIAGNOSIS — Z95.2 S/P AVR: Primary | ICD-10-CM

## 2022-03-10 PROCEDURE — 99214 OFFICE O/P EST MOD 30 MIN: CPT | Performed by: INTERNAL MEDICINE

## 2022-03-10 NOTE — PROGRESS NOTES
Subjective:        Patient ID: Farhad Encinas is a 60 y o  male  Chief Complaint:  Edy Blackman is here for routine follow-up, I am happy to report he remains smoke free, I congratulated and applauded him for this today  He says it is difficult some days but he is doing well  He is not having any concerning chest pains or progressive dyspnea, says he is back to his baseline with breathing, pulmonary notes and bronchoscopy results reviewed  Results somewhat nondiagnostic  He is planning on following up with the CT scan as suggested  I urged he do so per Pulmonary recommendations  He is not having any palpitations of alarm, no unusual edema orthopnea or PND  No recent medication changes  The following portions of the patient's history were reviewed and updated as appropriate: allergies, current medications, past family history, past medical history, past social history, past surgical history and problem list   Review of Systems   Constitutional: Negative for chills, diaphoresis, malaise/fatigue and weight gain  HENT: Negative for nosebleeds and stridor  Eyes: Negative for double vision, vision loss in left eye, vision loss in right eye and visual disturbance  Cardiovascular: Positive for dyspnea on exertion ( chronic nonprogressive)  Negative for chest pain, claudication, cyanosis, irregular heartbeat, leg swelling, near-syncope, orthopnea, palpitations, paroxysmal nocturnal dyspnea and syncope  Respiratory: Positive for shortness of breath (Chronic nonprogressive) and wheezing ( chronic nonprogressive responds well to rare nebulizer treatment)  Negative for cough and snoring  Endocrine: Negative for polydipsia, polyphagia and polyuria  Hematologic/Lymphatic: Negative for bleeding problem  Does not bruise/bleed easily  Skin: Negative for flushing and rash  Musculoskeletal: Negative for falls and myalgias     Gastrointestinal: Negative for abdominal pain, heartburn, hematemesis, hematochezia, melena and nausea  Genitourinary: Negative for hematuria  Neurological: Negative for brief paralysis, dizziness, focal weakness, headaches, light-headedness, loss of balance and vertigo  Psychiatric/Behavioral: Negative for altered mental status and substance abuse  Allergic/Immunologic: Negative for hives  Objective:      /60   Pulse 101   Ht 6' 1" (1 854 m)   Wt (!) 181 kg (400 lb)   BMI 52 77 kg/m²   Physical Exam  Constitutional:       General: He is not in acute distress  Appearance: He is well-developed  He is not diaphoretic  HENT:      Head: Normocephalic and atraumatic  Eyes:      General: No scleral icterus  Pupils: Pupils are equal, round, and reactive to light  Neck:      Thyroid: No thyromegaly  Vascular: No JVD  Cardiovascular:      Rate and Rhythm: Normal rate  Rhythm irregular  Heart sounds: Normal heart sounds  No murmur heard  No friction rub  No gallop  Pulmonary:      Effort: Pulmonary effort is normal  No respiratory distress  Breath sounds: Normal breath sounds  No stridor  No wheezing or rales  Abdominal:      General: Bowel sounds are normal  There is no distension  Palpations: Abdomen is soft  There is no mass  Tenderness: There is no abdominal tenderness  Musculoskeletal:         General: No deformity  Normal range of motion  Cervical back: Normal range of motion and neck supple  Skin:     General: Skin is warm and dry  Coloration: Skin is not pale  Findings: No erythema  Neurological:      Mental Status: He is alert and oriented to person, place, and time  Coordination: Coordination normal    Psychiatric:         Mood and Affect: Mood normal          Behavior: Behavior normal          Thought Content:  Thought content normal          Judgment: Judgment normal          Lab Review:   Appointment on 02/10/2022   Component Date Value    Protime 02/10/2022 31 4*    INR 02/10/2022 3 27*   Appointment on 01/13/2022   Component Date Value    Hemoglobin A1C 01/13/2022 6 2*    EAG 01/13/2022 131     Sodium 01/13/2022 138     Potassium 01/13/2022 4 2     Chloride 01/13/2022 102     CO2 01/13/2022 28     ANION GAP 01/13/2022 8     BUN 01/13/2022 14     Creatinine 01/13/2022 0 96     Glucose 01/13/2022 151*    Calcium 01/13/2022 8 9     Corrected Calcium 01/13/2022 9 5     AST 01/13/2022 15     ALT 01/13/2022 24     Alkaline Phosphatase 01/13/2022 90     Total Protein 01/13/2022 7 8     Albumin 01/13/2022 3 2*    Total Bilirubin 01/13/2022 0 48     eGFR 01/13/2022 86     WBC 01/13/2022 8 57     RBC 01/13/2022 4 84     Hemoglobin 01/13/2022 14 6     Hematocrit 01/13/2022 44 4     MCV 01/13/2022 92     MCH 01/13/2022 30 2     MCHC 01/13/2022 32 9     RDW 01/13/2022 13 8     MPV 01/13/2022 9 3     Platelets 01/24/6765 295     Neutrophils Relative 01/13/2022 69     Immat GRANS % 01/13/2022 0     Lymphocytes Relative 01/13/2022 19     Monocytes Relative 01/13/2022 7     Eosinophils Relative 01/13/2022 4     Basophils Relative 01/13/2022 1     Neutrophils Absolute 01/13/2022 5 90     Immature Grans Absolute 01/13/2022 0 03     Lymphocytes Absolute 01/13/2022 1 62     Monocytes Absolute 01/13/2022 0 59     Eosinophils Absolute 01/13/2022 0 35     Basophils Absolute 01/13/2022 0 08     Creatinine, Ur 01/13/2022 206 0     Microalbum  ,U,Random 01/13/2022 23 9*    Microalb Creat Ratio 01/13/2022 12     Cholesterol 01/13/2022 112     Triglycerides 01/13/2022 99     HDL, Direct 01/13/2022 28*    LDL Calculated 01/13/2022 64     Non-HDL-Chol (CHOL-HDL) 01/13/2022 84     Protime 01/13/2022 34 5*    INR 01/13/2022 3 70*     No results found  Assessment:       1  S/P AVR     2  Atrial fibrillation, unspecified type ([facility] )     3  Essential hypertension     4  Dyslipidemia associated with type 2 diabetes mellitus ([facility] )     5  H/O heart artery stent     6   Chronic diastolic congestive heart failure ([address] )          Plan:       Magy Monroe is without any signs or symptoms reminiscent of unstable angina, decompensated heart failure/volume excess, nor electrical instability  His AFib is rate controlled and properly anticoagulated, recent INR therapeutic  Goal 2 5-3 5 due to mechanical AVR  Warfarin per your management  Continue metoprolol and diltiazem  Valve auscultation today is unremarkable, echo in the fall revealed normal prosthetic valve function  Reiterated need for SBE antibiotic prophylaxis prior to applicable procedures  Particularly dental     Lipids well controlled, on proper statin therapy  Continue atorvastatin  Blood pressure well controlled continue benazepril  In light of CAD for now will continue with baby aspirin therapy, would be quick to discontinue should any anemia develop  Recent CBC normal     Strongly encouraged pulmonary follow-up  I made no changes today, ordered no acute cardiac testing, we will see him back in 6 months, asked to call me sooner with any concerning potential cardiac symptoms in the meantime

## 2022-03-10 NOTE — PATIENT INSTRUCTIONS
A-fib (Atrial Fibrillation)   AMBULATORY CARE:   Atrial fibrillation (A-fib)  is an irregular heartbeat  It reduces your heart's ability to pump blood through your body  A-fib may come and go, or it may be a long-term condition  A-fib can cause life-threatening blood clots, stroke, or heart failure  It is important to treat and manage A-fib to help prevent these problems  Common signs and symptoms include the following:   · A heartbeat that races, pounds, or flutters    · Weakness, severe tiredness, or confusion    · Feeling lightheaded, sweaty, dizzy, or faint    · Shortness of breath or anxiety    · Chest pain or pressure    Call your local emergency number (911 in the [address] [facility]) or have someone call if:   · You have any of the following signs of a heart attack:      ? Squeezing, pressure, or pain in your chest    ? You may  also have any of the following:     § Discomfort or pain in your back, neck, jaw, stomach, or arm    § Shortness of breath    § Nausea or vomiting    § Lightheadedness or a sudden cold sweat    · You have any of the following signs of a stroke:      ? Numbness or drooping on one side of your face     ? Weakness in an arm or leg    ? Confusion or difficulty speaking    ? Dizziness, a severe headache, or vision loss    Call your doctor or cardiologist if:   · Your arm or leg feels warm, tender, and painful  It may look swollen and red  · Your heart rate is more than 110 beats per minute  · You have new or worsening swelling in your legs, feet, ankles, or abdomen  · You are short of breath, even at rest     · You have questions or concerns about your condition or care  Treatment for A-fib:  Conditions that cause A-fib, such as thyroid disease, will be treated  You may also need any of the following:  · Heart medicines  help control your heart rate or rhythm  You may need more than one medicine to treat your symptoms      · Antiplatelet or blood thinner medicines  help prevent blood clots and stroke  · Cardioversion  is a procedure to return your heart rate and rhythm to normal  It can be done using medicines or electric shock  · A-fib ablation  is a procedure that uses energy to burn a small area of heart tissue  This creates scar tissue and prevents electrical signals that cause A-fib  You may need this procedure more than once  Ask for more information on A-fib ablation  · A pacemaker  may be inserted into your heart  A pacemaker is a device that controls your heartbeat  A pacemaker may be inserted during an ablation procedure or surgery  Ask your healthcare provider for more information on pacemakers  · Surgery  may be needed if other procedures do not work  During surgery your healthcare provider will make cuts in the upper part of your heart  The provider will stitch the cuts together to create scar tissue  The scar tissue will prevent electrical signals that cause A-fib  Manage A-fib:   · Know your target heart rate  Learn how to check your pulse and monitor your heart rate  · Know the risks if you choose to drink alcohol  Alcohol can increase your risk for A-fib or make A-fib harder to manage  Ask your healthcare provider if it is okay for you to drink any alcohol  He or she can help you set limits for the number of drinks you have in 24 hours and in a week  A drink of alcohol is 12 ounces of beer, 5 ounces of wine, or 1½ ounces of liquor  · Do not smoke  Nicotine can cause heart damage and make it more difficult to manage your A-fib  Do not use e-cigarettes or smokeless tobacco in place of cigarettes or to help you quit  They still contain nicotine  Ask your healthcare provider for information if you currently smoke and need help quitting  · Eat heart-healthy foods  Heart healthy foods will help keep your cholesterol low  These include fruits, vegetables, whole-grain breads, low-fat dairy products, beans, lean meats, and fish   Replace butter and margarine with heart-healthy oils such as olive oil and canola oil  · Maintain a healthy weight  Ask your healthcare provider what a healthy weight is for you  Ask him or her to help you create a safe weight loss plan if you are overweight  Even a small goal of a 10% weight loss can improve your heart health  · Get regular physical activity  Physical activity helps improve your heart health  Get at least 150 minutes of moderate aerobic physical activity each week  Your healthcare provider can help you create an activity plan  · Manage other health conditions  This includes high blood pressure or cholesterol, sleep apnea, diabetes, and other heart conditions  Take medicine as directed and follow your treatment plan  Your healthcare provider may need to change a medicine you are taking if it is causing your A-fib  Do not  stop taking any medicine unless directed by your provider  Follow up with your doctor or cardiologist as directed: You will need regular blood tests and monitoring  Write down your questions so you remember to ask them during your visits  © "[employer] 2022 Information is for End User's use only and may not be sold, redistributed or otherwise used for commercial purposes  All illustrations and images included in CareNotes® are the copyrighted property of [employer]  or [address] [place], [place] [address]will   The above information is an  only  It is not intended as medical advice for individual conditions or treatments  Talk to your doctor, nurse or pharmacist before following any medical regimen to see if it is safe and effective for you

## 2022-03-16 ENCOUNTER — ANTICOAG VISIT (OUTPATIENT)
Dept: FAMILY MEDICINE CLINIC | Facility: CLINIC | Age: 57
End: 2022-03-16

## 2022-03-16 ENCOUNTER — APPOINTMENT (OUTPATIENT)
Dept: LAB | Facility: HOSPITAL | Age: 57
End: 2022-03-16
Payer: MEDICARE

## 2022-03-16 DIAGNOSIS — I48.91 ATRIAL FIBRILLATION, UNSPECIFIED TYPE (HCC): ICD-10-CM

## 2022-03-16 LAB
INR PPP: 3.07 (ref 0.84–1.19)
PROTHROMBIN TIME: 29.9 SECONDS (ref 11.6–14.5)

## 2022-03-16 PROCEDURE — 36415 COLL VENOUS BLD VENIPUNCTURE: CPT

## 2022-03-16 PROCEDURE — 85610 PROTHROMBIN TIME: CPT

## 2022-04-26 ENCOUNTER — APPOINTMENT (OUTPATIENT)
Dept: LAB | Facility: HOSPITAL | Age: 57
End: 2022-04-26
Payer: MEDICARE

## 2022-04-26 ENCOUNTER — ANTICOAG VISIT (OUTPATIENT)
Dept: FAMILY MEDICINE CLINIC | Facility: CLINIC | Age: 57
End: 2022-04-26

## 2022-04-27 DIAGNOSIS — E78.49 OTHER HYPERLIPIDEMIA: ICD-10-CM

## 2022-04-27 RX ORDER — ATORVASTATIN CALCIUM 10 MG/1
10 TABLET, FILM COATED ORAL DAILY
Qty: 90 TABLET | Refills: 3 | Status: SHIPPED | OUTPATIENT
Start: 2022-04-27

## 2022-05-02 ENCOUNTER — TELEPHONE (OUTPATIENT)
Dept: FAMILY MEDICINE CLINIC | Facility: CLINIC | Age: 57
End: 2022-05-02

## 2022-05-02 DIAGNOSIS — K04.7 TOOTH INFECTION: Primary | ICD-10-CM

## 2022-05-02 RX ORDER — CEPHALEXIN 500 MG/1
500 CAPSULE ORAL EVERY 8 HOURS SCHEDULED
Qty: 21 CAPSULE | Refills: 0 | Status: SHIPPED | OUTPATIENT
Start: 2022-05-02 | End: 2022-05-09

## 2022-05-09 DIAGNOSIS — I48.91 ATRIAL FIBRILLATION, UNSPECIFIED TYPE (HCC): ICD-10-CM

## 2022-05-09 RX ORDER — WARFARIN SODIUM 10 MG/1
10 TABLET ORAL DAILY
Qty: 90 TABLET | Refills: 3 | Status: SHIPPED | OUTPATIENT
Start: 2022-05-09

## 2022-05-17 NOTE — PROGRESS NOTES
Established Patient Progress Note      Chief Complaint   Patient presents with    Diabetes Type 2        History of Present Illness:   Rocky Hawley is a 60 y o  male with hypertension, hyperlipidemia, obstructive sleep apnea, and type 2 diabetes with long term use of insulin since 2016  Reports complications of  neuropathy  Denies recent illness or hospitalizations  Denies recent severe hypoglycemic or severe hyperglycemic episodes  Denies any issues with his current regimen  home glucose monitoring: are performed regularly, 2-4 x daily    Patient reports feeling well  He reports very occasional symptoms of hypoglycemia < 1 monthly  The last time that he checked his BG with these symptoms, it was after he had approximately 4 oz of orange juice  BG was 84 mg/dL  He continues to focus on a low carbohydrate diet  Component      Latest Ref Rng & Units 4/28/2021 7/12/2021 1/13/2022          10:29 AM  9:32 AM 10:41 AM   Hemoglobin A1C      Normal 3 8-5 6%; PreDiabetic 5 7-6 4%; Diabetic >=6 5%; Glycemic control for adults with diabetes <7 0% % 8 0 (H) 6 8 (H) 6 2 (H)   eAG, EST AVG Glucose      mg/dl 183 148 131       Home blood glucose readings: Patient did not bring glucose logs or glucometer to today's appointment  Current regimen:   Tresiba 66 units daily  Metformin 1000 mg twice daily  NovoLog 14 units prior to breakfast and 36 units prior to dinner    Last Eye Exam: UTD  Last Foot Exam: UTD    For hyperlipidemia, he is taking 10 mg of atorvastatin nightly  He denies myalgias  For hypertension, he is taking 10 mg of benazepril daily and 50 mg of metoprolol tartrate twice daily  He denies headache, cough, and orthostatic hypotension      Patient Active Problem List   Diagnosis    Aortic stenosis    Atrial fibrillation ([facility] )    COPD with acute exacerbation (HCC)    Type 2 diabetes mellitus with hyperglycemia, with long-term current use of insulin ([facility] )    Other hyperlipidemia    Essential hypertension    S/P AVR    Obstructive sleep apnea    Restless leg syndrome    Hypersomnia    Morbid obesity with BMI of 50 0-59 9, adult (HCC)    PLMD (periodic limb movement disorder)    Palpitations    Acute exacerbation of chronic obstructive bronchitis (HCC)    Multiple pulmonary nodules determined by computed tomography of lung    Chronic pain of left knee    Seasonal allergic rhinitis    COPD, moderate (HCC)    SOB (shortness of breath)    Respiratory failure with hypoxia ([facility])    Pneumonia due to Streptococcus pneumoniae [facility])      Past Medical History:   Diagnosis Date    Asthma     last assessed 12    Athscl heart disease of native coronary artery w/o ang pctrs     Atrial fibrillation ([facility])     last assessed 12    Chronic diastolic (congestive) heart failure ([facility])     Closed fracture of fibula     last assessed 10/18/13    COLD (chronic obstructive lung disease) ([facility] )     last assessed 12    Coronary angioplasty status     CPAP (continuous positive airway pressure) dependence     Hyperlipidemia     Presence of prosthetic heart valve       Past Surgical History:   Procedure Laterality Date    AORTIC VALVE REPLACEMENT  10/07/2016    AVR replacement, mechanical    CORONARY ANGIOPLASTY WITH STENT PLACEMENT  2010    EF 40%, Successful bare metal stent mid RCA    MENISCECTOMY Left       Family History   Problem Relation Age of Onset    Atrial fibrillation Mother     Diabetes type II Mother     Heart attack Father         acute    Heart failure Father     Stroke Father         syndrome     Social History     Tobacco Use    Smoking status: Former Smoker     Years: 35 00     Types: Cigarettes     Quit date: 10/2016     Years since quittin 6    Smokeless tobacco: Never Used    Tobacco comment: Quit 2 months ago   Substance Use Topics    Alcohol use: Not Currently     Allergies   Allergen Reactions    Fluticasone-Salmeterol Palpitations         Current Outpatient Medications:     albuterol (2 5 mg/3 mL) 0 083 % nebulizer solution, Take 3 mL (2 5 mg total) by nebulization every 6 (six) hours as needed for wheezing or shortness of breath, Disp: 360 mL, Rfl: 2    albuterol (Ventolin HFA) 90 mcg/act inhaler, Inhale 2 puffs every 6 (six) hours as needed for shortness of breath Must be brand necessary, Disp: 18 g, Rfl: 3    aspirin 81 MG tablet, Take by mouth, Disp: , Rfl:     atorvastatin (LIPITOR) 10 mg tablet, Take 1 tablet (10 mg total) by mouth daily, Disp: 90 tablet, Rfl: 3    benazepril (LOTENSIN) 10 mg tablet, Take 1 tablet (10 mg total) by mouth daily, Disp: 90 tablet, Rfl: 3    diltiazem (CARDIZEM CD) 240 mg 24 hr capsule, Take 1 capsule (240 mg total) by mouth 2 (two) times a day, Disp: 180 capsule, Rfl: 3    fluticasone-umeclidinium-vilanterol (TRELEGY) 100-62 5-25 MCG/INH inhaler, Inhale 1 puff daily Rinse mouth after use , Disp: 2 each, Rfl: 5    furosemide (LASIX) 80 mg tablet, Take 1 tablet (80 mg total) by mouth daily, Disp: 90 tablet, Rfl: 3    insulin aspart (NovoLOG FlexPen) 100 UNIT/ML injection pen, Inject 14 units with breakfast and lunch and 36 units with dinner , Disp: 45 mL, Rfl: 1    Insulin Pen Needle (BD Pen Needle Fany U/F) 32G X 4 MM MISC, Inject as directed 4 (four) times a day, Disp: 400 each, Rfl: 5    metFORMIN (GLUCOPHAGE) 1000 MG tablet, Take 1 tablet (1,000 mg total) by mouth 2 (two) times a day, Disp: 180 tablet, Rfl: 3    metoprolol tartrate (LOPRESSOR) 50 mg tablet, Take 1 tablet (50 mg total) by mouth 2 (two) times a day, Disp: 180 tablet, Rfl: 3    nitroglycerin (NITROSTAT) 0 4 mg SL tablet, Place 1 tablet (0 4 mg total) under the tongue every 5 (five) minutes as needed for chest pain, Disp: 100 tablet, Rfl: 3    potassium chloride 20 MEQ TBCR, Take 1 tablet (20 mEq total) by mouth daily, Disp: 90 tablet, Rfl: 3    warfarin (COUMADIN) 10 mg tablet, Take 1 tablet (10 mg total) by mouth daily, Disp: 90 tablet, Rfl: 3    warfarin (COUMADIN) 2 mg tablet, Take 1 tablet (2 mg total) by mouth daily, Disp: 90 tablet, Rfl: 3    cetirizine (ZyrTEC) 10 mg tablet, Take 1 tablet (10 mg total) by mouth daily (Patient not taking: No sig reported), Disp: 30 tablet, Rfl: 1    insulin degludec Reather Magyar FlexTouch) 200 units/mL CONCENTRATED U-200 injection pen, Inject 66 Units under the skin daily, Disp: 36 mL, Rfl: 0    Review of Systems   Constitutional: Negative for activity change, appetite change, fatigue and unexpected weight change  HENT: Negative for dental problem, sore throat, trouble swallowing and voice change  Eyes: Negative for visual disturbance  Respiratory: Negative for cough, chest tightness and shortness of breath  Cardiovascular: Negative for chest pain, palpitations and leg swelling  Gastrointestinal: Negative for constipation, diarrhea, nausea and vomiting  Endocrine: Negative for polydipsia, polyphagia and polyuria  Genitourinary: Negative for frequency  Musculoskeletal: Negative for arthralgias, back pain, gait problem and myalgias  Skin: Negative for wound  Allergic/Immunologic: Negative for environmental allergies and food allergies  Neurological: Positive for numbness  Negative for dizziness, weakness, light-headedness and headaches  Psychiatric/Behavioral: Negative for decreased concentration, dysphoric mood and sleep disturbance  The patient is not nervous/anxious  Physical Exam:  Body mass index is 52 91 kg/m²  /72   Ht 6' 1" (1 854 m)   Wt (!) 182 kg (401 lb)   BMI 52 91 kg/m²    Wt Readings from Last 3 Encounters:   05/18/22 (!) 182 kg (401 lb)   03/10/22 (!) 181 kg (400 lb)   03/08/22 (!) 184 kg (404 lb 9 6 oz)       Physical Exam  Vitals reviewed  Constitutional:       General: He is not in acute distress  Appearance: He is well-developed  He is obese  He is not ill-appearing  HENT:      Head: Normocephalic and atraumatic     Eyes:      Pupils: Pupils are equal, round, and reactive to light  Neck:      Thyroid: No thyromegaly  Cardiovascular:      Rate and Rhythm: Normal rate and regular rhythm  Pulses: Normal pulses  Heart sounds: Normal heart sounds  Pulmonary:      Effort: Pulmonary effort is normal       Breath sounds: Normal breath sounds  Abdominal:      General: Bowel sounds are normal  There is no distension  Palpations: Abdomen is soft  Tenderness: There is no abdominal tenderness  Musculoskeletal:      Cervical back: Normal range of motion and neck supple  Right lower leg: No edema  Left lower leg: No edema  Lymphadenopathy:      Cervical: No cervical adenopathy  Skin:     General: Skin is warm and dry  Capillary Refill: Capillary refill takes less than 2 seconds  Neurological:      Mental Status: He is alert and oriented to person, place, and time        Gait: Gait normal    Psychiatric:         Mood and Affect: Mood normal          Behavior: Behavior normal            Labs:   Lab Results   Component Value Date    HGBA1C 5 6 05/18/2022    HGBA1C 6 2 (H) 01/13/2022    HGBA1C 6 8 (H) 07/12/2021     Lab Results   Component Value Date    CREATININE 0 96 01/13/2022    CREATININE 0 83 07/12/2021    CREATININE 0 80 06/15/2021    BUN 14 01/13/2022     09/01/2013    K 4 2 01/13/2022     01/13/2022    CO2 28 01/13/2022     eGFR   Date Value Ref Range Status   01/13/2022 86 ml/min/1 73sq m Final     Lab Results   Component Value Date    CHOL 180 09/01/2013    HDL 28 (L) 01/13/2022    TRIG 99 01/13/2022     Lab Results   Component Value Date    ALT 24 01/13/2022    AST 15 01/13/2022    ALKPHOS 90 01/13/2022    BILITOT 0 6 09/01/2013     Lab Results   Component Value Date    DXX9GJRSTRQM 3 159 01/15/2021    ASP0JTALNLXG 3 299 10/04/2018    [id number] 3 238 07/22/2016     No results found for: Kelsi Salgado    Impression & Plan:    Problem List Items Addressed This Visit        Endocrine    Type 2 diabetes mellitus with hyperglycemia, with long-term current use of insulin ([facility] ) - Primary     Patient is very well controlled on current regimen  We did discuss reducing Tresiba to 64 units daily  At this time, patient would prefer to continue with current regimen  He knows to notify me with episodes of hypoglycemia symptoms or BG < 70 mg/dL  My threshold to reduce basal insulin is low  Once again, encouraged patient to test his blood sugars more consistently  Complete labs prior to next appointment in 6 months  Lab Results   Component Value Date    HGBA1C 5 6 05/18/2022              Relevant Orders    Comprehensive metabolic panel Lab Collect    Microalbumin / creatinine urine ratio Lab Collect    Lipid panel Lab Collect Lab Collect    POCT hemoglobin A1c (Completed)       Respiratory    Obstructive sleep apnea     Continue CPAP  Cardiovascular and Mediastinum    Essential hypertension     BP stable at 128/72  Continue current regimen  Other    Other hyperlipidemia     Check fasting lipid panel  Continue statin  Morbid obesity with BMI of 50 0-59 9, adult ([facility] )     Continue to focus on a healthy diet  Increase physical activity as tolerated  Orders Placed This Encounter   Procedures    Comprehensive metabolic panel Lab Collect     This is a patient instruction: Patient fasting for 8 hours or longer recommended  Standing Status:   Future     Standing Expiration Date:   5/17/2023    Microalbumin / creatinine urine ratio Lab Collect     Standing Status:   Future     Standing Expiration Date:   5/17/2023    Lipid panel Lab Collect Lab Collect     This is a patient instruction: This test requires patient fasting for 10-12 hours or longer  Drinking of black coffee or black tea is acceptable  Standing Status:   Future     Standing Expiration Date:   5/17/2023    POCT hemoglobin A1c       There are no Patient Instructions on file for this visit      Discussed with the patient and all questioned fully answered  He will call me if any problems arise  Follow-up appointment in 6 months       Counseled patient on diagnostic results, prognosis, risk and benefit of treatment options, instruction for management, importance of treatment compliance, Risk  factor reduction and impressions    CHRISTINE Clay

## 2022-05-18 ENCOUNTER — TELEPHONE (OUTPATIENT)
Dept: ADMINISTRATIVE | Facility: OTHER | Age: 57
End: 2022-05-18

## 2022-05-18 ENCOUNTER — OFFICE VISIT (OUTPATIENT)
Dept: ENDOCRINOLOGY | Facility: CLINIC | Age: 57
End: 2022-05-18
Payer: MEDICARE

## 2022-05-18 VITALS
SYSTOLIC BLOOD PRESSURE: 128 MMHG | HEIGHT: 73 IN | WEIGHT: 315 LBS | DIASTOLIC BLOOD PRESSURE: 72 MMHG | BODY MASS INDEX: 41.75 KG/M2

## 2022-05-18 DIAGNOSIS — I10 ESSENTIAL HYPERTENSION: ICD-10-CM

## 2022-05-18 DIAGNOSIS — G47.33 OBSTRUCTIVE SLEEP APNEA: ICD-10-CM

## 2022-05-18 DIAGNOSIS — Z79.4 TYPE 2 DIABETES MELLITUS WITH HYPERGLYCEMIA, WITH LONG-TERM CURRENT USE OF INSULIN (HCC): Primary | ICD-10-CM

## 2022-05-18 DIAGNOSIS — E78.49 OTHER HYPERLIPIDEMIA: ICD-10-CM

## 2022-05-18 DIAGNOSIS — E66.01 MORBID OBESITY WITH BMI OF 50.0-59.9, ADULT (HCC): ICD-10-CM

## 2022-05-18 DIAGNOSIS — E11.65 TYPE 2 DIABETES MELLITUS WITH HYPERGLYCEMIA, WITH LONG-TERM CURRENT USE OF INSULIN (HCC): Primary | ICD-10-CM

## 2022-05-18 LAB — SL AMB POCT HEMOGLOBIN AIC: 5.6 (ref ?–6.5)

## 2022-05-18 PROCEDURE — 99213 OFFICE O/P EST LOW 20 MIN: CPT | Performed by: NURSE PRACTITIONER

## 2022-05-18 PROCEDURE — 83036 HEMOGLOBIN GLYCOSYLATED A1C: CPT | Performed by: NURSE PRACTITIONER

## 2022-05-18 NOTE — ASSESSMENT & PLAN NOTE
Patient is very well controlled on current regimen  We did discuss reducing Tresiba to 64 units daily  At this time, patient would prefer to continue with current regimen  He knows to notify me with episodes of hypoglycemia symptoms or BG < 70 mg/dL  My threshold to reduce basal insulin is low  Once again, encouraged patient to test his blood sugars more consistently  Complete labs prior to next appointment in 6 months     Lab Results   Component Value Date    HGBA1C 5 6 05/18/2022

## 2022-05-18 NOTE — TELEPHONE ENCOUNTER
----- Message from Jaja Little sent at 5/18/2022  8:18 AM EDT -----  Regarding: diabetic eye exam  05/18/22 8:19 AM    Hello, our patient Ashwini Groves has had Diabetic Eye Exam completed/performed  Please assist in updating the patient chart by making an External outreach to [address] [place] [address] located in [place]  The date of service is pt reports about 6 months ago      Thank you,  Jaja Little  [facility] [id number] [id number]

## 2022-05-18 NOTE — TELEPHONE ENCOUNTER
Upon review of the In Basket request we have noted that you noted the pcp, Dr Fatmata Ordonez w/in [address]  This message will be closed  because of this we are requesting that you forward this request/concern to the [employer] email  The Quality team members assigned to this email will be more than happy to assist you  Any additional questions or concerns should be emailed to the Practice Liaisons via [email or web address] email, please do not reply via In Basket      Thank you  Therese Salgado

## 2022-05-31 DIAGNOSIS — I42.8 OTHER CARDIOMYOPATHY (HCC): ICD-10-CM

## 2022-05-31 DIAGNOSIS — I10 ESSENTIAL HYPERTENSION: ICD-10-CM

## 2022-05-31 DIAGNOSIS — E11.65 POORLY CONTROLLED TYPE 2 DIABETES MELLITUS WITH PERIPHERAL NEUROPATHY (HCC): ICD-10-CM

## 2022-05-31 DIAGNOSIS — E11.42 POORLY CONTROLLED TYPE 2 DIABETES MELLITUS WITH PERIPHERAL NEUROPATHY (HCC): ICD-10-CM

## 2022-05-31 DIAGNOSIS — I48.91 ATRIAL FIBRILLATION, UNSPECIFIED TYPE (HCC): ICD-10-CM

## 2022-05-31 DIAGNOSIS — I25.10 CORONARY ARTERY DISEASE INVOLVING NATIVE CORONARY ARTERY OF NATIVE HEART WITHOUT ANGINA PECTORIS: ICD-10-CM

## 2022-05-31 RX ORDER — POTASSIUM CHLORIDE 1500 MG/1
20 TABLET, FILM COATED, EXTENDED RELEASE ORAL DAILY
Qty: 90 TABLET | Refills: 3 | Status: SHIPPED | OUTPATIENT
Start: 2022-05-31

## 2022-05-31 RX ORDER — INSULIN DEGLUDEC 200 U/ML
66 INJECTION, SOLUTION SUBCUTANEOUS DAILY
Qty: 27 ML | Refills: 1 | Status: SHIPPED | OUTPATIENT
Start: 2022-05-31 | End: 2022-11-11

## 2022-05-31 RX ORDER — FUROSEMIDE 80 MG
80 TABLET ORAL DAILY
Qty: 90 TABLET | Refills: 3 | Status: SHIPPED | OUTPATIENT
Start: 2022-05-31

## 2022-05-31 NOTE — TELEPHONE ENCOUNTER
Patient called stating he needs a refill on his [place]    Patient would like 3 boxes like he had before    Pharmacy:  [facility]

## 2022-06-03 ENCOUNTER — APPOINTMENT (OUTPATIENT)
Dept: LAB | Facility: HOSPITAL | Age: 57
End: 2022-06-03
Payer: MEDICARE

## 2022-06-03 ENCOUNTER — ANTICOAG VISIT (OUTPATIENT)
Dept: FAMILY MEDICINE CLINIC | Facility: CLINIC | Age: 57
End: 2022-06-03

## 2022-07-07 ENCOUNTER — OFFICE VISIT (OUTPATIENT)
Dept: SLEEP CENTER | Facility: CLINIC | Age: 57
End: 2022-07-07
Payer: MEDICARE

## 2022-07-07 VITALS
BODY MASS INDEX: 41.75 KG/M2 | SYSTOLIC BLOOD PRESSURE: 130 MMHG | OXYGEN SATURATION: 95 % | HEIGHT: 73 IN | DIASTOLIC BLOOD PRESSURE: 80 MMHG | WEIGHT: 315 LBS | TEMPERATURE: 97 F | HEART RATE: 71 BPM

## 2022-07-07 DIAGNOSIS — G47.33 OBSTRUCTIVE SLEEP APNEA: Primary | ICD-10-CM

## 2022-07-07 DIAGNOSIS — J30.1 SEASONAL ALLERGIC RHINITIS DUE TO POLLEN: ICD-10-CM

## 2022-07-07 DIAGNOSIS — R68.2 DRY MOUTH: ICD-10-CM

## 2022-07-07 DIAGNOSIS — I10 ESSENTIAL HYPERTENSION: ICD-10-CM

## 2022-07-07 DIAGNOSIS — I48.20 CHRONIC ATRIAL FIBRILLATION (HCC): ICD-10-CM

## 2022-07-07 DIAGNOSIS — E66.01 MORBID OBESITY WITH BMI OF 40.0-44.9, ADULT (HCC): ICD-10-CM

## 2022-07-07 DIAGNOSIS — J44.9 COPD MIXED TYPE (HCC): ICD-10-CM

## 2022-07-07 DIAGNOSIS — G25.81 RESTLESS LEG SYNDROME: ICD-10-CM

## 2022-07-07 PROCEDURE — 99214 OFFICE O/P EST MOD 30 MIN: CPT | Performed by: INTERNAL MEDICINE

## 2022-07-07 NOTE — PROGRESS NOTES
Review of Systems      Genitourinary difficulty with erection   Cardiology ankle/leg swelling   Gastrointestinal none   Neurology need to move extremities, numbness/tingling of an extremity and balance problems   Constitutional none   Integumentary none   Psychiatry none   Musculoskeletal joint pain and legs twitching/jerking   Pulmonary shortness of breath with activity and wheezing   ENT none   Endocrine none   Hematological none

## 2022-07-07 NOTE — PROGRESS NOTES
Follow-Up Note - [address]  60 y o  male  :1965  OPW:[id number]  DOS:2022    CC: I saw this patient for follow-up in clinic today for Sleep disordered breathing, Coexisting Sleep and Medical Problems  He got a replacement dream Station version 2 machine from [facility]anna [facility] several months ago  Results of prior studies in 2017:  The diagnostic portion of a split study demonstrated AHI of 59 5 per hour, higher while supine  Minimum oxygen saturation was 86 % and he spent 69 6% of time asleep during this portion of the study with saturations below 90%  During the therapeutic portion, he appeared to do best with BiPAP at 21/16 cm H2O  He developed central events and continued to have some snoring  The AHI was 8 8 per hour at this setting but minimum oxygen saturation maintained above 90%     PFSH, Problem List, Medications & Allergies were reviewed in EMR  Interval changes: none reported  He  has a past medical history of Asthma, Athscl heart disease of native coronary artery w/o ang pctrs, Atrial fibrillation (HCC), Chronic diastolic (congestive) heart failure (HCC), Closed fracture of fibula, COLD (chronic obstructive lung disease) ([facility] ), Coronary angioplasty status, CPAP (continuous positive airway pressure) dependence, Hyperlipidemia, and Presence of prosthetic heart valve  He has a current medication list which includes the following prescription(s): albuterol, albuterol, aspirin, atorvastatin, benazepril, diltiazem, fluticasone-umeclidinium-vilanterol, furosemide, insulin aspart, tresiba flextouch, bd pen needle ashley u/f, metformin, metoprolol tartrate, nitroglycerin, potassium chloride er, warfarin, warfarin, and cetirizine  PHYSIOLOGICAL DATA REVIEW AND INTERPRETATION:    using PAP > 4 hours/night 100%  Estimated PAUL 4 9/hour with pressure of 22/15cm H2O @90th/95th percentile; Patient has not been using ozone based devices to sanitize the machine      SUBJECTIVE: Regarding use of PAP, Melvin Rojo reports:   · no adverse effects: ; has not noticed any fibres or foreign material in air line  · He is benefiting from use: sleeping better   · Restless leg symptoms occur when he is sitting in the evenings but not disturbing sleep  Sleep Routine: Melvin Rojo reports getting 8 hrs sleep  ; he has no difficulty initiating or maintaining sleep   He arises spontaneously and feels refreshed  Melvin Rojo reports Excessive Daytime Sleepiness, dozes off when sedentary at home  He rated himself at Total score: 11 /24 on the [place] Sleepiness Scale  Habits: reports that he quit smoking about 5 years ago  His smoking use included cigarettes  He quit after 35 00 years of use  He has never used smokeless tobacco ,  reports previous alcohol use ,  reports no history of drug use , Caffeine use:moderate ; Exercise routine: none but is physically active         ROS: reviewed & as attached  Significant for weight has been stable  He reported no nasal symptoms  He has shortness of breath and wheezing because of COPD  He reports episodic irregular heart rhythm  EXAM: /80 (BP Location: Left arm, Cuff Size: Large)   Pulse 71   Temp (!) 97 °F (36 1 °C) (Temporal)   Ht 6' 1" (1 854 m)   Wt (!) 181 kg (398 lb)   SpO2 95%   BMI 52 51 kg/m²     Wt Readings from Last 3 Encounters:   07/07/22 (!) 181 kg (398 lb)   05/18/22 (!) 182 kg (401 lb)   03/10/22 (!) 181 kg (400 lb)      Patient is well groomed; well appearing  CNS: Alert, orientated, clear & coherent speech  Psych: cooperativeand in no distress  Mental state:appears normal   H&N: EOMI; NC/AT:no facial pressure marks, no rashes  Skin/Extrem: col & hydration normal; no edema  Resp: Respiratory effort is normal  Physical findings otherwise essentially unchanged from previous  IMPRESSION: Problem List Items & Comorbidities Addressed this Visit    1  Obstructive sleep apnea  PAP DME Resupply/Reorder   2  Restless leg syndrome     3   Dry mouth     4  COPD mixed type ([facility] )     5  Seasonal allergic rhinitis due to pollen     6  Chronic atrial fibrillation (HCC)     7  Essential hypertension     8  Morbid obesity with BMI of 40 0-44 9, adult ([facility] )         PLAN:  1  I reviewed results of prior studies and physiologic data with the patient  2  I discussed treatment options with risks and benefits  3  Treatment with  PAP is medically necessary and Sandy Shown is agreable to continue use  4  Care of equipment, methods to improve comfort using PAP and importance of compliance with therapy were discussed  5  Pressure setting: continue 22/15 cmH2O     6  Rx provided to replace  supplies and Care coordinated with DME provider  7  No medication is needed for restless leg symptoms  8  Discussed strategies for weight reduction  9  Follow-up is advised in 1 year or sooner if needed to monitor progress, compliance and to adjust therapy  Thank you for allowing me to participate in the care of this patient  Sincerely,    Authenticated electronically by Radha Blackmon MD on 23/02/71   Board Certified Specialist     Portions of the record may have been created with voice recognition software  Occasional wrong word or "sound a like" substitutions may have occurred due to the inherent limitations of voice recognition software  There may also be notations and random deletions of words or characters from malfunctioning software  Read the chart carefully and recognize, using context, where substitutions/deletions have occurred

## 2022-07-07 NOTE — PATIENT INSTRUCTIONS

## 2022-07-08 ENCOUNTER — TELEPHONE (OUTPATIENT)
Dept: SLEEP CENTER | Facility: CLINIC | Age: 57
End: 2022-07-08

## 2022-07-08 DIAGNOSIS — Z79.4 TYPE 2 DIABETES MELLITUS WITH HYPERGLYCEMIA, WITH LONG-TERM CURRENT USE OF INSULIN (HCC): Primary | ICD-10-CM

## 2022-07-08 DIAGNOSIS — E11.65 TYPE 2 DIABETES MELLITUS WITH HYPERGLYCEMIA, WITH LONG-TERM CURRENT USE OF INSULIN (HCC): Primary | ICD-10-CM

## 2022-07-08 RX ORDER — INSULIN ASPART 100 [IU]/ML
INJECTION, SOLUTION INTRAVENOUS; SUBCUTANEOUS
Qty: 45 ML | Refills: 1 | Status: SHIPPED | OUTPATIENT
Start: 2022-07-08

## 2022-07-08 NOTE — TELEPHONE ENCOUNTER
Rx for resupply order sent to [facility] via [place]

## 2022-07-11 ENCOUNTER — ANTICOAG VISIT (OUTPATIENT)
Dept: FAMILY MEDICINE CLINIC | Facility: CLINIC | Age: 57
End: 2022-07-11

## 2022-07-11 ENCOUNTER — OFFICE VISIT (OUTPATIENT)
Dept: PULMONOLOGY | Facility: CLINIC | Age: 57
End: 2022-07-11
Payer: MEDICARE

## 2022-07-11 ENCOUNTER — APPOINTMENT (OUTPATIENT)
Dept: LAB | Facility: HOSPITAL | Age: 57
End: 2022-07-11
Payer: MEDICARE

## 2022-07-11 VITALS
WEIGHT: 315 LBS | TEMPERATURE: 97.2 F | HEIGHT: 73 IN | SYSTOLIC BLOOD PRESSURE: 135 MMHG | BODY MASS INDEX: 41.75 KG/M2 | HEART RATE: 84 BPM | DIASTOLIC BLOOD PRESSURE: 70 MMHG | OXYGEN SATURATION: 95 %

## 2022-07-11 DIAGNOSIS — G47.33 OBSTRUCTIVE SLEEP APNEA: ICD-10-CM

## 2022-07-11 DIAGNOSIS — E66.01 MORBID OBESITY WITH BMI OF 50.0-59.9, ADULT (HCC): ICD-10-CM

## 2022-07-11 DIAGNOSIS — J44.9 COPD, MODERATE (HCC): Primary | ICD-10-CM

## 2022-07-11 DIAGNOSIS — I48.91 ATRIAL FIBRILLATION, UNSPECIFIED TYPE (HCC): ICD-10-CM

## 2022-07-11 DIAGNOSIS — R91.8 MULTIPLE PULMONARY NODULES DETERMINED BY COMPUTED TOMOGRAPHY OF LUNG: ICD-10-CM

## 2022-07-11 DIAGNOSIS — J44.1 COPD WITH ACUTE EXACERBATION (HCC): ICD-10-CM

## 2022-07-11 DIAGNOSIS — J30.89 SEASONAL ALLERGIC RHINITIS DUE TO FUNGAL SPORES: ICD-10-CM

## 2022-07-11 LAB
INR PPP: 2.96 (ref 0.84–1.19)
PROTHROMBIN TIME: 29.1 SECONDS (ref 11.6–14.5)

## 2022-07-11 PROCEDURE — 85610 PROTHROMBIN TIME: CPT

## 2022-07-11 PROCEDURE — 36415 COLL VENOUS BLD VENIPUNCTURE: CPT

## 2022-07-11 PROCEDURE — 99214 OFFICE O/P EST MOD 30 MIN: CPT | Performed by: PHYSICIAN ASSISTANT

## 2022-07-11 RX ORDER — AZITHROMYCIN 250 MG/1
TABLET, FILM COATED ORAL
Qty: 6 TABLET | Refills: 0 | Status: SHIPPED | OUTPATIENT
Start: 2022-07-11 | End: 2022-07-15

## 2022-07-11 RX ORDER — PREDNISONE 20 MG/1
40 TABLET ORAL DAILY
Qty: 20 TABLET | Refills: 0 | Status: SHIPPED | OUTPATIENT
Start: 2022-07-11

## 2022-07-11 NOTE — PROGRESS NOTES
Pulmonary Follow Up Note   Christine Taylor 60 y o  male MRN: [id number]  7/11/2022      Assessment:    COPD, moderate ([facility] )  Recommend patient continue Trelegy Ellipta 1 puff daily  Continue albuterol HFA 2 puffs q 6 hours p r n  And albuterol nebulized q 6 hours p r n  For shortness of breath or wheeze  Patient is actively wheezing on today's exam   Recommend short course of prednisone and Z-Jude given acute change in symptoms  COPD with acute exacerbation ([facility] )  Steroids and Z-Jude as above  Obstructive sleep apnea  Follows with Dr Licha Bermudez  Continue BiPAP 22/15 cm H2O per his recommendations  Seasonal allergic rhinitis  Continue Flonase 1 spray each nostril  Multiple pulmonary nodules determined by computed tomography of lung  Reviewed results of transbronchial bronchoscopy and EBUS  Unfortunately these were nondiagnostic  Recommended updating CT chest without contrast now  Morbid obesity with BMI of 50 0-59 9, adult ([facility] )  Weight loss encouraged  Plan:    Diagnoses and all orders for this visit:    COPD, moderate ([facility] )    Obstructive sleep apnea    Multiple pulmonary nodules determined by computed tomography of lung  -     CT chest wo contrast; Future    Morbid obesity with BMI of 50 0-59 9, adult ([facility] )    COPD with acute exacerbation (HCC)  -     azithromycin (ZITHROMAX) 250 mg tablet; Take 2 tablets today then 1 tablet daily x 4 days  -     predniSONE 20 mg tablet; Take 2 tablets (40 mg total) by mouth daily    Seasonal allergic rhinitis due to fungal spores        Return in about 3 months (around 10/11/2022)  History of Present Illness   HPI:  Christine Taylor is a 60 y o  male who presents the office today for routine follow-up  He has past medical history positive for moderate COPD, THIEN on BiPAP morbid obesity, cardiomyopathy, atrial fibrillation on Coumadin, hypertension, hyperlipidemia    Patient was last in our office in October for abnormal CT chest multiple pulmonary nodules in upper lobe and fissural predominance concerning for sarcoidosis  Since then patient underwent transbronchial biopsy with EBUS nondiagnostic  Patient has not required systemic steroids, antibiotics or been hospitalized for respiratory related illness since last time he was seen  Reports compliance on his home regimen  Presently, patient states that over the last 2 weeks he had worsening cough productive of yellow phlegm dyspnea on exertion, rhinorrhea, postnasal drip  Denies fevers, chills, headache, chest pain, pleurisy, lower extremity edema  No wheezing currently  Denies hemoptysis  Patient has been using his nebulizer twice a day which is more than his regular  Review of Systems   Respiratory: Positive for cough, shortness of breath and wheezing  All other systems reviewed and are negative        Historical Information   Past Medical History:   Diagnosis Date    Asthma     last assessed 8/21/12    Athscl heart disease of native coronary artery w/o ang pctrs     Atrial fibrillation ([facility] )     last assessed 8/21/12    Chronic diastolic (congestive) heart failure (HCC)     Closed fracture of fibula     last assessed 10/18/13    COLD (chronic obstructive lung disease) ([facility] )     last assessed 8/21/12    Coronary angioplasty status     CPAP (continuous positive airway pressure) dependence     Hyperlipidemia     Presence of prosthetic heart valve      Past Surgical History:   Procedure Laterality Date    AORTIC VALVE REPLACEMENT  10/07/2016    AVR replacement, mechanical    CORONARY ANGIOPLASTY WITH STENT PLACEMENT  07/29/2010    EF 40%, Successful bare metal stent mid RCA    MENISCECTOMY Left      Family History   Problem Relation Age of Onset    Atrial fibrillation Mother     Diabetes type II Mother     Heart attack Father         acute    Heart failure Father     Stroke Father         syndrome       Social History     Tobacco Use   Smoking Status Former Smoker    Years: 35 00    Types: Cigarettes    Quit date: 10/2016    Years since quittin 7   Smokeless Tobacco Never Used   Tobacco Comment    Quit 2 months ago         Meds/Allergies     Current Outpatient Medications:     albuterol (2 5 mg/3 mL) 0 083 % nebulizer solution, Take 3 mL (2 5 mg total) by nebulization every 6 (six) hours as needed for wheezing or shortness of breath, Disp: 360 mL, Rfl: 2    albuterol (Ventolin HFA) 90 mcg/act inhaler, Inhale 2 puffs every 6 (six) hours as needed for shortness of breath Must be brand necessary, Disp: 18 g, Rfl: 3    aspirin 81 MG tablet, Take by mouth, Disp: , Rfl:     atorvastatin (LIPITOR) 10 mg tablet, Take 1 tablet (10 mg total) by mouth daily, Disp: 90 tablet, Rfl: 3    azithromycin (ZITHROMAX) 250 mg tablet, Take 2 tablets today then 1 tablet daily x 4 days, Disp: 6 tablet, Rfl: 0    benazepril (LOTENSIN) 10 mg tablet, Take 1 tablet (10 mg total) by mouth daily, Disp: 90 tablet, Rfl: 3    diltiazem (CARDIZEM CD) 240 mg 24 hr capsule, Take 1 capsule (240 mg total) by mouth 2 (two) times a day, Disp: 180 capsule, Rfl: 3    fluticasone-umeclidinium-vilanterol (TRELEGY) 100-62 5-25 MCG/INH inhaler, Inhale 1 puff daily Rinse mouth after use , Disp: 2 each, Rfl: 5    furosemide (LASIX) 80 mg tablet, Take 1 tablet (80 mg total) by mouth daily, Disp: 90 tablet, Rfl: 3    insulin aspart (NovoLOG FlexPen) 100 UNIT/ML injection pen, Inject 14 units with breakfast and lunch and 36 units with dinner , Disp: 45 mL, Rfl: 1    insulin degludec (Tresiba FlexTouch) 200 units/mL CONCENTRATED U-200 injection pen, Inject 66 Units under the skin daily, Disp: 27 mL, Rfl: 1    Insulin Pen Needle (BD Pen Needle Fany U/F) 32G X 4 MM MISC, Inject as directed 4 (four) times a day, Disp: 400 each, Rfl: 5    metFORMIN (GLUCOPHAGE) 1000 MG tablet, Take 1 tablet (1,000 mg total) by mouth 2 (two) times a day, Disp: 180 tablet, Rfl: 3    metoprolol tartrate (LOPRESSOR) 50 mg tablet, Take 1 tablet (50 mg total) by mouth 2 (two) times a day, Disp: 180 tablet, Rfl: 3    nitroglycerin (NITROSTAT) 0 4 mg SL tablet, Place 1 tablet (0 4 mg total) under the tongue every 5 (five) minutes as needed for chest pain, Disp: 100 tablet, Rfl: 3    Potassium Chloride ER 20 MEQ TBCR, Take 1 tablet (20 mEq total) by mouth daily, Disp: 90 tablet, Rfl: 3    predniSONE 20 mg tablet, Take 2 tablets (40 mg total) by mouth daily, Disp: 20 tablet, Rfl: 0    warfarin (COUMADIN) 10 mg tablet, Take 1 tablet (10 mg total) by mouth daily, Disp: 90 tablet, Rfl: 3    warfarin (COUMADIN) 2 mg tablet, Take 1 tablet (2 mg total) by mouth daily, Disp: 90 tablet, Rfl: 3    cetirizine (ZyrTEC) 10 mg tablet, Take 1 tablet (10 mg total) by mouth daily (Patient not taking: No sig reported), Disp: 30 tablet, Rfl: 1  Allergies   Allergen Reactions    Fluticasone-Salmeterol Palpitations       Vitals: Blood pressure 135/70, pulse 84, temperature (!) 97 2 °F (36 2 °C), temperature source Tympanic, height 6' 1" (1 854 m), weight (!) 180 kg (396 lb 3 2 oz), SpO2 95 %  Body mass index is 52 27 kg/m²  Oxygen Therapy  SpO2: 95 %  Oxygen Therapy: None (Room air)    Physical Exam  Physical Exam  Vitals reviewed  Constitutional:       Appearance: Normal appearance  He is well-developed  HENT:      Head: Normocephalic and atraumatic  Nose: Nose normal       Mouth/Throat:      Mouth: Mucous membranes are moist       Pharynx: Oropharynx is clear  Eyes:      Extraocular Movements: Extraocular movements intact  Cardiovascular:      Rate and Rhythm: Normal rate and regular rhythm  Pulses: Normal pulses  Heart sounds: Normal heart sounds  No murmur heard  Pulmonary:      Effort: Pulmonary effort is normal  No respiratory distress  Breath sounds: Wheezing present  No rhonchi or rales  Abdominal:      Palpations: Abdomen is soft  Tenderness: There is no abdominal tenderness  Musculoskeletal:         General: No swelling or tenderness  Normal range of motion  Cervical back: Normal range of motion and neck supple  Skin:     General: Skin is warm and dry  Neurological:      General: No focal deficit present  Mental Status: He is alert  Mental status is at baseline  Psychiatric:         Mood and Affect: Mood normal          Behavior: Behavior normal          Labs: I have personally reviewed pertinent lab results  , ABG: No results found for: PHART, LJH5KYF, PO2ART, LSA8XRT, E5PCQHYY, BEART, SOURCE, BNP: No results found for: BNP, CBC: No results found for: WBC, HGB, HCT, MCV, PLT, ADJUSTEDWBC, MCH, MCHC, RDW, MPV, NRBC, CMP: No results found for: SODIUM, K, CL, CO2, ANIONGAP, BUN, CREATININE, GLUCOSE, CALCIUM, AST, ALT, ALKPHOS, PROT, BILITOT, EGFR, PT/INR:   Lab Results   Component Value Date    INR 2 96 (H) 07/11/2022   , Troponin: No results found for: TROPONINI  Lab Results   Component Value Date    WBC 8 57 01/13/2022    HGB 14 6 01/13/2022    HCT 44 4 01/13/2022    MCV 92 01/13/2022     01/13/2022     Lab Results   Component Value Date    GLUCOSE 119 09/01/2013    CALCIUM 8 9 01/13/2022     09/01/2013    K 4 2 01/13/2022    CO2 28 01/13/2022     01/13/2022    BUN 14 01/13/2022    CREATININE 0 96 01/13/2022     No results found for: IGE  Lab Results   Component Value Date    ALT 24 01/13/2022    AST 15 01/13/2022    ALKPHOS 90 01/13/2022    BILITOT 0 6 09/01/2013       Imaging and other studies: I have personally reviewed pertinent reports  and I have personally reviewed pertinent films in PACS     CT chest high-resolution 10/19/2021  Moderate improvement in extensive nodularity in upper lung predominance and along the fissures  No reticulation, honeycombing or traction bronchiectasis  No significant air trapping upon expiration  Densely calcified pleural plaques  Mild cardiomegaly, AVR  Mild coronary artery calcifications  Mild degenerative disease of the spine      Pulmonary function testing:  None to review

## 2022-07-11 NOTE — ASSESSMENT & PLAN NOTE
Recommend patient continue Trelegy Ellipta 1 puff daily  Continue albuterol HFA 2 puffs q 6 hours p r n  And albuterol nebulized q 6 hours p r n  For shortness of breath or wheeze  Patient is actively wheezing on today's exam   Recommend short course of prednisone and Z-Jude given acute change in symptoms

## 2022-07-11 NOTE — ASSESSMENT & PLAN NOTE
Reviewed results of transbronchial bronchoscopy and EBUS  Unfortunately these were nondiagnostic  Recommended updating CT chest without contrast now

## 2022-07-13 ENCOUNTER — DOCUMENTATION (OUTPATIENT)
Dept: PULMONOLOGY | Facility: CLINIC | Age: 57
End: 2022-07-13

## 2022-07-13 DIAGNOSIS — I10 HYPERTENSION, UNSPECIFIED TYPE: ICD-10-CM

## 2022-07-13 RX ORDER — DILTIAZEM HYDROCHLORIDE 240 MG/1
CAPSULE, COATED, EXTENDED RELEASE ORAL
Qty: 180 CAPSULE | Refills: 3 | Status: SHIPPED | OUTPATIENT
Start: 2022-07-13

## 2022-07-13 NOTE — PROGRESS NOTES
I received a fax from [address] asking if it is ok to give azithromycin while the pt is on coumadin   Please advise

## 2022-07-14 ENCOUNTER — RA CDI HCC (OUTPATIENT)
Dept: OTHER | Facility: HOSPITAL | Age: 57
End: 2022-07-14

## 2022-07-14 NOTE — PROGRESS NOTES
[facility]  coding opportunities       Chart reviewed, no opportunity found: CHART REVIEWED, NO OPPORTUNITY FOUND        Patients Insurance     Medicare Insurance: Medicare

## 2022-07-15 ENCOUNTER — HOSPITAL ENCOUNTER (OUTPATIENT)
Dept: CT IMAGING | Facility: HOSPITAL | Age: 57
Discharge: HOME | End: 2022-07-15
Payer: MEDICARE

## 2022-07-15 DIAGNOSIS — R91.8 MULTIPLE PULMONARY NODULES DETERMINED BY COMPUTED TOMOGRAPHY OF LUNG: ICD-10-CM

## 2022-07-15 PROCEDURE — G1004 CDSM NDSC: HCPCS

## 2022-07-15 PROCEDURE — 71250 CT THORAX DX C-: CPT

## 2022-07-19 ENCOUNTER — OFFICE VISIT (OUTPATIENT)
Dept: FAMILY MEDICINE CLINIC | Facility: CLINIC | Age: 57
End: 2022-07-19
Payer: MEDICARE

## 2022-07-19 VITALS
OXYGEN SATURATION: 92 % | BODY MASS INDEX: 41.75 KG/M2 | SYSTOLIC BLOOD PRESSURE: 118 MMHG | TEMPERATURE: 96.9 F | HEIGHT: 73 IN | HEART RATE: 93 BPM | WEIGHT: 315 LBS | DIASTOLIC BLOOD PRESSURE: 70 MMHG

## 2022-07-19 DIAGNOSIS — E66.01 MORBID OBESITY WITH BMI OF 50.0-59.9, ADULT (HCC): ICD-10-CM

## 2022-07-19 DIAGNOSIS — E11.9 TYPE 2 DIABETES MELLITUS WITHOUT COMPLICATION, UNSPECIFIED WHETHER LONG TERM INSULIN USE (HCC): Primary | ICD-10-CM

## 2022-07-19 PROCEDURE — 92250 FUNDUS PHOTOGRAPHY W/I&R: CPT | Performed by: FAMILY MEDICINE

## 2022-07-19 PROCEDURE — 99213 OFFICE O/P EST LOW 20 MIN: CPT | Performed by: FAMILY MEDICINE

## 2022-07-19 NOTE — PATIENT INSTRUCTIONS

## 2022-07-19 NOTE — PROGRESS NOTES
Assessment/Plan:   Told patient he may try picking up generic Mucinex over-the-counter  Follow up with specialists as scheduled  Diabetic eye exam done today  Continue to watch diet  Follow-up here in 6 months or p r n  Problem List Items Addressed This Visit        Other    Morbid obesity with BMI of 50 0-59 9, adult ([facility] )      Other Visit Diagnoses     Type 2 diabetes mellitus without complication, unspecified whether long term insulin use ([place] [address] )    -  Primary    Relevant Orders    IRIS Diabetic eye exam           Diagnoses and all orders for this visit:    Type 2 diabetes mellitus without complication, unspecified whether long term insulin use ([place] [address] )  -     IRIS Diabetic eye exam    Morbid obesity with BMI of 50 0-59 9, adult ([place] [address] )      No problem-specific Assessment & Plan notes found for this encounter  Subjective:      Patient ID: Fawn Gardner is a 60 y o  male  Patient here today for follow-up  Patient denies any chest pain or increased shortness of breath  Patient's hemoglobin A1c is controlled  Patient has had a cough  Pulmonology gave him prednisone taper and Zithromax  He feels it could be productive, but not able to bring up mucus  The following portions of the patient's history were reviewed and updated as appropriate:   He has a past medical history of Asthma, Athscl heart disease of native coronary artery w/o ang pctrs, Atrial fibrillation (HCC), Chronic diastolic [facility]congestive) [facility] failure [facility] ), Closed fracture of fibula, COLD (chronic obstructive lung disease) ([facility] ), Coronary angioplasty status, CPAP (continuous positive airway pressure) dependence, Hyperlipidemia, and Presence of prosthetic heart valve ,  does not have any pertinent problems on file  ,   has a past surgical history that includes Coronary angioplasty with stent (07/29/2010); Aortic valve replacement (10/07/2016); and Meniscectomy (Left)  ,  family history includes Atrial fibrillation in his mother; Diabetes type II in his mother; Heart attack in his father; Heart failure in his father; Stroke in his father  ,   reports that he quit smoking about 5 years ago  His smoking use included cigarettes  He quit after 35 00 years of use  He has never used smokeless tobacco  He reports previous alcohol use  He reports that he does not use drugs  ,  is allergic to fluticasone-salmeterol     Current Outpatient Medications   Medication Sig Dispense Refill    albuterol (2 5 mg/3 mL) 0 083 % nebulizer solution Take 3 mL (2 5 mg total) by nebulization every 6 (six) hours as needed for wheezing or shortness of breath 360 mL 2    albuterol (Ventolin HFA) 90 mcg/act inhaler Inhale 2 puffs every 6 (six) hours as needed for shortness of breath Must be brand necessary 18 g 3    aspirin 81 MG tablet Take by mouth      atorvastatin (LIPITOR) 10 mg tablet Take 1 tablet (10 mg total) by mouth daily 90 tablet 3    benazepril (LOTENSIN) 10 mg tablet Take 1 tablet (10 mg total) by mouth daily 90 tablet 3    diltiazem (CARDIZEM CD) 240 mg 24 hr capsule Take 1 capsule by mouth twice daily 180 capsule 3    fluticasone-umeclidinium-vilanterol (TRELEGY) 100-62 5-25 MCG/INH inhaler Inhale 1 puff daily Rinse mouth after use  2 each 5    furosemide (LASIX) 80 mg tablet Take 1 tablet (80 mg total) by mouth daily 90 tablet 3    insulin aspart (NovoLOG FlexPen) 100 UNIT/ML injection pen Inject 14 units with breakfast and lunch and 36 units with dinner   45 mL 1    insulin degludec Velinda Ring FlexTouch) 200 units/mL CONCENTRATED U-200 injection pen Inject 66 Units under the skin daily 27 mL 1    Insulin Pen Needle (BD Pen Needle Fany U/F) 32G X 4 MM MISC Inject as directed 4 (four) times a day 400 each 5    metFORMIN (GLUCOPHAGE) 1000 MG tablet Take 1 tablet (1,000 mg total) by mouth 2 (two) times a day 180 tablet 3    metoprolol tartrate (LOPRESSOR) 50 mg tablet Take 1 tablet (50 mg total) by mouth 2 (two) times a day 180 tablet 3    Potassium Chloride ER 20 MEQ TBCR Take 1 tablet (20 mEq total) by mouth daily 90 tablet 3    predniSONE 20 mg tablet Take 2 tablets (40 mg total) by mouth daily 20 tablet 0    warfarin (COUMADIN) 10 mg tablet Take 1 tablet (10 mg total) by mouth daily 90 tablet 3    warfarin (COUMADIN) 2 mg tablet Take 1 tablet (2 mg total) by mouth daily 90 tablet 3    cetirizine (ZyrTEC) 10 mg tablet Take 1 tablet (10 mg total) by mouth daily (Patient not taking: No sig reported) 30 tablet 1    nitroglycerin (NITROSTAT) 0 4 mg SL tablet Place 1 tablet (0 4 mg total) under the tongue every 5 (five) minutes as needed for chest pain (Patient not taking: Reported on 7/19/2022) 100 tablet 3     No current facility-administered medications for this visit  Review of Systems   Constitutional: Negative  Respiratory: Negative  Cardiovascular: Negative  Gastrointestinal: Negative  Genitourinary: Negative  Objective:  Vitals:    07/19/22 1206   BP: 118/70   Pulse: 93   Temp: (!) 96 9 °F (36 1 °C)   SpO2: 92%   Weight: (!) 180 kg (396 lb 12 8 oz)   Height: 6' 1" (1 854 m)     Body mass index is 52 35 kg/m²  Physical Exam  Vitals reviewed  Constitutional:       General: He is not in acute distress  Appearance: Normal appearance  He is well-developed  He is not ill-appearing, toxic-appearing or diaphoretic  HENT:      Head: Normocephalic and atraumatic  Eyes:      Conjunctiva/sclera: Conjunctivae normal    Cardiovascular:      Rate and Rhythm: Normal rate  Rhythm irregularly irregular  Heart sounds: Normal heart sounds  No murmur heard  No friction rub  No gallop  Pulmonary:      Effort: Pulmonary effort is normal  No respiratory distress  Breath sounds: Normal breath sounds  No wheezing, rhonchi or rales  Musculoskeletal:      Right lower leg: No edema  Left lower leg: No edema  Neurological:      General: No focal deficit present        Mental Status: He is alert and oriented to person, place, and time  Psychiatric:         Mood and Affect: Mood normal          Behavior: Behavior normal          Thought Content: Thought content normal          Judgment: Judgment normal          BMI Counseling: Body mass index is 52 35 kg/m²  The BMI is above normal  Nutrition recommendations include reducing portion sizes, decreasing overall calorie intake, 3-5 servings of fruits/vegetables daily, reducing fast food intake, consuming healthier snacks, decreasing soda and/or juice intake, moderation in carbohydrate intake, increasing intake of lean protein, reducing intake of saturated fat and trans fat and reducing intake of cholesterol

## 2022-08-02 ENCOUNTER — TELEPHONE (OUTPATIENT)
Dept: FAMILY MEDICINE CLINIC | Facility: CLINIC | Age: 57
End: 2022-08-02

## 2022-08-02 NOTE — TELEPHONE ENCOUNTER
Patient said you told him to call if he did not get test CT results from Dr Conner Mcdaniels, he did not  Do you want to give?

## 2022-08-08 DIAGNOSIS — J43.9 PULMONARY EMPHYSEMA, UNSPECIFIED EMPHYSEMA TYPE (HCC): ICD-10-CM

## 2022-08-08 RX ORDER — ALBUTEROL SULFATE 90 UG/1
2 AEROSOL, METERED RESPIRATORY (INHALATION) EVERY 6 HOURS PRN
Qty: 18 G | Refills: 3 | Status: SHIPPED | OUTPATIENT
Start: 2022-08-08

## 2022-08-22 ENCOUNTER — APPOINTMENT (OUTPATIENT)
Dept: LAB | Facility: HOSPITAL | Age: 57
End: 2022-08-22
Payer: MEDICARE

## 2022-08-22 ENCOUNTER — ANTICOAG VISIT (OUTPATIENT)
Dept: FAMILY MEDICINE CLINIC | Facility: CLINIC | Age: 57
End: 2022-08-22

## 2022-08-23 DIAGNOSIS — J44.9 COPD, MODERATE (HCC): ICD-10-CM

## 2022-09-12 ENCOUNTER — OFFICE VISIT (OUTPATIENT)
Dept: CARDIOLOGY CLINIC | Facility: CLINIC | Age: 57
End: 2022-09-12
Payer: MEDICARE

## 2022-09-12 VITALS
BODY MASS INDEX: 40.43 KG/M2 | HEIGHT: 74 IN | WEIGHT: 315 LBS | SYSTOLIC BLOOD PRESSURE: 124 MMHG | DIASTOLIC BLOOD PRESSURE: 68 MMHG | HEART RATE: 78 BPM

## 2022-09-12 DIAGNOSIS — Z95.5 H/O HEART ARTERY STENT: ICD-10-CM

## 2022-09-12 DIAGNOSIS — I50.32 CHRONIC DIASTOLIC HEART FAILURE (HCC): ICD-10-CM

## 2022-09-12 DIAGNOSIS — I10 ESSENTIAL HYPERTENSION: ICD-10-CM

## 2022-09-12 DIAGNOSIS — E11.69 DYSLIPIDEMIA ASSOCIATED WITH TYPE 2 DIABETES MELLITUS (HCC): ICD-10-CM

## 2022-09-12 DIAGNOSIS — Z95.2 S/P AVR: ICD-10-CM

## 2022-09-12 DIAGNOSIS — E78.5 DYSLIPIDEMIA ASSOCIATED WITH TYPE 2 DIABETES MELLITUS (HCC): ICD-10-CM

## 2022-09-12 DIAGNOSIS — I48.91 ATRIAL FIBRILLATION, UNSPECIFIED TYPE (HCC): Primary | ICD-10-CM

## 2022-09-12 PROCEDURE — 99214 OFFICE O/P EST MOD 30 MIN: CPT | Performed by: INTERNAL MEDICINE

## 2022-09-12 PROCEDURE — 93000 ELECTROCARDIOGRAM COMPLETE: CPT | Performed by: INTERNAL MEDICINE

## 2022-09-12 RX ORDER — METOPROLOL TARTRATE 50 MG/1
50 TABLET, FILM COATED ORAL 2 TIMES DAILY
Qty: 180 TABLET | Refills: 3 | Status: SHIPPED | OUTPATIENT
Start: 2022-09-12

## 2022-09-12 NOTE — PROGRESS NOTES
Subjective:        Patient ID: Heladio Braden is a 59 y o  male  Chief Complaint:  Vicky Steiner is here for routine follow-up  Feels well offers no specific complaints from a cardiac perspective  Denies chest pains any more than his usual mild shortness of breath he attributes to his weight and asthma  Uses nebulizers as needed with good results  Denies any productive cough or fever  Has not had any palpitations of concern, no presyncope or syncope  No unusual edema, taking his water pill regularly  Recent INR therapeutic, recent lipids and A1c excellent  The following portions of the patient's history were reviewed and updated as appropriate: allergies, current medications, past family history, past medical history, past social history, past surgical history and problem list   Review of Systems   Constitutional: Negative for chills, diaphoresis, malaise/fatigue and weight gain  HENT: Negative for nosebleeds and stridor  Eyes: Negative for double vision, vision loss in left eye, vision loss in right eye and visual disturbance  Cardiovascular: Negative for chest pain, claudication, cyanosis, dyspnea on exertion, irregular heartbeat, leg swelling, near-syncope, orthopnea, palpitations, paroxysmal nocturnal dyspnea and syncope  Respiratory: Negative for cough, shortness of breath, snoring and wheezing  Endocrine: Negative for polydipsia, polyphagia and polyuria  Hematologic/Lymphatic: Negative for bleeding problem  Does not bruise/bleed easily  Skin: Negative for flushing and rash  Musculoskeletal: Negative for falls and myalgias  Gastrointestinal: Negative for abdominal pain, heartburn, hematemesis, hematochezia, melena and nausea  Genitourinary: Negative for hematuria  Neurological: Negative for brief paralysis, dizziness, focal weakness, headaches, light-headedness, loss of balance and vertigo  Psychiatric/Behavioral: Negative for altered mental status and substance abuse  Allergic/Immunologic: Negative for hives  Objective:      /68   Pulse 78   Ht 6' 2" (1 88 m)   Wt (!) 181 kg (398 lb)   BMI 51 10 kg/m²   Physical Exam  Constitutional:       General: He is not in acute distress  Appearance: He is well-developed  He is not diaphoretic  HENT:      Head: Normocephalic and atraumatic  Eyes:      General: No scleral icterus  Pupils: Pupils are equal, round, and reactive to light  Neck:      Thyroid: No thyromegaly  Vascular: No carotid bruit or JVD  Cardiovascular:      Rate and Rhythm: Normal rate  Rhythm irregular  Heart sounds: No murmur heard  No friction rub  No gallop  Comments: Maurilio Álvarez opening and closing prosthetic click without alarming murmur  Pulmonary:      Effort: Pulmonary effort is normal  No respiratory distress  Breath sounds: Normal breath sounds  No stridor  No wheezing or rales  Abdominal:      General: Bowel sounds are normal  There is no distension  Palpations: Abdomen is soft  There is no mass  Tenderness: There is no abdominal tenderness  Musculoskeletal:         General: No deformity  Cervical back: Normal range of motion and neck supple  Right lower leg: Edema present  Left lower leg: Edema (Mild bilateral edema chronic finding) present  Skin:     General: Skin is warm and dry  Coloration: Skin is not pale  Findings: No erythema  Neurological:      Mental Status: He is alert and oriented to person, place, and time        Coordination: Coordination normal    Psychiatric:         Mood and Affect: Mood normal          Behavior: Behavior normal          Lab Review:   Ancillary Orders on 08/12/2022   Component Date Value    Protime 08/22/2022 34 2 (A)    INR 08/22/2022 3 35 (A)   Office Visit on 07/19/2022   Component Date Value    Severity 07/20/2022 NORMAL     Right Eye Diabetic Retin* 07/20/2022 None     Right Eye Macular Edema 07/20/2022 None     Right Eye Other Retinopa* 2022 None     Right Eye Image Quality 2022 Gradable Image     Left Eye Diabetic Retino* 2022 None     Left Eye Macular Edema 2022 None     Left Eye Other Retinopat* 2022 None     Left Eye Image Quality 2022 Gradable Image     Result 2022 Retinal Study Result for SHA COLEMAN     Result 2022       Result 2022  carolyne Arellano 59 y/o, M (: 1965, MRN: [id number])     Result 2022  presented to [address] [place] [address] Care on 2022 for a retinal imaging study of the left and right eyes   Result 2022       Result 2022  Based on the findings of the study, the following is recommended for SHA COLEMAN     Result 2022  Normal Study: Re-scan the patient in 12 months or in the next calendar year   Result 2022       Result 2022  Interpreting Provider's Comments:  No comments provided     Result 2022  Diagnoses Present: E119 - Type 2 diabetes mellitus without complications     Result 2022       Result 2022  Right eye findings: Negative for Diabetic Retinopathy     Result 2022 Negative for Macular Edema     Result 2022       Result 2022  Left eye findings: Negative for Diabetic Retinopathy     Result 2022 Negative for Macular Edema     Result 2022       Result 2022       Result 2022  This result was electronically signed by Swapnil Valencia MD, NPI: [id number], Taxonomy: [id number] on 2022 18:23 UT   Result 2022       Result 2022 NOTE:  Any pathology noted on this diabetic retinal evaluation should be confirmed by an appropriate ophthalmic examination  No results found  Assessment:       1  Atrial fibrillation, unspecified type ([address] )  POCT ECG    Echo complete w/ contrast if indicated   2   Essential hypertension  metoprolol tartrate (LOPRESSOR) 50 mg tablet    Echo complete w/ contrast if indicated   3  S/P AVR  Echo complete w/ contrast if indicated   4  H/O heart artery stent  Echo complete w/ contrast if indicated   5  Chronic diastolic heart failure ([facility] )     6  Dyslipidemia associated with type 2 diabetes mellitus ([facility] [address] )          Plan:       Corby Edwards is without evidence for unstable angina decompensated heart failure nor malignant dysrhythmia  His AFib is rate controlled and anticoagulated, with mechanical AVR goal INR 2 5-3 5 recommended  No falls or bleeding issues  Continue current dose metoprolol diltiazem and warfarin  He will continue our anticoagulation clinic  He is on proper statin therapy, LDL is at goal   No statin changes recommended  Blood pressure controlled continue benazepril, excellent choice with underlying diabetes as renal function permits  Near euvolemic, continue present dose Lasix  Encouraged smoking abstinence  SBE antibiotic prophylaxis requirements reiterated  Auscultation of his prosthetic valve unremarkable today, EKG stable  Echocardiogram ordered in 6 months time with his next follow-up visit he will call sooner with any concerning potential cardiac symptoms in the meantime

## 2022-09-12 NOTE — PATIENT INSTRUCTIONS
A-fib (Atrial Fibrillation)   AMBULATORY CARE:   Atrial fibrillation (A-fib)  is an irregular heartbeat  It reduces your heart's ability to pump blood through your body  A-fib may come and go, or it may be a long-term condition  A-fib can cause life-threatening blood clots, stroke, or heart failure  It is important to treat and manage A-fib to help prevent these problems  Common signs and symptoms include the following:   A heartbeat that races, pounds, or flutters    Weakness, severe tiredness, or confusion    Feeling lightheaded, sweaty, dizzy, or faint    Shortness of breath or anxiety    Chest pain or pressure    Call your local emergency number (911 in the [address] [facility] [facility]) or have someone call if:   You have any of the following signs of a heart attack:      Squeezing, pressure, or pain in your chest    You may  also have any of the following:     Discomfort or pain in your back, neck, jaw, stomach, or arm    Shortness of breath    Nausea or vomiting    Lightheadedness or a sudden cold sweat    You have any of the following signs of a stroke:      Numbness or drooping on one side of your face     Weakness in an arm or leg    Confusion or difficulty speaking    Dizziness, a severe headache, or vision loss    Call your doctor or cardiologist if:   Your arm or leg feels warm, tender, and painful  It may look swollen and red  Your heart rate is more than 110 beats per minute  You have new or worsening swelling in your legs, feet, ankles, or abdomen  You are short of breath, even at rest     You have questions or concerns about your condition or care  Treatment for A-fib:  Conditions that cause A-fib, such as thyroid disease, will be treated  You may also need any of the following:  Heart medicines  help control your heart rate or rhythm  You may need more than one medicine to treat your symptoms  Antiplatelet or blood thinner medicines  help prevent blood clots and stroke      Cardioversion  is a procedure to return your heart rate and rhythm to normal  It can be done using medicines or electric shock  A-fib ablation  is a procedure that uses energy to burn a small area of heart tissue  This creates scar tissue and prevents electrical signals that cause A-fib  You may need this procedure more than once  Ask for more information on A-fib ablation  A pacemaker  may be inserted into your heart  A pacemaker is a device that controls your heartbeat  A pacemaker may be inserted during an ablation procedure or surgery  Ask your healthcare provider for more information on pacemakers  Surgery  may be needed if other procedures do not work  During surgery your healthcare provider will make cuts in the upper part of your heart  The provider will stitch the cuts together to create scar tissue  The scar tissue will prevent electrical signals that cause A-fib  Manage A-fib:   Know your target heart rate  Learn how to check your pulse and monitor your heart rate  Know the risks if you choose to drink alcohol  Alcohol can increase your risk for A-fib or make A-fib harder to manage  Ask your healthcare provider if it is okay for you to drink any alcohol  He or she can help you set limits for the number of drinks you have in 24 hours and in a week  A drink of alcohol is 12 ounces of beer, 5 ounces of wine, or 1½ ounces of liquor  Do not smoke  Nicotine can cause heart damage and make it more difficult to manage your A-fib  Do not use e-cigarettes or smokeless tobacco in place of cigarettes or to help you quit  They still contain nicotine  Ask your healthcare provider for information if you currently smoke and need help quitting  Eat heart-healthy foods  Heart healthy foods will help keep your cholesterol low  These include fruits, vegetables, whole-grain breads, low-fat dairy products, beans, lean meats, and fish  Replace butter and margarine with heart-healthy oils such as olive oil and canola oil  Maintain a healthy weight  Ask your healthcare provider what a healthy weight is for you  Ask him or her to help you create a safe weight loss plan if you are overweight  Even a small goal of a 10% weight loss can improve your heart health  Get regular physical activity  Physical activity helps improve your heart health  Get at least 150 minutes of moderate aerobic physical activity each week  Your healthcare provider can help you create an activity plan  Manage other health conditions  This includes high blood pressure or cholesterol, sleep apnea, diabetes, and other heart conditions  Take medicine as directed and follow your treatment plan  Your healthcare provider may need to change a medicine you are taking if it is causing your A-fib  Do not  stop taking any medicine unless directed by your provider  Follow up with your doctor or cardiologist as directed: You will need regular blood tests and monitoring  Write down your questions so you remember to ask them during your visits  © Copyright [employer] 2022 Information is for End User's use only and may not be sold, redistributed or otherwise used for commercial purposes  All illustrations and images included in CareNotes® are the copyrighted property of [employer]  [facility] [address]   The above information is an  only  It is not intended as medical advice for individual conditions or treatments  Talk to your doctor, nurse or pharmacist before following any medical regimen to see if it is safe and effective for you

## 2022-09-29 ENCOUNTER — ANTICOAG VISIT (OUTPATIENT)
Dept: FAMILY MEDICINE CLINIC | Facility: CLINIC | Age: 57
End: 2022-09-29

## 2022-09-29 ENCOUNTER — APPOINTMENT (OUTPATIENT)
Dept: LAB | Facility: HOSPITAL | Age: 57
End: 2022-09-29
Payer: MEDICARE

## 2022-09-29 DIAGNOSIS — I48.91 ATRIAL FIBRILLATION, UNSPECIFIED TYPE (HCC): ICD-10-CM

## 2022-09-29 LAB
INR PPP: 3.72 (ref 0.84–1.19)
PROTHROMBIN TIME: 37.1 SECONDS (ref 11.6–14.5)

## 2022-09-29 PROCEDURE — 36415 COLL VENOUS BLD VENIPUNCTURE: CPT

## 2022-09-29 PROCEDURE — 85610 PROTHROMBIN TIME: CPT

## 2022-10-12 PROBLEM — J13 PNEUMONIA DUE TO STREPTOCOCCUS PNEUMONIAE (HCC): Status: RESOLVED | Noted: 2021-08-18 | Resolved: 2022-10-12

## 2022-10-21 ENCOUNTER — APPOINTMENT (OUTPATIENT)
Dept: LAB | Facility: HOSPITAL | Age: 57
End: 2022-10-21
Payer: MEDICARE

## 2022-10-21 DIAGNOSIS — I48.91 ATRIAL FIBRILLATION, UNSPECIFIED TYPE (HCC): Primary | ICD-10-CM

## 2022-10-21 DIAGNOSIS — Z79.4 TYPE 2 DIABETES MELLITUS WITH HYPERGLYCEMIA, WITH LONG-TERM CURRENT USE OF INSULIN (HCC): ICD-10-CM

## 2022-10-21 DIAGNOSIS — E11.65 TYPE 2 DIABETES MELLITUS WITH HYPERGLYCEMIA, WITH LONG-TERM CURRENT USE OF INSULIN (HCC): ICD-10-CM

## 2022-10-21 DIAGNOSIS — I48.91 ATRIAL FIBRILLATION, UNSPECIFIED TYPE (HCC): ICD-10-CM

## 2022-10-21 LAB
ALBUMIN SERPL BCP-MCNC: 3.4 G/DL (ref 3.5–5)
ALP SERPL-CCNC: 83 U/L (ref 46–116)
ALT SERPL W P-5'-P-CCNC: 17 U/L (ref 12–78)
ANION GAP SERPL CALCULATED.3IONS-SCNC: 9 MMOL/L (ref 4–13)
AST SERPL W P-5'-P-CCNC: 16 U/L (ref 5–45)
BILIRUB SERPL-MCNC: 0.45 MG/DL (ref 0.2–1)
BUN SERPL-MCNC: 14 MG/DL (ref 5–25)
CALCIUM ALBUM COR SERPL-MCNC: 9.3 MG/DL (ref 8.3–10.1)
CALCIUM SERPL-MCNC: 8.8 MG/DL (ref 8.3–10.1)
CHLORIDE SERPL-SCNC: 104 MMOL/L (ref 96–108)
CHOLEST SERPL-MCNC: 112 MG/DL
CO2 SERPL-SCNC: 25 MMOL/L (ref 21–32)
CREAT SERPL-MCNC: 1.04 MG/DL (ref 0.6–1.3)
CREAT UR-MCNC: 248 MG/DL
GFR SERPL CREATININE-BSD FRML MDRD: 78 ML/MIN/1.73SQ M
GLUCOSE SERPL-MCNC: 139 MG/DL (ref 65–140)
HDLC SERPL-MCNC: 27 MG/DL
INR PPP: 3.76 (ref 0.84–1.19)
LDLC SERPL CALC-MCNC: 62 MG/DL (ref 0–100)
MICROALBUMIN UR-MCNC: 33.1 MG/L (ref 0–20)
MICROALBUMIN/CREAT 24H UR: 13 MG/G CREATININE (ref 0–30)
NONHDLC SERPL-MCNC: 85 MG/DL
POTASSIUM SERPL-SCNC: 3.8 MMOL/L (ref 3.5–5.3)
PROT SERPL-MCNC: 7.5 G/DL (ref 6.4–8.4)
PROTHROMBIN TIME: 37.4 SECONDS (ref 11.6–14.5)
SODIUM SERPL-SCNC: 138 MMOL/L (ref 135–147)
TRIGL SERPL-MCNC: 117 MG/DL

## 2022-10-21 PROCEDURE — 85610 PROTHROMBIN TIME: CPT

## 2022-10-21 PROCEDURE — 82570 ASSAY OF URINE CREATININE: CPT

## 2022-10-21 PROCEDURE — 82043 UR ALBUMIN QUANTITATIVE: CPT

## 2022-10-21 PROCEDURE — 80061 LIPID PANEL: CPT

## 2022-10-21 PROCEDURE — 36415 COLL VENOUS BLD VENIPUNCTURE: CPT

## 2022-10-21 PROCEDURE — 80053 COMPREHEN METABOLIC PANEL: CPT

## 2022-10-24 ENCOUNTER — ANTICOAG VISIT (OUTPATIENT)
Dept: FAMILY MEDICINE CLINIC | Facility: CLINIC | Age: 57
End: 2022-10-24

## 2022-11-03 ENCOUNTER — LAB (OUTPATIENT)
Dept: LAB | Facility: HOSPITAL | Age: 57
End: 2022-11-03
Attending: FAMILY MEDICINE

## 2022-11-03 ENCOUNTER — ANTICOAG VISIT (OUTPATIENT)
Dept: FAMILY MEDICINE CLINIC | Facility: CLINIC | Age: 57
End: 2022-11-03

## 2022-11-03 DIAGNOSIS — I48.91 ATRIAL FIBRILLATION, UNSPECIFIED TYPE (HCC): ICD-10-CM

## 2022-11-03 LAB
INR PPP: 3.2 (ref 0.84–1.19)
PROTHROMBIN TIME: 33.1 SECONDS (ref 11.6–14.5)

## 2022-11-14 DIAGNOSIS — E11.65 POORLY CONTROLLED TYPE 2 DIABETES MELLITUS WITH PERIPHERAL NEUROPATHY (HCC): ICD-10-CM

## 2022-11-14 DIAGNOSIS — E11.42 POORLY CONTROLLED TYPE 2 DIABETES MELLITUS WITH PERIPHERAL NEUROPATHY (HCC): ICD-10-CM

## 2022-11-14 RX ORDER — INSULIN DEGLUDEC 200 U/ML
66 INJECTION, SOLUTION SUBCUTANEOUS DAILY
Qty: 27 ML | Refills: 1 | Status: SHIPPED | OUTPATIENT
Start: 2022-11-14 | End: 2023-04-27

## 2022-11-17 NOTE — PROGRESS NOTES
Established Patient Progress Note      Chief Complaint   Patient presents with   • Diabetes Type 2     Checking sugar once a day - didn't bring with him today  Average range around 120 - checking time ranges from fasting to dinner        History of Present Illness:   Nakia Bermudez is a 59 y o  male with  hypertension, hyperlipidemia, obstructive sleep apnea, and type 2 diabetes with long term use of insulin since 2016  Reports complications of  neuropathy  Denies recent illness or hospitalizations  Denies recent severe hypoglycemic or severe hyperglycemic episodes  Denies any issues with his current regimen  home glucose monitoring: are performed regularly, 2-4 x daily    POC HgA1C is 5 6%  Patient reports feeling well  He has no questions or concerns  Component      Latest Ref Rng & Units 7/12/2021 1/13/2022 5/18/2022           9:32 AM 10:41 AM  8:14 AM   Hemoglobin A1C      6 5 6 8 (H) 6 2 (H) 5 6       Component      Latest Ref Rng & Units 7/12/2021 1/13/2022 10/21/2022           9:32 AM 10:41 AM  1:29 PM   eGFR      ml/min/1 73sq m 98 86 78     Component      Latest Ref Rng & Units 1/13/2022 10/21/2022          10:41 AM  1:29 PM   MICROALBUMIN/CREATININE RATIO      0 - 30 mg/g creatinine 12 13     Home blood glucose readings: None for review- patient reports 100-130 mg/dL  Denies hypoglycemia  Current regimen:   Tresiba 66 units daily  NovoLog 14-14-36  Metformin 1000 mg twice daily    Last Eye Exam: UTD  Last Foot Exam: UTD    For hyperlipidemia, he is taking 10 mg of atorvastatin nightly  He denies myalgias  For hypertension, he is taking 10 mg of benazepril daily and 50 mg of metoprolol tartrate twice daily  He denies headache, cough, and orthostatic hypotension      Patient Active Problem List   Diagnosis   • Aortic stenosis   • Atrial fibrillation ([facility] [address] )   • COPD with acute exacerbation [facility] )   • Type 2 diabetes mellitus with hyperglycemia, with long-term current use of insulin ([facility] )   • Other hyperlipidemia   • Essential hypertension   • S/P AVR   • Obstructive sleep apnea   • Restless leg syndrome   • Hypersomnia   • Morbid obesity with BMI of 50 0-59 9, adult ([facility])   • PLMD (periodic limb movement disorder)   • Palpitations   • Acute exacerbation of chronic obstructive bronchitis ([facility])   • Multiple pulmonary nodules determined by computed tomography of lung   • Chronic pain of left knee   • Seasonal allergic rhinitis   • COPD, moderate ([facility])   • SOB (shortness of breath)   • Respiratory failure with hypoxia ([facility])      Past Medical History:   Diagnosis Date   • Asthma     last assessed 12   • Athscl heart disease of native coronary artery w/o ang pctrs    • Atrial fibrillation ([facility] )     last assessed 12   • Chronic diastolic (congestive) heart failure ([facility])    • Closed fracture of fibula     last assessed 10/18/13   • COLD (chronic obstructive lung disease) ([facility] )     last assessed 12   • Coronary angioplasty status    • CPAP (continuous positive airway pressure) dependence    • Dyslipidemia associated with type 2 diabetes mellitus ([facility])    • Hyperlipidemia    • Hypertension    • Presence of prosthetic heart valve       Past Surgical History:   Procedure Laterality Date   • AORTIC VALVE REPLACEMENT  10/07/2016    AVR replacement, mechanical   • CORONARY ANGIOPLASTY WITH STENT PLACEMENT  2010    EF 40%, Successful bare metal stent mid RCA   • MENISCECTOMY Left       Family History   Problem Relation Age of Onset   • Atrial fibrillation Mother    • Diabetes type II Mother    • Heart attack Father         acute   • Heart failure Father    • Stroke Father         syndrome     Social History     Tobacco Use   • Smoking status: Former     Years: 35 00     Types: Cigarettes     Quit date: 10/2016     Years since quittin 1   • Smokeless tobacco: Never   • Tobacco comments:     Quit 2 months ago   Substance Use Topics   • Alcohol use: Not Currently     Allergies   Allergen Reactions   • Fluticasone-Salmeterol Palpitations         Current Outpatient Medications:   •  albuterol (2 5 mg/3 mL) 0 083 % nebulizer solution, Take 3 mL (2 5 mg total) by nebulization every 6 (six) hours as needed for wheezing or shortness of breath, Disp: 360 mL, Rfl: 2  •  albuterol (Ventolin HFA) 90 mcg/act inhaler, Inhale 2 puffs every 6 (six) hours as needed for shortness of breath Must be brand necessary, Disp: 18 g, Rfl: 3  •  aspirin 81 MG tablet, Take by mouth, Disp: , Rfl:   •  atorvastatin (LIPITOR) 10 mg tablet, Take 1 tablet (10 mg total) by mouth daily, Disp: 90 tablet, Rfl: 3  •  benazepril (LOTENSIN) 10 mg tablet, Take 1 tablet (10 mg total) by mouth daily, Disp: 90 tablet, Rfl: 3  •  diltiazem (CARDIZEM CD) 240 mg 24 hr capsule, Take 1 capsule by mouth twice daily, Disp: 180 capsule, Rfl: 3  •  fluticasone-umeclidinium-vilanterol (TRELEGY) 100-62 5-25 MCG/INH inhaler, Inhale 1 puff daily Rinse mouth after use , Disp: 2 each, Rfl: 5  •  furosemide (LASIX) 80 mg tablet, Take 1 tablet (80 mg total) by mouth daily, Disp: 90 tablet, Rfl: 3  •  insulin aspart (NovoLOG FlexPen) 100 UNIT/ML injection pen, Inject 14 units with breakfast and lunch and 36 units with dinner , Disp: 45 mL, Rfl: 1  •  insulin degludec Jorge L Leather FlexTouch) 200 units/mL CONCENTRATED U-200 injection pen, Inject 66 Units under the skin daily, Disp: 27 mL, Rfl: 1  •  Insulin Pen Needle (BD Pen Needle Fany U/F) 32G X 4 MM MISC, Inject as directed 4 (four) times a day, Disp: 400 each, Rfl: 5  •  metFORMIN (GLUCOPHAGE) 1000 MG tablet, Take 1 tablet (1,000 mg total) by mouth 2 (two) times a day, Disp: 180 tablet, Rfl: 3  •  metoprolol tartrate (LOPRESSOR) 50 mg tablet, Take 1 tablet (50 mg total) by mouth 2 (two) times a day, Disp: 180 tablet, Rfl: 3  •  nitroglycerin (NITROSTAT) 0 4 mg SL tablet, Place 1 tablet (0 4 mg total) under the tongue every 5 (five) minutes as needed for chest pain, Disp: 100 tablet, Rfl: 3  •  Potassium Chloride ER 20 MEQ TBCR, Take 1 tablet (20 mEq total) by mouth daily, Disp: 90 tablet, Rfl: 3  •  warfarin (COUMADIN) 10 mg tablet, Take 1 tablet (10 mg total) by mouth daily, Disp: 90 tablet, Rfl: 3  •  warfarin (COUMADIN) 2 mg tablet, Take 1 tablet (2 mg total) by mouth daily, Disp: 90 tablet, Rfl: 3    Review of Systems   Constitutional: Negative for activity change, appetite change, fatigue and unexpected weight change  HENT: Negative for dental problem, sore throat, trouble swallowing and voice change  Eyes: Negative for visual disturbance  Respiratory: Negative for cough, chest tightness and shortness of breath  Cardiovascular: Negative for chest pain, palpitations and leg swelling  Gastrointestinal: Negative for constipation, diarrhea, nausea and vomiting  Endocrine: Negative for polydipsia, polyphagia and polyuria  Genitourinary: Negative for frequency  Musculoskeletal: Negative for arthralgias, back pain, gait problem and myalgias  Skin: Negative for wound  Allergic/Immunologic: Positive for environmental allergies  Negative for food allergies  Neurological: Negative for dizziness, weakness, light-headedness, numbness and headaches  Psychiatric/Behavioral: Negative for decreased concentration, dysphoric mood and sleep disturbance  The patient is not nervous/anxious  Physical Exam:  Body mass index is 51 1 kg/m²  /68   Pulse 78   Ht 6' 2" (1 88 m)   Wt (!) 181 kg (398 lb)   SpO2 96%   BMI 51 10 kg/m²    Wt Readings from Last 3 Encounters:   11/18/22 (!) 181 kg (398 lb)   09/12/22 (!) 181 kg (398 lb)   07/19/22 (!) 180 kg (396 lb 12 8 oz)       Physical Exam  Vitals reviewed  Constitutional:       General: He is not in acute distress  Appearance: He is well-developed  He is obese  He is not ill-appearing  HENT:      Head: Normocephalic and atraumatic  Eyes:      Pupils: Pupils are equal, round, and reactive to light  Neck:      Thyroid: No thyromegaly     Cardiovascular: Rate and Rhythm: Normal rate and regular rhythm  Pulses: Normal pulses  Heart sounds: Normal heart sounds  Pulmonary:      Effort: Pulmonary effort is normal       Breath sounds: Wheezing (insp and exp) present  No rhonchi  Abdominal:      General: Bowel sounds are normal  There is no distension  Palpations: Abdomen is soft  Tenderness: There is no abdominal tenderness  Musculoskeletal:      Cervical back: Normal range of motion and neck supple  Right lower leg: No edema  Left lower leg: No edema  Lymphadenopathy:      Cervical: No cervical adenopathy  Skin:     General: Skin is warm and dry  Capillary Refill: Capillary refill takes less than 2 seconds  Neurological:      Mental Status: He is alert and oriented to person, place, and time        Gait: Gait normal    Psychiatric:         Mood and Affect: Mood normal          Behavior: Behavior normal            Labs:   Lab Results   Component Value Date    HGBA1C 5 7 11/18/2022    HGBA1C 5 6 05/18/2022    HGBA1C 6 2 (H) 01/13/2022     Lab Results   Component Value Date    CREATININE 1 04 10/21/2022    CREATININE 0 96 01/13/2022    CREATININE 0 83 07/12/2021    BUN 14 10/21/2022     09/01/2013    K 3 8 10/21/2022     10/21/2022    CO2 25 10/21/2022     eGFR   Date Value Ref Range Status   10/21/2022 78 ml/min/1 73sq m Final     Lab Results   Component Value Date    CHOL 180 09/01/2013    HDL 27 (L) 10/21/2022    TRIG 117 10/21/2022     Lab Results   Component Value Date    ALT 17 10/21/2022    AST 16 10/21/2022    ALKPHOS 83 10/21/2022    BILITOT 0 6 09/01/2013     Lab Results   Component Value Date    SQR1PXFVVODP 3 159 01/15/2021    REO8BMPVOZKI 3 299 10/04/2018    TAT5FJBAVYXQ 3 238 07/22/2016     No results found for: FREET4, TSI    Impression & Plan:    Problem List Items Addressed This Visit        Endocrine    Type 2 diabetes mellitus with hyperglycemia, with long-term current use of insulin ([facility] [address] ) - Primary     Patient is well controlled  Continue current regimen  Continue with healthy diet  Continue with regular physical activity  Continue to check blood sugar at least twice daily  Patient knows to notify me with persistent hyperglycemia episodes of hypoglycemia  Follow-up in 6 months  Lab Results   Component Value Date    HGBA1C 5 6 05/18/2022            Relevant Orders    Comprehensive metabolic panel    Hemoglobin A1C    Microalbumin / creatinine urine ratio    POCT hemoglobin A1c (Completed)       Respiratory    Obstructive sleep apnea     Continue BiPAP  Cardiovascular and Mediastinum    Essential hypertension     BP stable at 1 30/68  Continue current regimen  Other    Other hyperlipidemia     Stable  Continue current regimen  Orders Placed This Encounter   Procedures   • Comprehensive metabolic panel     This is a patient instruction: Patient fasting for 8 hours or longer recommended  Standing Status:   Future     Standing Expiration Date:   11/17/2023   • Hemoglobin A1C     Standing Status:   Future     Standing Expiration Date:   11/17/2023   • Microalbumin / creatinine urine ratio     Standing Status:   Future     Standing Expiration Date:   11/17/2023   • POCT hemoglobin A1c       There are no Patient Instructions on file for this visit  Discussed with the patient and all questioned fully answered  He will call me if any problems arise  Follow-up appointment in 6 months       Counseled patient on diagnostic results, prognosis, risk and benefit of treatment options, instruction for management, importance of treatment compliance, Risk  factor reduction and impressions    CHRISTINE Cagle

## 2022-11-18 ENCOUNTER — OFFICE VISIT (OUTPATIENT)
Dept: ENDOCRINOLOGY | Facility: CLINIC | Age: 57
End: 2022-11-18

## 2022-11-18 VITALS
WEIGHT: 315 LBS | HEART RATE: 78 BPM | SYSTOLIC BLOOD PRESSURE: 130 MMHG | BODY MASS INDEX: 40.43 KG/M2 | DIASTOLIC BLOOD PRESSURE: 68 MMHG | OXYGEN SATURATION: 96 % | HEIGHT: 74 IN

## 2022-11-18 DIAGNOSIS — E11.65 TYPE 2 DIABETES MELLITUS WITH HYPERGLYCEMIA, WITH LONG-TERM CURRENT USE OF INSULIN (HCC): Primary | ICD-10-CM

## 2022-11-18 DIAGNOSIS — I10 ESSENTIAL HYPERTENSION: ICD-10-CM

## 2022-11-18 DIAGNOSIS — G47.33 OBSTRUCTIVE SLEEP APNEA: ICD-10-CM

## 2022-11-18 DIAGNOSIS — E78.49 OTHER HYPERLIPIDEMIA: ICD-10-CM

## 2022-11-18 DIAGNOSIS — Z79.4 TYPE 2 DIABETES MELLITUS WITH HYPERGLYCEMIA, WITH LONG-TERM CURRENT USE OF INSULIN (HCC): Primary | ICD-10-CM

## 2022-11-18 LAB — SL AMB POCT HEMOGLOBIN AIC: 5.7 (ref ?–6.5)

## 2022-11-18 NOTE — ASSESSMENT & PLAN NOTE
Patient is well controlled  Continue current regimen  Continue with healthy diet  Continue with regular physical activity  Continue to check blood sugar at least twice daily  Patient knows to notify me with persistent hyperglycemia episodes of hypoglycemia  Follow-up in 6 months    Lab Results   Component Value Date    HGBA1C 5 6 05/18/2022

## 2022-11-28 ENCOUNTER — OFFICE VISIT (OUTPATIENT)
Dept: FAMILY MEDICINE CLINIC | Facility: CLINIC | Age: 57
End: 2022-11-28

## 2022-11-28 VITALS
WEIGHT: 315 LBS | SYSTOLIC BLOOD PRESSURE: 124 MMHG | HEART RATE: 99 BPM | OXYGEN SATURATION: 93 % | BODY MASS INDEX: 40.43 KG/M2 | TEMPERATURE: 97.4 F | DIASTOLIC BLOOD PRESSURE: 70 MMHG | HEIGHT: 74 IN

## 2022-11-28 DIAGNOSIS — Z23 NEED FOR INFLUENZA VACCINATION: Primary | ICD-10-CM

## 2022-11-28 DIAGNOSIS — Z12.11 SCREENING FOR COLON CANCER: ICD-10-CM

## 2022-11-28 DIAGNOSIS — Z00.00 MEDICARE ANNUAL WELLNESS VISIT, SUBSEQUENT: ICD-10-CM

## 2022-11-28 PROBLEM — J96.91 RESPIRATORY FAILURE WITH HYPOXIA (HCC): Status: RESOLVED | Noted: 2021-08-18 | Resolved: 2022-11-28

## 2022-11-28 NOTE — PROGRESS NOTES
Assessment and Plan:     Problem List Items Addressed This Visit    None  Visit Diagnoses     Need for influenza vaccination    -  Primary    Relevant Orders    influenza vaccine, quadrivalent, recombinant, PF, 0 5 mL, for patients 18 yr+ ([facility])    influenza vaccine, quadrivalent, recombinant, PF, 0 5 mL, for patients 18 yr+ ([facility])    Screening for colon cancer        Relevant Orders    Occult Blood, Fecal Immunochemical    Medicare annual wellness visit, subsequent               Preventive health issues were discussed with patient, and age appropriate screening tests were ordered as noted in patient's After Visit Summary  Personalized health advice and appropriate referrals for health education or preventive services given if needed, as noted in patient's After Visit Summary       History of Present Illness:     Patient presents for a Medicare Wellness Visit    HPI   Patient Care Team:  Eliseo Phelan DO as PCP - General  MD Liza Peguero as Nurse Practitioner (Endocrinology)     Review of Systems:     Review of Systems     Problem List:     Patient Active Problem List   Diagnosis   • Aortic stenosis   • Atrial fibrillation [facility])   • COPD with acute exacerbation ([facility] )   • Type 2 diabetes mellitus with hyperglycemia, with long-term current use of insulin ([facility] )   • Other hyperlipidemia   • Essential hypertension   • S/P AVR   • Obstructive sleep apnea   • Restless leg syndrome   • Hypersomnia   • Morbid obesity with BMI of 50 0-59 9, adult ([facility] )   • PLMD (periodic limb movement disorder)   • Palpitations   • Acute exacerbation of chronic obstructive bronchitis ([facility] )   • Multiple pulmonary nodules determined by computed tomography of lung   • Chronic pain of left knee   • Seasonal allergic rhinitis   • COPD, moderate ([facility] )   • SOB (shortness of breath)      Past Medical and Surgical History:     Past Medical History:   Diagnosis Date   • Asthma     last assessed 8/21/12   • Athscl heart disease of native coronary artery w/o ang pctrs    • Atrial fibrillation (HCC)     last assessed 12   • Chronic diastolic (congestive) heart failure ([facility])    • Closed fracture of fibula     last assessed 10/18/13   • COLD (chronic obstructive lung disease) ([facility] )     last assessed 12   • Coronary angioplasty status    • CPAP (continuous positive airway pressure) dependence    • Dyslipidemia associated with type 2 diabetes mellitus ([facility])    • Hyperlipidemia    • Hypertension    • Presence of prosthetic heart valve    • Respiratory failure with hypoxia ([facility] ) 2021     Past Surgical History:   Procedure Laterality Date   • AORTIC VALVE REPLACEMENT  10/07/2016    AVR replacement, mechanical   • CORONARY ANGIOPLASTY WITH STENT PLACEMENT  2010    EF 40%, Successful bare metal stent mid RCA   • MENISCECTOMY Left       Family History:     Family History   Problem Relation Age of Onset   • Atrial fibrillation Mother    • Diabetes type II Mother    • Heart attack Father         acute   • Heart failure Father    • Stroke Father         syndrome      Social History:     Social History     Socioeconomic History   • Marital status: /Civil Union     Spouse name: None   • Number of children: None   • Years of education: None   • Highest education level: None   Occupational History   • None   Tobacco Use   • Smoking status: Former     Years: 35 00     Types: Cigarettes     Quit date: 10/2016     Years since quittin 1   • Smokeless tobacco: Never   • Tobacco comments:     Quit 2 months ago   Vaping Use   • Vaping Use: Never used   Substance and Sexual Activity   • Alcohol use: Not Currently   • Drug use: Never   • Sexual activity: None   Other Topics Concern   • None   Social History Narrative    Always uses seatbelt    Daily caffeine    No living will     Social Determinants of Health     Financial Resource Strain: Medium Risk   • Difficulty of Paying Living Expenses: Somewhat hard   Food Insecurity: Not on file   Transportation Needs: No Transportation Needs   • Lack of Transportation (Medical): No   • Lack of Transportation (Non-Medical): No   Physical Activity: Not on file   Stress: Not on file   Social Connections: Not on file   Intimate Partner Violence: Not on file   Housing Stability: Not on file      Medications and Allergies:     Current Outpatient Medications   Medication Sig Dispense Refill   • albuterol (2 5 mg/3 mL) 0 083 % nebulizer solution Take 3 mL (2 5 mg total) by nebulization every 6 (six) hours as needed for wheezing or shortness of breath 360 mL 2   • albuterol (Ventolin HFA) 90 mcg/act inhaler Inhale 2 puffs every 6 (six) hours as needed for shortness of breath Must be brand necessary 18 g 3   • aspirin 81 MG tablet Take by mouth     • atorvastatin (LIPITOR) 10 mg tablet Take 1 tablet (10 mg total) by mouth daily 90 tablet 3   • benazepril (LOTENSIN) 10 mg tablet Take 1 tablet (10 mg total) by mouth daily 90 tablet 3   • diltiazem (CARDIZEM CD) 240 mg 24 hr capsule Take 1 capsule by mouth twice daily 180 capsule 3   • fluticasone-umeclidinium-vilanterol (TRELEGY) 100-62 5-25 MCG/INH inhaler Inhale 1 puff daily Rinse mouth after use  2 each 5   • furosemide (LASIX) 80 mg tablet Take 1 tablet (80 mg total) by mouth daily 90 tablet 3   • insulin aspart (NovoLOG FlexPen) 100 UNIT/ML injection pen Inject 14 units with breakfast and lunch and 36 units with dinner   45 mL 1   • insulin degludec Krishan Second FlexTouch) 200 units/mL CONCENTRATED U-200 injection pen Inject 66 Units under the skin daily 27 mL 1   • Insulin Pen Needle (BD Pen Needle Fany U/F) 32G X 4 MM MISC Inject as directed 4 (four) times a day 400 each 5   • metFORMIN (GLUCOPHAGE) 1000 MG tablet Take 1 tablet (1,000 mg total) by mouth 2 (two) times a day 180 tablet 3   • metoprolol tartrate (LOPRESSOR) 50 mg tablet Take 1 tablet (50 mg total) by mouth 2 (two) times a day 180 tablet 3   • Potassium Chloride ER 20 MEQ TBCR Take 1 tablet (20 mEq total) by mouth daily 90 tablet 3   • warfarin (COUMADIN) 10 mg tablet Take 1 tablet (10 mg total) by mouth daily 90 tablet 3   • warfarin (COUMADIN) 2 mg tablet Take 1 tablet (2 mg total) by mouth daily 90 tablet 3   • nitroglycerin (NITROSTAT) 0 4 mg SL tablet Place 1 tablet (0 4 mg total) under the tongue every 5 (five) minutes as needed for chest pain (Patient not taking: Reported on 11/28/2022) 100 tablet 3     No current facility-administered medications for this visit  Allergies   Allergen Reactions   • Fluticasone-Salmeterol Palpitations      Immunizations:     Immunization History   Administered Date(s) Administered   • COVID-19 MODERNA VACC 0 5 ML IM 03/26/2021, 04/28/2021   • INFLUENZA 10/13/2016, 10/14/2016, 10/23/2017, 10/25/2018   • Influenza Quadrivalent Preservative Free 3 years and older IM 11/03/2014, 10/14/2016, 10/23/2017   • Influenza, recombinant, quadrivalent,injectable, preservative free 10/25/2018, 10/28/2019, 10/29/2020, 11/02/2021   • Influenza, seasonal, injectable 01/03/2013, 12/16/2013   • Pneumococcal Conjugate 13-Valent 11/02/2021   • Pneumococcal Polysaccharide PPV23 12/16/2013, 10/25/2018      Health Maintenance:         Topic Date Due   • Hepatitis C Screening  Never done   • HIV Screening  Never done   • Colorectal Cancer Screening  Never done         Topic Date Due   • Hepatitis B Vaccine (1 of 3 - 3-dose series) Never done   • COVID-19 Vaccine (3 - Booster for Moderna series) 09/28/2021   • Influenza Vaccine (1) 09/01/2022      Medicare Screening Tests and Risk Assessments:     Melida Montiel is here for his Subsequent Wellness visit  Last Medicare Wellness visit information reviewed, patient interviewed and updates made to the record today  Health Risk Assessment:   Patient rates overall health as fair  Patient feels that their physical health rating is same  Patient is satisfied with their life  Eyesight was rated as same  Hearing was rated as same  Patient feels that their emotional and mental health rating is same  Patients states they are never, rarely angry  Patient states they are often unusually tired/fatigued  Pain experienced in the last 7 days has been some  Patient's pain rating has been 6/10  Patient states that he has experienced no weight loss or gain in last 6 months  Fall Risk Screening: In the past year, patient has experienced: no history of falling in past year      Home Safety:  Patient has trouble with stairs inside or outside of their home  Patient has working smoke alarms and has working carbon monoxide detector  Home safety hazards include: none  Nutrition:   Current diet is Diabetic  Medications:   Patient is not currently taking any over-the-counter supplements  Patient is able to manage medications  Activities of Daily Living (ADLs)/Instrumental Activities of Daily Living (IADLs):   Walk and transfer into and out of bed and chair?: Yes  Dress and groom yourself?: Yes    Bathe or shower yourself?: Yes    Feed yourself? Yes  Do your laundry/housekeeping?: Yes  Manage your money, pay your bills and track your expenses?: Yes  Make your own meals?: Yes    Do your own shopping?: Yes    Previous Hospitalizations:   Any hospitalizations or ED visits within the last 12 months?: No      Advance Care Planning:   Living will: No      PREVENTIVE SCREENINGS      Cardiovascular Screening:    General: Screening Not Indicated and History Lipid Disorder      Diabetes Screening:     General: Screening Not Indicated and History Diabetes      Abdominal Aortic Aneurysm (AAA) Screening:    Risk factors include: tobacco use        Lung Cancer Screening:     General: Screening Not Indicated    Screening, Brief Intervention, and Referral to Treatment (SBIRT)    Screening  Typical number of drinks in a day: 0  Typical number of drinks in a week: 0  Interpretation: Low risk drinking behavior      Single Item Drug Screening:  How often have you used an illegal drug (including marijuana) or a prescription medication for non-medical reasons in the past year? never    Single Item Drug Screen Score: 0  Interpretation: Negative screen for possible drug use disorder    No results found       Physical Exam:     /70   Pulse 99   Temp (!) 97 4 °F (36 3 °C)   Ht 6' 2" (1 88 m)   Wt (!) 181 kg (398 lb 14 4 oz)   SpO2 93%   BMI 51 22 kg/m²     Physical Exam     Jeanette Hanley,

## 2022-11-28 NOTE — PATIENT INSTRUCTIONS
Medicare Preventive Visit Patient Instructions  Thank you for completing your Welcome to Medicare Visit or Medicare Annual Wellness Visit today  Your next wellness visit will be due in one year (11/29/2023)  The screening/preventive services that you may require over the next 5-10 years are detailed below  Some tests may not apply to you based off risk factors and/or age  Screening tests ordered at today's visit but not completed yet may show as past due  Also, please note that scanned in results may not display below  Preventive Screenings:  Service Recommendations Previous Testing/Comments   Colorectal Cancer Screening  · Colonoscopy    · Fecal Occult Blood Test (FOBT)/Fecal Immunochemical Test (FIT)  · Fecal DNA/Cologuard Test  · Flexible Sigmoidoscopy Age: 39-70 years old   Colonoscopy: every 10 years (May be performed more frequently if at higher risk)  OR  FOBT/FIT: every 1 year  OR  Cologuard: every 3 years  OR  Sigmoidoscopy: every 5 years  Screening may be recommended earlier than age 39 if at higher risk for colorectal cancer  Also, an individualized decision between you and your healthcare provider will decide whether screening between the ages of 74-80 would be appropriate   Colonoscopy: Not on file  FOBT/FIT: Not on file  Cologuard: Not on file  Sigmoidoscopy: Not on file          Prostate Cancer Screening Individualized decision between patient and health care provider in men between ages of 53-78   Medicare will cover every 12 months beginning on the day after your 50th birthday PSA: 0 2 ng/mL           Hepatitis C Screening Once for adults born between 1945 and 1965  More frequently in patients at high risk for Hepatitis C Hep C Antibody: Not on file        Diabetes Screening 1-2 times per year if you're at risk for diabetes or have pre-diabetes Fasting glucose: 154 mg/dL (7/2/2019)  A1C: 5 7 (11/18/2022)  Screening Not Indicated  History Diabetes   Cholesterol Screening Once every 5 years if you don't have a lipid disorder  May order more often based on risk factors  Lipid panel: 10/21/2022  Screening Not Indicated  History Lipid Disorder      Other Preventive Screenings Covered by Medicare:  1  Abdominal Aortic Aneurysm (AAA) Screening: covered once if your at risk  You're considered to be at risk if you have a family history of AAA or a male between the age of 73-68 who smoking at least 100 cigarettes in your lifetime  2  Lung Cancer Screening: covers low dose CT scan once per year if you meet all of the following conditions: (1) Age 50-69; (2) No signs or symptoms of lung cancer; (3) Current smoker or have quit smoking within the last 15 years; (4) You have a tobacco smoking history of at least 20 pack years (packs per day x number of years you smoked); (5) You get a written order from a healthcare provider  3  Glaucoma Screening: covered annually if you're considered high risk: (1) You have diabetes OR (2) Family history of glaucoma OR (3)  aged 48 and older OR (3)  American aged 72 and older  3  Osteoporosis Screening: covered every 2 years if you meet one of the following conditions: (1) Have a vertebral abnormality; (2) On glucocorticoid therapy for more than 3 months; (3) Have primary hyperparathyroidism; (4) On osteoporosis medications and need to assess response to drug therapy  5  HIV Screening: covered annually if you're between the age of 12-76  Also covered annually if you are younger than 13 and older than 72 with risk factors for HIV infection  For pregnant patients, it is covered up to 3 times per pregnancy      Immunizations:  Immunization Recommendations   Influenza Vaccine Annual influenza vaccination during flu season is recommended for all persons aged >= 6 months who do not have contraindications   Pneumococcal Vaccine   * Pneumococcal conjugate vaccine = PCV13 (Prevnar 13), PCV15 (Vaxneuvance), PCV20 (Prevnar 20)  * Pneumococcal polysaccharide vaccine = PPSV23 (Pneumovax) Adults 2364 years old: 1-3 doses may be recommended based on certain risk factors  Adults 72 years old: 1-2 doses may be recommended based off what pneumonia vaccine you previously received   Hepatitis B Vaccine 3 dose series if at intermediate or high risk (ex: diabetes, end stage renal disease, liver disease)   Tetanus (Td) Vaccine - COST NOT COVERED BY MEDICARE PART B Following completion of primary series, a booster dose should be given every 10 years to maintain immunity against tetanus  Td may also be given as tetanus wound prophylaxis  Tdap Vaccine - COST NOT COVERED BY MEDICARE PART B Recommended at least once for all adults  For pregnant patients, recommended with each pregnancy  Shingles Vaccine (Shingrix) - COST NOT COVERED BY MEDICARE PART B  2 shot series recommended in those aged 48 and above     Health Maintenance Due:      Topic Date Due   • Hepatitis C Screening  Never done   • HIV Screening  Never done   • Colorectal Cancer Screening  Never done     Immunizations Due:      Topic Date Due   • Hepatitis B Vaccine (1 of 3 - 3-dose series) Never done   • COVID-19 Vaccine (3 - Booster for Moderna series) 09/28/2021   • Influenza Vaccine (1) 09/01/2022     Advance Directives   What are advance directives? Advance directives are legal documents that state your wishes and plans for medical care  These plans are made ahead of time in case you lose your ability to make decisions for yourself  Advance directives can apply to any medical decision, such as the treatments you want, and if you want to donate organs  What are the types of advance directives? There are many types of advance directives, and each state has rules about how to use them  You may choose a combination of any of the following:  · Living will: This is a written record of the treatment you want  You can also choose which treatments you do not want, which to limit, and which to stop at a certain time   This includes surgery, medicine, IV fluid, and tube feedings  · Durable power of  for healthcare [facility]): This is a written record that states who you want to make healthcare choices for you when you are unable to make them for yourself  This person, called a proxy, is usually a family member or a friend  You may choose more than 1 proxy  · Do not resuscitate (DNR) order:  A DNR order is used in case your heart stops beating or you stop breathing  It is a request not to have certain forms of treatment, such as CPR  A DNR order may be included in other types of advance directives  · Medical directive: This covers the care that you want if you are in a coma, near death, or unable to make decisions for yourself  You can list the treatments you want for each condition  Treatment may include pain medicine, surgery, blood transfusions, dialysis, IV or tube feedings, and a ventilator (breathing machine)  · Values history: This document has questions about your views, beliefs, and how you feel and think about life  This information can help others choose the care that you would choose  Why are advance directives important? An advance directive helps you control your care  Although spoken wishes may be used, it is better to have your wishes written down  Spoken wishes can be misunderstood, or not followed  Treatments may be given even if you do not want them  An advance directive may make it easier for your family to make difficult choices about your care  Weight Management   Why it is important to manage your weight:  Being overweight increases your risk of health conditions such as heart disease, high blood pressure, type 2 diabetes, and certain types of cancer  It can also increase your risk for osteoarthritis, sleep apnea, and other respiratory problems  Aim for a slow, steady weight loss  Even a small amount of weight loss can lower your risk of health problems    How to lose weight safely:  A safe and healthy way to lose weight is to eat fewer calories and get regular exercise  You can lose up about 1 pound a week by decreasing the number of calories you eat by 500 calories each day  Healthy meal plan for weight management:  A healthy meal plan includes a variety of foods, contains fewer calories, and helps you stay healthy  A healthy meal plan includes the following:  · Eat whole-grain foods more often  A healthy meal plan should contain fiber  Fiber is the part of grains, fruits, and vegetables that is not broken down by your body  Whole-grain foods are healthy and provide extra fiber in your diet  Some examples of whole-grain foods are whole-wheat breads and pastas, oatmeal, brown rice, and bulgur  · Eat a variety of vegetables every day  Include dark, leafy greens such as spinach, kale, oly greens, and mustard greens  Eat yellow and orange vegetables such as carrots, sweet potatoes, and winter squash  · Eat a variety of fruits every day  Choose fresh or canned fruit (canned in its own juice or light syrup) instead of juice  Fruit juice has very little or no fiber  · Eat low-fat dairy foods  Drink fat-free (skim) milk or 1% milk  Eat fat-free yogurt and low-fat cottage cheese  Try low-fat cheeses such as mozzarella and other reduced-fat cheeses  · Choose meat and other protein foods that are low in fat  Choose beans or other legumes such as split peas or lentils  Choose fish, skinless poultry (chicken or turkey), or lean cuts of red meat (beef or pork)  Before you cook meat or poultry, cut off any visible fat  · Use less fat and oil  Try baking foods instead of frying them  Add less fat, such as margarine, sour cream, regular salad dressing and mayonnaise to foods  Eat fewer high-fat foods  Some examples of high-fat foods include french fries, doughnuts, ice cream, and cakes  · Eat fewer sweets  Limit foods and drinks that are high in sugar   This includes candy, cookies, regular soda, and sweetened drinks  Exercise:  Exercise at least 30 minutes per day on most days of the week  Some examples of exercise include walking, biking, dancing, and swimming  You can also fit in more physical activity by taking the stairs instead of the elevator or parking farther away from stores  Ask your healthcare provider about the best exercise plan for you  © Copyright [employer] 2018 Information is for End User's use only and may not be sold, redistributed or otherwise used for commercial purposes  All illustrations and images included in CareNotes® are the copyrighted property of [employer] , [employer]  or [facility] Preventive Visit Patient Instructions  Thank you for completing your Welcome to Medicare Visit or Medicare Annual Wellness Visit today  Your next wellness visit will be due in one year (11/29/2023)  The screening/preventive services that you may require over the next 5-10 years are detailed below  Some tests may not apply to you based off risk factors and/or age  Screening tests ordered at today's visit but not completed yet may show as past due  Also, please note that scanned in results may not display below  Preventive Screenings:  Service Recommendations Previous Testing/Comments   Colorectal Cancer Screening  · Colonoscopy    · Fecal Occult Blood Test (FOBT)/Fecal Immunochemical Test (FIT)  · Fecal DNA/Cologuard Test  · Flexible Sigmoidoscopy Age: 39-70 years old   Colonoscopy: every 10 years (May be performed more frequently if at higher risk)  OR  FOBT/FIT: every 1 year  OR  Cologuard: every 3 years  OR  Sigmoidoscopy: every 5 years  Screening may be recommended earlier than age 39 if at higher risk for colorectal cancer  Also, an individualized decision between you and your healthcare provider will decide whether screening between the ages of 74-80 would be appropriate   Colonoscopy: Not on file  FOBT/FIT: Not on file  Cologuard: Not on file  Sigmoidoscopy: Not on file Prostate Cancer Screening Individualized decision between patient and health care provider in men between ages of 53-78   Medicare will cover every 12 months beginning on the day after your 50th birthday PSA: 0 2 ng/mL           Hepatitis C Screening Once for adults born between 1945 and 1965  More frequently in patients at high risk for Hepatitis C Hep C Antibody: Not on file        Diabetes Screening 1-2 times per year if you're at risk for diabetes or have pre-diabetes Fasting glucose: 154 mg/dL (7/2/2019)  A1C: 5 7 (11/18/2022)  Screening Not Indicated  History Diabetes   Cholesterol Screening Once every 5 years if you don't have a lipid disorder  May order more often based on risk factors  Lipid panel: 10/21/2022  Screening Not Indicated  History Lipid Disorder      Other Preventive Screenings Covered by Medicare:  6  Abdominal Aortic Aneurysm (AAA) Screening: covered once if your at risk  You're considered to be at risk if you have a family history of AAA or a male between the age of 73-68 who smoking at least 100 cigarettes in your lifetime  7  Lung Cancer Screening: covers low dose CT scan once per year if you meet all of the following conditions: (1) Age 50-69; (2) No signs or symptoms of lung cancer; (3) Current smoker or have quit smoking within the last 15 years; (4) You have a tobacco smoking history of at least 20 pack years (packs per day x number of years you smoked); (5) You get a written order from a healthcare provider  8  Glaucoma Screening: covered annually if you're considered high risk: (1) You have diabetes OR (2) Family history of glaucoma OR (3)  aged 48 and older OR (3)  American aged 72 and older  5   Osteoporosis Screening: covered every 2 years if you meet one of the following conditions: (1) Have a vertebral abnormality; (2) On glucocorticoid therapy for more than 3 months; (3) Have primary hyperparathyroidism; (4) On osteoporosis medications and need to assess response to drug therapy  10  HIV Screening: covered annually if you're between the age of 12-76  Also covered annually if you are younger than 13 and older than 72 with risk factors for HIV infection  For pregnant patients, it is covered up to 3 times per pregnancy  Immunizations:  Immunization Recommendations   Influenza Vaccine Annual influenza vaccination during flu season is recommended for all persons aged >= 6 months who do not have contraindications   Pneumococcal Vaccine   * Pneumococcal conjugate vaccine = PCV13 (Prevnar 13), PCV15 (Vaxneuvance), PCV20 (Prevnar 20)  * Pneumococcal polysaccharide vaccine = PPSV23 (Pneumovax) Adults 25-60 years old: 1-3 doses may be recommended based on certain risk factors  Adults 72 years old: 1-2 doses may be recommended based off what pneumonia vaccine you previously received   Hepatitis B Vaccine 3 dose series if at intermediate or high risk (ex: diabetes, end stage renal disease, liver disease)   Tetanus (Td) Vaccine - COST NOT COVERED BY MEDICARE PART B Following completion of primary series, a booster dose should be given every 10 years to maintain immunity against tetanus  Td may also be given as tetanus wound prophylaxis  Tdap Vaccine - COST NOT COVERED BY MEDICARE PART B Recommended at least once for all adults  For pregnant patients, recommended with each pregnancy  Shingles Vaccine (Shingrix) - COST NOT COVERED BY MEDICARE PART B  2 shot series recommended in those aged 48 and above     Health Maintenance Due:      Topic Date Due   • Hepatitis C Screening  Never done   • HIV Screening  Never done   • Colorectal Cancer Screening  Never done     Immunizations Due:      Topic Date Due   • Hepatitis B Vaccine (1 of 3 - 3-dose series) Never done   • COVID-19 Vaccine (3 - Booster for Moderna series) 09/28/2021   • Influenza Vaccine (1) 09/01/2022     Advance Directives   What are advance directives?   Advance directives are legal documents that state your wishes and plans for medical care  These plans are made ahead of time in case you lose your ability to make decisions for yourself  Advance directives can apply to any medical decision, such as the treatments you want, and if you want to donate organs  What are the types of advance directives? There are many types of advance directives, and each state has rules about how to use them  You may choose a combination of any of the following:  · Living will: This is a written record of the treatment you want  You can also choose which treatments you do not want, which to limit, and which to stop at a certain time  This includes surgery, medicine, IV fluid, and tube feedings  · Durable power of  for healthcare [facility]): This is a written record that states who you want to make healthcare choices for you when you are unable to make them for yourself  This person, called a proxy, is usually a family member or a friend  You may choose more than 1 proxy  · Do not resuscitate (DNR) order:  A DNR order is used in case your heart stops beating or you stop breathing  It is a request not to have certain forms of treatment, such as CPR  A DNR order may be included in other types of advance directives  · Medical directive: This covers the care that you want if you are in a coma, near death, or unable to make decisions for yourself  You can list the treatments you want for each condition  Treatment may include pain medicine, surgery, blood transfusions, dialysis, IV or tube feedings, and a ventilator (breathing machine)  · Values history: This document has questions about your views, beliefs, and how you feel and think about life  This information can help others choose the care that you would choose  Why are advance directives important? An advance directive helps you control your care  Although spoken wishes may be used, it is better to have your wishes written down   Spoken wishes can be misunderstood, or not followed  Treatments may be given even if you do not want them  An advance directive may make it easier for your family to make difficult choices about your care  Weight Management   Why it is important to manage your weight:  Being overweight increases your risk of health conditions such as heart disease, high blood pressure, type 2 diabetes, and certain types of cancer  It can also increase your risk for osteoarthritis, sleep apnea, and other respiratory problems  Aim for a slow, steady weight loss  Even a small amount of weight loss can lower your risk of health problems  How to lose weight safely:  A safe and healthy way to lose weight is to eat fewer calories and get regular exercise  You can lose up about 1 pound a week by decreasing the number of calories you eat by 500 calories each day  Healthy meal plan for weight management:  A healthy meal plan includes a variety of foods, contains fewer calories, and helps you stay healthy  A healthy meal plan includes the following:  · Eat whole-grain foods more often  A healthy meal plan should contain fiber  Fiber is the part of grains, fruits, and vegetables that is not broken down by your body  Whole-grain foods are healthy and provide extra fiber in your diet  Some examples of whole-grain foods are whole-wheat breads and pastas, oatmeal, brown rice, and bulgur  · Eat a variety of vegetables every day  Include dark, leafy greens such as spinach, kale, oly greens, and mustard greens  Eat yellow and orange vegetables such as carrots, sweet potatoes, and winter squash  · Eat a variety of fruits every day  Choose fresh or canned fruit (canned in its own juice or light syrup) instead of juice  Fruit juice has very little or no fiber  · Eat low-fat dairy foods  Drink fat-free (skim) milk or 1% milk  Eat fat-free yogurt and low-fat cottage cheese  Try low-fat cheeses such as mozzarella and other reduced-fat cheeses    · Choose meat and other protein foods that are low in fat  Choose beans or other legumes such as split peas or lentils  Choose fish, skinless poultry (chicken or turkey), or lean cuts of red meat (beef or pork)  Before you cook meat or poultry, cut off any visible fat  · Use less fat and oil  Try baking foods instead of frying them  Add less fat, such as margarine, sour cream, regular salad dressing and mayonnaise to foods  Eat fewer high-fat foods  Some examples of high-fat foods include french fries, doughnuts, ice cream, and cakes  · Eat fewer sweets  Limit foods and drinks that are high in sugar  This includes candy, cookies, regular soda, and sweetened drinks  Exercise:  Exercise at least 30 minutes per day on most days of the week  Some examples of exercise include walking, biking, dancing, and swimming  You can also fit in more physical activity by taking the stairs instead of the elevator or parking farther away from stores  Ask your healthcare provider about the best exercise plan for you  © Copyright [employer] 2018 Information is for End User's use only and may not be sold, redistributed or otherwise used for commercial purposes   All illustrations and images included in CareNotes® are the copyrighted property of [employer]  or [address] [place] [address]

## 2022-12-16 DIAGNOSIS — E11.65 TYPE 2 DIABETES MELLITUS WITH HYPERGLYCEMIA, WITH LONG-TERM CURRENT USE OF INSULIN (HCC): ICD-10-CM

## 2022-12-16 DIAGNOSIS — Z79.4 TYPE 2 DIABETES MELLITUS WITH HYPERGLYCEMIA, WITH LONG-TERM CURRENT USE OF INSULIN (HCC): ICD-10-CM

## 2022-12-16 RX ORDER — INSULIN ASPART 100 [IU]/ML
INJECTION, SOLUTION INTRAVENOUS; SUBCUTANEOUS
Qty: 45 ML | Refills: 1 | Status: SHIPPED | OUTPATIENT
Start: 2022-12-16

## 2022-12-20 ENCOUNTER — APPOINTMENT (OUTPATIENT)
Dept: LAB | Facility: HOSPITAL | Age: 57
End: 2022-12-20
Attending: FAMILY MEDICINE

## 2022-12-20 ENCOUNTER — ANTICOAG VISIT (OUTPATIENT)
Dept: FAMILY MEDICINE CLINIC | Facility: CLINIC | Age: 57
End: 2022-12-20

## 2023-01-31 DIAGNOSIS — E11.9 TYPE 2 DIABETES MELLITUS WITHOUT COMPLICATION, WITH LONG-TERM CURRENT USE OF INSULIN (HCC): ICD-10-CM

## 2023-01-31 DIAGNOSIS — Z79.4 TYPE 2 DIABETES MELLITUS WITHOUT COMPLICATION, WITH LONG-TERM CURRENT USE OF INSULIN (HCC): ICD-10-CM

## 2023-01-31 RX ORDER — PEN NEEDLE, DIABETIC 32GX 5/32"
NEEDLE, DISPOSABLE MISCELLANEOUS 4 TIMES DAILY
Qty: 400 EACH | Refills: 5 | Status: SHIPPED | OUTPATIENT
Start: 2023-01-31

## 2023-02-24 ENCOUNTER — LAB (OUTPATIENT)
Dept: LAB | Facility: HOSPITAL | Age: 58
End: 2023-02-24
Attending: FAMILY MEDICINE

## 2023-02-24 ENCOUNTER — ANTICOAG VISIT (OUTPATIENT)
Dept: FAMILY MEDICINE CLINIC | Facility: CLINIC | Age: 58
End: 2023-02-24

## 2023-02-24 DIAGNOSIS — I48.91 ATRIAL FIBRILLATION, UNSPECIFIED TYPE (HCC): ICD-10-CM

## 2023-02-24 LAB
INR PPP: 2.66 (ref 0.84–1.19)
PROTHROMBIN TIME: 28.6 SECONDS (ref 11.6–14.5)

## 2023-03-06 DIAGNOSIS — I10 ESSENTIAL HYPERTENSION: Primary | ICD-10-CM

## 2023-03-06 DIAGNOSIS — E11.9 TYPE 2 DIABETES MELLITUS WITHOUT COMPLICATION, UNSPECIFIED WHETHER LONG TERM INSULIN USE (HCC): ICD-10-CM

## 2023-03-06 DIAGNOSIS — Z23 NEED FOR IMMUNIZATION AGAINST PERTUSSIS: ICD-10-CM

## 2023-03-06 RX ORDER — BENAZEPRIL HYDROCHLORIDE 10 MG/1
10 TABLET ORAL DAILY
Qty: 90 TABLET | Refills: 3 | Status: SHIPPED | OUTPATIENT
Start: 2023-03-06

## 2023-03-16 ENCOUNTER — HOSPITAL ENCOUNTER (OUTPATIENT)
Dept: NON INVASIVE DIAGNOSTICS | Facility: CLINIC | Age: 58
Discharge: HOME | End: 2023-03-16

## 2023-03-16 VITALS
SYSTOLIC BLOOD PRESSURE: 130 MMHG | HEIGHT: 75 IN | HEART RATE: 69 BPM | DIASTOLIC BLOOD PRESSURE: 88 MMHG | WEIGHT: 315 LBS | BODY MASS INDEX: 39.17 KG/M2

## 2023-03-16 DIAGNOSIS — Z95.2 S/P AVR: ICD-10-CM

## 2023-03-16 DIAGNOSIS — Z95.5 H/O HEART ARTERY STENT: ICD-10-CM

## 2023-03-16 DIAGNOSIS — I48.91 ATRIAL FIBRILLATION, UNSPECIFIED TYPE (HCC): ICD-10-CM

## 2023-03-16 DIAGNOSIS — I10 ESSENTIAL HYPERTENSION: ICD-10-CM

## 2023-03-16 LAB
AORTIC VALVE MEAN VELOCITY: 18.3 M/S
APICAL FOUR CHAMBER EJECTION FRACTION: 51 %
AV LVOT MEAN GRADIENT: 2 MMHG
AV LVOT PEAK GRADIENT: 4 MMHG
AV MEAN GRADIENT: 15 MMHG
AV PEAK GRADIENT: 25 MMHG
AV VELOCITY RATIO: 0.38
DOP CALC AO PEAK VEL: 2.52 M/S
DOP CALC AO VTI: 56.24 CM
DOP CALC LVOT PEAK VEL VTI: 22.15 CM
DOP CALC LVOT PEAK VEL: 0.95 M/S
DOP CALC MV VTI: 30.28 CM
LAAS-AP4: 37.1 CM2
LEFT INTERNAL DIMENSION IN SYSTOLE: 4.6 CM (ref 2.1–4)
MV MEAN GRADIENT: 3 MMHG
MV PEAK GRADIENT: 8 MMHG
MV STENOSIS PRESSURE HALF TIME: 102 MS
MV VALVE AREA P 1/2 METHOD: 2.16
SL CV LV EF: 50
SL CV PED ECHO LEFT VENTRICLE SYSTOLIC VOLUME (MOD BIPLANE) 2D: 98 ML

## 2023-03-27 ENCOUNTER — OFFICE VISIT (OUTPATIENT)
Dept: CARDIOLOGY CLINIC | Facility: CLINIC | Age: 58
End: 2023-03-27

## 2023-03-27 VITALS
DIASTOLIC BLOOD PRESSURE: 74 MMHG | HEART RATE: 86 BPM | BODY MASS INDEX: 39.17 KG/M2 | WEIGHT: 315 LBS | HEIGHT: 75 IN | SYSTOLIC BLOOD PRESSURE: 122 MMHG

## 2023-03-27 DIAGNOSIS — I50.32 CHRONIC DIASTOLIC HEART FAILURE (HCC): ICD-10-CM

## 2023-03-27 DIAGNOSIS — G47.33 OBSTRUCTIVE SLEEP APNEA: ICD-10-CM

## 2023-03-27 DIAGNOSIS — Z95.5 H/O HEART ARTERY STENT: ICD-10-CM

## 2023-03-27 DIAGNOSIS — Z95.2 S/P AVR: Primary | ICD-10-CM

## 2023-03-27 DIAGNOSIS — I10 ESSENTIAL HYPERTENSION: ICD-10-CM

## 2023-03-27 DIAGNOSIS — I48.20 CHRONIC ATRIAL FIBRILLATION (HCC): ICD-10-CM

## 2023-03-27 DIAGNOSIS — J44.9 COPD, MODERATE (HCC): ICD-10-CM

## 2023-03-27 NOTE — PATIENT INSTRUCTIONS
A-fib (Atrial Fibrillation)   AMBULATORY CARE:   Atrial fibrillation (A-fib)  is an irregular heartbeat  It reduces your heart's ability to pump blood through your body  A-fib may come and go, or it may be a long-term condition  A-fib can cause life-threatening blood clots, stroke, or heart failure  It is important to treat and manage A-fib to help prevent these problems  Common signs and symptoms include the following:   A heartbeat that races, pounds, or flutters    Weakness, severe tiredness, or confusion    Feeling lightheaded, sweaty, dizzy, or faint    Shortness of breath or anxiety    Chest pain or pressure    Call your local emergency number (911 in the [address] [facility]) or have someone call if:   You have any of the following signs of a heart attack:      Squeezing, pressure, or pain in your chest    You may  also have any of the following:     Discomfort or pain in your back, neck, jaw, stomach, or arm    Shortness of breath    Nausea or vomiting    Lightheadedness or a sudden cold sweat    You have any of the following signs of a stroke:      Numbness or drooping on one side of your face     Weakness in an arm or leg    Confusion or difficulty speaking    Dizziness, a severe headache, or vision loss    Seek immediate care if:   Your arm or leg feels warm, tender, and painful  It may look swollen and red  Your heart rate is more than 110 beats per minute  You are short of breath, even at rest     Call your doctor or cardiologist if:   You have new or worsening swelling in your legs, feet, ankles, or abdomen  You have questions or concerns about your condition or care  Treatment for A-fib:  Conditions that cause A-fib, such as thyroid disease, will be treated  You may also need any of the following:  Heart medicines  help control your heart rate or rhythm  You may need more than one medicine to treat your symptoms      Antiplatelet or blood thinner medicines  help prevent blood clots and stroke  Cardioversion  is a procedure to return your heart rate and rhythm to normal  It can be done using medicines or electric shock  A-fib ablation  is a procedure that uses energy to burn a small area of heart tissue  This creates scar tissue and prevents electrical signals that cause A-fib  You may need this procedure more than once  Ask for more information on A-fib ablation  A pacemaker  may be inserted into your heart  A pacemaker is a device that controls your heartbeat  A pacemaker may be inserted during an ablation procedure or surgery  Ask your healthcare provider for more information on pacemakers  Surgery  may be needed if other procedures do not work  During surgery your healthcare provider will make cuts in the upper part of your heart  The provider will stitch the cuts together to create scar tissue  The scar tissue will prevent electrical signals that cause A-fib  Manage or prevent A-fib:   Get screening for A-fib, if recommended  Screening means you are checked for A-fib, even if you do not have signs or symptoms  Screening can find problems early so treatment can begin  Early treatment can save your life  Your healthcare provider will talk to you about the benefits and risks of screening  Screening may be recommended starting at age 48  Your provider may recommend regular screenings if you stay at high risk for A-fib  Know your target heart rate  Learn how to check your pulse and monitor your heart rate  Know the risks if you choose to drink alcohol  Alcohol can increase your risk for A-fib or make A-fib harder to manage  Ask your healthcare provider if it is okay for you to drink any alcohol  He or she can help you set limits for the number of drinks you have in 24 hours and in a week  A drink of alcohol is 12 ounces of beer, 5 ounces of wine, or 1½ ounces of liquor  Do not smoke    Nicotine can cause heart damage and make it more difficult to manage your A-fib  Do not use e-cigarettes or smokeless tobacco in place of cigarettes or to help you quit  They still contain nicotine  Ask your healthcare provider for information if you currently smoke and need help quitting  Eat heart-healthy foods  Heart healthy foods will help keep your cholesterol low  These include fruits, vegetables, whole-grain breads, low-fat dairy products, beans, lean meats, and fish  Replace butter and margarine with heart-healthy oils such as olive oil and canola oil  Maintain a healthy weight  Ask your healthcare provider what a healthy weight is for you  Ask him or her to help you create a safe weight loss plan if needed  Even a small goal of a 10% weight loss can improve your heart health  Get regular physical activity  Physical activity helps improve your heart health  Get at least 150 minutes of moderate aerobic physical activity each week  Your healthcare provider can help you create an activity plan  Manage other health conditions  This includes high blood pressure or cholesterol, sleep apnea, diabetes, and other heart conditions  Take medicine as directed and follow your treatment plan  Your healthcare provider may need to change a medicine you are taking if it is causing your A-fib  Do not  stop taking any medicine unless directed by your provider  Follow up with your doctor or cardiologist as directed: You will need regular blood tests and monitoring  Write down your questions so you remember to ask them during your visits  © Copyright [employer] 2022 Information is for End User's use only and may not be sold, redistributed or otherwise used for commercial purposes  The above information is an  only  It is not intended as medical advice for individual conditions or treatments  Talk to your doctor, nurse or pharmacist before following any medical regimen to see if it is safe and effective for you

## 2023-03-27 NOTE — PROGRESS NOTES
Subjective:        Patient ID: Yomi Montoya is a 59 y o  male  Chief Complaint:  Brittanie Gonzalez is here to review testing for his routine visit  Recent echo showed normal aortic prosthetic valve function, and low normal left ventricular systolic function  He was happy to hear this  Recent INR therapeutic, no falls or bleeding issues  No chest pains  Admits to only his usual dyspnea and edema issues, nothing new he says, everything is the same he said  No recent hospitalizations or urgent care visits  No fevers chills or rigors  He quit smoking again about a 1 month ago  The following portions of the patient's history were reviewed and updated as appropriate: allergies, current medications, past family history, past medical history, past social history, past surgical history and problem list   Review of Systems   Constitutional: Negative for chills, diaphoresis, malaise/fatigue and weight gain  HENT: Negative for nosebleeds and stridor  Eyes: Negative for double vision, vision loss in left eye, vision loss in right eye and visual disturbance  Cardiovascular: Negative for chest pain, claudication, cyanosis, dyspnea on exertion, irregular heartbeat, leg swelling, near-syncope, orthopnea, palpitations, paroxysmal nocturnal dyspnea and syncope  Respiratory: Negative for cough, shortness of breath, snoring and wheezing  Endocrine: Negative for polydipsia, polyphagia and polyuria  Hematologic/Lymphatic: Negative for bleeding problem  Does not bruise/bleed easily  Skin: Negative for flushing and rash  Musculoskeletal: Negative for falls and myalgias  Gastrointestinal: Negative for abdominal pain, heartburn, hematemesis, hematochezia, melena and nausea  Genitourinary: Negative for hematuria  Neurological: Negative for brief paralysis, dizziness, focal weakness, headaches, light-headedness, loss of balance and vertigo     Psychiatric/Behavioral: Negative for altered mental status "and substance abuse  Allergic/Immunologic: Negative for hives  Objective:      /74   Pulse 86   Ht 6' 3\" (1 905 m)   Wt (!) 172 kg (380 lb)   BMI 47 50 kg/m²   Physical Exam  Constitutional:       General: He is not in acute distress  Appearance: He is well-developed  He is not diaphoretic  HENT:      Head: Normocephalic and atraumatic  Eyes:      General: No scleral icterus  Pupils: Pupils are equal, round, and reactive to light  Neck:      Thyroid: No thyromegaly  Vascular: No carotid bruit or JVD  Cardiovascular:      Rate and Rhythm: Normal rate  Rhythm irregular  Heart sounds: Normal heart sounds  No murmur heard  No friction rub  No gallop  Pulmonary:      Effort: Pulmonary effort is normal  No respiratory distress  Breath sounds: Normal breath sounds  No stridor  No wheezing or rales  Abdominal:      General: Bowel sounds are normal  There is no distension  Palpations: Abdomen is soft  There is no mass  Tenderness: There is no abdominal tenderness  Musculoskeletal:      Cervical back: Normal range of motion and neck supple  Right lower leg: No edema  Left lower leg: No edema  Skin:     General: Skin is warm and dry  Coloration: Skin is not pale  Findings: No erythema  Neurological:      Mental Status: He is alert and oriented to person, place, and time  Coordination: Coordination normal    Psychiatric:         Mood and Affect: Mood normal          Behavior: Behavior normal          Thought Content:  Thought content normal          Judgment: Judgment normal          Lab Review:   Hospital Outpatient Visit on 03/16/2023   Component Date Value   • A4C EF 03/16/2023 51    • LVIDS 03/16/2023 4 60    • AV LVOT peak gradient 03/16/2023 4    • LVOT peak VTI 03/16/2023 22 15    • LVOT peak luisana 03/16/2023 0 95    • Aortic valve peak veloci* 03/16/2023 2 52    • Ao VTI 03/16/2023 56 24    • AV mean gradient 03/16/2023 15  " • LVOT mn grad 03/16/2023 2 0    • AV peak gradient 03/16/2023 25    • MV mean gradient antegra* 03/16/2023 3    • MV peak gradient antegra* 03/16/2023 8    • MV VTI 03/16/2023 30 28    • MV stenosis pressure 1/2* 03/16/2023 102    • MV valve area p 1/2 meth* 03/16/2023 2 16    • Aortic valve mean veloci* 03/16/2023 18 30    • LEFT VENTRICLE SYSTOLIC * 63/51/4923 98    • Left Atrium Area-systoli* 03/16/2023 37 1    • LV EF 03/16/2023 50    • DVI 03/16/2023 0 38    Lab on 02/24/2023   Component Date Value   • Protime 02/24/2023 28 6 (H)    • INR 02/24/2023 2 66 (H)      Echo complete w/ contrast if indicated    Result Date: 3/16/2023  Narrative: •  Left Ventricle: Left ventricular cavity size is normal  Wall thickness is normal  The left ventricular ejection fraction is 50%  Systolic function is low normal  •  Right Ventricle: Systolic function is low normal  Normal tricuspid annular plane systolic excursion (TAPSE) > 1 7 cm  •  Aortic Valve: There is a mechanical valve  The prosthetic valve appears to be functioning normally  There is mild regurgitation  The aortic valve has no significant stenosis  The aortic valve peak velocity is 2 52 m/s  The aortic valve mean gradient is 15 mmHg  •  Mitral Valve: There is moderate annular calcification  There is mild regurgitation  •  Tricuspid Valve: There is mild regurgitation  •  Pericardium: There is no pericardial effusion  Assessment:       1  S/P AVR        2  Chronic atrial fibrillation (HCC)        3  Essential hypertension        4  COPD, moderate ([address] )        5  Obstructive sleep apnea        6  H/O heart artery stent        7  Chronic diastolic heart failure ([address] )             Plan:       Gerry Duke has no signs or symptoms reminiscent of angina heart failure nor electrical instability      A-fib is rate controlled and he is anticoagulated with Coumadin in light of bert mitten mechanical AVR which is recently by echo functioning normally and sounds normal today on exam   SB antibiotic prophylaxis requirement reiterated  Goal INR 2 5-3 5 recommended in light of mechanical AVR with concomitant atrial fibrillation  Continue diltiazem 240 mg daily, metoprolol 50 mg p o  twice daily, Coumadin goal INR 2 5-3 5  Baby aspirin optionally told him, recent literature suggest may not be necessary  For now we opted to continue, he is aware of the slight increased bleeding risk  On proper statin, goal LDL is 70  Volume status optimal, continue present dose Lasix  He has sublingual nitroglycerin fortunately has not needed any  I will see him back in 6 months, asked him to call sooner with any concerning potential cardiac symptoms in the meantime  Acute cardiac testing not ordered

## 2023-03-28 ENCOUNTER — ANTICOAG VISIT (OUTPATIENT)
Dept: FAMILY MEDICINE CLINIC | Facility: CLINIC | Age: 58
End: 2023-03-28

## 2023-03-28 ENCOUNTER — APPOINTMENT (OUTPATIENT)
Dept: LAB | Facility: HOSPITAL | Age: 58
End: 2023-03-28
Attending: FAMILY MEDICINE

## 2023-05-01 ENCOUNTER — TELEPHONE (OUTPATIENT)
Dept: ENDOCRINOLOGY | Facility: CLINIC | Age: 58
End: 2023-05-01

## 2023-05-01 DIAGNOSIS — E11.65 POORLY CONTROLLED TYPE 2 DIABETES MELLITUS WITH PERIPHERAL NEUROPATHY (HCC): ICD-10-CM

## 2023-05-01 DIAGNOSIS — E11.42 POORLY CONTROLLED TYPE 2 DIABETES MELLITUS WITH PERIPHERAL NEUROPATHY (HCC): ICD-10-CM

## 2023-05-01 DIAGNOSIS — I48.91 ATRIAL FIBRILLATION, UNSPECIFIED TYPE (HCC): ICD-10-CM

## 2023-05-01 RX ORDER — INSULIN DEGLUDEC 200 U/ML
66 INJECTION, SOLUTION SUBCUTANEOUS DAILY
Qty: 27 ML | Refills: 1 | Status: CANCELLED | OUTPATIENT
Start: 2023-05-01 | End: 2023-10-12

## 2023-05-01 RX ORDER — WARFARIN SODIUM 10 MG/1
10 TABLET ORAL DAILY
Qty: 90 TABLET | Refills: 3 | Status: SHIPPED | OUTPATIENT
Start: 2023-05-01

## 2023-05-01 NOTE — TELEPHONE ENCOUNTER
Pt needs refill for tresiba flextouch   Patient usually gets 3 boxes and she wants this sent [address] in [place]

## 2023-05-02 RX ORDER — INSULIN DEGLUDEC 200 U/ML
66 INJECTION, SOLUTION SUBCUTANEOUS DAILY
Qty: 27 ML | Refills: 1 | Status: SHIPPED | OUTPATIENT
Start: 2023-05-02 | End: 2023-05-03 | Stop reason: SDUPTHER

## 2023-05-03 RX ORDER — INSULIN DEGLUDEC 200 U/ML
66 INJECTION, SOLUTION SUBCUTANEOUS DAILY
Qty: 27 ML | Refills: 1 | Status: SHIPPED | OUTPATIENT
Start: 2023-05-03 | End: 2023-10-14

## 2023-05-03 NOTE — TELEPHONE ENCOUNTER
Voicemail left for patient to call the office per provider and clarify want medication he wants refilled

## 2023-05-09 ENCOUNTER — APPOINTMENT (OUTPATIENT)
Dept: LAB | Facility: HOSPITAL | Age: 58
End: 2023-05-09
Attending: FAMILY MEDICINE

## 2023-05-09 ENCOUNTER — ANTICOAG VISIT (OUTPATIENT)
Dept: FAMILY MEDICINE CLINIC | Facility: CLINIC | Age: 58
End: 2023-05-09

## 2023-05-09 DIAGNOSIS — E11.65 TYPE 2 DIABETES MELLITUS WITH HYPERGLYCEMIA, WITH LONG-TERM CURRENT USE OF INSULIN (HCC): ICD-10-CM

## 2023-05-09 DIAGNOSIS — Z79.4 TYPE 2 DIABETES MELLITUS WITH HYPERGLYCEMIA, WITH LONG-TERM CURRENT USE OF INSULIN (HCC): ICD-10-CM

## 2023-05-09 LAB
ALBUMIN SERPL BCP-MCNC: 3.9 G/DL (ref 3.5–5)
ALP SERPL-CCNC: 81 U/L (ref 34–104)
ALT SERPL W P-5'-P-CCNC: 14 U/L (ref 7–52)
ANION GAP SERPL CALCULATED.3IONS-SCNC: 10 MMOL/L (ref 4–13)
AST SERPL W P-5'-P-CCNC: 16 U/L (ref 13–39)
BILIRUB SERPL-MCNC: 0.62 MG/DL (ref 0.2–1)
BUN SERPL-MCNC: 11 MG/DL (ref 5–25)
CALCIUM SERPL-MCNC: 9.2 MG/DL (ref 8.4–10.2)
CHLORIDE SERPL-SCNC: 102 MMOL/L (ref 96–108)
CO2 SERPL-SCNC: 24 MMOL/L (ref 21–32)
CREAT SERPL-MCNC: 0.78 MG/DL (ref 0.6–1.3)
CREAT UR-MCNC: 164 MG/DL
EST. AVERAGE GLUCOSE BLD GHB EST-MCNC: 114 MG/DL
GFR SERPL CREATININE-BSD FRML MDRD: 98 ML/MIN/1.73SQ M
GLUCOSE SERPL-MCNC: 79 MG/DL (ref 65–140)
HBA1C MFR BLD: 5.6 %
MICROALBUMIN UR-MCNC: 15.5 MG/L (ref 0–20)
MICROALBUMIN/CREAT 24H UR: 9 MG/G CREATININE (ref 0–30)
POTASSIUM SERPL-SCNC: 4.3 MMOL/L (ref 3.5–5.3)
PROT SERPL-MCNC: 7.2 G/DL (ref 6.4–8.4)
SODIUM SERPL-SCNC: 136 MMOL/L (ref 135–147)

## 2023-05-19 ENCOUNTER — RA CDI HCC (OUTPATIENT)
Dept: OTHER | Facility: HOSPITAL | Age: 58
End: 2023-05-19

## 2023-05-30 ENCOUNTER — OFFICE VISIT (OUTPATIENT)
Dept: FAMILY MEDICINE CLINIC | Facility: CLINIC | Age: 58
End: 2023-05-30

## 2023-05-30 VITALS
WEIGHT: 315 LBS | HEART RATE: 85 BPM | BODY MASS INDEX: 39.17 KG/M2 | DIASTOLIC BLOOD PRESSURE: 72 MMHG | SYSTOLIC BLOOD PRESSURE: 120 MMHG | TEMPERATURE: 97.7 F | OXYGEN SATURATION: 94 % | HEIGHT: 75 IN

## 2023-05-30 DIAGNOSIS — I48.91 ATRIAL FIBRILLATION, UNSPECIFIED TYPE (HCC): ICD-10-CM

## 2023-05-30 DIAGNOSIS — E11.65 TYPE 2 DIABETES MELLITUS WITH HYPERGLYCEMIA, WITH LONG-TERM CURRENT USE OF INSULIN (HCC): Primary | ICD-10-CM

## 2023-05-30 DIAGNOSIS — Z79.4 TYPE 2 DIABETES MELLITUS WITH HYPERGLYCEMIA, WITH LONG-TERM CURRENT USE OF INSULIN (HCC): Primary | ICD-10-CM

## 2023-05-30 DIAGNOSIS — E66.01 MORBID OBESITY WITH BMI OF 45.0-49.9, ADULT (HCC): ICD-10-CM

## 2023-05-30 RX ORDER — WARFARIN SODIUM 2 MG/1
2 TABLET ORAL
Qty: 90 TABLET | Refills: 3 | Status: SHIPPED | OUTPATIENT
Start: 2023-05-30

## 2023-05-30 NOTE — PROGRESS NOTES
Name: Adriana Pisano      : 1965      MRN: [id number]  Encounter Provider: Estella Jackson DO  Encounter Date: 2023   Encounter department: [facility] [address]   Patient will follow-up with endocrinology as scheduled  He is going to try and find a new pulmonologist   Continue same medications  Follow-up here in 6 months or as needed  Patient did not wish to have a diabetic foot exam   1  Type 2 diabetes mellitus with hyperglycemia, with long-term current use of insulin ([address] )    2  Atrial fibrillation, unspecified type ([facility])  -     warfarin (COUMADIN) 2 mg tablet; Take 1 tablet (2 mg total) by mouth daily    3  Morbid obesity with BMI of 45 0-49 9, adult ([facility])      BMI Counseling: Body mass index is 48 75 kg/m²  The BMI is above normal  Nutrition recommendations include decreasing portion sizes, encouraging healthy choices of fruits and vegetables, decreasing fast food intake, consuming healthier snacks, limiting drinks that contain sugar, moderation in carbohydrate intake, increasing intake of lean protein, reducing intake of saturated and trans fat and reducing intake of cholesterol  No pharmacotherapy was ordered  Rationale for BMI follow-up plan is due to patient being overweight or obese  Depression Screening and Follow-up Plan: Patient was screened for depression during today's encounter  They screened negative with a PHQ-2 score of 0  Subjective     Patient here today for follow-up  Patient denies any chest pain  Continues to have his breathing issues, no worse, no better  Patient just had blood work done showing his hemoglobin A1c was good at 5 6    Review of Systems   Constitutional: Negative  Respiratory: Negative  Cardiovascular: Negative  Gastrointestinal: Negative  Genitourinary: Negative          Past Medical History:   Diagnosis Date   • Asthma     last assessed 12   • Athscl heart disease of native coronary artery w/o ang pctrs • Atrial fibrillation ([facility] )     last assessed 12   • Chronic diastolic (congestive) heart failure (HCC)    • Closed fracture of fibula     last assessed 10/18/13   • COLD (chronic obstructive lung disease) ([facility] )     last assessed 12   • Coronary angioplasty status    • CPAP (continuous positive airway pressure) dependence    • Dyslipidemia associated with type 2 diabetes mellitus (HCC)    • Hyperlipidemia    • Hypertension    • Presence of prosthetic heart valve    • Respiratory failure with hypoxia ([facility] ) 2021     Past Surgical History:   Procedure Laterality Date   • AORTIC VALVE REPLACEMENT  10/07/2016    AVR replacement, mechanical   • CORONARY ANGIOPLASTY WITH STENT PLACEMENT  2010    EF 40%, Successful bare metal stent mid RCA   • MENISCECTOMY Left      Family History   Problem Relation Age of Onset   • Atrial fibrillation Mother    • Diabetes type II Mother    • Heart attack Father         acute   • Heart failure Father    • Stroke Father         syndrome     Social History     Socioeconomic History   • Marital status: /Civil Union     Spouse name: None   • Number of children: None   • Years of education: None   • Highest education level: None   Occupational History   • None   Tobacco Use   • Smoking status: Former     Years: 35 00     Types: Cigarettes     Quit date: 10/2016     Years since quittin 6   • Smokeless tobacco: Never   • Tobacco comments:     Quit 2 months ago   Vaping Use   • Vaping Use: Never used   Substance and Sexual Activity   • Alcohol use: Not Currently   • Drug use: Never   • Sexual activity: None   Other Topics Concern   • None   Social History Narrative    Always uses seatbelt    Daily caffeine    No living will     Social Determinants of Health     Financial Resource Strain: Medium Risk (2022)    Overall Financial Resource Strain (CARDIA)    • Difficulty of Paying Living Expenses: Somewhat hard   Food Insecurity: Not on file   Transportation Needs: No Transportation Needs (11/28/2022)    PRAPARE - Transportation    • Lack of Transportation (Medical): No    • Lack of Transportation (Non-Medical): No   Physical Activity: Not on file   Stress: Not on file   Social Connections: Not on file   Intimate Partner Violence: Not on file   Housing Stability: Not on file     Current Outpatient Medications on File Prior to Visit   Medication Sig   • albuterol (2 5 mg/3 mL) 0 083 % nebulizer solution Take 3 mL (2 5 mg total) by nebulization every 6 (six) hours as needed for wheezing or shortness of breath   • albuterol (Ventolin HFA) 90 mcg/act inhaler Inhale 2 puffs every 6 (six) hours as needed for shortness of breath Must be brand necessary   • aspirin 81 MG tablet Take by mouth   • atorvastatin (LIPITOR) 10 mg tablet Take 1 tablet (10 mg total) by mouth daily   • benazepril (LOTENSIN) 10 mg tablet Take 1 tablet (10 mg total) by mouth daily   • diltiazem (CARDIZEM CD) 240 mg 24 hr capsule Take 1 capsule by mouth twice daily   • fluticasone-umeclidinium-vilanterol (TRELEGY) 100-62 5-25 MCG/INH inhaler Inhale 1 puff daily Rinse mouth after use  • furosemide (LASIX) 80 mg tablet Take 1 tablet (80 mg total) by mouth daily   • insulin aspart (NovoLOG FlexPen) 100 UNIT/ML injection pen Inject 14 units with breakfast and lunch and 36 units with dinner     • insulin degludec Dede Chi FlexTouch) 200 units/mL CONCENTRATED U-200 injection pen Inject 66 Units under the skin daily   • Insulin Pen Needle (BD Pen Needle Fany U/F) 32G X 4 MM MISC Inject as directed 4 (four) times a day   • metFORMIN (GLUCOPHAGE) 1000 MG tablet Take 1 tablet (1,000 mg total) by mouth 2 (two) times a day   • metoprolol tartrate (LOPRESSOR) 50 mg tablet Take 1 tablet (50 mg total) by mouth 2 (two) times a day   • nitroglycerin (NITROSTAT) 0 4 mg SL tablet Place 1 tablet (0 4 mg total) under the tongue every 5 (five) minutes as needed for chest pain   • Potassium Chloride ER 20 MEQ TBCR Take 1 tablet "(20 mEq total) by mouth daily   • warfarin (COUMADIN) 10 mg tablet Take 1 tablet (10 mg total) by mouth daily   • [DISCONTINUED] warfarin (COUMADIN) 2 mg tablet Take 1 tablet (2 mg total) by mouth daily     Allergies   Allergen Reactions   • Fluticasone-Salmeterol Palpitations     Immunization History   Administered Date(s) Administered   • COVID-19 MODERNA VACC 0 5 ML IM 03/26/2021, 04/28/2021   • INFLUENZA 10/13/2016, 10/14/2016, 10/23/2017, 10/25/2018   • Influenza Quadrivalent Preservative Free 3 years and older IM 11/03/2014, 10/14/2016, 10/23/2017   • Influenza, recombinant, quadrivalent,injectable, preservative free 10/25/2018, 10/28/2019, 10/29/2020, 11/02/2021, 11/28/2022   • Influenza, seasonal, injectable 01/03/2013, 12/16/2013   • Pneumococcal Conjugate 13-Valent 11/02/2021   • Pneumococcal Polysaccharide PPV23 12/16/2013, 10/25/2018       Objective     /72   Pulse 85   Temp 97 7 °F (36 5 °C)   Ht 6' 3\" (1 905 m)   Wt (!) 177 kg (390 lb)   SpO2 94%   BMI 48 75 kg/m²     Physical Exam  Vitals reviewed  Constitutional:       General: He is not in acute distress  Appearance: Normal appearance  He is well-developed  He is not ill-appearing, toxic-appearing or diaphoretic  HENT:      Head: Normocephalic and atraumatic  Eyes:      Conjunctiva/sclera: Conjunctivae normal    Cardiovascular:      Rate and Rhythm: Normal rate and regular rhythm  Heart sounds: Normal heart sounds  No murmur heard  No friction rub  No gallop  Pulmonary:      Effort: Pulmonary effort is normal  No respiratory distress  Breath sounds: Normal breath sounds  No wheezing, rhonchi or rales  Musculoskeletal:      Right lower leg: No edema  Left lower leg: No edema  Neurological:      General: No focal deficit present  Mental Status: He is alert and oriented to person, place, and time     Psychiatric:         Mood and Affect: Mood normal          Behavior: Behavior normal          Thought " Content: Thought content normal          Judgment: Judgment normal        Sandro Salguero DO  BMI Counseling: Body mass index is 48 75 kg/m²  The BMI is above normal  Nutrition recommendations include reducing portion sizes, decreasing overall calorie intake, 3-5 servings of fruits/vegetables daily, reducing fast food intake, consuming healthier snacks, decreasing soda and/or juice intake, moderation in carbohydrate intake, increasing intake of lean protein, reducing intake of saturated fat and trans fat and reducing intake of cholesterol

## 2023-05-30 NOTE — PATIENT INSTRUCTIONS
Heart Healthy Diet   AMBULATORY CARE:   A heart healthy diet  is an eating plan low in unhealthy fats and sodium (salt)  The plan is high in healthy fats and fiber  A heart healthy diet helps improve your cholesterol levels and lowers your risk for heart disease and stroke  A dietitian will teach you how to read and understand food labels  Heart healthy diet guidelines to follow:   • Choose foods that contain healthy fats:      ? Unsaturated fats  include monounsaturated and polyunsaturated fats  Unsaturated fat is found in foods such as soybean, canola, olive, corn, and safflower oils  It is also found in soft tub margarine that is made with liquid vegetable oil  ? Omega-3 fat  is found in certain fish, such as salmon, tuna, and trout, and in walnuts and flaxseed  Eat fish high in omega-3 fats at least 2 times a week  • Limit or do not have unhealthy fats:      ? Cholesterol  is found in animal foods, such as eggs and lobster, and in dairy products made from whole milk  Limit cholesterol to less than 200 mg each day  ? Saturated fat  is found in meats, such as byrd and hamburger  It is also found in chicken or turkey skin, whole milk, and butter  Limit saturated fat to less than 7% of your total daily calories  ? Trans fat  is found in packaged foods, such as potato chips and cookies  It is also in hard margarine, some fried foods, and shortening  Do not eat foods that contain trans fats  • Get 20 to 30 grams of fiber each day  Fruits, vegetables, whole-grain foods, and legumes (cooked beans) are good sources of fiber  • Limit sodium as directed  You may be told to limit sodium, such as to 2,000 mg or less each day  Choose low-sodium or no-salt-added foods  Add little or no salt to food you prepare  Use herbs and spices in place of salt         Include the following in your heart healthy plan:  Ask your dietitian or healthcare provider how many servings to have each day from the following food groups:  • Grains:      ? Whole-wheat breads, cereals, and pastas, and brown rice    ? Low-fat, low-sodium crackers and chips    • Vegetables:      ? Broccoli, green beans, green peas, and spinach    ? Collards, kale, and lima beans    ? Carrots, sweet potatoes, tomatoes, and peppers    ? Canned vegetables with no salt added    • Fruits:      ? Bananas, peaches, pears, and pineapple    ? Grapes, raisins, and dates    ? Oranges, tangerines, grapefruit, orange juice, and grapefruit juice    ? Apricots, mangoes, melons, and papaya    ? Raspberries and strawberries    ? Canned fruit with no added sugar    • Low-fat dairy:      ? Nonfat (skim) milk, 1% milk, and low-fat almond, cashew, or soy milks fortified with calcium    ? Low-fat cheese, regular or frozen yogurt, and cottage cheese    • Meats and proteins:      ? Lean cuts of beef and pork (loin, leg, round), skinless chicken and turkey    ? Legumes, soy products, egg whites, or nuts    Limit or do not include the following in your heart healthy plan:   • Foods and liquids that contain unhealthy fats and oils:      ? Whole or 2% milk, cream cheese, sour cream, or cheese    ? High-fat cuts of beef (T-bone steaks, ribs), chicken or turkey with skin, and organ meats such as liver    ? Butter, stick margarine, shortening, and cooking oils such as coconut or palm oil    • Foods and liquids high in sodium:      ? Packaged foods, such as frozen dinners, cookies, macaroni and cheese, and cereals with more than 300 mg of sodium per serving    ? Vegetables with added sodium, such as instant potatoes, vegetables with added sauces, or regular canned vegetables    ? Cured or smoked meats, such as hot dogs, byrd, and sausage    ? High-sodium ketchup, barbecue sauce, salad dressing, pickles, olives, soy sauce, or miso    • Foods and liquids high in sugar:      ? Candy, cake, cookies, pies, or doughnuts    ?  Soft drinks (soda), sports drinks, or sweetened tea    ? Canned or dry mixes for cakes, soups, sauces, or gravies    Other healthy heart guidelines:   • Do not smoke  Nicotine and other chemicals in cigarettes and cigars can cause lung and heart damage  Ask your healthcare provider for information if you currently smoke and need help to quit  E-cigarettes or smokeless tobacco still contain nicotine  Talk to your provider before you use these products  • Limit or do not drink alcohol as directed  Alcohol can damage your heart and raise your blood pressure  Your healthcare provider may give you specific daily and weekly limits  The general recommended limit is 1 drink a day for women 21 or older and for men 72 or older  Do not have more than 3 drinks within 24 hours or 7 within a week  The recommended limit is 2 drinks a day for men 24to 59years of age  Do not have more than 4 drinks within 24 hours or 14 within a week  A drink of alcohol is 12 ounces of beer, 5 ounces of wine, or 1½ ounces of liquor  • Maintain a healthy weight  Extra body weight makes your heart work harder  Ask your provider what a healthy weight is for you  He or she can help you create a safe weight loss plan, if needed  • Exercise regularly  Exercise can help you maintain a healthy weight and improve your blood pressure and cholesterol levels  Regular exercise can also decrease your risk for heart problems  Ask your provider about the best exercise plan for you  Do not start an exercise program without asking your provider  Follow up with your doctor or cardiologist as directed:  Write down your questions so you remember to ask them during your visits  © Copyright Philip Villagomez 2022 Information is for End User's use only and may not be sold, redistributed or otherwise used for commercial purposes  The above information is an  only  It is not intended as medical advice for individual conditions or treatments   Talk to your doctor, nurse or pharmacist before following any medical regimen to see if it is safe and effective for you

## 2023-05-31 NOTE — PROGRESS NOTES
Established Patient Progress Note      Chief Complaint   Patient presents with   • Diabetes Type 2     Checking once or twice a day ( did not bring with todays appt) average 115 per pt random times through out the day        Impression & Plan:    Problem List Items Addressed This Visit        Endocrine    Type 2 diabetes mellitus with hyperglycemia, with long-term current use of insulin ([address] ) - Primary     Patient is very well controlled with minimal episodes of hyperglycemia and hypoglycemia  Continue current regimen  Continue with a healthy diet and physical activity as tolerated  Patient is to notify me with persistent hyperglycemia or episodes of hypoglycemia  Follow-up in 6 months  Lab Results   Component Value Date    HGBA1C 5 6 05/09/2023            Relevant Orders    Comprehensive metabolic panel    Hemoglobin A1C    Lipid Panel with Direct LDL reflex       Respiratory    Obstructive sleep apnea     Continue BiPAP  Cardiovascular and Mediastinum    Essential hypertension     BP stable at 126/72  Continue current regimen  Other    Other hyperlipidemia     Taking 10 mg of atorvastatin  Check fasting lipid panel with next labs  Orders Placed This Encounter   Procedures   • Comprehensive metabolic panel     This is a patient instruction: Patient fasting for 8 hours or longer recommended  Standing Status:   Future     Standing Expiration Date:   6/6/2024   • Hemoglobin A1C     Standing Status:   Future     Standing Expiration Date:   6/6/2024   • Lipid Panel with Direct LDL reflex     This is a patient instruction: This test requires patient fasting for 10-12 hours or longer  Drinking of black coffee or black tea is acceptable       Standing Status:   Future     Standing Expiration Date:   6/6/2024       History of Present Illness:   Rosa Elena Jaime is a 59 y o  male with hypertension, hyperlipidemia, obstructive sleep apnea, and type 2 diabetes with long term use of insulin since 2016  Reports complications of  neuropathy  Denies recent illness or hospitalizations  Denies recent severe hypoglycemic or severe hyperglycemic episodes  Denies any issues with his current regimen  home glucose monitoring: are performed regularly, 2-4 x daily    At patient's last appointment on 11/18/2022, no adjustments were made to his regimen as he was well controlled  He remains well controlled  He reports 2 episodes of low blood sugar in the last 6 months  One occurred overnight and 1 very early in the morning  He treated these appropriately  He does have symptoms of hypoglycemia including sweating and tremor  Component      Latest Ref Rng & Units 11/18/2022 5/9/2023           1:57 PM 10:56 AM   Hemoglobin A1C      Normal 3 8-5 6%; PreDiabetic 5 7-6 4%; Diabetic >=6 5%; Glycemic control for adults with diabetes <7 0% % 5 7 5 6   eAG, EST AVG Glucose      mg/dl  114     Component      Latest Ref Rng & Units 5/9/2023          10:56 AM   eGFR      ml/min/1 73sq m 98     Component      Latest Ref Rng & Units 5/9/2023          10:56 AM   MICROALBUMIN/CREATININE RATIO      0 - 30 mg/g creatinine 9        Current regimen:   Metformin 1000 mg twice daily  Tresiba 66 units daily  NovoLog 14-14-36    Last Eye Exam: Up-to-date   last Foot Exam: Up-to-date    For hyperlipidemia, he is taking 10 mg of atorvastatin nightly  He denies myalgias  For hypertension, he is taking 10 mg of benazepril daily and 50 mg of metoprolol tartrate twice daily  He denies headache, cough, and orthostatic hypotension  For obstructive sleep apnea, he does use his BiPAP regularly      Patient Active Problem List   Diagnosis   • Aortic stenosis   • Atrial fibrillation ([address] )   • COPD with acute exacerbation (HCC)   • Type 2 diabetes mellitus with hyperglycemia, with long-term current use of insulin ([address] )   • Other hyperlipidemia   • Essential hypertension   • S/P AVR   • Obstructive sleep apnea   • Restless leg syndrome • Hypersomnia   • Morbid obesity with BMI of 50 0-59 9, adult ([facility])   • PLMD (periodic limb movement disorder)   • Palpitations   • Acute exacerbation of chronic obstructive bronchitis ([facility])   • Multiple pulmonary nodules determined by computed tomography of lung   • Chronic pain of left knee   • Seasonal allergic rhinitis   • COPD, moderate ([facility])   • SOB (shortness of breath)      Past Medical History:   Diagnosis Date   • Asthma     last assessed 12   • Athscl heart disease of native coronary artery w/o ang pctrs    • Atrial fibrillation ([address] )     last assessed 12   • Chronic diastolic (congestive) heart failure ([facility])    • Closed fracture of fibula     last assessed 10/18/13   • COLD (chronic obstructive lung disease) ([address] )     last assessed 12   • Coronary angioplasty status    • CPAP (continuous positive airway pressure) dependence    • Dyslipidemia associated with type 2 diabetes mellitus ([facility])    • Hyperlipidemia    • Hypertension    • Presence of prosthetic heart valve    • Respiratory failure with hypoxia ([facility] ) 2021      Past Surgical History:   Procedure Laterality Date   • AORTIC VALVE REPLACEMENT  10/07/2016    AVR replacement, mechanical   • CORONARY ANGIOPLASTY WITH STENT PLACEMENT  2010    EF 40%, Successful bare metal stent mid RCA   • MENISCECTOMY Left       Family History   Problem Relation Age of Onset   • Atrial fibrillation Mother    • Diabetes type II Mother    • Heart attack Father         acute   • Heart failure Father    • Stroke Father         syndrome     Social History     Tobacco Use   • Smoking status: Former     Years: 35 00     Types: Cigarettes     Quit date: 10/2016     Years since quittin 6   • Smokeless tobacco: Never   • Tobacco comments:     Quit 2 months ago   Substance Use Topics   • Alcohol use: Not Currently     Allergies   Allergen Reactions   • Fluticasone-Salmeterol Palpitations         Current Outpatient Medications:   •  albuterol (2 5 mg/3 mL) 0 083 % nebulizer solution, Take 3 mL (2 5 mg total) by nebulization every 6 (six) hours as needed for wheezing or shortness of breath, Disp: 360 mL, Rfl: 2  •  albuterol (Ventolin HFA) 90 mcg/act inhaler, Inhale 2 puffs every 6 (six) hours as needed for shortness of breath Must be brand necessary, Disp: 18 g, Rfl: 3  •  atorvastatin (LIPITOR) 10 mg tablet, Take 1 tablet (10 mg total) by mouth daily, Disp: 90 tablet, Rfl: 3  •  benazepril (LOTENSIN) 10 mg tablet, Take 1 tablet (10 mg total) by mouth daily, Disp: 90 tablet, Rfl: 3  •  diltiazem (CARDIZEM CD) 240 mg 24 hr capsule, Take 1 capsule by mouth twice daily, Disp: 180 capsule, Rfl: 3  •  fluticasone-umeclidinium-vilanterol (TRELEGY) 100-62 5-25 MCG/INH inhaler, Inhale 1 puff daily Rinse mouth after use , Disp: 2 each, Rfl: 5  •  furosemide (LASIX) 80 mg tablet, Take 1 tablet (80 mg total) by mouth daily, Disp: 90 tablet, Rfl: 3  •  insulin aspart (NovoLOG FlexPen) 100 UNIT/ML injection pen, Inject 14 units with breakfast and lunch and 36 units with dinner , Disp: 45 mL, Rfl: 1  •  insulin degludec Joselyn Pressman FlexTouch) 200 units/mL CONCENTRATED U-200 injection pen, Inject 66 Units under the skin daily, Disp: 27 mL, Rfl: 1  •  Insulin Pen Needle (BD Pen Needle Fany U/F) 32G X 4 MM MISC, Inject as directed 4 (four) times a day, Disp: 400 each, Rfl: 5  •  metFORMIN (GLUCOPHAGE) 1000 MG tablet, Take 1 tablet (1,000 mg total) by mouth 2 (two) times a day, Disp: 180 tablet, Rfl: 3  •  metoprolol tartrate (LOPRESSOR) 50 mg tablet, Take 1 tablet (50 mg total) by mouth 2 (two) times a day, Disp: 180 tablet, Rfl: 3  •  nitroglycerin (NITROSTAT) 0 4 mg SL tablet, Place 1 tablet (0 4 mg total) under the tongue every 5 (five) minutes as needed for chest pain, Disp: 100 tablet, Rfl: 3  •  Potassium Chloride ER 20 MEQ TBCR, Take 1 tablet (20 mEq total) by mouth daily, Disp: 90 tablet, Rfl: 3  •  warfarin (COUMADIN) 10 mg tablet, Take 1 tablet (10 mg total) by mouth "daily, Disp: 90 tablet, Rfl: 3  •  warfarin (COUMADIN) 2 mg tablet, Take 1 tablet (2 mg total) by mouth daily, Disp: 90 tablet, Rfl: 3    Review of Systems   Constitutional: Negative for activity change, appetite change, fatigue and unexpected weight change  HENT: Negative for dental problem, sore throat, trouble swallowing and voice change  Eyes: Negative for visual disturbance  Respiratory: Negative for cough, chest tightness and shortness of breath  Cardiovascular: Negative for chest pain, palpitations and leg swelling  Gastrointestinal: Negative for constipation, diarrhea, nausea and vomiting  Endocrine: Negative for polydipsia, polyphagia and polyuria  Genitourinary: Negative for frequency  Musculoskeletal: Negative for arthralgias, back pain, gait problem and myalgias  Skin: Negative for wound  Allergic/Immunologic: Positive for environmental allergies  Negative for food allergies  Neurological: Positive for numbness  Negative for dizziness, weakness, light-headedness and headaches  Psychiatric/Behavioral: Negative for decreased concentration, dysphoric mood and sleep disturbance  The patient is not nervous/anxious  Physical Exam:  Body mass index is 48 75 kg/m²  /72   Pulse 88   Resp 18   Ht 6' 3\" (1 905 m)   Wt (!) 177 kg (390 lb)   SpO2 98%   BMI 48 75 kg/m²    Wt Readings from Last 3 Encounters:   06/06/23 (!) 177 kg (390 lb)   05/30/23 (!) 177 kg (390 lb)   03/27/23 (!) 172 kg (380 lb)       Physical Exam  Vitals reviewed  Constitutional:       General: He is not in acute distress  Appearance: He is well-developed  He is obese  He is not ill-appearing  HENT:      Head: Normocephalic and atraumatic  Eyes:      Pupils: Pupils are equal, round, and reactive to light  Neck:      Thyroid: No thyromegaly  Cardiovascular:      Rate and Rhythm: Normal rate  Rhythm irregular  Pulses: Normal pulses  Heart sounds: Normal heart sounds     Pulmonary: " "     Effort: Pulmonary effort is normal       Breath sounds: Normal breath sounds  Abdominal:      General: Bowel sounds are normal  There is no distension  Palpations: Abdomen is soft  Tenderness: There is no abdominal tenderness  Musculoskeletal:      Cervical back: Normal range of motion and neck supple  Right lower leg: No edema  Left lower leg: No edema  Lymphadenopathy:      Cervical: No cervical adenopathy  Skin:     General: Skin is warm and dry  Capillary Refill: Capillary refill takes less than 2 seconds  Neurological:      Mental Status: He is alert and oriented to person, place, and time  Gait: Gait normal    Psychiatric:         Mood and Affect: Mood normal          Behavior: Behavior normal            Labs:   Lab Results   Component Value Date    HGBA1C 5 6 05/09/2023    HGBA1C 5 7 11/18/2022    HGBA1C 5 6 05/18/2022     Lab Results   Component Value Date    BUN 11 05/09/2023     05/09/2023    CO2 24 05/09/2023    CREATININE 0 78 05/09/2023    CREATININE 1 04 10/21/2022    CREATININE 0 96 01/13/2022    K 4 3 05/09/2023     09/01/2013     eGFR   Date Value Ref Range Status   05/09/2023 98 ml/min/1 73sq m Final     Lab Results   Component Value Date    CHOL 180 09/01/2013    HDL 27 (L) 10/21/2022    TRIG 117 10/21/2022     Lab Results   Component Value Date    ALKPHOS 81 05/09/2023    ALT 14 05/09/2023    AST 16 05/09/2023    BILITOT 0 6 09/01/2013     Lab Results   Component Value Date    PJH6SZMEHGQA 3 159 01/15/2021    PTX7CPPYQZLE 3 299 10/04/2018    [id number] 3 238 07/22/2016     No results found for: \"FREET4\", \"TSI\"          Discussed with the patient and all questioned fully answered  He will call me if any problems arise  Follow-up appointment in 6 months       Counseled patient on diagnostic results, prognosis, risk and benefit of treatment options, instruction for management, importance of treatment compliance, Risk  factor reduction and " impressions    There are no Patient Instructions on file for this visit      CHRISTINE Barlow

## 2023-06-06 ENCOUNTER — OFFICE VISIT (OUTPATIENT)
Dept: ENDOCRINOLOGY | Facility: CLINIC | Age: 58
End: 2023-06-06
Payer: MEDICARE

## 2023-06-06 VITALS
HEIGHT: 75 IN | HEART RATE: 88 BPM | WEIGHT: 315 LBS | DIASTOLIC BLOOD PRESSURE: 72 MMHG | SYSTOLIC BLOOD PRESSURE: 126 MMHG | BODY MASS INDEX: 39.17 KG/M2 | OXYGEN SATURATION: 98 % | RESPIRATION RATE: 18 BRPM

## 2023-06-06 DIAGNOSIS — I10 ESSENTIAL HYPERTENSION: ICD-10-CM

## 2023-06-06 DIAGNOSIS — G47.33 OBSTRUCTIVE SLEEP APNEA: ICD-10-CM

## 2023-06-06 DIAGNOSIS — E78.49 OTHER HYPERLIPIDEMIA: ICD-10-CM

## 2023-06-06 DIAGNOSIS — E11.65 TYPE 2 DIABETES MELLITUS WITH HYPERGLYCEMIA, WITH LONG-TERM CURRENT USE OF INSULIN (HCC): Primary | ICD-10-CM

## 2023-06-06 DIAGNOSIS — Z79.4 TYPE 2 DIABETES MELLITUS WITH HYPERGLYCEMIA, WITH LONG-TERM CURRENT USE OF INSULIN (HCC): Primary | ICD-10-CM

## 2023-06-06 PROCEDURE — 99214 OFFICE O/P EST MOD 30 MIN: CPT | Performed by: NURSE PRACTITIONER

## 2023-06-06 NOTE — ASSESSMENT & PLAN NOTE
Patient is very well controlled with minimal episodes of hyperglycemia and hypoglycemia  Continue current regimen  Continue with a healthy diet and physical activity as tolerated  Patient is to notify me with persistent hyperglycemia or episodes of hypoglycemia  Follow-up in 6 months    Lab Results   Component Value Date    HGBA1C 5 6 05/09/2023

## 2023-06-09 DIAGNOSIS — E11.65 TYPE 2 DIABETES MELLITUS WITH HYPERGLYCEMIA, WITH LONG-TERM CURRENT USE OF INSULIN (HCC): ICD-10-CM

## 2023-06-09 DIAGNOSIS — Z79.4 TYPE 2 DIABETES MELLITUS WITH HYPERGLYCEMIA, WITH LONG-TERM CURRENT USE OF INSULIN (HCC): ICD-10-CM

## 2023-06-09 RX ORDER — INSULIN ASPART 100 [IU]/ML
INJECTION, SOLUTION INTRAVENOUS; SUBCUTANEOUS
Qty: 45 ML | Refills: 1 | Status: SHIPPED | OUTPATIENT
Start: 2023-06-09

## 2023-06-09 NOTE — TELEPHONE ENCOUNTER
Name of medication/s patient is requesting to be refilled novolog  Medication dosage 14 units with breakfast and 36 units with dinner    How often is medication being taken twice daily  Is patient requesting 30 or 90 day supply, usually gets 3 boxes      [facility] [place]    Last Visit 6/6/2023  Next Visit Visit date not found

## 2023-06-29 ENCOUNTER — ANTICOAG VISIT (OUTPATIENT)
Dept: FAMILY MEDICINE CLINIC | Facility: CLINIC | Age: 58
End: 2023-06-29

## 2023-06-29 ENCOUNTER — LAB (OUTPATIENT)
Dept: LAB | Facility: HOSPITAL | Age: 58
End: 2023-06-29
Payer: MEDICARE

## 2023-06-29 DIAGNOSIS — I48.91 ATRIAL FIBRILLATION, UNSPECIFIED TYPE (HCC): ICD-10-CM

## 2023-06-29 LAB
INR PPP: 3.54 (ref 0.84–1.19)
PROTHROMBIN TIME: 35.7 SECONDS (ref 11.6–14.5)

## 2023-06-29 PROCEDURE — 36415 COLL VENOUS BLD VENIPUNCTURE: CPT

## 2023-06-29 PROCEDURE — 85610 PROTHROMBIN TIME: CPT

## 2023-07-13 ENCOUNTER — OFFICE VISIT (OUTPATIENT)
Dept: SLEEP CENTER | Facility: CLINIC | Age: 58
End: 2023-07-13
Payer: MEDICARE

## 2023-07-13 VITALS
WEIGHT: 315 LBS | HEART RATE: 76 BPM | TEMPERATURE: 96.7 F | SYSTOLIC BLOOD PRESSURE: 122 MMHG | HEIGHT: 73 IN | BODY MASS INDEX: 41.75 KG/M2 | OXYGEN SATURATION: 98 % | DIASTOLIC BLOOD PRESSURE: 62 MMHG

## 2023-07-13 DIAGNOSIS — E66.01 MORBID OBESITY WITH BMI OF 40.0-44.9, ADULT (HCC): ICD-10-CM

## 2023-07-13 DIAGNOSIS — G25.81 RESTLESS LEG SYNDROME: ICD-10-CM

## 2023-07-13 DIAGNOSIS — J44.9 COPD MIXED TYPE (HCC): ICD-10-CM

## 2023-07-13 DIAGNOSIS — R68.2 DRY MOUTH: ICD-10-CM

## 2023-07-13 DIAGNOSIS — I48.20 CHRONIC ATRIAL FIBRILLATION (HCC): ICD-10-CM

## 2023-07-13 DIAGNOSIS — J30.1 SEASONAL ALLERGIC RHINITIS DUE TO POLLEN: ICD-10-CM

## 2023-07-13 DIAGNOSIS — I10 ESSENTIAL HYPERTENSION: ICD-10-CM

## 2023-07-13 DIAGNOSIS — G47.33 OBSTRUCTIVE SLEEP APNEA: Primary | ICD-10-CM

## 2023-07-13 PROCEDURE — 99214 OFFICE O/P EST MOD 30 MIN: CPT | Performed by: INTERNAL MEDICINE

## 2023-07-13 NOTE — PROGRESS NOTES
Follow-Up Note - Sleep Center   Izzy Qureshi  59 y.o. male  :1965  RDF:[id number]  DOS:2023    CC: I saw this patient for follow-up in clinic today for Sleep disordered breathing, Coexisting Sleep and Medical Problems. Interval changes: Patient received ot a refurbished dream Station Version 1 machine from [place] several months ago. Results of prior studies in 2017:  The diagnostic portion of a split study demonstrated AHI of 59.5 per hour, higher while supine. Minimum oxygen saturation was 86 % and he spent 69.6% of time asleep during this portion of the study with saturations below 90%. During the therapeutic portion, he appeared to do best with BiPAP at 21/16 cm H2O. He developed central events and continued to have some snoring. The AHI was 8.8 per hour at this setting but minimum oxygen saturation maintained above 90%     PFSH, Problem List, Medications & Allergies were reviewed in EMR. He  has a past medical history of Asthma, Athscl heart disease of native coronary artery w/o ang pctrs, Atrial fibrillation (HCC), Chronic diastolic (congestive) heart failure (HCC), Closed fracture of fibula, COLD (chronic obstructive lung disease) ([address]), Coronary angioplasty status, CPAP (continuous positive airway pressure) dependence, Dyslipidemia associated with type 2 diabetes mellitus ([address]), Hyperlipidemia, Hypertension, Presence of prosthetic heart valve, and Respiratory failure with hypoxia ([address]) (2021). He has a current medication list which includes the following prescription(s): albuterol, albuterol, atorvastatin, benazepril, diltiazem, fluticasone-umeclidinium-vilanterol, furosemide, insulin aspart, tresiba flextouch, bd pen needle ashley u/f, metformin, metoprolol tartrate, nitroglycerin, potassium chloride er, warfarin, and warfarin. PHYSIOLOGICAL DATA REVIEW : Using PAP > 4 hours/night 97%.  Estimated PAUL 6.8/hour with pressure of 25/15cm [email or web address] percentile; patient has not been using non FDA approved devices to sanitize the machine. INTERPRETATION: Compliance is excellent; Pressure setting is:in acceptable range; ;   SUBJECTIVE: With respect to use of PAP, Jonny Reyes  is experiencing some adverse effects:dry mouth/throat. He derives benefit. Is satisfied with sleep and daytime function. Sleep Routine: Jonny Reyes reports getting 9 hrs sleep; he has no difficulty initiating or maintaining sleep . He arises needing an alarm and feels refreshed. Jonny Reyes reports episodic daytime sleepiness, and may nap once in a while. He rated [himself] at Total score: 5 /24 on the [place] Sleepiness Scale. Other issues: Restless leg symptoms are not disturbing sleep. Habits:[ reports that he quit smoking about 6 years ago. His smoking use included cigarettes. He has never used smokeless tobacco.], [ reports that he does not currently use alcohol.], [ reports no history of drug use.], Caffeine use:moderate; Exercise routine: irregular. ROS: Significant for few pounds weight reduction in the past few months. He has some nasal congestion but feels respiratory symptoms are controlled. He is reporting no cardiac symptoms. [facility] EXAM: /62 (BP Location: Left arm, Cuff Size: Large)   Pulse 76   Temp (!) 96.7 °F (35.9 °C) (Temporal)   Ht 6' 1" (1.854 m)   Wt (!) 174 kg (384 lb 3.2 oz)   SpO2 98%   BMI 50.69 kg/m²     Wt Readings from Last 3 Encounters:   07/13/23 (!) 174 kg (384 lb 3.2 oz)   06/06/23 (!) 177 kg (390 lb)   05/30/23 (!) 177 kg (390 lb)      Patient is well groomed; well appearing. CNS: Alert, orientated, clear & coherent speech. Psych: cooperative and in no distress. Mental state: Appears normal.  H&N: EOMI; NC/AT: No facial pressure marks, no rashes. Skin/Extrem: col & hydration normal; no edema  Resp: Respiratory effort is normal  Physical findings otherwise essentially unchanged from previous. IMPRESSION: Problem List Items & Comorbidities Addressed this Visit    1. Obstructive sleep apnea  PAP DME Resupply/Reorder      2. Restless leg syndrome        3. Dry mouth        4. COPD mixed type ([address])        5. Seasonal allergic rhinitis due to pollen        6. Chronic atrial fibrillation (HCC)        7. Essential hypertension        8. Morbid obesity with BMI of 40.0-44.9, adult ([address])            PLAN:  1. I reviewed results of prior studies and physiologic data with the patient. 2. I discussed treatment options with risks and benefits. 3. Treatment with  PAP is medically necessary and [place] [facility] is agreable to continue use. 4. Care of equipment, methods to improve comfort using PAP and importance of compliance with therapy were discussed. 5. Pressure setting: continue 22/15 cmH2O. he declined pressure increase. 6. Rx provided to replace supplies and Care coordinated with DME provider. 7. No medication is needed for RLS. 8. Discussed strategies for weight reduction. 9. Follow-up is advised in 1 year or sooner if needed to monitor progress, compliance and to adjust therapy. Thank you for allowing me to participate in the care of this patient. Sincerely,     Authenticated electronically on 19/01/08   Board Certified Specialist     Portions of the record may have been created with voice recognition software. Occasional wrong word or "sound a like" substitutions may have occurred due to the inherent limitations of voice recognition software. There may also be notations and random deletions of words or characters from malfunctioning software. Read the chart carefully and recognize, using context, where substitutions/deletions have occurred.

## 2023-07-13 NOTE — PATIENT INSTRUCTIONS

## 2023-07-14 ENCOUNTER — TELEPHONE (OUTPATIENT)
Dept: SLEEP CENTER | Facility: CLINIC | Age: 58
End: 2023-07-14

## 2023-07-14 NOTE — TELEPHONE ENCOUNTER
Rx for PAP resupply sent to [address] [place] [address] via [address] [facility].

## 2023-07-17 LAB

## 2023-07-19 ENCOUNTER — TELEPHONE (OUTPATIENT)
Dept: GASTROENTEROLOGY | Facility: CLINIC | Age: 58
End: 2023-07-19

## 2023-07-19 NOTE — TELEPHONE ENCOUNTER
----- Message from Abby Owens PA-C sent at 7/18/2023  3:27 PM EDT -----  Can you call patient to get him scheduled in the pulmonary office with one of the docs soon? It looks like he is overdue for a visit after cancelling his last appt.

## 2023-07-24 ENCOUNTER — ANTICOAG VISIT (OUTPATIENT)
Dept: FAMILY MEDICINE CLINIC | Facility: CLINIC | Age: 58
End: 2023-07-24

## 2023-07-24 ENCOUNTER — LAB (OUTPATIENT)
Dept: LAB | Facility: HOSPITAL | Age: 58
End: 2023-07-24
Payer: MEDICARE

## 2023-07-24 ENCOUNTER — OFFICE VISIT (OUTPATIENT)
Dept: PULMONOLOGY | Facility: CLINIC | Age: 58
End: 2023-07-24
Payer: MEDICARE

## 2023-07-24 VITALS
HEART RATE: 75 BPM | DIASTOLIC BLOOD PRESSURE: 65 MMHG | OXYGEN SATURATION: 93 % | SYSTOLIC BLOOD PRESSURE: 114 MMHG | WEIGHT: 315 LBS | BODY MASS INDEX: 41.75 KG/M2 | HEIGHT: 73 IN

## 2023-07-24 DIAGNOSIS — R06.02 SOB (SHORTNESS OF BREATH): ICD-10-CM

## 2023-07-24 DIAGNOSIS — G47.33 OBSTRUCTIVE SLEEP APNEA: Primary | ICD-10-CM

## 2023-07-24 DIAGNOSIS — E66.01 MORBID OBESITY WITH BMI OF 50.0-59.9, ADULT (HCC): ICD-10-CM

## 2023-07-24 DIAGNOSIS — J44.9 COPD, MODERATE (HCC): ICD-10-CM

## 2023-07-24 DIAGNOSIS — I48.91 ATRIAL FIBRILLATION, UNSPECIFIED TYPE (HCC): ICD-10-CM

## 2023-07-24 DIAGNOSIS — R91.8 MULTIPLE PULMONARY NODULES DETERMINED BY COMPUTED TOMOGRAPHY OF LUNG: ICD-10-CM

## 2023-07-24 LAB
INR PPP: 2.98 (ref 0.84–1.19)
PROTHROMBIN TIME: 31.3 SECONDS (ref 11.6–14.5)

## 2023-07-24 PROCEDURE — 85610 PROTHROMBIN TIME: CPT

## 2023-07-24 PROCEDURE — 99214 OFFICE O/P EST MOD 30 MIN: CPT | Performed by: INTERNAL MEDICINE

## 2023-07-24 PROCEDURE — 36415 COLL VENOUS BLD VENIPUNCTURE: CPT

## 2023-07-24 NOTE — ASSESSMENT & PLAN NOTE
Compliance report not available today. However patient reports adherence to PAP therapy and states it does benefit him.

## 2023-07-24 NOTE — ASSESSMENT & PLAN NOTE
Patient has moderate COPD though possibly asthma COPD overlap syndrome. Most recent pulmonary function tests were performed in March 2019 and did show a significant bronchodilator response. He has benefited from [facility] but still has some wheezing. Plan  Continue Trelegy Ellipta. Given sample of high-dose Trelegy today.   He will let me know if this is more effective than low-dose Trelegy  Continue albuterol  Follow-up in 6 months

## 2023-07-24 NOTE — ASSESSMENT & PLAN NOTE
There is an unclear etiology of the micronodularity and groundglass opacities. The most recent CT of the chest from July 2022 x-ray showed improvement. While diagnosis of sarcoidosis was entertained biopsies were not consistent with this (as there were no granulomas noted). Other considerations include inflammatory nodules, hypersensitivity pneumonitis, drug-induced lung disease, atypical infection. Fortunately patient is doing fairly well from a symptomatic standpoint and again there was radiographic improvement. I offered to refer the patient for repeat CT of the chest and to also satisfy lung cancer screening but he would like to defer at this time.     Plan   F/u in 6 months   Cont inhalers  Rediscuss possible survelliance imaging during next encounter

## 2023-07-24 NOTE — PROGRESS NOTES
Pulmonary Follow Up Note   Alicia Teague 59 y.o. male MRN: [id number]  7/24/2023      Assessment:    Obstructive sleep apnea  Compliance report not available today. However patient reports adherence to PAP therapy and states it does benefit him. Multiple pulmonary nodules determined by computed tomography of lung  There is an unclear etiology of the micronodularity and groundglass opacities. The most recent CT of the chest from July 2022 x-ray showed improvement. While diagnosis of sarcoidosis was entertained biopsies were not consistent with this (as there were no granulomas noted). Other considerations include inflammatory nodules, hypersensitivity pneumonitis, drug-induced lung disease, atypical infection. Fortunately patient is doing fairly well from a symptomatic standpoint and again there was radiographic improvement. I offered to refer the patient for repeat CT of the chest and to also satisfy lung cancer screening but he would like to defer at this time. Plan   F/u in 6 months   Cont inhalers  Rediscuss possible survelliance imaging during next encounter     COPD, moderate ([address])  Patient has moderate COPD though possibly asthma COPD overlap syndrome. Most recent pulmonary function tests were performed in March 2019 and did show a significant bronchodilator response. He has benefited from [facility] but still has some wheezing. Plan  Continue Trelegy Ellipta. Given sample of high-dose Trelegy today.   He will let me know if this is more effective than low-dose Trelegy  Continue albuterol  Follow-up in 6 months    Morbid obesity with BMI of 50.0-59.9, adult (HCC)  Weight loss through dietary modification and self-paced exercise is encouraged      Plan:    Diagnoses and all orders for this visit:    Obstructive sleep apnea    Multiple pulmonary nodules determined by computed tomography of lung    COPD, moderate (HCC)    SOB (shortness of breath)    Morbid obesity with BMI of 50.0-59.9, adult [facility])        No follow-ups on file. History of Present Illness   HPI:  Karla Cerda is a 59 y.o. male who presents to the pulmonary office for follow-up of previously abnormal imaging that was potentially concerning for sarcoidosis. Did undergo EBUS with FNA that was nondiagnostic. States overall his breathing has been pretty good. Has not changed. He uses Trelegy Ellipta. He does not use albuterol every day. States he uses it depending on his level of activity. He has not had any recent hospitalizations. He states that he smokes off-and-on. He has smoked since the age of 11years old. Previously drove liver a truck (propane, fuel oill). Review of Systems   Constitutional: Negative for chills, fatigue and fever. HENT: Negative for congestion, nosebleeds, postnasal drip, rhinorrhea, sinus pressure and sore throat. Eyes: Negative for discharge, redness and itching. Respiratory: Positive for shortness of breath and wheezing. Negative for cough, choking, chest tightness and stridor. Cardiovascular: Negative for chest pain, palpitations and leg swelling. Gastrointestinal: Negative for blood in stool. Genitourinary: Negative for difficulty urinating and dysuria. Musculoskeletal: Negative for arthralgias, joint swelling and myalgias. Skin: Negative for color change and rash. Neurological: Negative for light-headedness and headaches. Hematological: Negative for adenopathy.        Historical Information   Past Medical History:   Diagnosis Date   • Asthma     last assessed 8/21/12   • Athscl heart disease of native coronary artery w/o ang pctrs    • Atrial fibrillation ([address])     last assessed 8/21/12   • Chronic diastolic (congestive) heart failure (HCC)    • Closed fracture of fibula     last assessed 10/18/13   • COLD (chronic obstructive lung disease) ([address])     last assessed 8/21/12   • Coronary angioplasty status    • CPAP (continuous positive airway pressure) dependence • Dyslipidemia associated with type 2 diabetes mellitus ([address])    • Hyperlipidemia    • Hypertension    • Presence of prosthetic heart valve    • Respiratory failure with hypoxia ([address]) 8/18/2021     Past Surgical History:   Procedure Laterality Date   • AORTIC VALVE REPLACEMENT  10/07/2016    AVR replacement, mechanical   • CORONARY ANGIOPLASTY WITH STENT PLACEMENT  07/29/2010    EF 40%, Successful bare metal stent mid RCA   • MENISCECTOMY Left      Family History   Problem Relation Age of Onset   • Atrial fibrillation Mother    • Diabetes type II Mother    • Heart attack Father         acute   • Heart failure Father    • Stroke Father         syndrome         Meds/Allergies     Current Outpatient Medications:   •  albuterol (2.5 mg/3 mL) 0.083 % nebulizer solution, Take 3 mL (2.5 mg total) by nebulization every 6 (six) hours as needed for wheezing or shortness of breath, Disp: 360 mL, Rfl: 2  •  albuterol (Ventolin HFA) 90 mcg/act inhaler, Inhale 2 puffs every 6 (six) hours as needed for shortness of breath Must be brand necessary, Disp: 18 g, Rfl: 3  •  atorvastatin (LIPITOR) 10 mg tablet, Take 1 tablet (10 mg total) by mouth daily, Disp: 90 tablet, Rfl: 3  •  benazepril (LOTENSIN) 10 mg tablet, Take 1 tablet (10 mg total) by mouth daily, Disp: 90 tablet, Rfl: 3  •  diltiazem (CARDIZEM CD) 240 mg 24 hr capsule, Take 1 capsule by mouth twice daily, Disp: 180 capsule, Rfl: 3  •  fluticasone-umeclidinium-vilanterol (TRELEGY) 100-62.5-25 MCG/INH inhaler, Inhale 1 puff daily Rinse mouth after use., Disp: 2 each, Rfl: 5  •  furosemide (LASIX) 80 mg tablet, Take 1 tablet (80 mg total) by mouth daily, Disp: 90 tablet, Rfl: 3  •  insulin aspart (NovoLOG FlexPen) 100 UNIT/ML injection pen, Inject 14 units with breakfast and lunch and 36 units with dinner., Disp: 45 mL, Rfl: 1  •  insulin degludec Zoraida Even FlexTouch) 200 units/mL CONCENTRATED U-200 injection pen, Inject 66 Units under the skin daily, Disp: 27 mL, Rfl: 1  • Insulin Pen Needle (BD Pen Needle Fany U/F) 32G X 4 MM MISC, Inject as directed 4 (four) times a day, Disp: 400 each, Rfl: 5  •  metFORMIN (GLUCOPHAGE) 1000 MG tablet, Take 1 tablet (1,000 mg total) by mouth 2 (two) times a day, Disp: 180 tablet, Rfl: 3  •  metoprolol tartrate (LOPRESSOR) 50 mg tablet, Take 1 tablet (50 mg total) by mouth 2 (two) times a day, Disp: 180 tablet, Rfl: 3  •  nitroglycerin (NITROSTAT) 0.4 mg SL tablet, Place 1 tablet (0.4 mg total) under the tongue every 5 (five) minutes as needed for chest pain, Disp: 100 tablet, Rfl: 3  •  Potassium Chloride ER 20 MEQ TBCR, Take 1 tablet (20 mEq total) by mouth daily, Disp: 90 tablet, Rfl: 3  •  warfarin (COUMADIN) 10 mg tablet, Take 1 tablet (10 mg total) by mouth daily, Disp: 90 tablet, Rfl: 3  •  warfarin (COUMADIN) 2 mg tablet, Take 1 tablet (2 mg total) by mouth daily, Disp: 90 tablet, Rfl: 3  Allergies   Allergen Reactions   • Fluticasone-Salmeterol Palpitations       Vitals: Blood pressure 114/65, pulse 75, height 6' 1" (1.854 m), weight (!) 171 kg (378 lb), SpO2 93 %. Body mass index is 49.87 kg/m². Oxygen Therapy  SpO2: 93 %      Physical Exam  Physical Exam  Vitals reviewed. Constitutional:       General: He is not in acute distress. Appearance: He is well-developed. He is not ill-appearing, toxic-appearing or diaphoretic. HENT:      Head: Normocephalic and atraumatic. Right Ear: External ear normal.      Left Ear: External ear normal.      Nose: Nose normal. No congestion or rhinorrhea. Mouth/Throat:      Pharynx: No oropharyngeal exudate or posterior oropharyngeal erythema. Eyes:      General: No scleral icterus. Right eye: No discharge. Left eye: No discharge. Conjunctiva/sclera: Conjunctivae normal.      Pupils: Pupils are equal, round, and reactive to light. Neck:      Trachea: No tracheal deviation. Cardiovascular:      Rate and Rhythm: Normal rate and regular rhythm.       Heart sounds: Normal heart sounds. No murmur heard. No friction rub. No gallop. Pulmonary:      Effort: Pulmonary effort is normal.      Breath sounds: Normal breath sounds. No stridor. No wheezing or rales. Musculoskeletal:      Cervical back: Normal range of motion. Right lower leg: No edema. Left lower leg: No edema. Lymphadenopathy:      Head:      Right side of head: No submental, submandibular, preauricular or posterior auricular adenopathy. Left side of head: No submental, submandibular, preauricular or posterior auricular adenopathy. Cervical: No cervical adenopathy. Skin:     General: Skin is warm and dry. Findings: No lesion or rash. Neurological:      Mental Status: He is alert and oriented to person, place, and time. Labs: I have personally reviewed pertinent lab results. Lab Results   Component Value Date    WBC 8.57 01/13/2022    HGB 14.6 01/13/2022    HCT 44.4 01/13/2022    MCV 92 01/13/2022     01/13/2022     Lab Results   Component Value Date    GLUCOSE 119 09/01/2013    CALCIUM 9.2 05/09/2023     09/01/2013    K 4.3 05/09/2023    CO2 24 05/09/2023     05/09/2023    BUN 11 05/09/2023    CREATININE 0.78 05/09/2023     No results found for: "IGE"  Lab Results   Component Value Date    ALT 14 05/09/2023    AST 16 05/09/2023    ALKPHOS 81 05/09/2023    BILITOT 0.6 09/01/2013       FNA 11/2021  Final Diagnosis   A.& B. Lymph Node, Subcarinal: (Thin-Prep and smears)  Negative for malignancy. Benign bronchial cells. Numerous lymphocytes. Few macrophages.     Satisfactory for evaluation.        C. Lymph Node, 10R:  Negative for malignancy. Benign bronchial cells and lymphocytes         Imaging and other studies: I have personally reviewed pertinent reports. and I have personally reviewed pertinent films in PACS     CT chest 7/2022  LUNGS:  Interval improvement in upper lobe predominant micronodularity and groundglass opacities.   Persistent interlobular septal thickening. No bronchiectasis perceived. No tracheal or endobronchial lesion.     PLEURA:  Redemonstration of calcified pleural plaques. No pleural effusion.     HEART/GREAT VESSELS: Aortic valve replacement. Coronary artery calcification. No thoracic aortic aneurysm.     MEDIASTINUM AND YOEL:  Limited evaluation without IV contrast.  Nonspecific subcentimeter mediastinal lymph nodes without juan adenopathy.     CHEST WALL AND LOWER NECK:  Sternotomy wires.     VISUALIZED STRUCTURES IN THE UPPER ABDOMEN:  4 mm left renal calculus.     OSSEOUS STRUCTURES:  No acute fracture or destructive osseous lesion.     IMPRESSION:     The appearance of the lungs is improved since the CT scan from 10/19/2021 with decrease in micronodularity and groundglass opacities.       PFT 3/2019  Pulmonary function testing:   Results:  FEV1/FVC Ratio: 61 %  Forced Vital Capacity: 4.15 L    80 % predicted  FEV1: 2.54 L     69 % predicted  After administration of bronchodilator FEV1: 2.65L   72%     Lung volumes by body plethysmography:   Total Lung Capacity 83 % predicted   Residual volume 81 % predicted     DLCO corrected for patients hemoglobin level: 76 %     Interpretation:     • Moderate obstructive airflow defect      • There is significant airway response with the administration of bronchodilator per ATS standards     • Normal Lung volumes     • Normal diffusion capacity    Echo   Left Ventricle: Left ventricular cavity size is normal. Wall thickness is normal. The left ventricular ejection fraction is 50%. Systolic function is low normal.  •  Right Ventricle: Systolic function is low normal. Normal tricuspid annular plane systolic excursion (TAPSE) > 1.7 cm. •  Aortic Valve: There is a mechanical valve. The prosthetic valve appears to be functioning normally. There is mild regurgitation. The aortic valve has no significant stenosis. The aortic valve peak velocity is 2.52 m/s.  The aortic valve mean gradient is 15 mmHg.  •  Mitral Valve: There is moderate annular calcification. There is mild regurgitation. •  Tricuspid Valve: There is mild regurgitation. •  Pericardium: There is no pericardial effusion. Jonny Weeks M.D.   [facility]

## 2023-07-26 DIAGNOSIS — J43.9 PULMONARY EMPHYSEMA, UNSPECIFIED EMPHYSEMA TYPE (HCC): ICD-10-CM

## 2023-07-26 RX ORDER — ALBUTEROL SULFATE 90 UG/1
2 AEROSOL, METERED RESPIRATORY (INHALATION) EVERY 6 HOURS PRN
Qty: 18 G | Refills: 3 | Status: SHIPPED | OUTPATIENT
Start: 2023-07-26

## 2023-08-01 DIAGNOSIS — I48.91 ATRIAL FIBRILLATION, UNSPECIFIED TYPE (HCC): ICD-10-CM

## 2023-08-01 DIAGNOSIS — I10 ESSENTIAL HYPERTENSION: ICD-10-CM

## 2023-08-01 DIAGNOSIS — I25.10 CORONARY ARTERY DISEASE INVOLVING NATIVE CORONARY ARTERY OF NATIVE HEART WITHOUT ANGINA PECTORIS: ICD-10-CM

## 2023-08-01 DIAGNOSIS — I42.8 OTHER CARDIOMYOPATHY (HCC): ICD-10-CM

## 2023-08-01 DIAGNOSIS — I10 HYPERTENSION, UNSPECIFIED TYPE: ICD-10-CM

## 2023-08-01 RX ORDER — DILTIAZEM HYDROCHLORIDE 240 MG/1
240 CAPSULE, COATED, EXTENDED RELEASE ORAL 2 TIMES DAILY
Qty: 180 CAPSULE | Refills: 3 | Status: SHIPPED | OUTPATIENT
Start: 2023-08-01

## 2023-08-01 RX ORDER — POTASSIUM CHLORIDE 1500 MG/1
20 TABLET, EXTENDED RELEASE ORAL DAILY
Qty: 90 TABLET | Refills: 3 | Status: SHIPPED | OUTPATIENT
Start: 2023-08-01

## 2023-08-01 RX ORDER — FUROSEMIDE 80 MG
80 TABLET ORAL DAILY
Qty: 90 TABLET | Refills: 3 | Status: SHIPPED | OUTPATIENT
Start: 2023-08-01

## 2023-08-21 DIAGNOSIS — J44.9 COPD, MODERATE (HCC): ICD-10-CM

## 2023-08-28 DIAGNOSIS — I10 ESSENTIAL HYPERTENSION: ICD-10-CM

## 2023-08-28 RX ORDER — METOPROLOL TARTRATE 50 MG/1
50 TABLET, FILM COATED ORAL 2 TIMES DAILY
Qty: 180 TABLET | Refills: 3 | Status: SHIPPED | OUTPATIENT
Start: 2023-08-28

## 2023-09-18 NOTE — ASSESSMENT & PLAN NOTE
/64  Continue current regimen 
Continue CPAP 
Patient's control is improving  Discussed adding GLP 1 Ozempic to assist with A1c reduction, weight loss, and cardiac protection  Discussed addition of SGL T2 for hemoglobin A1c reduction and renal protection  At this time, patient would prefer to have another 3 months in which he can focus on diet and exercise  Continue current regimen  Check hemoglobin A1c in 3 months prior to next appointment  Hemoglobin A1c goal is less than 7%    Lab Results   Component Value Date    HGBA1C 8 0 (H) 04/28/2021
No

## 2023-09-25 ENCOUNTER — APPOINTMENT (OUTPATIENT)
Dept: LAB | Facility: HOSPITAL | Age: 58
End: 2023-09-25
Payer: MEDICARE

## 2023-09-25 ENCOUNTER — ANTICOAG VISIT (OUTPATIENT)
Dept: FAMILY MEDICINE CLINIC | Facility: CLINIC | Age: 58
End: 2023-09-25

## 2023-09-25 DIAGNOSIS — I48.91 ATRIAL FIBRILLATION, UNSPECIFIED TYPE (HCC): Primary | ICD-10-CM

## 2023-09-25 LAB
INR PPP: 3.4 (ref 0.84–1.19)
PROTHROMBIN TIME: 33.6 SECONDS (ref 11.6–14.5)

## 2023-09-25 PROCEDURE — 36415 COLL VENOUS BLD VENIPUNCTURE: CPT

## 2023-09-25 PROCEDURE — 85610 PROTHROMBIN TIME: CPT

## 2023-10-05 ENCOUNTER — OFFICE VISIT (OUTPATIENT)
Dept: CARDIOLOGY CLINIC | Facility: CLINIC | Age: 58
End: 2023-10-05
Payer: MEDICARE

## 2023-10-05 VITALS
HEIGHT: 73 IN | HEART RATE: 60 BPM | WEIGHT: 315 LBS | SYSTOLIC BLOOD PRESSURE: 100 MMHG | DIASTOLIC BLOOD PRESSURE: 68 MMHG | BODY MASS INDEX: 41.75 KG/M2

## 2023-10-05 DIAGNOSIS — I10 ESSENTIAL HYPERTENSION: ICD-10-CM

## 2023-10-05 DIAGNOSIS — G47.33 OBSTRUCTIVE SLEEP APNEA: ICD-10-CM

## 2023-10-05 DIAGNOSIS — Z95.5 H/O HEART ARTERY STENT: ICD-10-CM

## 2023-10-05 DIAGNOSIS — E78.2 MIXED HYPERLIPIDEMIA: ICD-10-CM

## 2023-10-05 DIAGNOSIS — Z95.2 S/P AVR: Primary | ICD-10-CM

## 2023-10-05 DIAGNOSIS — I48.20 CHRONIC ATRIAL FIBRILLATION (HCC): ICD-10-CM

## 2023-10-05 PROCEDURE — 99214 OFFICE O/P EST MOD 30 MIN: CPT | Performed by: INTERNAL MEDICINE

## 2023-10-05 RX ORDER — NITROGLYCERIN 0.4 MG/1
0.4 TABLET SUBLINGUAL
Qty: 100 TABLET | Refills: 3 | Status: SHIPPED | OUTPATIENT
Start: 2023-10-05

## 2023-10-05 NOTE — PROGRESS NOTES
Subjective:        Patient ID: Brittany Doran is a 59 y.o. male. Chief Complaint:  Redell Patella is here for routine follow-up, offers no alarming complaints. Denies any chest pains or alarming palpitations, he has quit smoking again now for nearly 2 months, is breathing a bit easier. No unusual edema, sometimes he skips a day or 2 and he realizes it then goes back on it regularly and feels back to his baseline. He is compliant with his CPAP, has not seen a general dentist in some time does have some of his own teeth left. The following portions of the patient's history were reviewed and updated as appropriate: allergies, current medications, past family history, past medical history, past social history, past surgical history and problem list.  Review of Systems   Constitutional: Negative for chills, diaphoresis, malaise/fatigue and weight gain. HENT: Negative for nosebleeds and stridor. Eyes: Negative for double vision, vision loss in left eye, vision loss in right eye and visual disturbance. Cardiovascular: Positive for dyspnea on exertion and leg swelling. Negative for chest pain, claudication, cyanosis, irregular heartbeat, near-syncope, orthopnea, palpitations, paroxysmal nocturnal dyspnea and syncope. Respiratory: Positive for wheezing. Negative for cough, shortness of breath and snoring. Endocrine: Negative for polydipsia, polyphagia and polyuria. Hematologic/Lymphatic: Negative for bleeding problem. Does not bruise/bleed easily. Skin: Negative for flushing and rash. Musculoskeletal: Negative for falls and myalgias. Gastrointestinal: Negative for abdominal pain, heartburn, hematemesis, hematochezia, melena and nausea. Genitourinary: Negative for hematuria. Neurological: Negative for brief paralysis, dizziness, focal weakness, headaches, light-headedness, loss of balance and vertigo. Psychiatric/Behavioral: Negative for altered mental status and substance abuse. Allergic/Immunologic: Negative for hives. All of his review of symptoms positives are stable if not slightly improved. Objective:      /68   Pulse 60   Ht 6' 1" (1.854 m)   Wt (!) 172 kg (379 lb)   BMI 50.00 kg/m²   Physical Exam  Constitutional:       General: He is not in acute distress. Appearance: He is well-developed. He is not diaphoretic. HENT:      Head: Normocephalic and atraumatic. Eyes:      General: No scleral icterus. Pupils: Pupils are equal, round, and reactive to light. Neck:      Thyroid: No thyromegaly. Vascular: No JVD. Cardiovascular:      Rate and Rhythm: Normal rate. Rhythm irregular. Heart sounds: No murmur heard. No friction rub. No gallop. Comments: Crisp prosthetic click with no alarming outflow or diastolic murmur  Pulmonary:      Effort: Pulmonary effort is normal. No respiratory distress. Breath sounds: No stridor. Wheezing present. No rales. Comments: Very faint end expiratory wheezing bilaterally  Abdominal:      General: Bowel sounds are normal. There is no distension. Palpations: Abdomen is soft. There is no mass. Tenderness: There is no abdominal tenderness. Musculoskeletal:         General: No deformity. Normal range of motion. Cervical back: Normal range of motion and neck supple. Skin:     General: Skin is warm and dry. Coloration: Skin is not pale. Findings: No erythema. Neurological:      Mental Status: He is alert and oriented to person, place, and time. Mental status is at baseline. Coordination: Coordination normal.   Psychiatric:         Mood and Affect: Mood normal.         Behavior: Behavior normal.         Lab Review:   Orders Only on 09/25/2023   Component Date Value   • Protime 09/25/2023 33.6 (H)    • INR 09/25/2023 3.40 (H)      No results found. Assessment:       1. S/P AVR  CBC and differential      2.  H/O heart artery stent  nitroglycerin (NITROSTAT) 0.4 mg SL tablet      3. Chronic atrial fibrillation ([address])        4. Essential hypertension        5. Obstructive sleep apnea        6. Mixed hyperlipidemia             Plan:       Arnie Chinchilla has no signs nor symptoms reminiscent of unstable angina, decompensated heart failure, nor electrical instability. A-fib is rate controlled and properly anticoagulated on warfarin therapy, goal INR 2.5-3.5 in light of mechanical AVR and A-fib. Blood pressure is well controlled, diuretic therapy keeping him nearly euvolemic. Auscultation of his mechanical prosthesis is unremarkable, echo earlier this year showed normal prosthetic valve function and preserved EF. He is compliant with CPAP which I encouraged and applauded. He is on proper statin therapy, endocrinology already has appropriate blood work follow-up ordered with his winter visit, I added a CBC for completeness. At his request though he is not needed to use any I refilled his nitroglycerin as his current supply is over 3years old. I reiterated the need for SBE antibiotic prophylaxis prior to apical dental procedures. I strongly recommended he visit with a general dentist routinely. Did warn him about poor dentition and valvular heart disease and the chance of a significant valve infection. I will see him back in 6 months, asked him to call sooner with any concerning potential cardiac symptoms.

## 2023-10-18 DIAGNOSIS — E11.42 POORLY CONTROLLED TYPE 2 DIABETES MELLITUS WITH PERIPHERAL NEUROPATHY (HCC): ICD-10-CM

## 2023-10-18 DIAGNOSIS — E11.65 POORLY CONTROLLED TYPE 2 DIABETES MELLITUS WITH PERIPHERAL NEUROPATHY (HCC): ICD-10-CM

## 2023-10-18 RX ORDER — INSULIN DEGLUDEC 200 U/ML
66 INJECTION, SOLUTION SUBCUTANEOUS DAILY
Qty: 27 ML | Refills: 1 | OUTPATIENT
Start: 2023-10-18 | End: 2024-03-30

## 2023-10-19 ENCOUNTER — TELEPHONE (OUTPATIENT)
Dept: ENDOCRINOLOGY | Facility: CLINIC | Age: 58
End: 2023-10-19

## 2023-10-19 DIAGNOSIS — E11.42 POORLY CONTROLLED TYPE 2 DIABETES MELLITUS WITH PERIPHERAL NEUROPATHY (HCC): ICD-10-CM

## 2023-10-19 DIAGNOSIS — E11.65 POORLY CONTROLLED TYPE 2 DIABETES MELLITUS WITH PERIPHERAL NEUROPATHY (HCC): ICD-10-CM

## 2023-10-19 RX ORDER — INSULIN DEGLUDEC 200 U/ML
66 INJECTION, SOLUTION SUBCUTANEOUS DAILY
Qty: 27 ML | Refills: 1 | Status: SHIPPED | OUTPATIENT
Start: 2023-10-19 | End: 2024-03-31

## 2023-10-19 NOTE — TELEPHONE ENCOUNTER
Name of medication/s patient is requesting to be refilled tresiba    Medication dosage 66    How often is medication being taken once a day    Is patient requesting 30 or 90 day supply, 90 day supply    Name of Pharmacy [facility] in [facility]    Last Visit 6/9/2023  Next Visit 12/8/2023

## 2023-10-30 ENCOUNTER — ANTICOAG VISIT (OUTPATIENT)
Dept: FAMILY MEDICINE CLINIC | Facility: CLINIC | Age: 58
End: 2023-10-30

## 2023-10-30 ENCOUNTER — APPOINTMENT (OUTPATIENT)
Dept: LAB | Facility: HOSPITAL | Age: 58
End: 2023-10-30
Payer: MEDICARE

## 2023-10-30 DIAGNOSIS — E11.65 TYPE 2 DIABETES MELLITUS WITH HYPERGLYCEMIA, WITH LONG-TERM CURRENT USE OF INSULIN (HCC): ICD-10-CM

## 2023-10-30 DIAGNOSIS — I48.91 ATRIAL FIBRILLATION, UNSPECIFIED TYPE (HCC): ICD-10-CM

## 2023-10-30 DIAGNOSIS — Z79.4 TYPE 2 DIABETES MELLITUS WITH HYPERGLYCEMIA, WITH LONG-TERM CURRENT USE OF INSULIN (HCC): ICD-10-CM

## 2023-10-30 DIAGNOSIS — J44.9 COPD, MODERATE (HCC): ICD-10-CM

## 2023-10-30 LAB
ALBUMIN SERPL BCP-MCNC: 4 G/DL (ref 3.5–5)
ALP SERPL-CCNC: 78 U/L (ref 34–104)
ALT SERPL W P-5'-P-CCNC: 12 U/L (ref 7–52)
ANION GAP SERPL CALCULATED.3IONS-SCNC: 9 MMOL/L
AST SERPL W P-5'-P-CCNC: 17 U/L (ref 13–39)
BASOPHILS # BLD AUTO: 0.06 THOUSANDS/ÂΜL (ref 0–0.1)
BASOPHILS NFR BLD AUTO: 1 % (ref 0–1)
BILIRUB SERPL-MCNC: 0.68 MG/DL (ref 0.2–1)
BUN SERPL-MCNC: 13 MG/DL (ref 5–25)
CALCIUM SERPL-MCNC: 9.3 MG/DL (ref 8.4–10.2)
CHLORIDE SERPL-SCNC: 101 MMOL/L (ref 96–108)
CHOLEST SERPL-MCNC: 109 MG/DL
CO2 SERPL-SCNC: 26 MMOL/L (ref 21–32)
CREAT SERPL-MCNC: 0.76 MG/DL (ref 0.6–1.3)
EOSINOPHIL # BLD AUTO: 0.24 THOUSAND/ÂΜL (ref 0–0.61)
EOSINOPHIL NFR BLD AUTO: 3 % (ref 0–6)
ERYTHROCYTE [DISTWIDTH] IN BLOOD BY AUTOMATED COUNT: 13.7 % (ref 11.6–15.1)
GFR SERPL CREATININE-BSD FRML MDRD: 99 ML/MIN/1.73SQ M
GLUCOSE SERPL-MCNC: 91 MG/DL (ref 65–140)
HCT VFR BLD AUTO: 43.1 % (ref 36.5–49.3)
HDLC SERPL-MCNC: 29 MG/DL
HGB BLD-MCNC: 14.3 G/DL (ref 12–17)
IMM GRANULOCYTES # BLD AUTO: 0.02 THOUSAND/UL (ref 0–0.2)
IMM GRANULOCYTES NFR BLD AUTO: 0 % (ref 0–2)
INR PPP: 3.79 (ref 0.84–1.19)
LDLC SERPL CALC-MCNC: 61 MG/DL (ref 0–100)
LYMPHOCYTES # BLD AUTO: 1.69 THOUSANDS/ÂΜL (ref 0.6–4.47)
LYMPHOCYTES NFR BLD AUTO: 20 % (ref 14–44)
MCH RBC QN AUTO: 30.7 PG (ref 26.8–34.3)
MCHC RBC AUTO-ENTMCNC: 33.2 G/DL (ref 31.4–37.4)
MCV RBC AUTO: 93 FL (ref 82–98)
MONOCYTES # BLD AUTO: 0.56 THOUSAND/ÂΜL (ref 0.17–1.22)
MONOCYTES NFR BLD AUTO: 7 % (ref 4–12)
NEUTROPHILS # BLD AUTO: 5.98 THOUSANDS/ÂΜL (ref 1.85–7.62)
NEUTS SEG NFR BLD AUTO: 69 % (ref 43–75)
NRBC BLD AUTO-RTO: 0 /100 WBCS
PLATELET # BLD AUTO: 212 THOUSANDS/UL (ref 149–390)
PMV BLD AUTO: 9.1 FL (ref 8.9–12.7)
POTASSIUM SERPL-SCNC: 3.9 MMOL/L (ref 3.5–5.3)
PROT SERPL-MCNC: 7.1 G/DL (ref 6.4–8.4)
PROTHROMBIN TIME: 36.5 SECONDS (ref 11.6–14.5)
RBC # BLD AUTO: 4.66 MILLION/UL (ref 3.88–5.62)
SODIUM SERPL-SCNC: 136 MMOL/L (ref 135–147)
TRIGL SERPL-MCNC: 95 MG/DL
WBC # BLD AUTO: 8.55 THOUSAND/UL (ref 4.31–10.16)

## 2023-10-30 PROCEDURE — 36415 COLL VENOUS BLD VENIPUNCTURE: CPT

## 2023-10-30 PROCEDURE — 80053 COMPREHEN METABOLIC PANEL: CPT

## 2023-10-30 PROCEDURE — 85610 PROTHROMBIN TIME: CPT

## 2023-10-30 PROCEDURE — 83036 HEMOGLOBIN GLYCOSYLATED A1C: CPT

## 2023-10-30 PROCEDURE — 80061 LIPID PANEL: CPT

## 2023-10-31 LAB
EST. AVERAGE GLUCOSE BLD GHB EST-MCNC: 114 MG/DL
HBA1C MFR BLD: 5.6 %

## 2023-11-15 ENCOUNTER — ANTICOAG VISIT (OUTPATIENT)
Dept: FAMILY MEDICINE CLINIC | Facility: CLINIC | Age: 58
End: 2023-11-15

## 2023-11-15 ENCOUNTER — APPOINTMENT (OUTPATIENT)
Dept: LAB | Facility: HOSPITAL | Age: 58
End: 2023-11-15
Payer: MEDICARE

## 2023-11-15 DIAGNOSIS — I48.91 ATRIAL FIBRILLATION, UNSPECIFIED TYPE (HCC): ICD-10-CM

## 2023-11-15 LAB
INR PPP: 3.59 (ref 0.84–1.19)
PROTHROMBIN TIME: 35.1 SECONDS (ref 11.6–14.5)

## 2023-11-15 PROCEDURE — 36415 COLL VENOUS BLD VENIPUNCTURE: CPT

## 2023-11-15 PROCEDURE — 85610 PROTHROMBIN TIME: CPT

## 2023-11-30 ENCOUNTER — OFFICE VISIT (OUTPATIENT)
Dept: FAMILY MEDICINE CLINIC | Facility: CLINIC | Age: 58
End: 2023-11-30
Payer: MEDICARE

## 2023-11-30 ENCOUNTER — APPOINTMENT (OUTPATIENT)
Dept: LAB | Facility: HOSPITAL | Age: 58
End: 2023-11-30
Payer: MEDICARE

## 2023-11-30 VITALS
BODY MASS INDEX: 41.75 KG/M2 | SYSTOLIC BLOOD PRESSURE: 110 MMHG | OXYGEN SATURATION: 94 % | TEMPERATURE: 97 F | WEIGHT: 315 LBS | HEIGHT: 73 IN | DIASTOLIC BLOOD PRESSURE: 74 MMHG | HEART RATE: 89 BPM

## 2023-11-30 DIAGNOSIS — Z12.5 PROSTATE CANCER SCREENING: ICD-10-CM

## 2023-11-30 DIAGNOSIS — I48.91 ATRIAL FIBRILLATION, UNSPECIFIED TYPE (HCC): ICD-10-CM

## 2023-11-30 DIAGNOSIS — Z79.4 TYPE 2 DIABETES MELLITUS WITH HYPERGLYCEMIA, WITH LONG-TERM CURRENT USE OF INSULIN (HCC): ICD-10-CM

## 2023-11-30 DIAGNOSIS — Z23 ENCOUNTER FOR IMMUNIZATION: ICD-10-CM

## 2023-11-30 DIAGNOSIS — Z00.00 MEDICARE ANNUAL WELLNESS VISIT, SUBSEQUENT: Primary | ICD-10-CM

## 2023-11-30 DIAGNOSIS — E11.65 TYPE 2 DIABETES MELLITUS WITH HYPERGLYCEMIA, WITH LONG-TERM CURRENT USE OF INSULIN (HCC): ICD-10-CM

## 2023-11-30 LAB
INR PPP: 2.77 (ref 0.84–1.19)
LEFT EYE DIABETIC RETINOPATHY: ABNORMAL
LEFT EYE IMAGE QUALITY: ABNORMAL
LEFT EYE MACULAR EDEMA: ABNORMAL
LEFT EYE OTHER RETINOPATHY: ABNORMAL
PROTHROMBIN TIME: 28.7 SECONDS (ref 11.6–14.5)
RIGHT EYE DIABETIC RETINOPATHY: ABNORMAL
RIGHT EYE IMAGE QUALITY: ABNORMAL
RIGHT EYE MACULAR EDEMA: ABNORMAL
RIGHT EYE OTHER RETINOPATHY: ABNORMAL
SEVERITY (EYE EXAM): ABNORMAL

## 2023-11-30 PROCEDURE — G0008 ADMIN INFLUENZA VIRUS VAC: HCPCS | Performed by: FAMILY MEDICINE

## 2023-11-30 PROCEDURE — G0439 PPPS, SUBSEQ VISIT: HCPCS | Performed by: FAMILY MEDICINE

## 2023-11-30 PROCEDURE — 90686 IIV4 VACC NO PRSV 0.5 ML IM: CPT | Performed by: FAMILY MEDICINE

## 2023-11-30 PROCEDURE — 92250 FUNDUS PHOTOGRAPHY W/I&R: CPT | Performed by: FAMILY MEDICINE

## 2023-11-30 PROCEDURE — 85610 PROTHROMBIN TIME: CPT

## 2023-11-30 PROCEDURE — 36415 COLL VENOUS BLD VENIPUNCTURE: CPT

## 2023-11-30 NOTE — PATIENT INSTRUCTIONS
Medicare Preventive Visit Patient Instructions  Thank you for completing your Welcome to Medicare Visit or Medicare Annual Wellness Visit today. Your next wellness visit will be due in one year (11/30/2024). The screening/preventive services that you may require over the next 5-10 years are detailed below. Some tests may not apply to you based off risk factors and/or age. Screening tests ordered at today's visit but not completed yet may show as past due. Also, please note that scanned in results may not display below. Preventive Screenings:  Service Recommendations Previous Testing/Comments   Colorectal Cancer Screening  Colonoscopy    Fecal Occult Blood Test (FOBT)/Fecal Immunochemical Test (FIT)  Fecal DNA/Cologuard Test  Flexible Sigmoidoscopy Age: 43-73 years old   Colonoscopy: every 10 years (May be performed more frequently if at higher risk)  OR  FOBT/FIT: every 1 year  OR  Cologuard: every 3 years  OR  Sigmoidoscopy: every 5 years  Screening may be recommended earlier than age 39 if at higher risk for colorectal cancer. Also, an individualized decision between you and your healthcare provider will decide whether screening between the ages of 77-80 would be appropriate.  Colonoscopy: Not on file  FOBT/FIT: Not on file  Cologuard: Not on file  Sigmoidoscopy: Not on file          Prostate Cancer Screening Individualized decision between patient and health care provider in men between ages of 53-66   Medicare will cover every 12 months beginning on the day after your 50th birthday PSA: 0.2 ng/mL           Hepatitis C Screening Once for adults born between 1945 and 1965  More frequently in patients at high risk for Hepatitis C Hep C Antibody: Not on file        Diabetes Screening 1-2 times per year if you're at risk for diabetes or have pre-diabetes Fasting glucose: 154 mg/dL (7/2/2019)  A1C: 5.6 % (10/30/2023)  Screening Not Indicated  History Diabetes   Cholesterol Screening Once every 5 years if you don't have a lipid disorder. May order more often based on risk factors. Lipid panel: 10/30/2023  Screening Not Indicated  History Lipid Disorder      Other Preventive Screenings Covered by Medicare:  Abdominal Aortic Aneurysm (AAA) Screening: covered once if your at risk. You're considered to be at risk if you have a family history of AAA or a male between the age of 70-76 who smoking at least 100 cigarettes in your lifetime. Lung Cancer Screening: covers low dose CT scan once per year if you meet all of the following conditions: (1) Age 48-67; (2) No signs or symptoms of lung cancer; (3) Current smoker or have quit smoking within the last 15 years; (4) You have a tobacco smoking history of at least 20 pack years (packs per day x number of years you smoked); (5) You get a written order from a healthcare provider. Glaucoma Screening: covered annually if you're considered high risk: (1) You have diabetes OR (2) Family history of glaucoma OR (3)  aged 48 and older OR (3)  American aged 72 and older  Osteoporosis Screening: covered every 2 years if you meet one of the following conditions: (1) Have a vertebral abnormality; (2) On glucocorticoid therapy for more than 3 months; (3) Have primary hyperparathyroidism; (4) On osteoporosis medications and need to assess response to drug therapy. HIV Screening: covered annually if you're between the age of 14-79. Also covered annually if you are younger than 13 and older than 72 with risk factors for HIV infection. For pregnant patients, it is covered up to 3 times per pregnancy.     Immunizations:  Immunization Recommendations   Influenza Vaccine Annual influenza vaccination during flu season is recommended for all persons aged >= 6 months who do not have contraindications   Pneumococcal Vaccine   * Pneumococcal conjugate vaccine = PCV13 (Prevnar 13), PCV15 (Vaxneuvance), PCV20 (Prevnar 20)  * Pneumococcal polysaccharide vaccine = PPSV23 (Pneumovax) Adults 45-23 yo with certain risk factors or if 69+ yo  If never received any pneumonia vaccine: recommend Prevnar 21 (PCV20)  Give PCV20 if previously received 1 dose of PCV13 or PPSV23   Hepatitis B Vaccine 3 dose series if at intermediate or high risk (ex: diabetes, end stage renal disease, liver disease)   Respiratory syncytial virus (RSV) Vaccine - COVERED BY MEDICARE PART D  * RSVPreF3 (Arexvy) CDC recommends that adults 61years of age and older may receive a single dose of RSV vaccine using shared clinical decision-making (SCDM)   Tetanus (Td) Vaccine - COST NOT COVERED BY MEDICARE PART B Following completion of primary series, a booster dose should be given every 10 years to maintain immunity against tetanus. Td may also be given as tetanus wound prophylaxis. Tdap Vaccine - COST NOT COVERED BY MEDICARE PART B Recommended at least once for all adults. For pregnant patients, recommended with each pregnancy. Shingles Vaccine (Shingrix) - COST NOT COVERED BY MEDICARE PART B  2 shot series recommended in those 23 years and older who have or will have weakened immune systems or those 50 years and older     Health Maintenance Due:      Topic Date Due   • Hepatitis C Screening  Never done   • HIV Screening  Never done   • Colorectal Cancer Screening  Never done     Immunizations Due:      Topic Date Due   • COVID-19 Vaccine (3 - Moderna series) 06/23/2021   • Influenza Vaccine (1) 09/01/2023     Advance Directives   What are advance directives? Advance directives are legal documents that state your wishes and plans for medical care. These plans are made ahead of time in case you lose your ability to make decisions for yourself. Advance directives can apply to any medical decision, such as the treatments you want, and if you want to donate organs. What are the types of advance directives? There are many types of advance directives, and each state has rules about how to use them.  You may choose a combination of any of the following:  Living will: This is a written record of the treatment you want. You can also choose which treatments you do not want, which to limit, and which to stop at a certain time. This includes surgery, medicine, IV fluid, and tube feedings. Durable power of  for healthcare [facility]): This is a written record that states who you want to make healthcare choices for you when you are unable to make them for yourself. This person, called a proxy, is usually a family member or a friend. You may choose more than 1 proxy. Do not resuscitate (DNR) order:  A DNR order is used in case your heart stops beating or you stop breathing. It is a request not to have certain forms of treatment, such as CPR. A DNR order may be included in other types of advance directives. Medical directive: This covers the care that you want if you are in a coma, near death, or unable to make decisions for yourself. You can list the treatments you want for each condition. Treatment may include pain medicine, surgery, blood transfusions, dialysis, IV or tube feedings, and a ventilator (breathing machine). Values history: This document has questions about your views, beliefs, and how you feel and think about life. This information can help others choose the care that you would choose. Why are advance directives important? An advance directive helps you control your care. Although spoken wishes may be used, it is better to have your wishes written down. Spoken wishes can be misunderstood, or not followed. Treatments may be given even if you do not want them. An advance directive may make it easier for your family to make difficult choices about your care. Weight Management   Why it is important to manage your weight:  Being overweight increases your risk of health conditions such as heart disease, high blood pressure, type 2 diabetes, and certain types of cancer.  It can also increase your risk for osteoarthritis, sleep apnea, and other respiratory problems. Aim for a slow, steady weight loss. Even a small amount of weight loss can lower your risk of health problems. How to lose weight safely:  A safe and healthy way to lose weight is to eat fewer calories and get regular exercise. You can lose up about 1 pound a week by decreasing the number of calories you eat by 500 calories each day. Healthy meal plan for weight management:  A healthy meal plan includes a variety of foods, contains fewer calories, and helps you stay healthy. A healthy meal plan includes the following:  Eat whole-grain foods more often. A healthy meal plan should contain fiber. Fiber is the part of grains, fruits, and vegetables that is not broken down by your body. Whole-grain foods are healthy and provide extra fiber in your diet. Some examples of whole-grain foods are whole-wheat breads and pastas, oatmeal, brown rice, and bulgur. Eat a variety of vegetables every day. Include dark, leafy greens such as spinach, kale, oly greens, and mustard greens. Eat yellow and orange vegetables such as carrots, sweet potatoes, and winter squash. Eat a variety of fruits every day. Choose fresh or canned fruit (canned in its own juice or light syrup) instead of juice. Fruit juice has very little or no fiber. Eat low-fat dairy foods. Drink fat-free (skim) milk or 1% milk. Eat fat-free yogurt and low-fat cottage cheese. Try low-fat cheeses such as mozzarella and other reduced-fat cheeses. Choose meat and other protein foods that are low in fat. Choose beans or other legumes such as split peas or lentils. Choose fish, skinless poultry (chicken or turkey), or lean cuts of red meat (beef or pork). Before you cook meat or poultry, cut off any visible fat. Use less fat and oil. Try baking foods instead of frying them. Add less fat, such as margarine, sour cream, regular salad dressing and mayonnaise to foods. Eat fewer high-fat foods.  Some examples of high-fat foods include french fries, doughnuts, ice cream, and cakes. Eat fewer sweets. Limit foods and drinks that are high in sugar. This includes candy, cookies, regular soda, and sweetened drinks. Exercise:  Exercise at least 30 minutes per day on most days of the week. Some examples of exercise include walking, biking, dancing, and swimming. You can also fit in more physical activity by taking the stairs instead of the elevator or parking farther away from stores. Ask your healthcare provider about the best exercise plan for you. © Copyright [employer] 2018 Information is for End User's use only and may not be sold, redistributed or otherwise used for commercial purposes. All illustrations and images included in CareNotes® are the copyrighted property of [employer] or [facility] Preventive Visit Patient Instructions  Thank you for completing your Welcome to Medicare Visit or Medicare Annual Wellness Visit today. Your next wellness visit will be due in one year (11/30/2024). The screening/preventive services that you may require over the next 5-10 years are detailed below. Some tests may not apply to you based off risk factors and/or age. Screening tests ordered at today's visit but not completed yet may show as past due. Also, please note that scanned in results may not display below. Preventive Screenings:  Service Recommendations Previous Testing/Comments   Colorectal Cancer Screening  Colonoscopy    Fecal Occult Blood Test (FOBT)/Fecal Immunochemical Test (FIT)  Fecal DNA/Cologuard Test  Flexible Sigmoidoscopy Age: 43-73 years old   Colonoscopy: every 10 years (May be performed more frequently if at higher risk)  OR  FOBT/FIT: every 1 year  OR  Cologuard: every 3 years  OR  Sigmoidoscopy: every 5 years  Screening may be recommended earlier than age 39 if at higher risk for colorectal cancer.  Also, an individualized decision between you and your healthcare provider will decide whether screening between the ages of 77-80 would be appropriate. Colonoscopy: Not on file  FOBT/FIT: Not on file  Cologuard: Not on file  Sigmoidoscopy: Not on file          Prostate Cancer Screening Individualized decision between patient and health care provider in men between ages of 53-66   Medicare will cover every 12 months beginning on the day after your 50th birthday PSA: 0.2 ng/mL           Hepatitis C Screening Once for adults born between 1945 and 1965  More frequently in patients at high risk for Hepatitis C Hep C Antibody: Not on file        Diabetes Screening 1-2 times per year if you're at risk for diabetes or have pre-diabetes Fasting glucose: 154 mg/dL (7/2/2019)  A1C: 5.6 % (10/30/2023)  Screening Not Indicated  History Diabetes   Cholesterol Screening Once every 5 years if you don't have a lipid disorder. May order more often based on risk factors. Lipid panel: 10/30/2023  Screening Not Indicated  History Lipid Disorder      Other Preventive Screenings Covered by Medicare:  Abdominal Aortic Aneurysm (AAA) Screening: covered once if your at risk. You're considered to be at risk if you have a family history of AAA or a male between the age of 70-76 who smoking at least 100 cigarettes in your lifetime. Lung Cancer Screening: covers low dose CT scan once per year if you meet all of the following conditions: (1) Age 48-67; (2) No signs or symptoms of lung cancer; (3) Current smoker or have quit smoking within the last 15 years; (4) You have a tobacco smoking history of at least 20 pack years (packs per day x number of years you smoked); (5) You get a written order from a healthcare provider.   Glaucoma Screening: covered annually if you're considered high risk: (1) You have diabetes OR (2) Family history of glaucoma OR (3)  aged 48 and older OR (3)  American aged 72 and older  Osteoporosis Screening: covered every 2 years if you meet one of the following conditions: (1) Have a vertebral abnormality; (2) On glucocorticoid therapy for more than 3 months; (3) Have primary hyperparathyroidism; (4) On osteoporosis medications and need to assess response to drug therapy. HIV Screening: covered annually if you're between the age of 14-79. Also covered annually if you are younger than 13 and older than 72 with risk factors for HIV infection. For pregnant patients, it is covered up to 3 times per pregnancy. Immunizations:  Immunization Recommendations   Influenza Vaccine Annual influenza vaccination during flu season is recommended for all persons aged >= 6 months who do not have contraindications   Pneumococcal Vaccine   * Pneumococcal conjugate vaccine = PCV13 (Prevnar 13), PCV15 (Vaxneuvance), PCV20 (Prevnar 20)  * Pneumococcal polysaccharide vaccine = PPSV23 (Pneumovax) Adults 21-52 yo with certain risk factors or if 69+ yo  If never received any pneumonia vaccine: recommend Prevnar 20 (PCV20)  Give PCV20 if previously received 1 dose of PCV13 or PPSV23   Hepatitis B Vaccine 3 dose series if at intermediate or high risk (ex: diabetes, end stage renal disease, liver disease)   Respiratory syncytial virus (RSV) Vaccine - COVERED BY MEDICARE PART D  * RSVPreF3 (Arexvy) CDC recommends that adults 61years of age and older may receive a single dose of RSV vaccine using shared clinical decision-making (SCDM)   Tetanus (Td) Vaccine - COST NOT COVERED BY MEDICARE PART B Following completion of primary series, a booster dose should be given every 10 years to maintain immunity against tetanus. Td may also be given as tetanus wound prophylaxis. Tdap Vaccine - COST NOT COVERED BY MEDICARE PART B Recommended at least once for all adults. For pregnant patients, recommended with each pregnancy.    Shingles Vaccine (Shingrix) - COST NOT COVERED BY MEDICARE PART B  2 shot series recommended in those 19 years and older who have or will have weakened immune systems or those 50 years and older     Health Maintenance Due:      Topic Date Due   • Hepatitis C Screening  Never done   • HIV Screening  Never done   • Colorectal Cancer Screening  Never done     Immunizations Due:      Topic Date Due   • COVID-19 Vaccine (3 - Moderna series) 06/23/2021   • Influenza Vaccine (1) 09/01/2023     Advance Directives   What are advance directives? Advance directives are legal documents that state your wishes and plans for medical care. These plans are made ahead of time in case you lose your ability to make decisions for yourself. Advance directives can apply to any medical decision, such as the treatments you want, and if you want to donate organs. What are the types of advance directives? There are many types of advance directives, and each state has rules about how to use them. You may choose a combination of any of the following:  Living will: This is a written record of the treatment you want. You can also choose which treatments you do not want, which to limit, and which to stop at a certain time. This includes surgery, medicine, IV fluid, and tube feedings. Durable power of  for [facility]): This is a written record that states who you want to make healthcare choices for you when you are unable to make them for yourself. This person, called a proxy, is usually a family member or a friend. You may choose more than 1 proxy. Do not resuscitate (DNR) order:  A DNR order is used in case your heart stops beating or you stop breathing. It is a request not to have certain forms of treatment, such as CPR. A DNR order may be included in other types of advance directives. Medical directive: This covers the care that you want if you are in a coma, near death, or unable to make decisions for yourself. You can list the treatments you want for each condition. Treatment may include pain medicine, surgery, blood transfusions, dialysis, IV or tube feedings, and a ventilator (breathing machine).   Values history: This document has questions about your views, beliefs, and how you feel and think about life. This information can help others choose the care that you would choose. Why are advance directives important? An advance directive helps you control your care. Although spoken wishes may be used, it is better to have your wishes written down. Spoken wishes can be misunderstood, or not followed. Treatments may be given even if you do not want them. An advance directive may make it easier for your family to make difficult choices about your care. Weight Management   Why it is important to manage your weight:  Being overweight increases your risk of health conditions such as heart disease, high blood pressure, type 2 diabetes, and certain types of cancer. It can also increase your risk for osteoarthritis, sleep apnea, and other respiratory problems. Aim for a slow, steady weight loss. Even a small amount of weight loss can lower your risk of health problems. How to lose weight safely:  A safe and healthy way to lose weight is to eat fewer calories and get regular exercise. You can lose up about 1 pound a week by decreasing the number of calories you eat by 500 calories each day. Healthy meal plan for weight management:  A healthy meal plan includes a variety of foods, contains fewer calories, and helps you stay healthy. A healthy meal plan includes the following:  Eat whole-grain foods more often. A healthy meal plan should contain fiber. Fiber is the part of grains, fruits, and vegetables that is not broken down by your body. Whole-grain foods are healthy and provide extra fiber in your diet. Some examples of whole-grain foods are whole-wheat breads and pastas, oatmeal, brown rice, and bulgur. Eat a variety of vegetables every day. Include dark, leafy greens such as spinach, kale, oly greens, and mustard greens. Eat yellow and orange vegetables such as carrots, sweet potatoes, and winter squash.    Eat a variety of fruits every day. Choose fresh or canned fruit (canned in its own juice or light syrup) instead of juice. Fruit juice has very little or no fiber. Eat low-fat dairy foods. Drink fat-free (skim) milk or 1% milk. Eat fat-free yogurt and low-fat cottage cheese. Try low-fat cheeses such as mozzarella and other reduced-fat cheeses. Choose meat and other protein foods that are low in fat. Choose beans or other legumes such as split peas or lentils. Choose fish, skinless poultry (chicken or turkey), or lean cuts of red meat (beef or pork). Before you cook meat or poultry, cut off any visible fat. Use less fat and oil. Try baking foods instead of frying them. Add less fat, such as margarine, sour cream, regular salad dressing and mayonnaise to foods. Eat fewer high-fat foods. Some examples of high-fat foods include french fries, doughnuts, ice cream, and cakes. Eat fewer sweets. Limit foods and drinks that are high in sugar. This includes candy, cookies, regular soda, and sweetened drinks. Exercise:  Exercise at least 30 minutes per day on most days of the week. Some examples of exercise include walking, biking, dancing, and swimming. You can also fit in more physical activity by taking the stairs instead of the elevator or parking farther away from stores. Ask your healthcare provider about the best exercise plan for you. © Copyright [employer] 2018 Information is for End User's use only and may not be sold, redistributed or otherwise used for commercial purposes.  All illustrations and images included in CareNotes® are the copyrighted property of [employer] Inc[employer] or  [employer]

## 2023-11-30 NOTE — PROGRESS NOTES
Assessment and Plan:   Flu shot given today. We will call him with the results of his PT/INR that he had drawn today. I ordered a PSA to get done with his next PT/INR. Follow-up with specialist as scheduled. Follow-up here in 6 months or as needed. Problem List Items Addressed This Visit        Endocrine    Type 2 diabetes mellitus with hyperglycemia, with long-term current use of insulin ([address])    Relevant Orders    [facility] Diabetic eye exam   Other Visit Diagnoses     Medicare annual wellness visit, subsequent    -  Primary    Encounter for immunization        Relevant Orders    influenza vaccine, quadrivalent, 0.5 mL, preservative-free, for adult and pediatric patients 6 mos+ (AFLURIA, FLUARIX, FLULAVAL, FLUZONE) (Completed)    Prostate cancer screening        Relevant Orders    PSA, Total Screen           Preventive health issues were discussed with patient, and age appropriate screening tests were ordered as noted in patient's After Visit Summary. Personalized health advice and appropriate referrals for health education or preventive services given if needed, as noted in patient's After Visit Summary. History of Present Illness:     Patient presents for a Medicare Wellness Visit    Medicare well visit. Patient denies any chest pain. Still gets winded with exertion, this is not new for him. He is still a non-smoker. No new concerns       Patient Care Team:  Lou Hardin DO as PCP - General  CHRISTINE Rouse as PCP - Endocrinology (Endocrinology)  MD Nanda Jensen as Nurse Practitioner (Endocrinology)     Review of Systems:     Review of Systems   Constitutional: Negative. Respiratory: Negative. Cardiovascular: Negative. Gastrointestinal: Negative. Genitourinary: Negative.          Problem List:     Patient Active Problem List   Diagnosis   • Aortic stenosis   • Atrial fibrillation (HCC)   • COPD with acute exacerbation (HCC)   • Type 2 diabetes mellitus with hyperglycemia, with long-term current use of insulin ([facility])   • Other hyperlipidemia   • Essential hypertension   • S/P AVR   • Obstructive sleep apnea   • Restless leg syndrome   • Hypersomnia   • Morbid obesity with BMI of 50.0-59.9, adult ([facility])   • PLMD (periodic limb movement disorder)   • Palpitations   • Acute exacerbation of chronic obstructive bronchitis ([facility])   • Multiple pulmonary nodules determined by computed tomography of lung   • Chronic pain of left knee   • Seasonal allergic rhinitis   • COPD, moderate ([facility])   • SOB (shortness of breath)      Past Medical and Surgical History:     Past Medical History:   Diagnosis Date   • Asthma     last assessed 8/21/12   • Athscl heart disease of native coronary artery w/o ang pctrs    • Atrial fibrillation ([address])     last assessed 8/21/12   • Chronic diastolic (congestive) heart failure ([facility])    • Closed fracture of fibula     last assessed 10/18/13   • COLD (chronic obstructive lung disease) ([address])     last assessed 8/21/12   • Coronary angioplasty status    • CPAP (continuous positive airway pressure) dependence    • Dyslipidemia associated with type 2 diabetes mellitus     • Hyperlipidemia    • Hypertension    • Presence of prosthetic heart valve    • Respiratory failure with hypoxia ([address]) 8/18/2021     Past Surgical History:   Procedure Laterality Date   • AORTIC VALVE REPLACEMENT  10/07/2016    AVR replacement, mechanical   • CORONARY ANGIOPLASTY WITH STENT PLACEMENT  07/29/2010    EF 40%, Successful bare metal stent mid RCA   • MENISCECTOMY Left       Family History:     Family History   Problem Relation Age of Onset   • Atrial fibrillation Mother    • Diabetes type II Mother    • Heart attack Father         acute   • Heart failure Father    • Stroke Father         syndrome      Social History:     Social History     Socioeconomic History   • Marital status: /Civil Union     Spouse name: None   • Number of children: None   • Years of education: None   • Highest education level: None   Occupational History   • None   Tobacco Use   • Smoking status: Former     Years: 35.00     Types: Cigarettes     Quit date: 10/2016     Years since quittin.1   • Smokeless tobacco: Never   • Tobacco comments:     Quit 2 months ago   Vaping Use   • Vaping Use: Never used   Substance and Sexual Activity   • Alcohol use: Not Currently   • Drug use: Never   • Sexual activity: None   Other Topics Concern   • None   Social History Narrative    Always uses seatbelt    Daily caffeine    No living will     Social Determinants of Health     Financial Resource Strain: Medium Risk (2023)    Overall Financial Resource Strain (CARDIA)    • Difficulty of Paying Living Expenses: Somewhat hard   Food Insecurity: Not on file   Transportation Needs: No Transportation Needs (2023)    PRAPARE - Transportation    • Lack of Transportation (Medical): No    • Lack of Transportation (Non-Medical):  No   Physical Activity: Not on file   Stress: Not on file   Social Connections: Not on file   Intimate Partner Violence: Not on file   Housing Stability: Not on file      Medications and Allergies:     Current Outpatient Medications   Medication Sig Dispense Refill   • albuterol (2.5 mg/3 mL) 0.083 % nebulizer solution Take 3 mL (2.5 mg total) by nebulization every 6 (six) hours as needed for wheezing or shortness of breath 360 mL 2   • albuterol (Ventolin HFA) 90 mcg/act inhaler Inhale 2 puffs every 6 (six) hours as needed for shortness of breath Must be brand necessary 18 g 3   • atorvastatin (LIPITOR) 10 mg tablet Take 1 tablet (10 mg total) by mouth daily 90 tablet 3   • benazepril (LOTENSIN) 10 mg tablet Take 1 tablet (10 mg total) by mouth daily 90 tablet 3   • diltiazem (CARDIZEM CD) 240 mg 24 hr capsule Take 1 capsule (240 mg total) by mouth 2 (two) times a day 180 capsule 3   • fluticasone-umeclidinium-vilanterol 100-62.5-25 mcg/actuation inhaler Inhale 1 puff daily Rinse mouth after use. 120 blister 1   • furosemide (LASIX) 80 mg tablet Take 1 tablet (80 mg total) by mouth daily 90 tablet 3   • insulin aspart (NovoLOG FlexPen) 100 UNIT/ML injection pen Inject 14 units with breakfast and lunch and 36 units with dinner. 45 mL 1   • insulin degludec Fajardo Emmy FlexTouch) 200 units/mL CONCENTRATED U-200 injection pen Inject 66 Units under the skin daily 27 mL 1   • Insulin Pen Needle (BD Pen Needle Fany U/F) 32G X 4 MM MISC Inject as directed 4 (four) times a day 400 each 5   • metFORMIN (GLUCOPHAGE) 1000 MG tablet Take 1 tablet (1,000 mg total) by mouth 2 (two) times a day 180 tablet 3   • metoprolol tartrate (LOPRESSOR) 50 mg tablet Take 1 tablet (50 mg total) by mouth 2 (two) times a day 180 tablet 3   • Potassium Chloride ER 20 MEQ TBCR Take 1 tablet (20 mEq total) by mouth daily 90 tablet 3   • warfarin (COUMADIN) 10 mg tablet Take 1 tablet (10 mg total) by mouth daily 90 tablet 3   • warfarin (COUMADIN) 2 mg tablet Take 1 tablet (2 mg total) by mouth daily 90 tablet 3   • nitroglycerin (NITROSTAT) 0.4 mg SL tablet Place 1 tablet (0.4 mg total) under the tongue every 5 (five) minutes as needed for chest pain (Patient not taking: Reported on 11/30/2023) 100 tablet 3     No current facility-administered medications for this visit.      Allergies   Allergen Reactions   • Fluticasone-Salmeterol Palpitations      Immunizations:     Immunization History   Administered Date(s) Administered   • COVID-19 MODERNA VACC 0.5 ML IM 03/26/2021, 04/28/2021   • INFLUENZA 10/13/2016, 10/14/2016, 10/23/2017, 10/25/2018   • Influenza Quadrivalent Preservative Free 3 years and older IM 11/03/2014, 10/14/2016, 10/23/2017   • Influenza, injectable, quadrivalent, preservative free 0.5 mL 11/30/2023   • Influenza, recombinant, quadrivalent,injectable, preservative free 10/25/2018, 10/28/2019, 10/29/2020, 11/02/2021, 11/28/2022   • Influenza, seasonal, injectable 01/03/2013, 12/16/2013   • Pneumococcal Conjugate 13-Valent 11/02/2021   • Pneumococcal Polysaccharide PPV23 12/16/2013, 10/25/2018      Health Maintenance:         Topic Date Due   • Hepatitis C Screening  Never done   • HIV Screening  Never done   • Colorectal Cancer Screening  Never done         Topic Date Due   • COVID-19 Vaccine (3 - Moderna series) 06/23/2021      Medicare Screening Tests and Risk Assessments:     Marry Fairbanks is here for his Subsequent Wellness visit. Last Medicare Wellness visit information reviewed, patient interviewed and updates made to the record today. Health Risk Assessment:   Patient rates overall health as fair. Patient feels that their physical health rating is same. Patient is satisfied with their life. Eyesight was rated as same. Hearing was rated as same. Patient feels that their emotional and mental health rating is same. Patients states they are sometimes angry. Patient states they are often unusually tired/fatigued. Pain experienced in the last 7 days has been some. Patient's pain rating has been 5/10. Patient states that he has experienced no weight loss or gain in last 6 months. Fall Risk Screening: In the past year, patient has experienced: no history of falling in past year      Home Safety:  Patient has trouble with stairs inside or outside of their home. Patient has working smoke alarms and has working carbon monoxide detector. Home safety hazards include: none. Nutrition:   Current diet is Diabetic. Medications:   Patient is not currently taking any over-the-counter supplements. Patient is able to manage medications. Activities of Daily Living (ADLs)/Instrumental Activities of Daily Living (IADLs):   Walk and transfer into and out of bed and chair?: Yes  Dress and groom yourself?: Yes    Bathe or shower yourself?: Yes    Feed yourself?  Yes  Do your laundry/housekeeping?: Yes  Manage your money, pay your bills and track your expenses?: Yes  Make your own meals?: Yes    Do your own shopping?: Yes    Previous Hospitalizations:   Any hospitalizations or ED visits within the last 12 months?: No      Advance Care Planning:   Living will: No    Advanced directive counseling given: Yes      Cognitive Screening:   Provider or family/friend/caregiver concerned regarding cognition?: No    PREVENTIVE SCREENINGS      Cardiovascular Screening:    General: Screening Not Indicated and History Lipid Disorder      Diabetes Screening:     General: Screening Not Indicated and History Diabetes      Abdominal Aortic Aneurysm (AAA) Screening:    Risk factors include: tobacco use        Lung Cancer Screening:     General: Screening Not Indicated    Screening, Brief Intervention, and Referral to Treatment (SBIRT)    Screening  Typical number of drinks in a day: 0  Typical number of drinks in a week: 0  Interpretation: Low risk drinking behavior. Single Item Drug Screening:  How often have you used an illegal drug (including marijuana) or a prescription medication for non-medical reasons in the past year? never    Single Item Drug Screen Score: 0  Interpretation: Negative screen for possible drug use disorder    No results found. Physical Exam:     /74   Pulse 89   Temp (!) 97 °F (36.1 °C)   Ht 6' 1" (1.854 m)   Wt (!) 170 kg (375 lb 3.2 oz)   SpO2 94%   BMI 49.50 kg/m²     Physical Exam  Vitals reviewed. Constitutional:       General: He is not in acute distress. Appearance: Normal appearance. He is well-developed. He is not diaphoretic. HENT:      Head: Normocephalic and atraumatic. Eyes:      Conjunctiva/sclera: Conjunctivae normal.   Cardiovascular:      Rate and Rhythm: Normal rate. Rhythm irregular. Pulses: no weak pulses          Dorsalis pedis pulses are 2+ on the right side and 2+ on the left side. Posterior tibial pulses are 2+ on the right side and 2+ on the left side. Heart sounds: Normal heart sounds. No murmur heard. No friction rub. No gallop.    Pulmonary: Effort: Pulmonary effort is normal. No respiratory distress. Breath sounds: Normal breath sounds. No wheezing, rhonchi or rales. Musculoskeletal:      Right lower leg: No edema. Left lower leg: No edema. Feet:      Right foot:      Skin integrity: No ulcer, skin breakdown, erythema, warmth, callus or dry skin. Left foot:      Skin integrity: No ulcer, skin breakdown, erythema, warmth, callus or dry skin. Neurological:      General: No focal deficit present. Mental Status: He is alert and oriented to person, place, and time. Psychiatric:         Mood and Affect: Mood normal.         Behavior: Behavior normal.         Thought Content: Thought content normal.         Judgment: Judgment normal.        Patient's shoes and socks removed. Right Foot/Ankle   Right Foot Inspection  Skin Exam: skin normal and skin intact. No dry skin, no warmth, no callus, no erythema, no maceration, no abnormal color, no pre-ulcer, no ulcer and no callus. Sensory   Monofilament testing: intact    Vascular  The right DP pulse is 2+. The right PT pulse is 2+. Left Foot/Ankle  Left Foot Inspection  Skin Exam: skin normal and skin intact. No dry skin, no warmth, no erythema, no maceration, normal color, no pre-ulcer, no ulcer and no callus. Sensory   Monofilament testing: intact    Vascular  The left DP pulse is 2+. The left PT pulse is 2+.      Assign Risk Category  No deformity present  No loss of protective sensation  No weak pulses  Risk: 0     Jayla Barba DO

## 2023-12-01 ENCOUNTER — TELEPHONE (OUTPATIENT)
Dept: FAMILY MEDICINE CLINIC | Facility: CLINIC | Age: 58
End: 2023-12-01

## 2023-12-01 ENCOUNTER — ANTICOAG VISIT (OUTPATIENT)
Dept: FAMILY MEDICINE CLINIC | Facility: CLINIC | Age: 58
End: 2023-12-01

## 2023-12-06 NOTE — PROGRESS NOTES
Established Patient Progress Note      Chief Complaint   Patient presents with    Diabetes Type 2        Impression & Plan:    Problem List Items Addressed This Visit          Endocrine    Type 2 diabetes mellitus with hyperglycemia, with long-term current use of insulin ([address]) - Primary     Patient remains very well-controlled. Continue current regimen. Continue with healthy diet and regular physical activity. I again offered patient a continuous glucose monitor. He declines. He would prefer to continue with fingersticks. Patient is to notify me with persistent hyperglycemia or episodes of hypoglycemia. Follow-up in 6 months. Lab Results   Component Value Date    HGBA1C 5.6 10/30/2023          Relevant Medications    insulin aspart (NovoLOG FlexPen) 100 UNIT/ML injection pen    Other Relevant Orders    Hemoglobin A1C    Albumin / creatinine urine ratio    Basic metabolic panel       Respiratory    COPD with acute exacerbation (HCC)     Stable. Denies recent exacerbations. Cardiovascular and Mediastinum    Atrial fibrillation ([address])     Rate controlled. Continue beta-blocker and Coumadin. Essential hypertension     BP well-controlled at 100/68. Continue current regimen. Other    Other hyperlipidemia     Very well-controlled. Continue 10 mg of atorvastatin nightly            Orders Placed This Encounter   Procedures    Hemoglobin A1C     Standing Status:   Future     Standing Expiration Date:   12/8/2024    Albumin / creatinine urine ratio     Standing Status:   Future     Standing Expiration Date:   44/0/3826    Basic metabolic panel     This is a patient instruction: Patient fasting for 8 hours or longer recommended.      Standing Status:   Future     Standing Expiration Date:   12/8/2024       History of Present Illness:   Brijesh Costa is a 59 y.o. male with hypertension, hyperlipidemia, obstructive sleep apnea, and type 2 diabetes with long term use of insulin since 7660. Reports complications of  neuropathy. Denies recent illness or hospitalizations. Denies recent severe hypoglycemic or severe hyperglycemic episodes. Denies any issues with his current regimen. home glucose monitoring: are performed regularly, 2-4 x daily     At patient's last appointment, no adjustments were made to his regimen as he was well controlled. A1C has remained stable. Patient reports feeling well. He has no questions or concerns today. Hemoglobin A1C   Latest Ref Rng Normal 4.0-5.6%; PreDiabetic 5.7-6.4%; Diabetic >=6.5%; Glycemic control for adults with diabetes <7.0% %   11/18/2022 5.7    5/9/2023 5.6    10/30/2023 5.6       eAG, EST AVG Glucose   Latest Ref Rng mg/dl   11/18/2022 5/9/2023 114    10/30/2023 114         eGFR   Latest Ref Rng ml/min/1.73sq m   5/9/2023 98    10/30/2023 99          Home blood glucose readings: None for review. Reports 2 episodes of hypoglycemia with blood sugar in the 60s in the last 6 months. Current regimen:   Tresiba 66 units nightly  Novolog 14-14-36  Metformin 1,000 mg BID      Last Eye Exam: Appointment scheduled  Last Foot Exam: Up-to-date    For HLD, he is taking 10 mg of atorvastatin nightly. He denies myalgias. For HTN, he is taking 50 mg of metoprolol tartrate  twice daily, 240 mg of diltiazem twice daily, and 10 mg of benazepril daily.     Component      Latest Ref Rng 10/30/2023   Cholesterol      See Comment mg/dL 109    Triglycerides      See Comment mg/dL 95    HDL      >=40 mg/dL 29 (L)    LDL Calculated      0 - 100 mg/dL 61       Legend:  (L) Low  Patient Active Problem List   Diagnosis    Aortic stenosis    Atrial fibrillation (HCC)    COPD with acute exacerbation (HCC)    Type 2 diabetes mellitus with hyperglycemia, with long-term current use of insulin (HCC)    Other hyperlipidemia    Essential hypertension    S/P AVR    Obstructive sleep apnea    Restless leg syndrome    Hypersomnia    Morbid obesity with BMI of 50.0-59.9, adult ([address])    PLMD (periodic limb movement disorder)    Palpitations    Acute exacerbation of chronic obstructive bronchitis (HCC)    Multiple pulmonary nodules determined by computed tomography of lung    Chronic pain of left knee    Seasonal allergic rhinitis    COPD, moderate (HCC)    SOB (shortness of breath)      Past Medical History:   Diagnosis Date    Asthma     last assessed 12    Athscl heart disease of native coronary artery w/o ang pctrs     Atrial fibrillation ([address])     last assessed 12    Chronic diastolic (congestive) heart failure (HCC)     Closed fracture of fibula     last assessed 10/18/13    COLD (chronic obstructive lung disease) ([address])     last assessed 12    Coronary angioplasty status     CPAP (continuous positive airway pressure) dependence     Dyslipidemia associated with type 2 diabetes mellitus      Hyperlipidemia     Hypertension     Presence of prosthetic heart valve     Respiratory failure with hypoxia ([address]) 2021      Past Surgical History:   Procedure Laterality Date    AORTIC VALVE REPLACEMENT  10/07/2016    AVR replacement, mechanical    CORONARY ANGIOPLASTY WITH STENT PLACEMENT  2010    EF 40%, Successful bare metal stent mid RCA    MENISCECTOMY Left       Family History   Problem Relation Age of Onset    Atrial fibrillation Mother     Diabetes type II Mother     Heart attack Father         acute    Heart failure Father     Stroke Father         syndrome     Social History     Tobacco Use    Smoking status: Former     Years: 35.00     Types: Cigarettes     Quit date: 10/2016     Years since quittin.1    Smokeless tobacco: Never    Tobacco comments:     Quit 2 months ago   Substance Use Topics    Alcohol use: Not Currently     Allergies   Allergen Reactions    Fluticasone-Salmeterol Palpitations         Current Outpatient Medications:     albuterol (2.5 mg/3 mL) 0.083 % nebulizer solution, Take 3 mL (2.5 mg total) by nebulization every 6 (six) hours as needed for wheezing or shortness of breath, Disp: 360 mL, Rfl: 2    albuterol (Ventolin HFA) 90 mcg/act inhaler, Inhale 2 puffs every 6 (six) hours as needed for shortness of breath Must be brand necessary, Disp: 18 g, Rfl: 3    atorvastatin (LIPITOR) 10 mg tablet, Take 1 tablet (10 mg total) by mouth daily, Disp: 90 tablet, Rfl: 3    benazepril (LOTENSIN) 10 mg tablet, Take 1 tablet (10 mg total) by mouth daily, Disp: 90 tablet, Rfl: 3    diltiazem (CARDIZEM CD) 240 mg 24 hr capsule, Take 1 capsule (240 mg total) by mouth 2 (two) times a day, Disp: 180 capsule, Rfl: 3    fluticasone-umeclidinium-vilanterol 100-62.5-25 mcg/actuation inhaler, Inhale 1 puff daily Rinse mouth after use., Disp: 120 blister, Rfl: 1    furosemide (LASIX) 80 mg tablet, Take 1 tablet (80 mg total) by mouth daily, Disp: 90 tablet, Rfl: 3    insulin aspart (NovoLOG FlexPen) 100 UNIT/ML injection pen, Inject 14 units with breakfast and lunch and 36 units with dinner., Disp: 45 mL, Rfl: 1    insulin degludec (Tresiba FlexTouch) 200 units/mL CONCENTRATED U-200 injection pen, Inject 66 Units under the skin daily, Disp: 27 mL, Rfl: 1    Insulin Pen Needle (BD Pen Needle Fany U/F) 32G X 4 MM MISC, Inject as directed 4 (four) times a day, Disp: 400 each, Rfl: 5    metFORMIN (GLUCOPHAGE) 1000 MG tablet, Take 1 tablet (1,000 mg total) by mouth 2 (two) times a day, Disp: 180 tablet, Rfl: 3    metoprolol tartrate (LOPRESSOR) 50 mg tablet, Take 1 tablet (50 mg total) by mouth 2 (two) times a day, Disp: 180 tablet, Rfl: 3    Potassium Chloride ER 20 MEQ TBCR, Take 1 tablet (20 mEq total) by mouth daily, Disp: 90 tablet, Rfl: 3    warfarin (COUMADIN) 10 mg tablet, Take 1 tablet (10 mg total) by mouth daily, Disp: 90 tablet, Rfl: 3    warfarin (COUMADIN) 2 mg tablet, Take 1 tablet (2 mg total) by mouth daily (Patient taking differently: Take 2 mg by mouth 3 (three) times a week Taking 2mg, 3 times a week Monday, Wednesday, Friday), Disp: 90 tablet, Rfl: 3 nitroglycerin (NITROSTAT) 0.4 mg SL tablet, Place 1 tablet (0.4 mg total) under the tongue every 5 (five) minutes as needed for chest pain (Patient not taking: Reported on 11/30/2023), Disp: 100 tablet, Rfl: 3    Review of Systems   Constitutional:  Negative for activity change, appetite change, fatigue and unexpected weight change. HENT:  Negative for dental problem, sore throat, trouble swallowing and voice change. Eyes:  Negative for visual disturbance. Respiratory:  Negative for cough, chest tightness and shortness of breath. Cardiovascular:  Negative for chest pain, palpitations and leg swelling. Gastrointestinal:  Negative for constipation, diarrhea, nausea and vomiting. Endocrine: Negative for polydipsia, polyphagia and polyuria. Genitourinary:  Negative for frequency. Musculoskeletal:  Positive for arthralgias. Negative for back pain, gait problem and myalgias. Skin:  Negative for wound. Allergic/Immunologic: Negative for environmental allergies and food allergies. Neurological:  Positive for numbness. Negative for dizziness, weakness, light-headedness and headaches. Psychiatric/Behavioral:  Negative for decreased concentration, dysphoric mood and sleep disturbance. The patient is not nervous/anxious. Physical Exam:  Body mass index is 52.16 kg/m². /68 (BP Location: Left arm, Patient Position: Sitting, Cuff Size: Large)   Pulse 96   Temp 97.9 °F (36.6 °C) (Temporal)   Resp 16   Ht 5' 11" (1.803 m)   Wt (!) 170 kg (374 lb)   SpO2 96%   BMI 52.16 kg/m²    Wt Readings from Last 3 Encounters:   12/08/23 (!) 170 kg (374 lb)   11/30/23 (!) 170 kg (375 lb 3.2 oz)   10/05/23 (!) 172 kg (379 lb)       Physical Exam  Vitals reviewed. Constitutional:       General: He is not in acute distress. Appearance: He is well-developed. He is obese. He is not ill-appearing. HENT:      Head: Normocephalic and atraumatic.    Eyes:      Pupils: Pupils are equal, round, and reactive to light. Neck:      Thyroid: No thyromegaly. Cardiovascular:      Rate and Rhythm: Normal rate and regular rhythm. Pulses: Normal pulses. Heart sounds: Normal heart sounds. Pulmonary:      Effort: Pulmonary effort is normal.      Breath sounds: Wheezing present. Abdominal:      General: Bowel sounds are normal. There is no distension. Palpations: Abdomen is soft. Tenderness: There is no abdominal tenderness. Musculoskeletal:      Cervical back: Normal range of motion and neck supple. Right lower leg: No edema. Left lower leg: No edema. Lymphadenopathy:      Cervical: No cervical adenopathy. Skin:     General: Skin is warm and dry. Capillary Refill: Capillary refill takes less than 2 seconds. Neurological:      Mental Status: He is alert and oriented to person, place, and time. Gait: Gait normal.   Psychiatric:         Mood and Affect: Mood normal.         Behavior: Behavior normal.           Labs:   Lab Results   Component Value Date    HGBA1C 5.6 10/30/2023    HGBA1C 5.6 05/09/2023    HGBA1C 5.7 11/18/2022     Lab Results   Component Value Date    CREATININE 0.76 10/30/2023    CREATININE 0.78 05/09/2023    CREATININE 1.04 10/21/2022    BUN 13 10/30/2023     09/01/2013    K 3.9 10/30/2023     10/30/2023    CO2 26 10/30/2023     eGFR   Date Value Ref Range Status   10/30/2023 99 ml/min/1.73sq m Final     Lab Results   Component Value Date    CHOL 180 09/01/2013    HDL 29 (L) 10/30/2023    TRIG 95 10/30/2023     Lab Results   Component Value Date    ALT 12 10/30/2023    AST 17 10/30/2023    ALKPHOS 78 10/30/2023    BILITOT 0.6 09/01/2013     Lab Results   Component Value Date    JVZ1OSEFJJXP 3.159 01/15/2021    NNR4USTQPKTF 3.299 10/04/2018    NVA4AKBTRIPR 3.238 07/22/2016     No results found for: "Spero Curb", "TSI"          Discussed with the patient and all questioned fully answered. He will call me if any problems arise.     Follow-up appointment in 6 months. Counseled patient on diagnostic results, prognosis, risk and benefit of treatment options, instruction for management, importance of treatment compliance, Risk  factor reduction and impressions    There are no Patient Instructions on file for this visit.     [address] [place] [address],[address]

## 2023-12-08 ENCOUNTER — OFFICE VISIT (OUTPATIENT)
Dept: ENDOCRINOLOGY | Facility: CLINIC | Age: 58
End: 2023-12-08
Payer: MEDICARE

## 2023-12-08 VITALS
OXYGEN SATURATION: 96 % | BODY MASS INDEX: 44.1 KG/M2 | HEART RATE: 96 BPM | TEMPERATURE: 97.9 F | DIASTOLIC BLOOD PRESSURE: 68 MMHG | WEIGHT: 315 LBS | HEIGHT: 71 IN | RESPIRATION RATE: 16 BRPM | SYSTOLIC BLOOD PRESSURE: 100 MMHG

## 2023-12-08 DIAGNOSIS — I48.20 CHRONIC ATRIAL FIBRILLATION (HCC): ICD-10-CM

## 2023-12-08 DIAGNOSIS — I10 ESSENTIAL HYPERTENSION: ICD-10-CM

## 2023-12-08 DIAGNOSIS — Z79.4 TYPE 2 DIABETES MELLITUS WITH HYPERGLYCEMIA, WITH LONG-TERM CURRENT USE OF INSULIN (HCC): Primary | ICD-10-CM

## 2023-12-08 DIAGNOSIS — E78.49 OTHER HYPERLIPIDEMIA: ICD-10-CM

## 2023-12-08 DIAGNOSIS — J44.1 COPD WITH ACUTE EXACERBATION (HCC): ICD-10-CM

## 2023-12-08 DIAGNOSIS — E11.65 TYPE 2 DIABETES MELLITUS WITH HYPERGLYCEMIA, WITH LONG-TERM CURRENT USE OF INSULIN (HCC): Primary | ICD-10-CM

## 2023-12-08 PROCEDURE — 99214 OFFICE O/P EST MOD 30 MIN: CPT | Performed by: NURSE PRACTITIONER

## 2023-12-08 RX ORDER — INSULIN ASPART 100 [IU]/ML
INJECTION, SOLUTION INTRAVENOUS; SUBCUTANEOUS
Qty: 45 ML | Refills: 1 | Status: SHIPPED | OUTPATIENT
Start: 2023-12-08

## 2023-12-08 NOTE — ASSESSMENT & PLAN NOTE
Patient remains very well-controlled. Continue current regimen. Continue with healthy diet and regular physical activity. I again offered patient a continuous glucose monitor. He declines. He would prefer to continue with fingersticks. Patient is to notify me with persistent hyperglycemia or episodes of hypoglycemia. Follow-up in 6 months.   Lab Results   Component Value Date    HGBA1C 5.6 10/30/2023

## 2024-01-03 ENCOUNTER — APPOINTMENT (OUTPATIENT)
Dept: LAB | Facility: HOSPITAL | Age: 59
End: 2024-01-03
Payer: MEDICARE

## 2024-01-03 ENCOUNTER — ANTICOAG VISIT (OUTPATIENT)
Dept: FAMILY MEDICINE CLINIC | Facility: CLINIC | Age: 59
End: 2024-01-03

## 2024-01-03 DIAGNOSIS — I48.91 ATRIAL FIBRILLATION, UNSPECIFIED TYPE (HCC): ICD-10-CM

## 2024-01-03 LAB
INR PPP: 2.58 (ref 0.84–1.19)
PROTHROMBIN TIME: 27.2 SECONDS (ref 11.6–14.5)
PSA SERPL-MCNC: 0.42 NG/ML (ref 0–4)

## 2024-01-03 PROCEDURE — 85610 PROTHROMBIN TIME: CPT

## 2024-01-03 PROCEDURE — 36415 COLL VENOUS BLD VENIPUNCTURE: CPT

## 2024-01-29 ENCOUNTER — OFFICE VISIT (OUTPATIENT)
Dept: PULMONOLOGY | Facility: CLINIC | Age: 59
End: 2024-01-29
Payer: MEDICARE

## 2024-01-29 VITALS
HEART RATE: 74 BPM | BODY MASS INDEX: 44.1 KG/M2 | SYSTOLIC BLOOD PRESSURE: 111 MMHG | DIASTOLIC BLOOD PRESSURE: 70 MMHG | OXYGEN SATURATION: 95 % | TEMPERATURE: 97.2 F | WEIGHT: 315 LBS | HEIGHT: 71 IN

## 2024-01-29 DIAGNOSIS — G47.33 OBSTRUCTIVE SLEEP APNEA: ICD-10-CM

## 2024-01-29 DIAGNOSIS — E66.01 MORBID OBESITY WITH BMI OF 50.0-59.9, ADULT (HCC): ICD-10-CM

## 2024-01-29 DIAGNOSIS — R91.8 MULTIPLE PULMONARY NODULES DETERMINED BY COMPUTED TOMOGRAPHY OF LUNG: ICD-10-CM

## 2024-01-29 DIAGNOSIS — J44.1 COPD WITH ACUTE EXACERBATION (HCC): ICD-10-CM

## 2024-01-29 DIAGNOSIS — F17.211 CIGARETTE NICOTINE DEPENDENCE IN REMISSION: Primary | ICD-10-CM

## 2024-01-29 PROCEDURE — 99214 OFFICE O/P EST MOD 30 MIN: CPT | Performed by: INTERNAL MEDICINE

## 2024-01-29 NOTE — PROGRESS NOTES
Progress Note - Pulmonary   Santino Flores 58 y.o. male MRN: [id number]   Encounter: [id number]      Assessment/Plan:  Patient is 58-year-old male with past medical history significant for COPD, lung nodules, THIEN who presents for routine follow-up.  Overall, the patient reports he is doing quite well.  He is tolerating his CPAP without difficulty.  The patient reports his COPD is well-controlled.  He is using Trelegy 100 and only intermittently requiring his albuterol.  For his morbid obesity, patient reports his weight is approximately stable.    THIEN  -  reports good compliance  -  continue current settings    Nicotine dependence  -  quit 3 months ago  -  > 20 pack year history  -Continue lung cancer screening, patient request to have testing done in July    Lung nodules  -  stable    COPD  -  Trelegy 100  -  albuterol    Morbid obesity  -  counseled on need for weight loss    1. Cigarette nicotine dependence in remission  -     CT lung screening program; Future; Expected date: 06/29/2024    2. COPD with acute exacerbation (HCC)    3. Obstructive sleep apnea    4. Multiple pulmonary nodules determined by computed tomography of lung    5. Morbid obesity with BMI of 50.0-59.9, adult (HCC)      Patient may follow up in 6 months or sooner as necessary.     Orders:  Orders Placed This Encounter   Procedures    CT lung screening program     Standing Status:   Future     Standing Expiration Date:   1/29/2025     Scheduling Instructions:      If possible wear clothing without any metal in the chest and abdomen area.  Sweat suit, shorts, sports bra or bra without underwire may eliminate the need to change. Please arrive 15 minutes prior to your appointment time to register. On the day of the test, please bring any prior CT or MRI studies of this area with you that were not performed at a [facility] facility.            This is now being billed through your insurance and if you have a copay, it is required at time of service.   Otherwise, you can go through .              To schedule this appointment, please contact Central Scheduling at [phone].           Order Specific Question:   What is the patient's sedation requirement? If Medication for Claustrophobia is selected, order medication at this point.     Answer:   No Sedation     Order Specific Question:   Does patient have a 20 pack year smoking history? (Equivalent to 1 pack of cigarettes per day for a year) IF NO, PATIENT IS NOT ELIGIBLE FOR THIS PROGRAM.     Answer:   Yes     Order Specific Question:   Has the patient been a current smoker or one who has quit smoking within the last 15 years? IF NO, PATIENT IS NOT ELIGIBLE FOR THIS PROGRAM.     Answer:   Yes     Order Specific Question:   Does the patient show any signs or symptoms of lung cancer?     Answer:   No     Order Specific Question:   Is this the first (baseline) CT or an annual exam?     Answer:   Annual [2]     Order Specific Question:   Release to patient through [employer]     Answer:   Immediate     Order Specific Question:   Reason for Exam (FREE TEXT)     Answer:   screening for lung cancer     Order Specific Question:   Is this a low dose CT or a routine CT?     Answer:   Low Dose CT [1]     Order Specific Question:   Is there documentation of shared decision making?     Answer:   Yes     I discussed with him that he is a candidate for lung cancer CT screening.     The following Shared Decision-Making points were covered:  Benefits of screening were discussed, including the rates of reduction in death from lung cancer and other causes.  Harms of screening were reviewed, including false positive tests, radiation exposure levels, risks of invasive procedures, risks of complications of screening, and risk of overdiagnosis.  I counseled on the importance of adherence to annual lung cancer LDCT screening, impact of co-morbidities, and ability or willingness to undergo diagnosis and treatment.  I  "counseled on the importance of maintaining abstinence as a former smoker or was counseled on the importance of smoking cessation if a current smoker    Review of Eligibility Criteria: He meets all of the criteria for Lung Cancer Screening.   He is 58 y.o.   He has 20 pack year tobacco history and is a current smoker or has quit within the past 15 years  He presents no signs or symptoms of lung cancer    After discussion, the patient decided to elect lung cancer screening.      Subjective:   The patient is wearing his CPAP each night. He has no concerns and feels rested during the day.      He quit smoking in 10/2023.  He has not had a cigarette in the past 3 months. He did it cold turkey.      He is taking Trelegy. He use his albuterol a couple of times per week.      He delivered heating oil and propane  He denies asbestos exposure.      He notes his weight is stable.  He will gain/lose weight.      Inhaler Regimen:  Trelegy  Albuterol    Remainder of review of systems negative except as described in HPI.      The following portions of the patient's history were reviewed and updated as appropriate: allergies, current medications, past family history, past medical history, past social history, past surgical history and problem list.     Objective:   Vitals: Blood pressure 111/70, pulse 74, temperature (!) 97.2 °F (36.2 °C), temperature source Tympanic, height 5' 11\" (1.803 m), weight (!) 166 kg (366 lb), SpO2 95%., RA, Body mass index is 51.05 kg/m².    Physical Exam  Gen: Pleasant, awake, alert, oriented x 3, no acute distress  HEENT: Mucous membranes moist, no oral lesions, no thrush  NECK: No accessory muscle use, JVP not elevated  Cardiac: RRR, single S1, single S2, no murmurs, no rubs, no gallops  Lungs: CTA b/l; no w/r/c  Abdomen: normoactive bowel sounds, soft nontender, nondistended, no rebound or rigidity, no guarding, obese  Extremities: no cyanosis, no clubbing, no LE edema  MSK:  Strength equal in all " extremities  Derm:  No rashes/lesions noted  Neuro:  Appropriate mood/affect    Labs: I have personally reviewed pertinent lab results.  Lab Results   Component Value Date    WBC 8.55 10/30/2023    WBC 8.69 09/01/2013    HGB 14.3 10/30/2023    HGB 16.0 09/01/2013     10/30/2023     09/01/2013     Lab Results   Component Value Date    CREATININE 0.76 10/30/2023    CREATININE 0.71 09/01/2013        Imaging and other studies: I have personally reviewed pertinent reports.    CT chest 7/15/2022  Radiology findings:  LUNGS:  Interval improvement in upper lobe predominant micronodularity and groundglass opacities.  Persistent interlobular septal thickening.  No bronchiectasis perceived.  No tracheal or endobronchial lesion.  PLEURA:  Redemonstration of calcified pleural plaques.  No pleural effusion.  HEART/GREAT VESSELS: Aortic valve replacement.  Coronary artery calcification.  No thoracic aortic aneurysm.  MEDIASTINUM AND YOEL:  Limited evaluation without IV contrast.  Nonspecific subcentimeter mediastinal lymph nodes without juan adenopathy.  CHEST WALL AND LOWER NECK:  Sternotomy wires.  VISUALIZED STRUCTURES IN THE UPPER ABDOMEN:  4 mm left renal calculus.  OSSEOUS STRUCTURES:  No acute fracture or destructive osseous lesion.    Pulmonary Function Testing: I have personally reviewed pertinent reports.   and I have personally reviewed pertinent films in PACS  3/18/2019:  FEV1/FVC Ratio: 61 %  Forced Vital Capacity: 4.15 L    80 % predicted  FEV1: 2.54 L     69 % predicted  After administration of bronchodilator FEV1: 2.65L   72%  Lung volumes by body plethysmography:   Total Lung Capacity 83 % predicted   Residual volume 81 % predicted  DLCO corrected for patients hemoglobin level: 76 %    Raymundo Sims MD  [facility]

## 2024-02-16 ENCOUNTER — APPOINTMENT (OUTPATIENT)
Dept: LAB | Facility: HOSPITAL | Age: 59
End: 2024-02-16
Payer: MEDICARE

## 2024-02-16 ENCOUNTER — ANTICOAG VISIT (OUTPATIENT)
Dept: FAMILY MEDICINE CLINIC | Facility: CLINIC | Age: 59
End: 2024-02-16

## 2024-02-16 DIAGNOSIS — I48.91 ATRIAL FIBRILLATION, UNSPECIFIED TYPE (HCC): ICD-10-CM

## 2024-02-16 LAB
INR PPP: 3.44 (ref 0.84–1.19)
PROTHROMBIN TIME: 33.9 SECONDS (ref 11.6–14.5)

## 2024-02-16 PROCEDURE — 85610 PROTHROMBIN TIME: CPT

## 2024-02-16 PROCEDURE — 36415 COLL VENOUS BLD VENIPUNCTURE: CPT

## 2024-02-29 ENCOUNTER — APPOINTMENT (OUTPATIENT)
Dept: LAB | Facility: HOSPITAL | Age: 59
End: 2024-02-29
Payer: MEDICARE

## 2024-02-29 DIAGNOSIS — J44.9 COPD, MODERATE (HCC): ICD-10-CM

## 2024-02-29 DIAGNOSIS — E11.9 TYPE 2 DIABETES MELLITUS WITHOUT COMPLICATION, WITH LONG-TERM CURRENT USE OF INSULIN (HCC): ICD-10-CM

## 2024-02-29 DIAGNOSIS — E11.9 TYPE 2 DIABETES MELLITUS WITHOUT COMPLICATION, UNSPECIFIED WHETHER LONG TERM INSULIN USE (HCC): ICD-10-CM

## 2024-02-29 DIAGNOSIS — Z79.4 TYPE 2 DIABETES MELLITUS WITHOUT COMPLICATION, WITH LONG-TERM CURRENT USE OF INSULIN (HCC): ICD-10-CM

## 2024-02-29 DIAGNOSIS — I48.91 ATRIAL FIBRILLATION, UNSPECIFIED TYPE (HCC): ICD-10-CM

## 2024-02-29 DIAGNOSIS — I10 ESSENTIAL HYPERTENSION: ICD-10-CM

## 2024-02-29 LAB
INR PPP: 3.37 (ref 0.84–1.19)
PROTHROMBIN TIME: 33.3 SECONDS (ref 11.6–14.5)

## 2024-02-29 PROCEDURE — 85610 PROTHROMBIN TIME: CPT

## 2024-02-29 PROCEDURE — 36415 COLL VENOUS BLD VENIPUNCTURE: CPT

## 2024-02-29 RX ORDER — PEN NEEDLE, DIABETIC 32GX 5/32"
NEEDLE, DISPOSABLE MISCELLANEOUS 4 TIMES DAILY
Qty: 400 EACH | Refills: 5 | Status: SHIPPED | OUTPATIENT
Start: 2024-02-29

## 2024-02-29 RX ORDER — BENAZEPRIL HYDROCHLORIDE 10 MG/1
10 TABLET ORAL DAILY
Qty: 90 TABLET | Refills: 3 | Status: SHIPPED | OUTPATIENT
Start: 2024-02-29

## 2024-02-29 NOTE — TELEPHONE ENCOUNTER
Pt called to have Trelegy refilled, not listed on medication list.    Pt states usually order 2 at a time.

## 2024-03-01 ENCOUNTER — ANTICOAG VISIT (OUTPATIENT)
Dept: FAMILY MEDICINE CLINIC | Facility: CLINIC | Age: 59
End: 2024-03-01

## 2024-03-05 ENCOUNTER — TELEPHONE (OUTPATIENT)
Age: 59
End: 2024-03-05

## 2024-03-05 DIAGNOSIS — J44.9 COPD, MODERATE (HCC): ICD-10-CM

## 2024-03-05 NOTE — TELEPHONE ENCOUNTER
Incoming call:    Re: Pt of Dr. Sims     Pt called for a refill for Trelegy last week on 2/29. Order shows up in chart,  however [facility] pharmacy does not have this order.     Please re-send Trelegy script to [facility] in [place]. Pt has been out of medication for 1 week.   Pt phone: [phone]

## 2024-04-03 DIAGNOSIS — Z79.4 TYPE 2 DIABETES MELLITUS WITH HYPERGLYCEMIA, WITH LONG-TERM CURRENT USE OF INSULIN (HCC): Primary | ICD-10-CM

## 2024-04-03 DIAGNOSIS — E11.65 TYPE 2 DIABETES MELLITUS WITH HYPERGLYCEMIA, WITH LONG-TERM CURRENT USE OF INSULIN (HCC): Primary | ICD-10-CM

## 2024-04-03 NOTE — TELEPHONE ENCOUNTER
Patient called refill line, he states that his ins will no longer cover tresiba and needs an alternative. Please advise. He uses [facility]

## 2024-04-04 ENCOUNTER — ANTICOAG VISIT (OUTPATIENT)
Dept: FAMILY MEDICINE CLINIC | Facility: CLINIC | Age: 59
End: 2024-04-04

## 2024-04-04 ENCOUNTER — APPOINTMENT (OUTPATIENT)
Dept: LAB | Facility: HOSPITAL | Age: 59
End: 2024-04-04
Payer: MEDICARE

## 2024-04-04 DIAGNOSIS — I48.91 ATRIAL FIBRILLATION, UNSPECIFIED TYPE (HCC): ICD-10-CM

## 2024-04-04 LAB
INR PPP: 2.87 (ref 0.84–1.19)
PROTHROMBIN TIME: 29.5 SECONDS (ref 11.6–14.5)

## 2024-04-04 PROCEDURE — 36415 COLL VENOUS BLD VENIPUNCTURE: CPT

## 2024-04-04 PROCEDURE — 85610 PROTHROMBIN TIME: CPT

## 2024-04-04 RX ORDER — INSULIN GLARGINE 300 U/ML
INJECTION, SOLUTION SUBCUTANEOUS
Qty: 18 ML | Refills: 3 | Status: SHIPPED | OUTPATIENT
Start: 2024-04-04

## 2024-04-04 NOTE — TELEPHONE ENCOUNTER
That is a bit confusing as he is currently taking Tresiba U-200. I will send for Toujeo U-300 at 66 units nightly.

## 2024-04-04 NOTE — TELEPHONE ENCOUNTER
Patient called the RX Refill Line. Message is being forwarded to the office.     Patient got alternative medications from insurance company, he said that Tresiba fFlex Touch U-200 is on there, Tresiba flex U-100, Toujeo SoloStar U-300 pen. He isn't sure if only brand name needs to be on the prescription for the Tresiba. He would like 3 boxes and he uses [facility] (in chart).    Please contact patient at  [phone]

## 2024-04-08 NOTE — RESULT NOTES
Verified Results  (1) PT WITH INR 22Jun2017 07:28AM Fletcher Man     Test Name Result Flag Reference   INR 2 35 H 0 86-1 16   PT 25 8 seconds H 12 1-14 4
Orbital.../Triceps...

## 2024-04-11 DIAGNOSIS — E78.49 OTHER HYPERLIPIDEMIA: ICD-10-CM

## 2024-04-11 RX ORDER — ATORVASTATIN CALCIUM 10 MG/1
10 TABLET, FILM COATED ORAL DAILY
Qty: 90 TABLET | Refills: 1 | Status: SHIPPED | OUTPATIENT
Start: 2024-04-11

## 2024-04-17 ENCOUNTER — OFFICE VISIT (OUTPATIENT)
Dept: CARDIOLOGY CLINIC | Facility: CLINIC | Age: 59
End: 2024-04-17
Payer: MEDICARE

## 2024-04-17 VITALS
WEIGHT: 315 LBS | HEIGHT: 73 IN | DIASTOLIC BLOOD PRESSURE: 68 MMHG | BODY MASS INDEX: 41.75 KG/M2 | HEART RATE: 53 BPM | SYSTOLIC BLOOD PRESSURE: 112 MMHG

## 2024-04-17 DIAGNOSIS — I10 ESSENTIAL HYPERTENSION: ICD-10-CM

## 2024-04-17 DIAGNOSIS — I48.91 ATRIAL FIBRILLATION, UNSPECIFIED TYPE (HCC): ICD-10-CM

## 2024-04-17 DIAGNOSIS — F17.211 CIGARETTE NICOTINE DEPENDENCE IN REMISSION: ICD-10-CM

## 2024-04-17 DIAGNOSIS — Z95.2 S/P AVR: Primary | ICD-10-CM

## 2024-04-17 DIAGNOSIS — E11.69 DYSLIPIDEMIA ASSOCIATED WITH TYPE 2 DIABETES MELLITUS  (HCC): ICD-10-CM

## 2024-04-17 DIAGNOSIS — E78.5 DYSLIPIDEMIA ASSOCIATED WITH TYPE 2 DIABETES MELLITUS  (HCC): ICD-10-CM

## 2024-04-17 DIAGNOSIS — I50.32 CHRONIC DIASTOLIC HEART FAILURE (HCC): ICD-10-CM

## 2024-04-17 PROCEDURE — 99214 OFFICE O/P EST MOD 30 MIN: CPT | Performed by: INTERNAL MEDICINE

## 2024-04-17 PROCEDURE — 93000 ELECTROCARDIOGRAM COMPLETE: CPT | Performed by: INTERNAL MEDICINE

## 2024-04-17 NOTE — ASSESSMENT & PLAN NOTE
Slow rate  New RBBB  Reduce Metoprolol to 25 mg BID.  Will check HR with Echo in 1 month  Call ASAP if any LH/Dizziness

## 2024-04-17 NOTE — ASSESSMENT & PLAN NOTE
Wt Readings from Last 3 Encounters:   04/17/24 (!) 167 kg (368 lb)   01/29/24 (!) 166 kg (366 lb)   12/08/23 (!) 170 kg (374 lb)     Compensated/Euvolemic

## 2024-04-17 NOTE — PROGRESS NOTES
Patient ID: Santino Flores is a 58 y.o. male.        Plan:      Atrial fibrillation (HCC)  Slow rate  New RBBB  Reduce Metoprolol to 25 mg BID.  Will check HR with Echo in 1 month  Call ASAP if any LH/Dizziness    Essential hypertension  Controlled    Chronic diastolic heart failure (HCC)  Wt Readings from Last 3 Encounters:   24 (!) 167 kg (368 lb)   24 (!) 166 kg (366 lb)   23 (!) 170 kg (374 lb)     Compensated/Euvolemic        Dyslipidemia associated with type 2 diabetes mellitus  (HCC)    Lab Results   Component Value Date    HGBA1C 5.6 10/30/2023     On proper statin    Cigarette nicotine dependence in remission  Applauded him as he remains smoke free    S/P AVR  Sounds great  Aware of SBE Abx prophylaxis need  Will reassess via Echo in 1 month  Remains in our coumadin clinic       Follow up Plan/Other summary comments:  Return in about 6 months (around 10/17/2024).    HPI: Santino is here for routine FU.  Feeling OK he says.  No alarming CP nor SOB issues.  Only gets ankle edema if on his feet for long periods.  No palpitaitons.  LH only if he bends over too long, not spontaneously though.  No (pre)syncope.        Results for orders placed or performed in visit on 24   POCT ECG    Impression    Afib, new RBBB, 53 bpm         Most recent or relevant cardiac/vascular testin/23 Echo   Left Ventricle: Left ventricular cavity size is normal. Wall thickness is normal. The left ventricular ejection fraction is 50%. Systolic function is low normal.    Right Ventricle: Systolic function is low normal. Normal tricuspid annular plane systolic excursion (TAPSE) > 1.7 cm.    Aortic Valve: There is a mechanical valve. The prosthetic valve appears to be functioning normally. There is mild regurgitation. The aortic valve has no significant stenosis. The aortic valve peak velocity is 2.52 m/s. The aortic valve mean gradient is 15 mmHg.    Mitral Valve: There is moderate annular calcification.  "There is mild regurgitation.    Tricuspid Valve: There is mild regurgitation.      Past Surgical History:   Procedure Laterality Date    AORTIC VALVE REPLACEMENT  10/07/2016    AVR replacement, mechanical    CORONARY ANGIOPLASTY WITH STENT PLACEMENT  07/29/2010    EF 40%, Successful bare metal stent mid RCA    MENISCECTOMY Left        Lipid Profile: Reviewed      Review of Systems   10  point ROS  was otherwise non pertinent or negative except as per HPI or as below.           Objective:     /68   Pulse (!) 53   Ht 6' 1\" (1.854 m)   Wt (!) 167 kg (368 lb)   BMI 48.55 kg/m²     PHYSICAL EXAM:    General:  Normal appearance in no distress.  Eyes:  Anicteric.  Oral mucosa:  Moist.  Neck:  No JVD. Carotid upstrokes are brisk without bruits.  No masses.  Chest:  Clear to auscultation.  Cardiac:  Irregular, No palpable PMI.  Normal S1 and S2.  No murmur gallop or rub.  Abdomen:  Soft and nontender. No palpable organomegaly or aortic enlargement.  Extremities:  Trace B/L LE peripheral edema.  Musculoskeletal:  Symmetric.   Vascular: Pedal pulses are intact.  Neuro:  Grossly symmetric.  Psych:  Alert and oriented x3.      Meds reviewed.    Past Medical History:   Diagnosis Date    Asthma     last assessed 8/21/12    Athscl heart disease of native coronary artery w/o ang pctrs     Atrial fibrillation (HCC)     last assessed 8/21/12    Chronic atrial fibrillation (HCC)     Chronic diastolic (congestive) heart failure (HCC)     Closed fracture of fibula     last assessed 10/18/13    COLD (chronic obstructive lung disease) (HCC)     last assessed 8/21/12    Coronary angioplasty status     CPAP (continuous positive airway pressure) dependence     Dyslipidemia associated with type 2 diabetes mellitus  (HCC)     H/O heart artery stent     Hyperlipidemia     Hypertension     Presence of prosthetic heart valve     Respiratory failure with hypoxia ([facility]) 08/18/2021    S/P AVR            Social History     Tobacco Use "   Smoking Status Former    Current packs/day: 0.00    Types: Cigarettes    Start date: 10/1981    Quit date: 10/2016    Years since quittin.5   Smokeless Tobacco Never   Tobacco Comments    Quit 2 months ago

## 2024-04-17 NOTE — ASSESSMENT & PLAN NOTE
Sounds great  Aware of SBE Abx prophylaxis need  Will reassess via Echo in 1 month  Remains in our coumadin clinic

## 2024-05-01 ENCOUNTER — TELEPHONE (OUTPATIENT)
Age: 59
End: 2024-05-01

## 2024-05-01 DIAGNOSIS — I48.91 ATRIAL FIBRILLATION, UNSPECIFIED TYPE (HCC): ICD-10-CM

## 2024-05-01 RX ORDER — WARFARIN SODIUM 10 MG/1
10 TABLET ORAL DAILY
Qty: 90 TABLET | Refills: 3 | Status: SHIPPED | OUTPATIENT
Start: 2024-05-01

## 2024-05-13 ENCOUNTER — APPOINTMENT (OUTPATIENT)
Dept: LAB | Facility: HOSPITAL | Age: 59
End: 2024-05-13
Payer: MEDICARE

## 2024-05-13 ENCOUNTER — ANTICOAG VISIT (OUTPATIENT)
Dept: FAMILY MEDICINE CLINIC | Facility: CLINIC | Age: 59
End: 2024-05-13

## 2024-05-13 DIAGNOSIS — E11.65 TYPE 2 DIABETES MELLITUS WITH HYPERGLYCEMIA, WITH LONG-TERM CURRENT USE OF INSULIN (HCC): ICD-10-CM

## 2024-05-13 DIAGNOSIS — I48.91 ATRIAL FIBRILLATION, UNSPECIFIED TYPE (HCC): ICD-10-CM

## 2024-05-13 DIAGNOSIS — Z79.4 TYPE 2 DIABETES MELLITUS WITH HYPERGLYCEMIA, WITH LONG-TERM CURRENT USE OF INSULIN (HCC): ICD-10-CM

## 2024-05-13 LAB
ANION GAP SERPL CALCULATED.3IONS-SCNC: 8 MMOL/L (ref 4–13)
BUN SERPL-MCNC: 10 MG/DL (ref 5–25)
CALCIUM SERPL-MCNC: 9.3 MG/DL (ref 8.4–10.2)
CHLORIDE SERPL-SCNC: 101 MMOL/L (ref 96–108)
CO2 SERPL-SCNC: 26 MMOL/L (ref 21–32)
CREAT SERPL-MCNC: 0.72 MG/DL (ref 0.6–1.3)
CREAT UR-MCNC: 66 MG/DL
EST. AVERAGE GLUCOSE BLD GHB EST-MCNC: 111 MG/DL
GFR SERPL CREATININE-BSD FRML MDRD: 101 ML/MIN/1.73SQ M
GLUCOSE SERPL-MCNC: 103 MG/DL (ref 65–140)
HBA1C MFR BLD: 5.5 %
INR PPP: 3.54 (ref 0.84–1.19)
MICROALBUMIN UR-MCNC: 10.6 MG/L
MICROALBUMIN/CREAT 24H UR: 16 MG/G CREATININE (ref 0–30)
POTASSIUM SERPL-SCNC: 4.1 MMOL/L (ref 3.5–5.3)
PROTHROMBIN TIME: 34.6 SECONDS (ref 11.6–14.5)
SODIUM SERPL-SCNC: 135 MMOL/L (ref 135–147)

## 2024-05-13 PROCEDURE — 82043 UR ALBUMIN QUANTITATIVE: CPT

## 2024-05-13 PROCEDURE — 36415 COLL VENOUS BLD VENIPUNCTURE: CPT

## 2024-05-13 PROCEDURE — 83036 HEMOGLOBIN GLYCOSYLATED A1C: CPT

## 2024-05-13 PROCEDURE — 82570 ASSAY OF URINE CREATININE: CPT

## 2024-05-13 PROCEDURE — 80048 BASIC METABOLIC PNL TOTAL CA: CPT

## 2024-05-13 PROCEDURE — 85610 PROTHROMBIN TIME: CPT

## 2024-05-23 ENCOUNTER — TELEPHONE (OUTPATIENT)
Dept: CARDIOLOGY CLINIC | Facility: CLINIC | Age: 59
End: 2024-05-23

## 2024-05-23 ENCOUNTER — HOSPITAL ENCOUNTER (OUTPATIENT)
Dept: NON INVASIVE DIAGNOSTICS | Facility: CLINIC | Age: 59
Discharge: HOME | End: 2024-05-23
Payer: MEDICARE

## 2024-05-23 VITALS
BODY MASS INDEX: 41.75 KG/M2 | WEIGHT: 315 LBS | HEIGHT: 73 IN | SYSTOLIC BLOOD PRESSURE: 144 MMHG | DIASTOLIC BLOOD PRESSURE: 72 MMHG | HEART RATE: 80 BPM

## 2024-05-23 DIAGNOSIS — I48.91 ATRIAL FIBRILLATION, UNSPECIFIED TYPE (HCC): ICD-10-CM

## 2024-05-23 DIAGNOSIS — Z95.2 S/P AVR: ICD-10-CM

## 2024-05-23 DIAGNOSIS — I10 ESSENTIAL HYPERTENSION: ICD-10-CM

## 2024-05-23 LAB
APICAL FOUR CHAMBER EJECTION FRACTION: 34 %
AV LVOT MEAN GRADIENT: 5 MMHG
AV LVOT PEAK GRADIENT: 8 MMHG
AV MEAN GRADIENT: 15 MMHG
AV VELOCITY RATIO: 0.54
BSA FOR ECHO PROCEDURE: 2.79 M2
DOP CALC AO PEAK VEL: 2.6 M/S
DOP CALC LVOT PEAK VEL VTI: 31.79 CM
DOP CALC LVOT PEAK VEL: 1.4 M/S
DOP CALC MV VTI: 48.03 CM
MV MEAN GRADIENT: 5 MMHG
MV PEAK GRADIENT: 13 MMHG
MV STENOSIS PRESSURE HALF TIME: 110 MS
MV VALVE AREA P 1/2 METHOD: 2
RA PRESSURE ESTIMATED: 10 MMHG
SL CV LV EF: 55

## 2024-05-23 PROCEDURE — 93306 TTE W/DOPPLER COMPLETE: CPT

## 2024-05-23 PROCEDURE — 93306 TTE W/DOPPLER COMPLETE: CPT | Performed by: INTERNAL MEDICINE

## 2024-05-23 NOTE — TELEPHONE ENCOUNTER
----- Message from Ronaldo Dill DO sent at 5/23/2024 11:54 AM EDT -----  Please call the patient regarding his Echo result.  Stable findings.  Valve ok and heart function remains normal.  No new recs.  TRB

## 2024-05-24 ENCOUNTER — RA CDI HCC (OUTPATIENT)
Dept: OTHER | Facility: HOSPITAL | Age: 59
End: 2024-05-24

## 2024-05-24 NOTE — PROGRESS NOTES
I11.0  Z79.4  HCC coding opportunities          Chart Reviewed number of suggestions sent to Provider: 2     Patients Insurance     Medicare Insurance: Medicare

## 2024-05-29 ENCOUNTER — ANTICOAG VISIT (OUTPATIENT)
Dept: FAMILY MEDICINE CLINIC | Facility: CLINIC | Age: 59
End: 2024-05-29

## 2024-05-29 ENCOUNTER — APPOINTMENT (OUTPATIENT)
Dept: LAB | Facility: HOSPITAL | Age: 59
End: 2024-05-29
Payer: MEDICARE

## 2024-05-29 DIAGNOSIS — I48.91 ATRIAL FIBRILLATION, UNSPECIFIED TYPE (HCC): ICD-10-CM

## 2024-05-29 LAB
INR PPP: 3.61 (ref 0.84–1.19)
PROTHROMBIN TIME: 35.2 SECONDS (ref 11.6–14.5)

## 2024-05-29 PROCEDURE — 36415 COLL VENOUS BLD VENIPUNCTURE: CPT

## 2024-05-29 PROCEDURE — 85610 PROTHROMBIN TIME: CPT

## 2024-05-31 ENCOUNTER — OFFICE VISIT (OUTPATIENT)
Dept: FAMILY MEDICINE CLINIC | Facility: CLINIC | Age: 59
End: 2024-05-31
Payer: MEDICARE

## 2024-05-31 VITALS
HEART RATE: 91 BPM | SYSTOLIC BLOOD PRESSURE: 116 MMHG | OXYGEN SATURATION: 96 % | WEIGHT: 315 LBS | HEIGHT: 73 IN | BODY MASS INDEX: 41.75 KG/M2 | DIASTOLIC BLOOD PRESSURE: 60 MMHG | TEMPERATURE: 97.6 F

## 2024-05-31 DIAGNOSIS — E11.9 TYPE 2 DIABETES MELLITUS WITHOUT COMPLICATION, UNSPECIFIED WHETHER LONG TERM INSULIN USE (HCC): Primary | ICD-10-CM

## 2024-05-31 DIAGNOSIS — J44.9 COPD, MODERATE (HCC): ICD-10-CM

## 2024-05-31 PROCEDURE — 92250 FUNDUS PHOTOGRAPHY W/I&R: CPT | Performed by: FAMILY MEDICINE

## 2024-05-31 PROCEDURE — 99214 OFFICE O/P EST MOD 30 MIN: CPT | Performed by: FAMILY MEDICINE

## 2024-05-31 NOTE — PROGRESS NOTES
Ambulatory Visit  Name: Santino Flores      : 1965      MRN: [id number]  Encounter Provider: Иван Yanez DO  Encounter Date: 2024   Encounter department: [facility]    Assessment & Plan   Continue same medications and continue to watch diet.  Follow-up with specialist as scheduled.  Follow-up in 6 months or as needed.  1. Type 2 diabetes mellitus without complication, unspecified whether long term insulin use ([facility])  -     IRIS Diabetic eye exam  2. COPD, moderate ([facility])  -     fluticasone-umeclidinium-vilanterol 100-62.5-25 mcg/actuation inhaler; Inhale 1 puff daily Rinse mouth after use.      Depression Screening and Follow-up Plan: Patient was screened for depression during today's encounter. They screened negative with a PHQ-2 score of 0.        History of Present Illness     Patient here today for follow-up.  He denies any chest pain or any increased shortness of breath.  He has been following up with cardiology, endocrinology and pulmonology.  No new concerns.      Review of Systems   Constitutional: Negative.    Respiratory: Negative.     Cardiovascular: Negative.    Gastrointestinal: Negative.    Genitourinary: Negative.      Past Medical History:   Diagnosis Date   • Asthma     last assessed 12   • Athscl heart disease of native coronary artery w/o ang pctrs    • Atrial fibrillation (HCC)     last assessed 12   • Chronic atrial fibrillation (HCC)    • Chronic diastolic (congestive) heart failure (HCC)    • Closed fracture of fibula     last assessed 10/18/13   • COLD (chronic obstructive lung disease) ([facility])     last assessed 12   • Coronary angioplasty status    • CPAP (continuous positive airway pressure) dependence    • Dyslipidemia associated with type 2 diabetes mellitus  (HCC)    • H/O heart artery stent    • Hyperlipidemia    • Hypertension    • Presence of prosthetic heart valve    • Respiratory failure with hypoxia ([facility]) 2021   • S/P AVR      Past  Surgical History:   Procedure Laterality Date   • AORTIC VALVE REPLACEMENT  10/07/2016    AVR replacement, mechanical   • CORONARY ANGIOPLASTY WITH STENT PLACEMENT  2010    EF 40%, Successful bare metal stent mid RCA   • MENISCECTOMY Left      Family History   Problem Relation Age of Onset   • Atrial fibrillation Mother    • Diabetes type II Mother    • Heart attack Father         acute   • Heart failure Father    • Stroke Father         syndrome     Social History     Tobacco Use   • Smoking status: Former     Current packs/day: 0.00     Types: Cigarettes     Start date: 10/1981     Quit date: 10/2016     Years since quittin.6   • Smokeless tobacco: Never   • Tobacco comments:     Quit 2 months ago   Vaping Use   • Vaping status: Never Used   Substance and Sexual Activity   • Alcohol use: Not Currently   • Drug use: Never   • Sexual activity: Not on file     Current Outpatient Medications on File Prior to Visit   Medication Sig   • albuterol (2.5 mg/3 mL) 0.083 % nebulizer solution Take 3 mL (2.5 mg total) by nebulization every 6 (six) hours as needed for wheezing or shortness of breath   • albuterol (Ventolin HFA) 90 mcg/act inhaler Inhale 2 puffs every 6 (six) hours as needed for shortness of breath Must be brand necessary   • atorvastatin (LIPITOR) 10 mg tablet Take 1 tablet (10 mg total) by mouth daily   • benazepril (LOTENSIN) 10 mg tablet Take 1 tablet (10 mg total) by mouth daily   • diltiazem (CARDIZEM CD) 240 mg 24 hr capsule Take 1 capsule (240 mg total) by mouth 2 (two) times a day   • furosemide (LASIX) 80 mg tablet Take 1 tablet (80 mg total) by mouth daily   • insulin aspart (NovoLOG FlexPen) 100 UNIT/ML injection pen Inject 14 units with breakfast and lunch and 36 units with dinner.   • insulin glargine (Toujeo Max SoloStar) 300 units/mL CONCENTRATED U-300 injection pen (2-unit dial) Inject 66 units nightly.   • Insulin Pen Needle (BD Pen Needle Fany U/F) 32G X 4 MM MISC Inject as directed  "4 (four) times a day   • metFORMIN (GLUCOPHAGE) 1000 MG tablet Take 1 tablet (1,000 mg total) by mouth 2 (two) times a day   • metoprolol tartrate (LOPRESSOR) 25 mg tablet Take 1 tablet (25 mg total) by mouth 2 (two) times a day   • Potassium Chloride ER 20 MEQ TBCR Take 1 tablet (20 mEq total) by mouth daily   • warfarin (COUMADIN) 10 mg tablet Take 1 tablet (10 mg total) by mouth daily   • warfarin (COUMADIN) 2 mg tablet Take 1 tablet (2 mg total) by mouth daily (Patient taking differently: Take 2 mg by mouth 3 (three) times a week Taking 2mg, 3 times a week Monday, Wednesday, Friday)   • [DISCONTINUED] fluticasone-umeclidinium-vilanterol 100-62.5-25 mcg/actuation inhaler Inhale 1 puff daily Rinse mouth after use.   • nitroglycerin (NITROSTAT) 0.4 mg SL tablet Place 1 tablet (0.4 mg total) under the tongue every 5 (five) minutes as needed for chest pain (Patient not taking: Reported on 5/31/2024)     Allergies   Allergen Reactions   • Fluticasone-Salmeterol Palpitations     Immunization History   Administered Date(s) Administered   • COVID-19 MODERNA VACC 0.5 ML IM 03/26/2021, 04/28/2021   • INFLUENZA 10/13/2016, 10/14/2016, 10/23/2017, 10/25/2018   • Influenza Quadrivalent Preservative Free 3 years and older IM 11/03/2014, 10/14/2016, 10/23/2017   • Influenza, injectable, quadrivalent, preservative free 0.5 mL 11/30/2023   • Influenza, recombinant, quadrivalent,injectable, preservative free 10/25/2018, 10/28/2019, 10/29/2020, 11/02/2021, 11/28/2022   • Influenza, seasonal, injectable 01/03/2013, 12/16/2013   • Pneumococcal Conjugate 13-Valent 11/02/2021   • Pneumococcal Polysaccharide PPV23 12/16/2013, 10/25/2018     Objective     /60   Pulse 91   Temp 97.6 °F (36.4 °C)   Ht 6' 1\" (1.854 m)   Wt (!) 167 kg (368 lb 6.4 oz)   SpO2 96%   BMI 48.60 kg/m²     Physical Exam  Vitals reviewed.   Constitutional:       General: He is not in acute distress.     Appearance: Normal appearance. He is " well-developed. He is not diaphoretic.   HENT:      Head: Normocephalic and atraumatic.   Eyes:      Conjunctiva/sclera: Conjunctivae normal.   Cardiovascular:      Rate and Rhythm: Normal rate and regular rhythm.      Heart sounds: Normal heart sounds. No murmur heard.     No friction rub. No gallop.   Pulmonary:      Effort: Pulmonary effort is normal. No respiratory distress.      Breath sounds: Normal breath sounds. No wheezing, rhonchi or rales.   Musculoskeletal:      Right lower leg: No edema.      Left lower leg: No edema.   Neurological:      General: No focal deficit present.      Mental Status: He is alert and oriented to person, place, and time.   Psychiatric:         Mood and Affect: Mood normal.         Behavior: Behavior normal.         Thought Content: Thought content normal.         Judgment: Judgment normal.       Administrative Statements

## 2024-06-04 DIAGNOSIS — I48.91 ATRIAL FIBRILLATION, UNSPECIFIED TYPE (HCC): Primary | ICD-10-CM

## 2024-06-04 DIAGNOSIS — I10 HYPERTENSION, UNSPECIFIED TYPE: ICD-10-CM

## 2024-06-04 NOTE — TELEPHONE ENCOUNTER
Pt contacted Call Center requested refill of their medication.        Doctor Name: Dr. Dill      Medication Name: diltiazem      Dosage of Med: 240 mg 24 hr capsule      Frequency of Med: 1 capsule by mouth 2x a day      Remaining Medication: 1 week's worth      Pharmacy and Location: [facility] [place] [place]        Pt. Preferred Callback Phone Number: [phone]      Per patient last time this was filled the pharmacy did not give him the entire amount

## 2024-06-05 RX ORDER — DILTIAZEM HYDROCHLORIDE 240 MG/1
240 CAPSULE, COATED, EXTENDED RELEASE ORAL 2 TIMES DAILY
Qty: 180 CAPSULE | Refills: 1 | Status: SHIPPED | OUTPATIENT
Start: 2024-06-05

## 2024-06-10 NOTE — TELEPHONE ENCOUNTER
Patient Santino [phone] contacting office to advise [facility] has the diltiazem (CARDIZEM CD) 240 mg 24 hr capsule on back order and they are unsure when the medication will be in.    Patient is requesting if provider can supply samples?    If not, can an alternative be called into the pharmacy.    Patient is out of medication.

## 2024-06-12 ENCOUNTER — APPOINTMENT (OUTPATIENT)
Dept: LAB | Facility: HOSPITAL | Age: 59
End: 2024-06-12
Payer: MEDICARE

## 2024-06-12 ENCOUNTER — ANTICOAG VISIT (OUTPATIENT)
Dept: FAMILY MEDICINE CLINIC | Facility: CLINIC | Age: 59
End: 2024-06-12

## 2024-06-12 DIAGNOSIS — I48.91 ATRIAL FIBRILLATION, UNSPECIFIED TYPE (HCC): ICD-10-CM

## 2024-06-12 LAB
INR PPP: 2.65 (ref 0.84–1.19)
PROTHROMBIN TIME: 27.7 SECONDS (ref 11.6–14.5)

## 2024-06-12 PROCEDURE — 36415 COLL VENOUS BLD VENIPUNCTURE: CPT

## 2024-06-12 PROCEDURE — 85610 PROTHROMBIN TIME: CPT

## 2024-06-12 RX ORDER — DILTIAZEM HYDROCHLORIDE 240 MG/1
240 CAPSULE, EXTENDED RELEASE ORAL DAILY
Qty: 90 CAPSULE | Refills: 3 | Status: SHIPPED | OUTPATIENT
Start: 2024-06-12

## 2024-06-12 NOTE — TELEPHONE ENCOUNTER
Patient presented in [place] office to verify if samples of medication were available or if there was an alternative that can be prescribed.  Patient is out of medication.    Called [facility] pharmacy and was informed by Kamila they do not have the Cardizem CD in stock either but they do have the Diltiazem ER 240mg 24 hour capsule and nothing in tablet form.

## 2024-06-12 NOTE — TELEPHONE ENCOUNTER
Reason for call:   [x] Refill   [] Prior Auth  [x] Other: Pt called states he went to [facility] and they dont have the new medication either, that medication is also on back order per the pharmacist. Please order something else and call the pt. Thank you.    Office:   [] PCP/Provider -   [x] Specialty/Provider - Cardio/ Dr Dill              Pharmacy:   [facility] [address] [place] [address], [facility] [address] [phone]         Does the patient have enough for 3 days?   [] Yes   [x] No - Send as HP to POD

## 2024-06-12 NOTE — TELEPHONE ENCOUNTER
Spoke with Brunilda at [facility] pharmacy and she stated that patient picked up the medication.    L/M for patient to call office and verify his receipt of the medication.

## 2024-06-12 NOTE — TELEPHONE ENCOUNTER
Spoke with Brennen at [facility] in [place] and she stated it would be the Dilacor XR.

## 2024-06-18 NOTE — PROGRESS NOTES
Established Patient Progress Note    Chief Complaint:  Diabetes follow up visit    Impression & Plan:    Problem List Items Addressed This Visit       Dyslipidemia associated with type 2 diabetes mellitus  (HCC)     Well-controlled.  Continue 10 mg of atorvastatin nightly.  Lab Results   Component Value Date    HGBA1C 5.5 05/13/2024            Relevant Medications    NovoLOG FlexPen 100 units/mL injection pen    Essential hypertension     BP well-controlled at 128/80.  Continue benazepril, diltiazem, and metoprolol.         Chronic diastolic heart failure (HCC)     Wt Readings from Last 3 Encounters:   06/19/24 (!) 165 kg (363 lb)   05/31/24 (!) 167 kg (368 lb 6.4 oz)   05/23/24 (!) 167 kg (368 lb)     Euvolemic on exam.             Type 2 diabetes mellitus with hyperglycemia, with long-term current use of insulin (HCC) - Primary     Patient remains very well-controlled.  No changes made to regimen today.  Continue with healthy diet and regular physical activity.  Patient is to notify me with persistent hyperglycemia or episodes of hypoglycemia.  Follow-up in 6 months.  Lab Results   Component Value Date    HGBA1C 5.5 05/13/2024            Relevant Medications    NovoLOG FlexPen 100 units/mL injection pen    Other Relevant Orders    Comprehensive metabolic panel    Hemoglobin A1C    Lipid Panel with Direct LDL reflex       History of Present Illness:   Santino Flores is a 58 y.o. male with hypertension, hyperlipidemia, obstructive sleep apnea, and type 2 diabetes with long term use of insulin since 2016. Reports complications of  neuropathy. Denies recent illness or hospitalizations. Denies recent severe hypoglycemic or severe hyperglycemic episodes. Denies any issues with his current regimen.     At patient's last appointment on 12/8/2023, no adjustments were made to his regimen as he was well-controlled.    Home blood glucose readings: None for review     Current regimen:  Toujeo U-300 66 units  nightly  NovoLog 14-14-36  Metformin 1000 mg twice daily        Patient Active Problem List   Diagnosis    Aortic stenosis    Atrial fibrillation (HCC)    Simple chronic bronchitis (HCC)    Dyslipidemia associated with type 2 diabetes mellitus  (HCC)    Other hyperlipidemia    Essential hypertension    S/P AVR    Obstructive sleep apnea    Restless leg syndrome    Hypersomnia    Morbid obesity with BMI of 50.0-59.9, adult (HCC)    PLMD (periodic limb movement disorder)    Palpitations    Multiple pulmonary nodules determined by computed tomography of lung    Chronic pain of left knee    Seasonal allergic rhinitis    SOB (shortness of breath)    Cigarette nicotine dependence in remission    Chronic diastolic heart failure (HCC)    Type 2 diabetes mellitus with hyperglycemia, with long-term current use of insulin (HCC)      Past Medical History:   Diagnosis Date    Asthma     last assessed 8/21/12    Athscl heart disease of native coronary artery w/o ang pctrs     Atrial fibrillation (HCC)     last assessed 8/21/12    Chronic atrial fibrillation (HCC)     Chronic diastolic (congestive) heart failure (HCC)     Closed fracture of fibula     last assessed 10/18/13    COLD (chronic obstructive lung disease) ([facility])     last assessed 8/21/12    Coronary angioplasty status     CPAP (continuous positive airway pressure) dependence     Dyslipidemia associated with type 2 diabetes mellitus  (HCC)     H/O heart artery stent     Hyperlipidemia     Hypertension     Presence of prosthetic heart valve     Respiratory failure with hypoxia ([facility]) 08/18/2021    S/P AVR       Past Surgical History:   Procedure Laterality Date    AORTIC VALVE REPLACEMENT  10/07/2016    AVR replacement, mechanical    CORONARY ANGIOPLASTY WITH STENT PLACEMENT  07/29/2010    EF 40%, Successful bare metal stent mid RCA    MENISCECTOMY Left       Family History   Problem Relation Age of Onset    Atrial fibrillation Mother     Diabetes type II Mother     Heart  attack Father         acute    Heart failure Father     Stroke Father         syndrome     Social History     Tobacco Use    Smoking status: Former     Current packs/day: 0.00     Types: Cigarettes     Start date: 10/1981     Quit date: 10/2016     Years since quittin.7    Smokeless tobacco: Never    Tobacco comments:     Quit 2 months ago   Substance Use Topics    Alcohol use: Not Currently     Allergies   Allergen Reactions    Fluticasone-Salmeterol Palpitations         Current Outpatient Medications:     albuterol (2.5 mg/3 mL) 0.083 % nebulizer solution, Take 3 mL (2.5 mg total) by nebulization every 6 (six) hours as needed for wheezing or shortness of breath, Disp: 360 mL, Rfl: 2    albuterol (Ventolin HFA) 90 mcg/act inhaler, Inhale 2 puffs every 6 (six) hours as needed for shortness of breath Must be brand necessary, Disp: 18 g, Rfl: 3    atorvastatin (LIPITOR) 10 mg tablet, Take 1 tablet (10 mg total) by mouth daily, Disp: 90 tablet, Rfl: 1    benazepril (LOTENSIN) 10 mg tablet, Take 1 tablet (10 mg total) by mouth daily, Disp: 90 tablet, Rfl: 3    diltiazem (DILACOR XR) 240 MG 24 hr capsule, Take 1 capsule (240 mg total) by mouth daily, Disp: 90 capsule, Rfl: 3    fluticasone-umeclidinium-vilanterol 100-62.5-25 mcg/actuation inhaler, Inhale 1 puff daily Rinse mouth after use., Disp: 180 blister, Rfl: 3    furosemide (LASIX) 80 mg tablet, Take 1 tablet (80 mg total) by mouth daily, Disp: 90 tablet, Rfl: 3    insulin glargine (Toujeo Max SoloStar) 300 units/mL CONCENTRATED U-300 injection pen (2-unit dial), Inject 66 units nightly., Disp: 18 mL, Rfl: 3    Insulin Pen Needle (BD Pen Needle Fany U/F) 32G X 4 MM MISC, Inject as directed 4 (four) times a day, Disp: 400 each, Rfl: 5    metFORMIN (GLUCOPHAGE) 1000 MG tablet, Take 1 tablet (1,000 mg total) by mouth 2 (two) times a day, Disp: 180 tablet, Rfl: 3    metoprolol tartrate (LOPRESSOR) 25 mg tablet, Take 1 tablet (25 mg total) by mouth 2 (two) times a  day, Disp: 180 tablet, Rfl: 3    NovoLOG FlexPen 100 units/mL injection pen, Inject 14 units with breakfast and lunch and 36 units with dinner., Disp: 45 mL, Rfl: 1    Potassium Chloride ER 20 MEQ TBCR, Take 1 tablet (20 mEq total) by mouth daily, Disp: 90 tablet, Rfl: 3    warfarin (COUMADIN) 10 mg tablet, Take 1 tablet (10 mg total) by mouth daily, Disp: 90 tablet, Rfl: 3    warfarin (COUMADIN) 2 mg tablet, Take 1 tablet (2 mg total) by mouth daily (Patient taking differently: Take 2 mg by mouth 3 (three) times a week Taking 2mg, 3 times a week Monday, Wednesday, Friday), Disp: 90 tablet, Rfl: 3    nitroglycerin (NITROSTAT) 0.4 mg SL tablet, Place 1 tablet (0.4 mg total) under the tongue every 5 (five) minutes as needed for chest pain (Patient not taking: Reported on 5/31/2024), Disp: 100 tablet, Rfl: 3    Review of Systems   Constitutional:  Negative for activity change, appetite change, fatigue and unexpected weight change.   HENT:  Negative for dental problem, sore throat, trouble swallowing and voice change.    Eyes:  Negative for visual disturbance.   Respiratory:  Negative for cough, chest tightness and shortness of breath.    Cardiovascular:  Negative for chest pain, palpitations and leg swelling.   Gastrointestinal:  Negative for constipation, diarrhea, nausea and vomiting.   Endocrine: Negative for polydipsia, polyphagia and polyuria.   Genitourinary:  Negative for frequency.   Musculoskeletal:  Negative for arthralgias, back pain, gait problem and myalgias.   Skin:  Negative for wound.   Allergic/Immunologic: Positive for environmental allergies. Negative for food allergies.   Neurological:  Negative for dizziness, weakness, light-headedness, numbness and headaches.   Psychiatric/Behavioral:  Negative for decreased concentration, dysphoric mood and sleep disturbance. The patient is not nervous/anxious.        Physical Exam:  Body mass index is 47.89 kg/m².  /80   Pulse 101   Temp 97.6 °F (36.4  "°C)   Resp 18   Ht 6' 1\" (1.854 m)   Wt (!) 165 kg (363 lb)   SpO2 98%   BMI 47.89 kg/m²    Wt Readings from Last 3 Encounters:   06/19/24 (!) 165 kg (363 lb)   05/31/24 (!) 167 kg (368 lb 6.4 oz)   05/23/24 (!) 167 kg (368 lb)       Physical Exam  Vitals reviewed.   Constitutional:       General: He is not in acute distress.     Appearance: He is well-developed. He is obese. He is not ill-appearing.   HENT:      Head: Normocephalic and atraumatic.   Eyes:      Pupils: Pupils are equal, round, and reactive to light.   Neck:      Thyroid: No thyromegaly.   Cardiovascular:      Rate and Rhythm: Normal rate.      Pulses: Normal pulses.   Pulmonary:      Effort: Pulmonary effort is normal.   Musculoskeletal:      Cervical back: Normal range of motion and neck supple.      Right lower leg: No edema.      Left lower leg: No edema.   Lymphadenopathy:      Cervical: No cervical adenopathy.   Skin:     General: Skin is warm and dry.      Capillary Refill: Capillary refill takes less than 2 seconds.   Neurological:      Mental Status: He is alert and oriented to person, place, and time.      Gait: Gait normal.   Psychiatric:         Mood and Affect: Mood normal.         Behavior: Behavior normal.           Labs:   Lab Results   Component Value Date    HGBA1C 5.5 05/13/2024    HGBA1C 5.6 10/30/2023    HGBA1C 5.6 05/09/2023     Lab Results   Component Value Date    CREATININE 0.72 05/13/2024    CREATININE 0.76 10/30/2023    CREATININE 0.78 05/09/2023    BUN 10 05/13/2024     09/01/2013    K 4.1 05/13/2024     05/13/2024    CO2 26 05/13/2024     eGFR   Date Value Ref Range Status   05/13/2024 101 ml/min/1.73sq m Final     Lab Results   Component Value Date    CHOL 180 09/01/2013    HDL 29 (L) 10/30/2023    TRIG 95 10/30/2023     Lab Results   Component Value Date    ALT 12 10/30/2023    AST 17 10/30/2023    ALKPHOS 78 10/30/2023    BILITOT 0.6 09/01/2013     Lab Results   Component Value Date    OZK0MQTOWXHY " "3.159 01/15/2021    UEA4ABQGIBRP 3.299 10/04/2018    QHD7YRGRFQQX 3.238 07/22/2016     No results found for: \"FREET4\", \"TSI\"    Discussed with the patient and all questioned fully answered. He will call me if any problems arise.    Follow-up appointment in 6 months.     Counseled patient on diagnostic results, prognosis, risk and benefit of treatment options, instruction for management, importance of treatment compliance, Risk  factor reduction and impressions    CHRISTINE Santiago    "

## 2024-06-19 ENCOUNTER — OFFICE VISIT (OUTPATIENT)
Dept: ENDOCRINOLOGY | Facility: CLINIC | Age: 59
End: 2024-06-19
Payer: MEDICARE

## 2024-06-19 VITALS
HEIGHT: 73 IN | SYSTOLIC BLOOD PRESSURE: 128 MMHG | DIASTOLIC BLOOD PRESSURE: 80 MMHG | OXYGEN SATURATION: 98 % | HEART RATE: 101 BPM | TEMPERATURE: 97.6 F | RESPIRATION RATE: 18 BRPM | WEIGHT: 315 LBS | BODY MASS INDEX: 41.75 KG/M2

## 2024-06-19 DIAGNOSIS — E11.65 TYPE 2 DIABETES MELLITUS WITH HYPERGLYCEMIA, WITH LONG-TERM CURRENT USE OF INSULIN (HCC): Primary | ICD-10-CM

## 2024-06-19 DIAGNOSIS — I10 ESSENTIAL HYPERTENSION: ICD-10-CM

## 2024-06-19 DIAGNOSIS — I50.32 CHRONIC DIASTOLIC HEART FAILURE (HCC): ICD-10-CM

## 2024-06-19 DIAGNOSIS — E78.5 DYSLIPIDEMIA ASSOCIATED WITH TYPE 2 DIABETES MELLITUS  (HCC): ICD-10-CM

## 2024-06-19 DIAGNOSIS — E11.69 DYSLIPIDEMIA ASSOCIATED WITH TYPE 2 DIABETES MELLITUS  (HCC): ICD-10-CM

## 2024-06-19 DIAGNOSIS — Z79.4 TYPE 2 DIABETES MELLITUS WITH HYPERGLYCEMIA, WITH LONG-TERM CURRENT USE OF INSULIN (HCC): Primary | ICD-10-CM

## 2024-06-19 PROCEDURE — 99214 OFFICE O/P EST MOD 30 MIN: CPT | Performed by: NURSE PRACTITIONER

## 2024-06-19 RX ORDER — INSULIN ASPART 100 [IU]/ML
INJECTION, SOLUTION INTRAVENOUS; SUBCUTANEOUS
Qty: 45 ML | Refills: 1 | Status: SHIPPED | OUTPATIENT
Start: 2024-06-19

## 2024-06-20 PROBLEM — Z79.4 TYPE 2 DIABETES MELLITUS WITH HYPERGLYCEMIA, WITH LONG-TERM CURRENT USE OF INSULIN (HCC): Status: ACTIVE | Noted: 2024-06-20

## 2024-06-20 PROBLEM — E11.65 TYPE 2 DIABETES MELLITUS WITH HYPERGLYCEMIA, WITH LONG-TERM CURRENT USE OF INSULIN (HCC): Status: ACTIVE | Noted: 2024-06-20

## 2024-06-20 NOTE — ASSESSMENT & PLAN NOTE
Patient remains very well-controlled.  No changes made to regimen today.  Continue with healthy diet and regular physical activity.  Patient is to notify me with persistent hyperglycemia or episodes of hypoglycemia.  Follow-up in 6 months.  Lab Results   Component Value Date    HGBA1C 5.5 05/13/2024

## 2024-06-20 NOTE — ASSESSMENT & PLAN NOTE
Wt Readings from Last 3 Encounters:   06/19/24 (!) 165 kg (363 lb)   05/31/24 (!) 167 kg (368 lb 6.4 oz)   05/23/24 (!) 167 kg (368 lb)     Euvolemic on exam.

## 2024-06-20 NOTE — ASSESSMENT & PLAN NOTE
Well-controlled.  Continue 10 mg of atorvastatin nightly.  Lab Results   Component Value Date    HGBA1C 5.5 05/13/2024

## 2024-07-03 ENCOUNTER — TELEPHONE (OUTPATIENT)
Dept: FAMILY MEDICINE CLINIC | Facility: CLINIC | Age: 59
End: 2024-07-03

## 2024-07-03 ENCOUNTER — APPOINTMENT (OUTPATIENT)
Dept: LAB | Facility: HOSPITAL | Age: 59
End: 2024-07-03
Payer: MEDICARE

## 2024-07-03 DIAGNOSIS — I48.91 ATRIAL FIBRILLATION, UNSPECIFIED TYPE (HCC): ICD-10-CM

## 2024-07-03 DIAGNOSIS — I48.91 ATRIAL FIBRILLATION, UNSPECIFIED TYPE (HCC): Primary | ICD-10-CM

## 2024-07-03 DIAGNOSIS — J43.9 PULMONARY EMPHYSEMA, UNSPECIFIED EMPHYSEMA TYPE (HCC): Primary | ICD-10-CM

## 2024-07-03 LAB
INR PPP: 4.25 (ref 0.84–1.19)
PROTHROMBIN TIME: 39.9 SECONDS (ref 11.6–14.5)

## 2024-07-03 PROCEDURE — 85610 PROTHROMBIN TIME: CPT

## 2024-07-03 PROCEDURE — 36415 COLL VENOUS BLD VENIPUNCTURE: CPT

## 2024-07-03 NOTE — TELEPHONE ENCOUNTER
Please let patient know that his INR is elevated. I want him to decrease to take 10 mg coumadin every day. Repeat INR in 1 week.

## 2024-07-09 ENCOUNTER — ANTICOAG VISIT (OUTPATIENT)
Dept: FAMILY MEDICINE CLINIC | Facility: CLINIC | Age: 59
End: 2024-07-09

## 2024-07-09 ENCOUNTER — APPOINTMENT (OUTPATIENT)
Dept: LAB | Facility: HOSPITAL | Age: 59
End: 2024-07-09
Payer: MEDICARE

## 2024-07-09 DIAGNOSIS — I48.91 ATRIAL FIBRILLATION, UNSPECIFIED TYPE (HCC): ICD-10-CM

## 2024-07-09 LAB
INR PPP: 3.09 (ref 0.84–1.19)
PROTHROMBIN TIME: 31.2 SECONDS (ref 11.6–14.5)

## 2024-07-09 PROCEDURE — 36415 COLL VENOUS BLD VENIPUNCTURE: CPT

## 2024-07-09 PROCEDURE — 85610 PROTHROMBIN TIME: CPT

## 2024-07-12 ENCOUNTER — ANTICOAG VISIT (OUTPATIENT)
Dept: CARDIOLOGY CLINIC | Facility: CLINIC | Age: 59
End: 2024-07-12
Payer: MEDICARE

## 2024-07-12 ENCOUNTER — TELEPHONE (OUTPATIENT)
Age: 59
End: 2024-07-12

## 2024-07-12 DIAGNOSIS — Z95.2 S/P AVR: Primary | ICD-10-CM

## 2024-07-12 PROCEDURE — 93793 ANTICOAG MGMT PT WARFARIN: CPT | Performed by: PHYSICIAN ASSISTANT

## 2024-07-12 NOTE — ADDENDUM NOTE
Addended by: FALLON MADRIGAL on: 7/12/2024 03:05 PM     Modules accepted: Orders, Level of Service

## 2024-07-12 NOTE — TELEPHONE ENCOUNTER
Caller:  Santino Flores     Doctor: Ronaldo Dill,     Reason for call: Patient calling in asking if we can ask Dr. Dill if he can take over his BTINR testing.     Call back#:  [phone]

## 2024-07-12 NOTE — PATIENT INSTRUCTIONS
Spoke with Patient: No changes in medication health or diet. No missed or extra doses. No s/s of bleeding TIA or CVA. No new ABX, NSAIDs OTC meds, or supplements. Dose as instructed and recheck in one week.   Esther Brooks PA-C

## 2024-07-15 ENCOUNTER — APPOINTMENT (OUTPATIENT)
Dept: LAB | Facility: HOSPITAL | Age: 59
End: 2024-07-15
Payer: MEDICARE

## 2024-07-15 ENCOUNTER — HOSPITAL ENCOUNTER (OUTPATIENT)
Dept: CT IMAGING | Facility: HOSPITAL | Age: 59
Discharge: HOME | End: 2024-07-15
Attending: INTERNAL MEDICINE
Payer: MEDICARE

## 2024-07-15 ENCOUNTER — ANTICOAG VISIT (OUTPATIENT)
Dept: CARDIOLOGY CLINIC | Facility: CLINIC | Age: 59
End: 2024-07-15
Payer: MEDICARE

## 2024-07-15 DIAGNOSIS — Z95.2 S/P AVR: ICD-10-CM

## 2024-07-15 DIAGNOSIS — F17.211 CIGARETTE NICOTINE DEPENDENCE IN REMISSION: ICD-10-CM

## 2024-07-15 DIAGNOSIS — I48.11 LONGSTANDING PERSISTENT ATRIAL FIBRILLATION (HCC): Primary | ICD-10-CM

## 2024-07-15 DIAGNOSIS — Z79.01 LONG TERM (CURRENT) USE OF ANTICOAGULANTS: ICD-10-CM

## 2024-07-15 LAB
INR PPP: 2.91 (ref 0.84–1.19)
PROTHROMBIN TIME: 29.8 SECONDS (ref 11.6–14.5)

## 2024-07-15 PROCEDURE — 93793 ANTICOAG MGMT PT WARFARIN: CPT | Performed by: PHYSICIAN ASSISTANT

## 2024-07-15 PROCEDURE — 71271 CT THORAX LUNG CANCER SCR C-: CPT

## 2024-07-15 PROCEDURE — 85610 PROTHROMBIN TIME: CPT

## 2024-07-15 PROCEDURE — 36415 COLL VENOUS BLD VENIPUNCTURE: CPT

## 2024-07-15 NOTE — PATIENT INSTRUCTIONS
Spoke with Patient: No changes in medication health or diet. No missed or extra doses. No s/s of bleeding TIA or CVA. No new ABX, NSAIDs OTC meds, or supplements. Dose as instructed and recheck in two weeks.   Esther Brooks PA-C

## 2024-07-25 ENCOUNTER — TELEPHONE (OUTPATIENT)
Dept: OBGYN CLINIC | Facility: CLINIC | Age: 59
End: 2024-07-25

## 2024-07-31 ENCOUNTER — ANTICOAG VISIT (OUTPATIENT)
Dept: CARDIOLOGY CLINIC | Facility: CLINIC | Age: 59
End: 2024-07-31
Payer: MEDICARE

## 2024-07-31 ENCOUNTER — APPOINTMENT (OUTPATIENT)
Dept: LAB | Facility: HOSPITAL | Age: 59
End: 2024-07-31
Payer: MEDICARE

## 2024-07-31 DIAGNOSIS — Z95.2 S/P AVR: ICD-10-CM

## 2024-07-31 DIAGNOSIS — Z95.2 S/P AVR: Primary | ICD-10-CM

## 2024-07-31 DIAGNOSIS — Z79.01 LONG TERM (CURRENT) USE OF ANTICOAGULANTS: ICD-10-CM

## 2024-07-31 LAB
INR PPP: 2.47 (ref 0.84–1.19)
PROTHROMBIN TIME: 26.2 SECONDS (ref 11.6–14.5)

## 2024-07-31 PROCEDURE — 85610 PROTHROMBIN TIME: CPT

## 2024-07-31 PROCEDURE — 93793 ANTICOAG MGMT PT WARFARIN: CPT | Performed by: NURSE PRACTITIONER

## 2024-07-31 PROCEDURE — 36415 COLL VENOUS BLD VENIPUNCTURE: CPT

## 2024-07-31 NOTE — PATIENT INSTRUCTIONS
INR received from lab and reviewed.    Increase to 12 mg on Wednesdays  Recheck INR in 2 weeks-instructions given to patient by phone  CHRISTINE Boyer

## 2024-08-15 ENCOUNTER — APPOINTMENT (OUTPATIENT)
Dept: LAB | Facility: HOSPITAL | Age: 59
End: 2024-08-15
Payer: MEDICARE

## 2024-08-15 ENCOUNTER — ANTICOAG VISIT (OUTPATIENT)
Dept: CARDIOLOGY CLINIC | Facility: CLINIC | Age: 59
End: 2024-08-15
Payer: MEDICARE

## 2024-08-15 DIAGNOSIS — I48.11 LONGSTANDING PERSISTENT ATRIAL FIBRILLATION (HCC): Primary | ICD-10-CM

## 2024-08-15 DIAGNOSIS — Z79.01 LONG TERM (CURRENT) USE OF ANTICOAGULANTS: ICD-10-CM

## 2024-08-15 DIAGNOSIS — Z95.2 S/P AVR: ICD-10-CM

## 2024-08-15 LAB
INR PPP: 2.43 (ref 0.85–1.19)
PROTHROMBIN TIME: 26.7 SECONDS (ref 12.3–15)

## 2024-08-15 PROCEDURE — 36415 COLL VENOUS BLD VENIPUNCTURE: CPT

## 2024-08-15 PROCEDURE — 93793 ANTICOAG MGMT PT WARFARIN: CPT | Performed by: PHYSICIAN ASSISTANT

## 2024-08-15 PROCEDURE — 85610 PROTHROMBIN TIME: CPT

## 2024-08-15 NOTE — PATIENT INSTRUCTIONS
LMAM to adjust dose and recheck in two weeks.  Patient to call if questions, concerns or changes in medication health or diet.  Esther Brooks PA-C

## 2024-09-06 ENCOUNTER — APPOINTMENT (OUTPATIENT)
Dept: LAB | Facility: HOSPITAL | Age: 59
End: 2024-09-06
Payer: MEDICARE

## 2024-09-06 ENCOUNTER — ANTICOAG VISIT (OUTPATIENT)
Dept: CARDIOLOGY CLINIC | Facility: CLINIC | Age: 59
End: 2024-09-06
Payer: MEDICARE

## 2024-09-06 DIAGNOSIS — Z95.2 S/P AVR: Primary | ICD-10-CM

## 2024-09-06 DIAGNOSIS — Z95.2 S/P AVR: ICD-10-CM

## 2024-09-06 DIAGNOSIS — Z79.01 LONG TERM (CURRENT) USE OF ANTICOAGULANTS: ICD-10-CM

## 2024-09-06 LAB
INR PPP: 3.07 (ref 0.85–1.19)
PROTHROMBIN TIME: 31.8 SECONDS (ref 12.3–15)

## 2024-09-06 PROCEDURE — 85610 PROTHROMBIN TIME: CPT

## 2024-09-06 PROCEDURE — 36415 COLL VENOUS BLD VENIPUNCTURE: CPT

## 2024-09-06 PROCEDURE — 93793 ANTICOAG MGMT PT WARFARIN: CPT | Performed by: NURSE PRACTITIONER

## 2024-09-06 NOTE — PATIENT INSTRUCTIONS
INR received from lab and reviewed.    No changes needed; continue current dosing schedule.  Recheck INR in one month-[facility] with results and instructions   CHRISTINE Boyer

## 2024-09-09 DIAGNOSIS — I10 ESSENTIAL HYPERTENSION: ICD-10-CM

## 2024-09-09 DIAGNOSIS — I42.8 OTHER CARDIOMYOPATHY (HCC): ICD-10-CM

## 2024-09-09 DIAGNOSIS — J43.9 PULMONARY EMPHYSEMA, UNSPECIFIED EMPHYSEMA TYPE (HCC): ICD-10-CM

## 2024-09-09 DIAGNOSIS — I48.91 ATRIAL FIBRILLATION, UNSPECIFIED TYPE (HCC): ICD-10-CM

## 2024-09-09 DIAGNOSIS — I25.10 CORONARY ARTERY DISEASE INVOLVING NATIVE CORONARY ARTERY OF NATIVE HEART WITHOUT ANGINA PECTORIS: ICD-10-CM

## 2024-09-09 NOTE — TELEPHONE ENCOUNTER
Reason for call:   [x] Refill   [] Prior Auth  [] Other:     Office:   [x] PCP/Provider -   [] Specialty/Provider -     Medication: albuterol (Ventolin HFA) 90 mcg/act inhaler 2 puffs every 6 hrs as needed    furosemide (LASIX) 80 mg 1 tablet daily     Potassium Chloride ER 20 MEQ 1 tablet daily       Pharmacy: [facility] [place]     Does the patient have enough for 3 days?   [x] Yes   [] No - Send as HP to POD

## 2024-09-10 ENCOUNTER — TELEPHONE (OUTPATIENT)
Dept: FAMILY MEDICINE CLINIC | Facility: CLINIC | Age: 59
End: 2024-09-10

## 2024-09-10 RX ORDER — ALBUTEROL SULFATE 90 UG/1
2 AEROSOL, METERED RESPIRATORY (INHALATION) EVERY 6 HOURS PRN
Qty: 18 G | Refills: 1 | Status: SHIPPED | OUTPATIENT
Start: 2024-09-10

## 2024-09-10 RX ORDER — POTASSIUM CHLORIDE 1500 MG/1
20 TABLET, EXTENDED RELEASE ORAL DAILY
Qty: 90 TABLET | Refills: 0 | Status: SHIPPED | OUTPATIENT
Start: 2024-09-10

## 2024-09-10 RX ORDER — FUROSEMIDE 80 MG
80 TABLET ORAL DAILY
Qty: 90 TABLET | Refills: 0 | Status: SHIPPED | OUTPATIENT
Start: 2024-09-10

## 2024-09-10 NOTE — TELEPHONE ENCOUNTER
Pharmacy called regarding Rx for Albuterol (Ventolin) inhaler - Pharmacy was confused by the way Rx was written - I asked the pharmacy how they filled it previously 12/23 & to refill it the same way.    Pharmacy would refill Brand Necessary Ventolin.

## 2024-09-26 ENCOUNTER — OFFICE VISIT (OUTPATIENT)
Dept: SLEEP CENTER | Facility: CLINIC | Age: 59
End: 2024-09-26
Payer: MEDICARE

## 2024-09-26 VITALS
BODY MASS INDEX: 41.75 KG/M2 | OXYGEN SATURATION: 98 % | RESPIRATION RATE: 16 BRPM | DIASTOLIC BLOOD PRESSURE: 82 MMHG | TEMPERATURE: 97.7 F | SYSTOLIC BLOOD PRESSURE: 121 MMHG | HEIGHT: 73 IN | HEART RATE: 98 BPM | WEIGHT: 315 LBS

## 2024-09-26 DIAGNOSIS — G47.33 OBSTRUCTIVE SLEEP APNEA: Primary | ICD-10-CM

## 2024-09-26 DIAGNOSIS — I48.20 CHRONIC ATRIAL FIBRILLATION (HCC): ICD-10-CM

## 2024-09-26 DIAGNOSIS — E66.01 CLASS 3 SEVERE OBESITY WITH SERIOUS COMORBIDITY AND BODY MASS INDEX (BMI) OF 45.0 TO 49.9 IN ADULT, UNSPECIFIED OBESITY TYPE (HCC): ICD-10-CM

## 2024-09-26 DIAGNOSIS — G25.81 RESTLESS LEG SYNDROME: ICD-10-CM

## 2024-09-26 DIAGNOSIS — E66.813 CLASS 3 SEVERE OBESITY WITH SERIOUS COMORBIDITY AND BODY MASS INDEX (BMI) OF 45.0 TO 49.9 IN ADULT, UNSPECIFIED OBESITY TYPE (HCC): ICD-10-CM

## 2024-09-26 PROCEDURE — 99214 OFFICE O/P EST MOD 30 MIN: CPT | Performed by: STUDENT IN AN ORGANIZED HEALTH CARE EDUCATION/TRAINING PROGRAM

## 2024-09-26 PROCEDURE — G2211 COMPLEX E/M VISIT ADD ON: HCPCS | Performed by: STUDENT IN AN ORGANIZED HEALTH CARE EDUCATION/TRAINING PROGRAM

## 2024-09-26 NOTE — PATIENT INSTRUCTIONS
Continue PAP Therapy  Continue BiPAP at settings of 22/15 cmH2O  Try tightening your mask and/or using the fullface mask more consistently, to avoid leak.  If needed in the future, I can order a formal mask fitting appointment; this is an appointment with the DME to ensure that you have the optimal mask and fit for your face structure    Remember to clean your mask and equipment regularly, as directed.  You will likely need a new device within the next few years, as [facility] has shut down their Sleep department; we can discuss this further at future appointments  You should be eligible for new supplies approximately every 3-6 months, depending on your insurance coverage. Contact your Durable Medical Equipment (DME) company for new supplies as needed.  Practice good Sleep Hygiene, as outlined below.  Follow up in 12 months.      Care and Maintenance  Headgear should be washed as needed. Daily inspection and weekly washings are recommended. Do not disassemble the straps. Machine wash in warm water, making sure to attach Velcro hooks and tabs before washing. Line dry or machine dry on a low setting.  Masks should be washed every day. Daily inspection is recommended. Leave the mask and tubing attached. Gently wash the mask with a soft cloth using warm water and mild detergent, concentrating on the mask cushion flaps. DO NOT use alcohol or bleach. Rinse thoroughly and air dry.  Tubing and headgear should be washed weekly. Daily inspection is recommended. Wash in warm water and mild detergent and rinse thoroughly. Hook the tubing to the machine and blow until dry.  Humidifier should be washed daily and filled with DISTILLED water before use. Wash with warm water and mild detergent. Disinfect weekly by soaking with a solution of 1 part white vinegar and 3 parts water for 30 minutes. Rinse thoroughly and air dry.  Disposable filters should be replaced once a month. Wash reusable foam filters with warm water and mild  detergent at least once a month. Rinse thoroughly and dry with paper towels.  Avoid  that contain fragrance or conditioners, as these will leave a residue.  NEVER iron any soft goods.      CMS Requirements    Your insurance requires a face-to-face follow up visit within a 31-90 day period after starting CPAP.  Your insurance requires compliance with CPAP, which is at least 4 hours per night for 70% of the time. This must be done over a 30 day period and must occur within the initial 31-90 day period after starting CPAP.  Your insurance also requires at least yearly follow ups to continue to pay for CPAP supplies.       PAP Supply Guidelines    Below are the guidelines for reordering your supplies. You will be responsible for your deductible, co payments, and out of pocket expenses.    Item Frequency   Nasal Mask (no headgear) 1 every 3 months   Nasal Mask Cushion 1 every 2 weeks   Full Face Mask (no headgear) 1 every 3 months   Full Face Mask Cushion 1 every month   Nasal Pillows 1 every 2 weeks   Headgear 1 every 6 months   hin Strap 1 every 6 months   camille 1 every 3 months   Filters: Reusable 1 every 6 months   Filters: Disposable 1 every 2 weeks   Humidifier Chamber(disposable) 1 every 6 months         Good Sleep Hygiene    Wake up at the same time every day, even on the weekends.  Use your bed for sleep and intimacy only.  If you have been in bed awake for 30 minutes, get up and leave the bedroom. Choose a dull activity not involving a blue screen (TV, computer, handheld devices). Go back to bed when you feel sleepy.  Avoid caffeine, nicotine and alcohol before you go to bed.  Avoid large meals before you go to bed.  Avoid using screens (computers, tablets, smartphones, etc.) for at least 1 hour before bedtime  Exercise regularly, but do not exercise right before you go to bed.  Avoid daytime naps. If you do take a nap, sleep for 20-40 minutes, and not after dinner.

## 2024-09-26 NOTE — PROGRESS NOTES
[facility]  Sleep Medicine Follow up/ Established Patient Visit      Assessment/Plan:  1. Obstructive sleep apnea  PAP DME Resupply/Reorder      2. Restless leg syndrome  PAP DME Resupply/Reorder      3. Chronic atrial fibrillation (HCC)  PAP DME Resupply/Reorder      4. Class 3 severe obesity with serious comorbidity and body mass index (BMI) of 45.0 to 49.9 in adult, unspecified obesity type (HCC)  PAP DME Resupply/Reorder          Santino is a pleasant 59-year-old gentleman with a PMHx of HTN, chronic diastolic heart failure, chronic atrial fibrillation on Coumadin, T2DM, HLD, obesity who presents in follow up for THIEN (AHI 59.5, O2 harini 82% 11/2017) and RLS.  He continues to endorse significant benefit from PAP therapy, with his only deficit being occasional leak (per his subjective report).  Looking at his compliance report, his AHI is slightly supratherapeutic, however I suspect this is due to his severe mask leak, which we discussed at length today.  His RLS remains mild and not bothersome enough to him to merit treatment.    Continue BiPAP at settings of 22/15 cm H2O  Offered a mask fitting appointment, however he prefers to try the fullface mask he has and/or tightening his current mask before pursuing this in order to correct the extensive mask leak.  Prescription for new supplies ordered today  Reviewed CMS/insurance requirements and resupply guidelines  Information provided on the above as well as general maintenance steps  Did review with him that he will likely need a new device sometime within the next few years due to [facility] shutting down their sleep department.  Okay with holding off on any pharmacologic management of his RLS at this time, as it is not significantly bothersome to him; we can revisit this in the future if needed.  He will Return in about 1 year (around  "9/26/2025).      ________________________________________________________________________________________________    Per Last Visit Note (Date: 7/13/2023):  PLAN:  I reviewed results of prior studies and physiologic data with the patient.   I discussed treatment options with risks and benefits.  Treatment with  PAP is medically necessary and Santino is agreable to continue use.   Care of equipment, methods to improve comfort using PAP and importance of compliance with therapy were discussed.  Pressure setting: continue 22/15 cmH2O. he declined pressure increase.  Rx provided to replace supplies and Care coordinated with DME provider.   No medication is needed for RLS.  Discussed strategies for weight reduction.    Follow-up is advised in 1 year or sooner if needed to monitor progress, compliance and to adjust therapy.       Sleep Studies:  -PAP titration study 4/11/2016: BMI 50.9.  BiPAP initiated at 14/10 cm H2O, titrated up to 21/14 cm H2O.  Best pressure setting noted to be 21/14 cm H2O.    -Split-night PSG 11/26/2017: BMI 48.4.  Diagnostic:  minutes, sleep efficiency 62.8%.  Sleep onset latency 26.5 minutes, no REM sleep noted.  AHI 59.5, supine .6.  O2 harini 82%, 37.7% below 90%.  Treatment: PAP initiated at 15 cm H2O, titrated up to 22/16 cm H2O.  Best pressure setting noted to be 22/16 cm H2O.  PLM index 39.     ________________________________________________________________________________________________      Interval History: Santino Flores is a 59 y.o. male with a PMHx of HTN, chronic diastolic heart failure, chronic atrial fibrillation on Coumadin, T2DM, HLD, obesity who presents in follow up for THIEN (AHI 59.5, O2 harini 82% 11/2017) and RLS.    SDB:  -Current experience with PAP Therapy: Still beneficial.  -Mask type: Nasal   -Difficulties with mask: \"Leaks once in a while.\"  -Device:  U[employer] Dreamstation AutoBiPAP ; replacement. Original received 12/21/2017.  -Difficulties with device: " Denies  -DME: [facility] - gets supplies from [employer] (cheaper)  -Compliance:        RLS: Can get annoying at times in the evenings, when inactive; worse at night. However, not currently interested in medication.       Swisher Sleepiness Scale:  What are your chances of dozing?   0= no chance  1= slight chance  2= moderate chance  3= high chance    Sitting and readin   Watching TV: 2  Sitting, inactive in a public place (e.g. a theatre or a meeting):0  As a passenger in a car for an hour without a break: 0  Lying down to rest in the afternoon when circumstances permit: 3   Sitting and talking to someone: 0  Sitting quietly after a lunch without alcohol: 0  In a car, while stopped for a few minutes in the traffic: 0       TOTAL  5/24  Greater or equal to 10 is positive for excessive daytime sleepiness        SLEEP HYGIENE QUESTIONS:  Bedtime: 2100/2200   Time it takes to fall sleep: 31-45 minutes  Wake up Time: 0630/0700   Number of times patient wakes up per night: 0  Reason (s) why patient wakes up during the night: n/a   Naps: Sometimes  Estimated total sleep time (in a 24 hour period of time): ~8-9 hours       Changes to PMH, PSH, SH: Denies     SLEEP RELATED ROS  Review of Systems  Allergies   Allergen Reactions    Fluticasone-Salmeterol Palpitations       CURRENT MEDICATIONS:  Current Outpatient Medications   Medication Instructions    albuterol (Ventolin HFA) 90 mcg/act inhaler 2 puffs, Inhalation, Every 6 hours PRN, Must be brand necessary    albuterol 2.5 mg, Nebulization, Every 6 hours PRN    atorvastatin (LIPITOR) 10 mg, Oral, Daily    benazepril (LOTENSIN) 10 mg, Oral, Daily    diltiazem (DILACOR XR) 240 mg, Oral, Daily    fluticasone-umeclidinium-vilanterol 100-62.5-25 mcg/actuation inhaler 1 puff, Inhalation, Daily, Rinse mouth after use.    furosemide (LASIX) 80 mg, Oral, Daily    insulin glargine (Toujeo Max SoloStar) 300 units/mL CONCENTRATED U-300 injection pen (2-unit dial) Inject 66 units  "nightly.    Insulin Pen Needle (BD Pen Needle Fany U/F) 32G X 4 MM MISC Injection, 4 times daily    metFORMIN (GLUCOPHAGE) 1,000 mg, Oral, 2 times daily    metoprolol tartrate (LOPRESSOR) 25 mg, Oral, 2 times daily    nitroglycerin (NITROSTAT) 0.4 mg, Sublingual, Every 5 minutes PRN    NovoLOG FlexPen 100 units/mL injection pen Inject 14 units with breakfast and lunch and 36 units with dinner.    Potassium Chloride ER 20 MEQ TBCR 20 mEq, Oral, Daily    warfarin (COUMADIN) 2 mg, Oral, Daily (warfarin)    warfarin (COUMADIN) 10 mg, Oral, Daily           PHYSICAL EXAMINATION:  Vital Signs: /82   Pulse 98   Temp 97.7 °F (36.5 °C) (Temporal)   Resp 16   Ht 6' 1\" (1.854 m)   Wt (!) 162 kg (357 lb 3.2 oz)   SpO2 98%   BMI 47.13 kg/m²     Constitutional: NAD, well appearing   Mental Status: AAOx3  Skin: Warm, dry, no rashes noted   Eyes: PERRL, normal conjunctiva  ENT: Nasal congestion absent  Posterior Airspace:   Palma Tongue Position: 4  Retrognathia: absent  Overbite: absent  High Arched Palate: absent  Tongue Scalloping/Ridging: absent  Uvula: normal  Chest: No evidence of respiratory distress, no accessory muscle use; no evidence of peripheral cyanosis; mechanical heart valve audible even from ~5' away.   Abdomen: Soft, NT/ND  Extremities: No digital clubbing or pedal edema  Neuro: Strength 5/5 throughout, sensation grossly intact          Electronically signed by:    Dillon Garcia DO  Board-Certified Neurology and Sleep Medicine  [facility]  09/26/24      "

## 2024-09-28 ENCOUNTER — TELEPHONE (OUTPATIENT)
Dept: SLEEP CENTER | Facility: CLINIC | Age: 59
End: 2024-09-28

## 2024-09-28 LAB
DME PARACHUTE DELIVERY DATE REQUESTED: NORMAL
DME PARACHUTE ITEM DESCRIPTION: NORMAL
DME PARACHUTE ORDER STATUS: NORMAL
DME PARACHUTE SUPPLIER NAME: NORMAL
DME PARACHUTE SUPPLIER PHONE: NORMAL

## 2024-10-04 ENCOUNTER — ANTICOAG VISIT (OUTPATIENT)
Dept: CARDIOLOGY CLINIC | Facility: CLINIC | Age: 59
End: 2024-10-04
Payer: MEDICARE

## 2024-10-04 ENCOUNTER — APPOINTMENT (OUTPATIENT)
Dept: LAB | Facility: HOSPITAL | Age: 59
End: 2024-10-04
Payer: MEDICARE

## 2024-10-04 DIAGNOSIS — Z95.2 S/P AVR: Primary | ICD-10-CM

## 2024-10-04 DIAGNOSIS — Z95.2 S/P AVR: ICD-10-CM

## 2024-10-04 DIAGNOSIS — Z79.01 LONG TERM (CURRENT) USE OF ANTICOAGULANTS: ICD-10-CM

## 2024-10-04 LAB
INR PPP: 3.51 (ref 0.85–1.19)
PROTHROMBIN TIME: 35.2 SECONDS (ref 12.3–15)

## 2024-10-04 PROCEDURE — 93793 ANTICOAG MGMT PT WARFARIN: CPT | Performed by: NURSE PRACTITIONER

## 2024-10-04 PROCEDURE — 36415 COLL VENOUS BLD VENIPUNCTURE: CPT

## 2024-10-04 PROCEDURE — 85610 PROTHROMBIN TIME: CPT

## 2024-10-04 NOTE — PATIENT INSTRUCTIONS
INR received from lab and reviewed.    No changes needed; continue current dosing schedule.  Recheck INR in 3 weeks-results and instructions given to pt by phone  CHRISTINE Boyer

## 2024-10-07 DIAGNOSIS — E78.49 OTHER HYPERLIPIDEMIA: ICD-10-CM

## 2024-10-07 NOTE — TELEPHONE ENCOUNTER
Reason for call:   [x] Refill   [] Prior Auth  [] Other:     Office:   [x] PCP/Provider - Mathieu Cronin, DO  [] Specialty/Provider -     Medication:     atorvastatin (LIPITOR) 10 mg tablet       Dose/Frequency:     10 mg, Oral, Daily       Quantity: 90    Pharmacy: [facility] [address] [place] [phone]     Does the patient have enough for 3 days?   [x] Yes   [] No - Send as HP to POD

## 2024-10-08 RX ORDER — ATORVASTATIN CALCIUM 10 MG/1
10 TABLET, FILM COATED ORAL DAILY
Qty: 90 TABLET | Refills: 1 | Status: SHIPPED | OUTPATIENT
Start: 2024-10-08

## 2024-11-04 ENCOUNTER — ANTICOAG VISIT (OUTPATIENT)
Dept: CARDIOLOGY CLINIC | Facility: CLINIC | Age: 59
End: 2024-11-04
Payer: MEDICARE

## 2024-11-04 ENCOUNTER — APPOINTMENT (OUTPATIENT)
Dept: LAB | Facility: HOSPITAL | Age: 59
End: 2024-11-04
Payer: MEDICARE

## 2024-11-04 DIAGNOSIS — I48.11 LONGSTANDING PERSISTENT ATRIAL FIBRILLATION (HCC): Primary | ICD-10-CM

## 2024-11-04 DIAGNOSIS — Z95.2 S/P AVR: ICD-10-CM

## 2024-11-04 DIAGNOSIS — Z79.01 LONG TERM (CURRENT) USE OF ANTICOAGULANTS: ICD-10-CM

## 2024-11-04 LAB
INR PPP: 2.98 (ref 0.85–1.19)
PROTHROMBIN TIME: 31.1 SECONDS (ref 12.3–15)

## 2024-11-04 PROCEDURE — 85610 PROTHROMBIN TIME: CPT

## 2024-11-04 PROCEDURE — 93793 ANTICOAG MGMT PT WARFARIN: CPT | Performed by: PHYSICIAN ASSISTANT

## 2024-11-04 PROCEDURE — 36415 COLL VENOUS BLD VENIPUNCTURE: CPT

## 2024-11-04 NOTE — PATIENT INSTRUCTIONS
Spoke with Patient: No changes in medication health or diet. No missed or extra doses. No s/s of bleeding TIA or CVA. No new ABX, NSAIDs OTC meds, or supplements. Dose as instructed and recheck in one month.   Esther Brooks PA-C

## 2024-11-19 ENCOUNTER — RA CDI HCC (OUTPATIENT)
Dept: OTHER | Facility: HOSPITAL | Age: 59
End: 2024-11-19

## 2024-11-21 ENCOUNTER — OFFICE VISIT (OUTPATIENT)
Dept: CARDIOLOGY CLINIC | Facility: CLINIC | Age: 59
End: 2024-11-21
Payer: MEDICARE

## 2024-11-21 VITALS
HEART RATE: 94 BPM | HEIGHT: 73 IN | SYSTOLIC BLOOD PRESSURE: 130 MMHG | BODY MASS INDEX: 41.75 KG/M2 | WEIGHT: 315 LBS | RESPIRATION RATE: 18 BRPM | DIASTOLIC BLOOD PRESSURE: 70 MMHG | OXYGEN SATURATION: 96 %

## 2024-11-21 DIAGNOSIS — I10 HYPERTENSION, UNSPECIFIED TYPE: ICD-10-CM

## 2024-11-21 DIAGNOSIS — I48.91 ATRIAL FIBRILLATION, UNSPECIFIED TYPE (HCC): ICD-10-CM

## 2024-11-21 DIAGNOSIS — E11.69 DYSLIPIDEMIA ASSOCIATED WITH TYPE 2 DIABETES MELLITUS  (HCC): ICD-10-CM

## 2024-11-21 DIAGNOSIS — I50.32 CHRONIC DIASTOLIC HEART FAILURE (HCC): ICD-10-CM

## 2024-11-21 DIAGNOSIS — Z95.5 H/O HEART ARTERY STENT: ICD-10-CM

## 2024-11-21 DIAGNOSIS — G47.33 OBSTRUCTIVE SLEEP APNEA: ICD-10-CM

## 2024-11-21 DIAGNOSIS — E78.5 DYSLIPIDEMIA ASSOCIATED WITH TYPE 2 DIABETES MELLITUS  (HCC): ICD-10-CM

## 2024-11-21 DIAGNOSIS — Z95.2 S/P AVR: Primary | ICD-10-CM

## 2024-11-21 DIAGNOSIS — F17.211 CIGARETTE NICOTINE DEPENDENCE IN REMISSION: ICD-10-CM

## 2024-11-21 PROCEDURE — 99213 OFFICE O/P EST LOW 20 MIN: CPT | Performed by: INTERNAL MEDICINE

## 2024-11-21 RX ORDER — DILTIAZEM HYDROCHLORIDE 240 MG/1
240 CAPSULE, EXTENDED RELEASE ORAL DAILY
Qty: 90 CAPSULE | Refills: 3 | Status: SHIPPED | OUTPATIENT
Start: 2024-11-21

## 2024-11-21 NOTE — ASSESSMENT & PLAN NOTE
Wt Readings from Last 3 Encounters:   11/21/24 (!) 165 kg (363 lb)   09/26/24 (!) 162 kg (357 lb 3.2 oz)   06/19/24 (!) 165 kg (363 lb)     Compensated/euvolemic

## 2024-11-21 NOTE — ASSESSMENT & PLAN NOTE
Normal mAVR via recent Echo and today's auscultation  In our coumadin clinic now  He is aware of SBE Abx prophylaxis requirements

## 2024-11-21 NOTE — PROGRESS NOTES
Patient ID: Santino Flores is a 59 y.o. male.        Plan:      S/P AVR  Normal mAVR via recent Echo and today's auscultation  In our coumadin clinic now  He is aware of SBE Abx prophylaxis requirements    Atrial fibrillation (HCC)  Rate controlled  AC'd    Chronic diastolic heart failure (HCC)  Wt Readings from Last 3 Encounters:   24 (!) 165 kg (363 lb)   24 (!) 162 kg (357 lb 3.2 oz)   24 (!) 165 kg (363 lb)     Compensated/euvolemic          Dyslipidemia associated with type 2 diabetes mellitus  (HCC)    Lab Results   Component Value Date    HGBA1C 5.5 2024     On proper statin  Goal LDL < 70    H/O heart artery stent  Angina free    Obstructive sleep apnea  Compliant with Sleep Med FU and treatment    Cigarette nicotine dependence in remission  Advised cessation       Follow up Plan/Other summary comments:  Advised no medication changes today.   Return in about 6 months (around 2025).  Call sooner with issues.     HPI: Santino is here for routine FU.  Feels well.  Offers no cardiac related complaints on extensive ROS questioning.  No cp, sob, palps, unusual edema, orthopnea, pnd, (pre)syncope, tia, or claudication like sx.       Most recent or relevant cardiac/vascular testin Echo    Left Ventricle: Left ventricular cavity size is normal. Wall thickness is mildly increased. There is mild to moderate concentric hypertrophy. The left ventricular ejection fraction is 55%. Although no diagnostic regional wall motion abnormality was identified, this possibility cannot be completely excluded on the basis of this study.    Right Ventricle: Systolic function is low normal.    Left Atrium: The atrium is mildly dilated.    Aortic Valve: There is a mechanical valve. The prosthetic valve appears to be functioning normally. There is no evidence of regurgitation. The aortic valve peak velocity is 2.6 m/s. The aortic valve mean gradient is 15.0 mmHg.    Mitral Valve: There is moderate  "annular calcification. There is trace regurgitation. There is very mild functional stenosis, likely due more to MAC than leaflet stenosis. The mitral valve mean gradient is 4.5mmHg.    Tricuspid Valve: There is mild regurgitation.    Pericardium: There is no pericardial effusion.    Prior TTE study available for comparison. Prior study date: 3/16/2023. No significant changes noted compared to the prior study.  03/23 Echo   Left Ventricle: Left ventricular cavity size is normal. Wall thickness is normal. The left ventricular ejection fraction is 50%. Systolic function is low normal.    Right Ventricle: Systolic function is low normal. Normal tricuspid annular plane systolic excursion (TAPSE) > 1.7 cm.    Aortic Valve: There is a mechanical valve. The prosthetic valve appears to be functioning normally. There is mild regurgitation. The aortic valve has no significant stenosis. The aortic valve peak velocity is 2.52 m/s. The aortic valve mean gradient is 15 mmHg.    Mitral Valve: There is moderate annular calcification. There is mild regurgitation.    Tricuspid Valve: There is mild regurgitation.      Past Surgical History:   Procedure Laterality Date    AORTIC VALVE REPLACEMENT  10/07/2016    AVR replacement, mechanical    CORONARY ANGIOPLASTY WITH STENT PLACEMENT  07/29/2010    EF 40%, Successful bare metal stent mid RCA    MENISCECTOMY Left        Lipid Profile: Reviewed      Review of Systems   10  point ROS  was otherwise non pertinent or negative except as per HPI or as below.       Objective:     /70 (BP Location: Left arm, Patient Position: Sitting, Cuff Size: Large)   Pulse 94   Resp 18   Ht 6' 0.64\" (1.845 m)   Wt (!) 165 kg (363 lb)   SpO2 96%   BMI 48.37 kg/m²     PHYSICAL EXAM:    General:  Normal appearance in no distress.  Eyes:  Anicteric.  Oral mucosa:  Moist.  Neck:  No JVD. Carotid upstrokes are brisk without bruits.  No masses.  Chest:  Clear to auscultation.  Cardiac:  Irregular, No " palpable PMI.  Normal S1 and S2.  No murmur gallop or rub.  Abdomen:  Soft and nontender. No palpable organomegaly or aortic enlargement.  Extremities:  Trace B/L LE peripheral edema.  Musculoskeletal:  Symmetric.   Vascular: Pedal pulses are intact.  Neuro:  Grossly symmetric.  Psych:  Alert and oriented x3.      Meds reviewed.    Current Outpatient Medications:     albuterol (2.5 mg/3 mL) 0.083 % nebulizer solution, Take 3 mL (2.5 mg total) by nebulization every 6 (six) hours as needed for wheezing or shortness of breath, Disp: 360 mL, Rfl: 2    albuterol (Ventolin HFA) 90 mcg/act inhaler, Inhale 2 puffs every 6 (six) hours as needed for shortness of breath Must be brand necessary, Disp: 18 g, Rfl: 1    atorvastatin (LIPITOR) 10 mg tablet, Take 1 tablet (10 mg total) by mouth daily, Disp: 90 tablet, Rfl: 1    benazepril (LOTENSIN) 10 mg tablet, Take 1 tablet (10 mg total) by mouth daily, Disp: 90 tablet, Rfl: 3    diltiazem (DILACOR XR) 240 MG 24 hr capsule, Take 1 capsule (240 mg total) by mouth daily, Disp: 90 capsule, Rfl: 3    fluticasone-umeclidinium-vilanterol 100-62.5-25 mcg/actuation inhaler, Inhale 1 puff daily Rinse mouth after use., Disp: 180 blister, Rfl: 3    furosemide (LASIX) 80 mg tablet, Take 1 tablet (80 mg total) by mouth daily, Disp: 90 tablet, Rfl: 0    insulin glargine (Toujeo Max SoloStar) 300 units/mL CONCENTRATED U-300 injection pen (2-unit dial), Inject 66 units nightly., Disp: 18 mL, Rfl: 3    Insulin Pen Needle (BD Pen Needle Fany U/F) 32G X 4 MM MISC, Inject as directed 4 (four) times a day, Disp: 400 each, Rfl: 5    metFORMIN (GLUCOPHAGE) 1000 MG tablet, Take 1 tablet (1,000 mg total) by mouth 2 (two) times a day, Disp: 180 tablet, Rfl: 3    metoprolol tartrate (LOPRESSOR) 25 mg tablet, Take 1 tablet (25 mg total) by mouth 2 (two) times a day, Disp: 180 tablet, Rfl: 3    nitroglycerin (NITROSTAT) 0.4 mg SL tablet, Place 1 tablet (0.4 mg total) under the tongue every 5 (five) minutes  as needed for chest pain, Disp: 100 tablet, Rfl: 3    NovoLOG FlexPen 100 units/mL injection pen, Inject 14 units with breakfast and lunch and 36 units with dinner., Disp: 45 mL, Rfl: 1    Potassium Chloride ER 20 MEQ TBCR, Take 1 tablet (20 mEq total) by mouth daily, Disp: 90 tablet, Rfl: 0    warfarin (COUMADIN) 10 mg tablet, Take 1 tablet (10 mg total) by mouth daily, Disp: 90 tablet, Rfl: 3    warfarin (COUMADIN) 2 mg tablet, Take 1 tablet (2 mg total) by mouth daily, Disp: 90 tablet, Rfl: 3     Past Medical History:   Diagnosis Date    Asthma     last assessed 12    Athscl heart disease of native coronary artery w/o ang pctrs     Atrial fibrillation (HCC)     last assessed 12    Chronic atrial fibrillation (HCC)     Chronic diastolic (congestive) heart failure (HCC)     Closed fracture of fibula     last assessed 10/18/13    COLD (chronic obstructive lung disease) (HCC)     last assessed 12    Coronary angioplasty status     CPAP (continuous positive airway pressure) dependence     Dyslipidemia associated with type 2 diabetes mellitus  (HCC)     H/O heart artery stent     Hyperlipidemia     Hypertension     Presence of prosthetic heart valve     Respiratory failure with hypoxia (HCC) 2021    S/P AVR            Social History     Tobacco Use   Smoking Status Former    Current packs/day: 0.00    Types: Cigarettes    Start date: 10/1981    Quit date: 10/2016    Years since quittin.1   Smokeless Tobacco Never   Tobacco Comments    Quit 2 months ago

## 2024-11-21 NOTE — PATIENT INSTRUCTIONS
"Patient Education     Atrial fibrillation   The Basics   Written by the doctors and editors at [facility]   What is atrial fibrillation? -- Atrial fibrillation is the most common heart rhythm problem (figure 1). The condition can put you at risk of stroke and other problems, as well as death. Atrial fibrillation is sometimes called \"A-fib.\"  The top 2 chambers of your heart are called the \"atria.\" They pump blood into the larger bottom chambers, which pump blood to your lungs and the rest of your body. In A-fib, your heart beats abnormally and the top chambers stop pumping blood as strongly as normal.  In some people, A-fib never goes away. In others, A-fib can come and go, even with treatment. If you had A-fib in the past, but have a normal heart rhythm now, ask your doctor what you can do to keep A-fib from coming back.  You might be able to lower your chances of having A-fib again if you:   Control your blood pressure   Avoid or limit alcohol   Cut down on caffeine   Get treatment for an overactive thyroid gland   Get regular exercise   Lose weight (if you are overweight)   Reduce stress  What are the symptoms of A-fib? -- Some people with A-fib have no symptoms. When symptoms do happen, they can include:   Feeling as though your heart is racing, skipping beats, or beating out of sync   Mild chest \"tightness\" or pain   Feeling lightheaded, dizzy, or like you might pass out   Having trouble breathing, especially with exercise  Is there a test for A-fib? -- Yes. If your doctor or nurse thinks that you might have A-fib, they will probably do a test called an electrocardiogram (\"ECG\"). It records the electrical activity in your heart.  How is A-fib treated? -- In some cases, A-fib goes away on its own, even without treatment. But many people do need treatment.  Treatment can include 1 or more of the following:   Medicines to control the speed or rhythm of the heartbeat   Medicines to keep clots from forming   " "\"Cardioversion\" - This involves applying an electrical current to the heart to fix its rhythm.   \"Ablation\" - These treatments involve destroying the small part of the heart that is sending abnormal electrical signals. This can be done using heat (\"radiofrequency ablation\") or cold (\"cryoablation\").   Pacemaker - This is a device that is implanted in the body and sends electrical signals to the heart to control the heartbeat.  What will my life be like? -- Most people with A-fib are able to live normal lives. Still, it is important to take the medicines that your doctor prescribes every day. Taking your medicines as directed can help reduce the chances that your A-fib will cause a stroke. It's also a good idea to learn what the signs and symptoms of a stroke are (figure 2).  When should I call the doctor? -- Call for an ambulance (in the [place] and [place], call 9-1-1) if:   You have severe trouble breathing, or you pass out.   You have signs of a heart attack, which might include:   Severe chest pain, pressure, or discomfort with:   Breathing trouble, sweating, upset stomach, or cold and clammy skin   Pain in your arms, back, or jaw   Pain that gets worse with activity like walking up stairs   You have signs of a stroke, which might include:   Numbness or weakness of the face, arm, or leg, especially on 1 side of the body   Confusion, or trouble speaking or understanding   Trouble seeing in 1 or both eyes   Trouble walking, dizziness, or loss of balance or coordination   Severe headache with no known cause  Call your doctor for advice if:   You have trouble breathing when talking or sitting still.   You feel your heart racing, and it does not stop after a while (for example, 1 hour).   You are lightheaded or more tired than normal.  All topics are updated as new evidence becomes available and our peer review process is complete.  This topic retrieved from [employer] on: Feb 26, 2024.  Topic 69672 Version 25.0  Release: " "32.2.4 - C32.56  © 2024 UpToDate, Inc. and/or its affiliates. All rights reserved.  figure 1: Atrial fibrillation     This drawing shows where the heart is located in the chest. In atrial fibrillation (\"A-fib\"), the electrical signals that control the heartbeat are abnormal. As a result, the top 2 chambers of the heart (see arrows) stop pumping effectively, and blood that should move out of these chambers gets left behind.  Graphic 59567 Version 6.0  figure 2: BE FAST to help remember stroke symptoms     One way to help remember stroke symptoms is to think of the words \"BE FAST.\" If a person shows any of these signs, call for an ambulance right away (in the [place] and [place], call 9-1-1).  Graphic 14136 Version 10.0  Consumer Information Use and Disclaimer   Disclaimer: This generalized information is a limited summary of diagnosis, treatment, and/or medication information. It is not meant to be comprehensive and should be used as a tool to help the user understand and/or assess potential diagnostic and treatment options. It does NOT include all information about conditions, treatments, medications, side effects, or risks that may apply to a specific patient. It is not intended to be medical advice or a substitute for the medical advice, diagnosis, or treatment of a health care provider based on the health care provider's examination and assessment of a patient's specific and unique circumstances. Patients must speak with a health care provider for complete information about their health, medical questions, and treatment options, including any risks or benefits regarding use of medications. This information does not endorse any treatments or medications as safe, effective, or approved for treating a specific patient. UpToDate, Inc. and its affiliates disclaim any warranty or liability relating to this information or the use thereof.The use of this information is governed by the Terms of Use, available at " https://www.[employer] Studiosuwer.com/en/know/clinical-effectiveness-terms. 2024© [employer] and its affiliates and/or licensors. All rights reserved.  Copyright   © 2024 [employer] and/or its affiliates. All rights reserved.

## 2024-12-02 ENCOUNTER — RESULTS FOLLOW-UP (OUTPATIENT)
Dept: FAMILY MEDICINE CLINIC | Facility: CLINIC | Age: 59
End: 2024-12-02

## 2024-12-02 ENCOUNTER — APPOINTMENT (OUTPATIENT)
Dept: LAB | Facility: HOSPITAL | Age: 59
End: 2024-12-02
Payer: MEDICARE

## 2024-12-02 ENCOUNTER — ANTICOAG VISIT (OUTPATIENT)
Dept: CARDIOLOGY CLINIC | Facility: CLINIC | Age: 59
End: 2024-12-02
Payer: MEDICARE

## 2024-12-02 ENCOUNTER — OFFICE VISIT (OUTPATIENT)
Dept: FAMILY MEDICINE CLINIC | Facility: CLINIC | Age: 59
End: 2024-12-02
Payer: MEDICARE

## 2024-12-02 VITALS
TEMPERATURE: 97.4 F | HEART RATE: 78 BPM | BODY MASS INDEX: 41.75 KG/M2 | DIASTOLIC BLOOD PRESSURE: 62 MMHG | WEIGHT: 315 LBS | HEIGHT: 73 IN | SYSTOLIC BLOOD PRESSURE: 122 MMHG | OXYGEN SATURATION: 96 %

## 2024-12-02 DIAGNOSIS — E78.5 DYSLIPIDEMIA ASSOCIATED WITH TYPE 2 DIABETES MELLITUS  (HCC): ICD-10-CM

## 2024-12-02 DIAGNOSIS — I10 ESSENTIAL HYPERTENSION: ICD-10-CM

## 2024-12-02 DIAGNOSIS — L84 CALLUS: ICD-10-CM

## 2024-12-02 DIAGNOSIS — Z95.2 S/P AVR: ICD-10-CM

## 2024-12-02 DIAGNOSIS — Z23 ENCOUNTER FOR IMMUNIZATION: ICD-10-CM

## 2024-12-02 DIAGNOSIS — I48.11 LONGSTANDING PERSISTENT ATRIAL FIBRILLATION (HCC): Primary | ICD-10-CM

## 2024-12-02 DIAGNOSIS — Z79.4 TYPE 2 DIABETES MELLITUS WITH HYPERGLYCEMIA, WITH LONG-TERM CURRENT USE OF INSULIN (HCC): ICD-10-CM

## 2024-12-02 DIAGNOSIS — I25.10 CORONARY ARTERY DISEASE INVOLVING NATIVE CORONARY ARTERY OF NATIVE HEART WITHOUT ANGINA PECTORIS: ICD-10-CM

## 2024-12-02 DIAGNOSIS — I48.91 ATRIAL FIBRILLATION, UNSPECIFIED TYPE (HCC): ICD-10-CM

## 2024-12-02 DIAGNOSIS — G47.33 OBSTRUCTIVE SLEEP APNEA: ICD-10-CM

## 2024-12-02 DIAGNOSIS — J43.9 PULMONARY EMPHYSEMA, UNSPECIFIED EMPHYSEMA TYPE (HCC): ICD-10-CM

## 2024-12-02 DIAGNOSIS — I42.8 OTHER CARDIOMYOPATHY (HCC): ICD-10-CM

## 2024-12-02 DIAGNOSIS — Z00.00 MEDICARE ANNUAL WELLNESS VISIT, SUBSEQUENT: Primary | ICD-10-CM

## 2024-12-02 DIAGNOSIS — Z79.01 LONG TERM (CURRENT) USE OF ANTICOAGULANTS: ICD-10-CM

## 2024-12-02 DIAGNOSIS — Z95.5 H/O HEART ARTERY STENT: ICD-10-CM

## 2024-12-02 DIAGNOSIS — E11.69 DYSLIPIDEMIA ASSOCIATED WITH TYPE 2 DIABETES MELLITUS  (HCC): ICD-10-CM

## 2024-12-02 DIAGNOSIS — E11.65 TYPE 2 DIABETES MELLITUS WITH HYPERGLYCEMIA, WITH LONG-TERM CURRENT USE OF INSULIN (HCC): ICD-10-CM

## 2024-12-02 LAB
ALBUMIN SERPL BCG-MCNC: 4.2 G/DL (ref 3.5–5)
ALP SERPL-CCNC: 74 U/L (ref 34–104)
ALT SERPL W P-5'-P-CCNC: 12 U/L (ref 7–52)
ANION GAP SERPL CALCULATED.3IONS-SCNC: 10 MMOL/L (ref 4–13)
AST SERPL W P-5'-P-CCNC: 19 U/L (ref 13–39)
BILIRUB SERPL-MCNC: 0.78 MG/DL (ref 0.2–1)
BUN SERPL-MCNC: 17 MG/DL (ref 5–25)
CALCIUM SERPL-MCNC: 9.3 MG/DL (ref 8.4–10.2)
CHLORIDE SERPL-SCNC: 99 MMOL/L (ref 96–108)
CHOLEST SERPL-MCNC: 111 MG/DL (ref ?–200)
CO2 SERPL-SCNC: 28 MMOL/L (ref 21–32)
CREAT SERPL-MCNC: 0.86 MG/DL (ref 0.6–1.3)
EST. AVERAGE GLUCOSE BLD GHB EST-MCNC: 108 MG/DL
GFR SERPL CREATININE-BSD FRML MDRD: 93 ML/MIN/1.73SQ M
GLUCOSE SERPL-MCNC: 75 MG/DL (ref 65–140)
HBA1C MFR BLD: 5.4 %
HDLC SERPL-MCNC: 32 MG/DL
INR PPP: 2.71 (ref 0.85–1.19)
LDLC SERPL CALC-MCNC: 63 MG/DL (ref 0–100)
POTASSIUM SERPL-SCNC: 3.8 MMOL/L (ref 3.5–5.3)
PROT SERPL-MCNC: 7.6 G/DL (ref 6.4–8.4)
PROTHROMBIN TIME: 29 SECONDS (ref 12.3–15)
SODIUM SERPL-SCNC: 137 MMOL/L (ref 135–147)
TRIGL SERPL-MCNC: 82 MG/DL (ref ?–150)

## 2024-12-02 PROCEDURE — 90471 IMMUNIZATION ADMIN: CPT

## 2024-12-02 PROCEDURE — 93793 ANTICOAG MGMT PT WARFARIN: CPT | Performed by: NURSE PRACTITIONER

## 2024-12-02 PROCEDURE — 83036 HEMOGLOBIN GLYCOSYLATED A1C: CPT

## 2024-12-02 PROCEDURE — 90673 RIV3 VACCINE NO PRESERV IM: CPT

## 2024-12-02 PROCEDURE — G0439 PPPS, SUBSEQ VISIT: HCPCS | Performed by: FAMILY MEDICINE

## 2024-12-02 PROCEDURE — 36415 COLL VENOUS BLD VENIPUNCTURE: CPT

## 2024-12-02 PROCEDURE — 80053 COMPREHEN METABOLIC PANEL: CPT

## 2024-12-02 PROCEDURE — 80061 LIPID PANEL: CPT

## 2024-12-02 PROCEDURE — 85610 PROTHROMBIN TIME: CPT

## 2024-12-02 RX ORDER — FUROSEMIDE 80 MG/1
80 TABLET ORAL DAILY
Qty: 90 TABLET | Refills: 3 | Status: SHIPPED | OUTPATIENT
Start: 2024-12-02

## 2024-12-02 RX ORDER — ALBUTEROL SULFATE 90 UG/1
2 INHALANT RESPIRATORY (INHALATION) EVERY 6 HOURS PRN
Qty: 18 G | Refills: 3 | Status: SHIPPED | OUTPATIENT
Start: 2024-12-02

## 2024-12-02 RX ORDER — POTASSIUM CHLORIDE 1500 MG/1
20 TABLET, EXTENDED RELEASE ORAL DAILY
Qty: 90 TABLET | Refills: 3 | Status: SHIPPED | OUTPATIENT
Start: 2024-12-02

## 2024-12-02 NOTE — ASSESSMENT & PLAN NOTE
Cardiology is following.  Orders:    Hemoglobin A1C; Future    Comprehensive metabolic panel; Future    CBC and differential; Future    Albumin / creatinine urine ratio; Future    Lipid Panel with Direct LDL reflex; Future    TSH, 3rd generation with Free T4 reflex; Future

## 2024-12-02 NOTE — PROGRESS NOTES
Name: Santino Flores      : 1965      MRN: [id number]  Encounter Provider: Mathieu Cronin DO  Encounter Date: 2024   Encounter department: [facility]  :  Assessment & Plan  Medicare annual wellness visit, subsequent         Type 2 diabetes mellitus with hyperglycemia, with long-term current use of insulin (HCC)    Lab Results   Component Value Date    HGBA1C 5.5 2024   Endocrinology is following, Dr. Campos    Orders:    Hemoglobin A1C; Future    Comprehensive metabolic panel; Future    CBC and differential; Future    Albumin / creatinine urine ratio; Future    Lipid Panel with Direct LDL reflex; Future    TSH, 3rd generation with Free T4 reflex; Future    Atrial fibrillation, unspecified type (HCC)  Cardiology is following.  Orders:    Hemoglobin A1C; Future    Comprehensive metabolic panel; Future    CBC and differential; Future    Albumin / creatinine urine ratio; Future    Lipid Panel with Direct LDL reflex; Future    TSH, 3rd generation with Free T4 reflex; Future    Essential hypertension  Currently controlled.  Continue current regimen.  Orders:    Hemoglobin A1C; Future    Comprehensive metabolic panel; Future    CBC and differential; Future    Albumin / creatinine urine ratio; Future    Lipid Panel with Direct LDL reflex; Future    TSH, 3rd generation with Free T4 reflex; Future    Obstructive sleep apnea  Patient is tolerating CPAP which she states helps.  Orders:    Hemoglobin A1C; Future    Comprehensive metabolic panel; Future    CBC and differential; Future    Albumin / creatinine urine ratio; Future    Lipid Panel with Direct LDL reflex; Future    TSH, 3rd generation with Free T4 reflex; Future    Dyslipidemia associated with type 2 diabetes mellitus  (HCC)    Lab Results   Component Value Date    HGBA1C 5.5 2024       Orders:    Hemoglobin A1C; Future    Comprehensive metabolic panel; Future    CBC and differential; Future    Albumin / creatinine urine ratio;  Future    Lipid Panel with Direct LDL reflex; Future    TSH, 3rd generation with Free T4 reflex; Future    S/P AVR  Cardiology is following.  Patient remains on warfarin anticoagulation which is managed by the Coumadin clinic at Dr. Dill's office.  Orders:    Hemoglobin A1C; Future    Comprehensive metabolic panel; Future    CBC and differential; Future    Albumin / creatinine urine ratio; Future    Lipid Panel with Direct LDL reflex; Future    TSH, 3rd generation with Free T4 reflex; Future    The Coumadin clinic at Dr. Dill's office is responsible for reviewing PT/INR and giving the patient dosing recommendations.    H/O heart artery stent  Patient is asymptomatic.  No angina symptoms.  Cardiology following.  Orders:    Hemoglobin A1C; Future    Comprehensive metabolic panel; Future    CBC and differential; Future    Albumin / creatinine urine ratio; Future    Lipid Panel with Direct LDL reflex; Future    TSH, 3rd generation with Free T4 reflex; Future    Long term (current) use of anticoagulants    Orders:    Hemoglobin A1C; Future    Comprehensive metabolic panel; Future    CBC and differential; Future    Albumin / creatinine urine ratio; Future    Lipid Panel with Direct LDL reflex; Future    TSH, 3rd generation with Free T4 reflex; Future    Callus  Will refer to podiatry for full evaluation and treatment.  Patient has a history of type 2 diabetes mellitus.  Orders:    Ambulatory Referral to Podiatry; Future    Encounter for immunization    Orders:    influenza vaccine, recombinant, PF, 0.5 mL IM (Flublok)           History of Present Illness     The patient is a 59-year-old male who presents for his subsequent annual Medicare visit.  Patient's past medical history, surgical history, medication list, allergies and social history were reviewed.  I did my best to review past lab work, diagnostic studies and consultations.      Review of Systems   Constitutional:  Negative for chills and fever.   HENT:   "Negative for ear pain and sore throat.    Eyes:  Negative for pain and visual disturbance.   Respiratory:  Negative for cough and shortness of breath.    Cardiovascular:  Negative for chest pain and palpitations.   Gastrointestinal:  Negative for abdominal pain and vomiting.   Genitourinary:  Negative for dysuria and hematuria.   Musculoskeletal:  Negative for arthralgias and back pain.   Skin:  Negative for color change and rash.   Neurological:  Negative for seizures and syncope.   Psychiatric/Behavioral: Negative.     All other systems reviewed and are negative.         Objective   /62   Pulse 78   Temp (!) 97.4 °F (36.3 °C)   Ht 6' 0.64\" (1.845 m)   Wt (!) 163 kg (360 lb)   SpO2 96%   BMI 47.97 kg/m²      Physical Exam  Vitals and nursing note reviewed.   Constitutional:       General: He is not in acute distress.     Appearance: Normal appearance. He is well-developed.   HENT:      Head: Normocephalic and atraumatic.      Right Ear: Tympanic membrane normal.      Left Ear: Tympanic membrane normal.      Mouth/Throat:      Mouth: Mucous membranes are moist.      Pharynx: Oropharynx is clear.   Eyes:      Extraocular Movements: Extraocular movements intact.      Conjunctiva/sclera: Conjunctivae normal.      Pupils: Pupils are equal, round, and reactive to light.   Cardiovascular:      Rate and Rhythm: Normal rate and regular rhythm.      Heart sounds: Normal heart sounds. No murmur heard.     No friction rub.   Pulmonary:      Effort: Pulmonary effort is normal. No respiratory distress.      Breath sounds: Normal breath sounds.   Abdominal:      General: Bowel sounds are normal.      Palpations: Abdomen is soft.      Tenderness: There is no abdominal tenderness. There is no guarding or rebound.      Hernia: No hernia is present.   Musculoskeletal:         General: No swelling.      Cervical back: Neck supple.   Skin:     General: Skin is warm and dry.      Capillary Refill: Capillary refill takes less " than 2 seconds.   Neurological:      General: No focal deficit present.      Mental Status: He is alert and oriented to person, place, and time.      Gait: Gait normal.   Psychiatric:         Mood and Affect: Mood normal.         Behavior: Behavior normal.         Thought Content: Thought content normal.

## 2024-12-02 NOTE — PROGRESS NOTES
Name: Santino Flores      : 1965      MRN: [id number]  Encounter Provider: Mathieu Cronin DO  Encounter Date: 2024   Encounter department: [facility]    Assessment & Plan       Preventive health issues were discussed with patient, and age appropriate screening tests were ordered as noted in patient's After Visit Summary. Personalized health advice and appropriate referrals for health education or preventive services given if needed, as noted in patient's After Visit Summary.    History of Present Illness     HPI   Patient Care Team:  Mathieu Cronin DO as PCP - General (Family Medicine)  MD Gita Mckinney CRNP as Nurse Practitioner (Endocrinology)    Review of Systems   Constitutional:  Negative for chills and fever.   HENT:  Negative for ear pain and sore throat.    Eyes:  Negative for pain and visual disturbance.   Respiratory:  Negative for cough and shortness of breath.    Cardiovascular:  Negative for chest pain and palpitations.   Gastrointestinal:  Negative for abdominal pain and vomiting.   Genitourinary:  Negative for dysuria and hematuria.   Musculoskeletal:  Negative for arthralgias and back pain.   Skin:  Negative for color change and rash.   Neurological:  Negative for seizures and syncope.   All other systems reviewed and are negative.    Medical History Reviewed by provider this encounter:       Annual Wellness Visit Questionnaire   Santino is here for his Subsequent Wellness visit.     Health Risk Assessment:   Patient rates overall health as fair. Patient feels that their physical health rating is slightly better. Patient is satisfied with their life. Eyesight was rated as same. Hearing was rated as same. Patient feels that their emotional and mental health rating is same. Patients states they are sometimes angry. Patient states they are sometimes unusually tired/fatigued. Pain experienced in the last 7 days has been some. Patient's pain rating has  been 6/10. Patient states that he has experienced no weight loss or gain in last 6 months.     Depression Screening:   PHQ-2 Score: 0      Fall Risk Screening:   In the past year, patient has experienced: no history of falling in past year      Home Safety:  Patient does not have trouble with stairs inside or outside of their home. Patient has working smoke alarms and has working carbon monoxide detector. Home safety hazards include: none.     Nutrition:   Current diet is Diabetic.     Medications:   Patient is not currently taking any over-the-counter supplements. Patient is able to manage medications.     Activities of Daily Living (ADLs)/Instrumental Activities of Daily Living (IADLs):   Walk and transfer into and out of bed and chair?: Yes  Dress and groom yourself?: Yes    Bathe or shower yourself?: Yes    Feed yourself? Yes  Do your laundry/housekeeping?: Yes  Manage your money, pay your bills and track your expenses?: Yes  Make your own meals?: Yes    Do your own shopping?: Yes    Previous Hospitalizations:   Any hospitalizations or ED visits within the last 12 months?: No      Advance Care Planning:   Living will: No      PREVENTIVE SCREENINGS      Cardiovascular Screening:    General: Screening Not Indicated and History Lipid Disorder      Diabetes Screening:     General: Screening Not Indicated and History Diabetes      Prostate Cancer Screening:    General: Screening Current      Abdominal Aortic Aneurysm (AAA) Screening:    Risk factors include: tobacco use        Lung Cancer Screening:     General: Screening Not Indicated    Screening, Brief Intervention, and Referral to Treatment (SBIRT)    Screening  Typical number of drinks in a day: 0  Typical number of drinks in a week: 0  Interpretation: Low risk drinking behavior.    Single Item Drug Screening:  How often have you used an illegal drug (including marijuana) or a prescription medication for non-medical reasons in the past year? never    Single Item  "Drug Screen Score: 0  Interpretation: Negative screen for possible drug use disorder    Social Drivers of Health     Financial Resource Strain: Medium Risk (11/30/2023)    Overall Financial Resource Strain (CARDIA)     Difficulty of Paying Living Expenses: Somewhat hard   Transportation Needs: No Transportation Needs (11/30/2023)    PRAPARE - Transportation     Lack of Transportation (Medical): No     Lack of Transportation (Non-Medical): No     No results found.    Objective   /62   Pulse (!) 106   Temp (!) 97.4 °F (36.3 °C)   Ht 6' 0.64\" (1.845 m)   Wt (!) 163 kg (360 lb)   SpO2 96%   BMI 47.97 kg/m²     Physical Exam  Vitals and nursing note reviewed.   Constitutional:       General: He is not in acute distress.     Appearance: Normal appearance. He is well-developed. He is not ill-appearing, toxic-appearing or diaphoretic.   HENT:      Head: Normocephalic and atraumatic.      Right Ear: Tympanic membrane normal.      Left Ear: Tympanic membrane normal.      Nose: Nose normal.      Mouth/Throat:      Mouth: Mucous membranes are moist.   Eyes:      Conjunctiva/sclera: Conjunctivae normal.      Pupils: Pupils are equal, round, and reactive to light.   Cardiovascular:      Rate and Rhythm: Normal rate and regular rhythm.      Pulses: Normal pulses. no weak pulses.           Dorsalis pedis pulses are 2+ on the right side and 2+ on the left side.        Posterior tibial pulses are 2+ on the right side and 2+ on the left side.      Heart sounds: Normal heart sounds. No murmur heard.  Pulmonary:      Effort: Pulmonary effort is normal. No respiratory distress.      Breath sounds: Normal breath sounds.   Abdominal:      General: Bowel sounds are normal.      Palpations: Abdomen is soft.      Tenderness: There is no abdominal tenderness.   Musculoskeletal:         General: No swelling.      Cervical back: Neck supple.   Feet:      Right foot:      Skin integrity: Callus present. No ulcer, skin breakdown, " erythema, warmth or dry skin.      Left foot:      Skin integrity: Callus present.   Skin:     General: Skin is warm and dry.      Capillary Refill: Capillary refill takes less than 2 seconds.   Neurological:      General: No focal deficit present.      Mental Status: He is alert and oriented to person, place, and time.   Psychiatric:         Mood and Affect: Mood normal.         Behavior: Behavior normal.     Patient's shoes and socks removed.    Right Foot/Ankle   Right Foot Inspection  Skin Exam: skin normal, skin intact, callus and callus. No dry skin, no warmth, no erythema, no maceration, no abnormal color, no pre-ulcer and no ulcer.     Toe Exam: ROM and strength within normal limits.     Sensory   Monofilament testing: diminished    Vascular  Capillary refills: < 3 seconds  The right DP pulse is 2+. The right PT pulse is 2+.     Left Foot/Ankle  Left Foot Inspection  Skin Exam: callus.     Toe Exam: ROM and strength within normal limits.     Sensory   Monofilament testing: diminished    Vascular  Capillary refills: < 3 seconds  The left DP pulse is 2+. The left PT pulse is 2+.     Assign Risk Category  No deformity present  Loss of protective sensation  No weak pulses  Risk: 1

## 2024-12-02 NOTE — ASSESSMENT & PLAN NOTE
Cardiology is following.  Patient remains on warfarin anticoagulation which is managed by the Coumadin clinic at Dr. Dill's office.  Orders:    Hemoglobin A1C; Future    Comprehensive metabolic panel; Future    CBC and differential; Future    Albumin / creatinine urine ratio; Future    Lipid Panel with Direct LDL reflex; Future    TSH, 3rd generation with Free T4 reflex; Future    The Coumadin clinic at Dr. Dill's office is responsible for reviewing PT/INR and giving the patient dosing recommendations.

## 2024-12-02 NOTE — ASSESSMENT & PLAN NOTE
Orders:    Hemoglobin A1C; Future    Comprehensive metabolic panel; Future    CBC and differential; Future    Albumin / creatinine urine ratio; Future    Lipid Panel with Direct LDL reflex; Future    TSH, 3rd generation with Free T4 reflex; Future

## 2024-12-02 NOTE — ASSESSMENT & PLAN NOTE
Lab Results   Component Value Date    HGBA1C 5.5 05/13/2024       Orders:    Hemoglobin A1C; Future    Comprehensive metabolic panel; Future    CBC and differential; Future    Albumin / creatinine urine ratio; Future    Lipid Panel with Direct LDL reflex; Future    TSH, 3rd generation with Free T4 reflex; Future

## 2024-12-02 NOTE — PATIENT INSTRUCTIONS
INR received from lab and reviewed.    No changes needed; continue current dosing schedule.  Recheck INR in one month.-[facility] with results and instructions  CHRISTINE Boyer

## 2024-12-02 NOTE — ASSESSMENT & PLAN NOTE
Patient is tolerating CPAP which she states helps.  Orders:    Hemoglobin A1C; Future    Comprehensive metabolic panel; Future    CBC and differential; Future    Albumin / creatinine urine ratio; Future    Lipid Panel with Direct LDL reflex; Future    TSH, 3rd generation with Free T4 reflex; Future

## 2024-12-02 NOTE — ASSESSMENT & PLAN NOTE
Lab Results   Component Value Date    HGBA1C 5.5 05/13/2024   Endocrinology is following, Dr. Campos    Orders:    Hemoglobin A1C; Future    Comprehensive metabolic panel; Future    CBC and differential; Future    Albumin / creatinine urine ratio; Future    Lipid Panel with Direct LDL reflex; Future    TSH, 3rd generation with Free T4 reflex; Future

## 2024-12-02 NOTE — ASSESSMENT & PLAN NOTE
Currently controlled.  Continue current regimen.  Orders:    Hemoglobin A1C; Future    Comprehensive metabolic panel; Future    CBC and differential; Future    Albumin / creatinine urine ratio; Future    Lipid Panel with Direct LDL reflex; Future    TSH, 3rd generation with Free T4 reflex; Future

## 2024-12-02 NOTE — ASSESSMENT & PLAN NOTE
Patient is asymptomatic.  No angina symptoms.  Cardiology following.  Orders:    Hemoglobin A1C; Future    Comprehensive metabolic panel; Future    CBC and differential; Future    Albumin / creatinine urine ratio; Future    Lipid Panel with Direct LDL reflex; Future    TSH, 3rd generation with Free T4 reflex; Future

## 2024-12-02 NOTE — PATIENT INSTRUCTIONS
Medicare Preventive Visit Patient Instructions  Thank you for completing your Welcome to Medicare Visit or Medicare Annual Wellness Visit today. Your next wellness visit will be due in one year (12/3/2025).  The screening/preventive services that you may require over the next 5-10 years are detailed below. Some tests may not apply to you based off risk factors and/or age. Screening tests ordered at today's visit but not completed yet may show as past due. Also, please note that scanned in results may not display below.  Preventive Screenings:  Service Recommendations Previous Testing/Comments   Colorectal Cancer Screening  Colonoscopy    Fecal Occult Blood Test (FOBT)/Fecal Immunochemical Test (FIT)  Fecal DNA/Cologuard Test  Flexible Sigmoidoscopy Age: 45-75 years old   Colonoscopy: every 10 years (May be performed more frequently if at higher risk)  OR  FOBT/FIT: every 1 year  OR  Cologuard: every 3 years  OR  Sigmoidoscopy: every 5 years  Screening may be recommended earlier than age 45 if at higher risk for colorectal cancer. Also, an individualized decision between you and your healthcare provider will decide whether screening between the ages of 76-85 would be appropriate. Colonoscopy: Not on file  FOBT/FIT: Not on file  Cologuard: Not on file  Sigmoidoscopy: Not on file          Prostate Cancer Screening Individualized decision between patient and health care provider in men between ages of 55-69   Medicare will cover every 12 months beginning on the day after your 50th birthday PSA: 0.42 ng/mL     Screening Current     Hepatitis C Screening Once for adults born between 1945 and 1965  More frequently in patients at high risk for Hepatitis C Hep C Antibody: Not on file        Diabetes Screening 1-2 times per year if you're at risk for diabetes or have pre-diabetes Fasting glucose: No results in last 5 years (No results in last 5 years)  A1C: 5.5 % (5/13/2024)  Screening Not Indicated  History Diabetes    Cholesterol Screening Once every 5 years if you don't have a lipid disorder. May order more often based on risk factors. Lipid panel: 10/30/2023  Screening Not Indicated  History Lipid Disorder      Other Preventive Screenings Covered by Medicare:  Abdominal Aortic Aneurysm (AAA) Screening: covered once if your at risk. You're considered to be at risk if you have a family history of AAA or a male between the age of 65-75 who smoking at least 100 cigarettes in your lifetime.  Lung Cancer Screening: covers low dose CT scan once per year if you meet all of the following conditions: (1) Age 55-77; (2) No signs or symptoms of lung cancer; (3) Current smoker or have quit smoking within the last 15 years; (4) You have a tobacco smoking history of at least 20 pack years (packs per day x number of years you smoked); (5) You get a written order from a healthcare provider.  Glaucoma Screening: covered annually if you're considered high risk: (1) You have diabetes OR (2) Family history of glaucoma OR (3)  aged 50 and older OR (4)  American aged 65 and older  Osteoporosis Screening: covered every 2 years if you meet one of the following conditions: (1) Have a vertebral abnormality; (2) On glucocorticoid therapy for more than 3 months; (3) Have primary hyperparathyroidism; (4) On osteoporosis medications and need to assess response to drug therapy.  HIV Screening: covered annually if you're between the age of 15-65. Also covered annually if you are younger than 15 and older than 65 with risk factors for HIV infection. For pregnant patients, it is covered up to 3 times per pregnancy.    Immunizations:  Immunization Recommendations   Influenza Vaccine Annual influenza vaccination during flu season is recommended for all persons aged >= 6 months who do not have contraindications   Pneumococcal Vaccine   * Pneumococcal conjugate vaccine = PCV13 (Prevnar 13), PCV15 (Vaxneuvance), PCV20 (Prevnar 20)  *  Pneumococcal polysaccharide vaccine = PPSV23 (Pneumovax) Adults 19-65 yo with certain risk factors or if 65+ yo  If never received any pneumonia vaccine: recommend Prevnar 20 (PCV20)  Give PCV20 if previously received 1 dose of PCV13 or PPSV23   Hepatitis B Vaccine 3 dose series if at intermediate or high risk (ex: diabetes, end stage renal disease, liver disease)   Respiratory syncytial virus (RSV) Vaccine - COVERED BY MEDICARE PART D  * RSVPreF3 (Arexvy) CDC recommends that adults 60 years of age and older may receive a single dose of RSV vaccine using shared clinical decision-making (SCDM)   Tetanus (Td) Vaccine - COST NOT COVERED BY MEDICARE PART B Following completion of primary series, a booster dose should be given every 10 years to maintain immunity against tetanus. Td may also be given as tetanus wound prophylaxis.   Tdap Vaccine - COST NOT COVERED BY MEDICARE PART B Recommended at least once for all adults. For pregnant patients, recommended with each pregnancy.   Shingles Vaccine (Shingrix) - COST NOT COVERED BY MEDICARE PART B  2 shot series recommended in those 19 years and older who have or will have weakened immune systems or those 50 years and older     Health Maintenance Due:      Topic Date Due   • Hepatitis C Screening  Never done   • HIV Screening  Never done   • Colorectal Cancer Screening  Never done     Immunizations Due:      Topic Date Due   • Hepatitis A Vaccine (1 of 2 - Risk 2-dose series) Never done   • Influenza Vaccine (1) 09/01/2024   • COVID-19 Vaccine (3 - 2024-25 season) 09/01/2024     Advance Directives   What are advance directives?  Advance directives are legal documents that state your wishes and plans for medical care. These plans are made ahead of time in case you lose your ability to make decisions for yourself. Advance directives can apply to any medical decision, such as the treatments you want, and if you want to donate organs.   What are the types of advance directives?   There are many types of advance directives, and each state has rules about how to use them. You may choose a combination of any of the following:  Living will:  This is a written record of the treatment you want. You can also choose which treatments you do not want, which to limit, and which to stop at a certain time. This includes surgery, medicine, IV fluid, and tube feedings.   Durable power of  for healthcare (DPAHC):  This is a written record that states who you want to make healthcare choices for you when you are unable to make them for yourself. This person, called a proxy, is usually a family member or a friend. You may choose more than 1 proxy.  Do not resuscitate (DNR) order:  A DNR order is used in case your heart stops beating or you stop breathing. It is a request not to have certain forms of treatment, such as CPR. A DNR order may be included in other types of advance directives.  Medical directive:  This covers the care that you want if you are in a coma, near death, or unable to make decisions for yourself. You can list the treatments you want for each condition. Treatment may include pain medicine, surgery, blood transfusions, dialysis, IV or tube feedings, and a ventilator (breathing machine).  Values history:  This document has questions about your views, beliefs, and how you feel and think about life. This information can help others choose the care that you would choose.  Why are advance directives important?  An advance directive helps you control your care. Although spoken wishes may be used, it is better to have your wishes written down. Spoken wishes can be misunderstood, or not followed. Treatments may be given even if you do not want them. An advance directive may make it easier for your family to make difficult choices about your care.   Weight Management   Why it is important to manage your weight:  Being overweight increases your risk of health conditions such as heart disease,  high blood pressure, type 2 diabetes, and certain types of cancer. It can also increase your risk for osteoarthritis, sleep apnea, and other respiratory problems. Aim for a slow, steady weight loss. Even a small amount of weight loss can lower your risk of health problems.  How to lose weight safely:  A safe and healthy way to lose weight is to eat fewer calories and get regular exercise. You can lose up about 1 pound a week by decreasing the number of calories you eat by 500 calories each day.   Healthy meal plan for weight management:  A healthy meal plan includes a variety of foods, contains fewer calories, and helps you stay healthy. A healthy meal plan includes the following:  Eat whole-grain foods more often.  A healthy meal plan should contain fiber. Fiber is the part of grains, fruits, and vegetables that is not broken down by your body. Whole-grain foods are healthy and provide extra fiber in your diet. Some examples of whole-grain foods are whole-wheat breads and pastas, oatmeal, brown rice, and bulgur.  Eat a variety of vegetables every day.  Include dark, leafy greens such as spinach, kale, oly greens, and mustard greens. Eat yellow and orange vegetables such as carrots, sweet potatoes, and winter squash.   Eat a variety of fruits every day.  Choose fresh or canned fruit (canned in its own juice or light syrup) instead of juice. Fruit juice has very little or no fiber.  Eat low-fat dairy foods.  Drink fat-free (skim) milk or 1% milk. Eat fat-free yogurt and low-fat cottage cheese. Try low-fat cheeses such as mozzarella and other reduced-fat cheeses.  Choose meat and other protein foods that are low in fat.  Choose beans or other legumes such as split peas or lentils. Choose fish, skinless poultry (chicken or turkey), or lean cuts of red meat (beef or pork). Before you cook meat or poultry, cut off any visible fat.   Use less fat and oil.  Try baking foods instead of frying them. Add less fat, such as  margarine, sour cream, regular salad dressing and mayonnaise to foods. Eat fewer high-fat foods. Some examples of high-fat foods include french fries, doughnuts, ice cream, and cakes.  Eat fewer sweets.  Limit foods and drinks that are high in sugar. This includes candy, cookies, regular soda, and sweetened drinks.  Exercise:  Exercise at least 30 minutes per day on most days of the week. Some examples of exercise include walking, biking, dancing, and swimming. You can also fit in more physical activity by taking the stairs instead of the elevator or parking farther away from stores. Ask your healthcare provider about the best exercise plan for you.      © Copyright [employer] 2018 Information is for End User's use only and may not be sold, redistributed or otherwise used for commercial purposes. All illustrations and images included in CareNotes® are the copyrighted property of [employer] Inc[employer] or [employer]

## 2024-12-03 ENCOUNTER — RESULTS FOLLOW-UP (OUTPATIENT)
Dept: ENDOCRINOLOGY | Facility: CLINIC | Age: 59
End: 2024-12-03

## 2024-12-08 LAB

## 2024-12-11 ENCOUNTER — OFFICE VISIT (OUTPATIENT)
Dept: PODIATRY | Facility: CLINIC | Age: 59
End: 2024-12-11
Payer: MEDICARE

## 2024-12-11 VITALS
WEIGHT: 315 LBS | BODY MASS INDEX: 41.75 KG/M2 | HEART RATE: 72 BPM | HEIGHT: 73 IN | SYSTOLIC BLOOD PRESSURE: 115 MMHG | OXYGEN SATURATION: 97 % | RESPIRATION RATE: 18 BRPM | TEMPERATURE: 97.3 F | DIASTOLIC BLOOD PRESSURE: 65 MMHG

## 2024-12-11 DIAGNOSIS — Z79.4 TYPE 2 DIABETES MELLITUS WITH HYPERGLYCEMIA, WITH LONG-TERM CURRENT USE OF INSULIN (HCC): Primary | ICD-10-CM

## 2024-12-11 DIAGNOSIS — E11.65 TYPE 2 DIABETES MELLITUS WITH HYPERGLYCEMIA, WITH LONG-TERM CURRENT USE OF INSULIN (HCC): Primary | ICD-10-CM

## 2024-12-11 DIAGNOSIS — L84 CALLUS: ICD-10-CM

## 2024-12-11 DIAGNOSIS — E11.42 DIABETIC POLYNEUROPATHY ASSOCIATED WITH TYPE 2 DIABETES MELLITUS (HCC): ICD-10-CM

## 2024-12-11 DIAGNOSIS — I73.9 PERIPHERAL VASCULAR DISEASE, UNSPECIFIED (HCC): ICD-10-CM

## 2024-12-11 DIAGNOSIS — B35.1 ONYCHOMYCOSIS: ICD-10-CM

## 2024-12-11 PROCEDURE — 11721 DEBRIDE NAIL 6 OR MORE: CPT | Performed by: PODIATRIST

## 2024-12-11 PROCEDURE — 99203 OFFICE O/P NEW LOW 30 MIN: CPT | Performed by: PODIATRIST

## 2024-12-11 RX ORDER — AMMONIUM LACTATE 12 G/100G
CREAM TOPICAL AS NEEDED
Qty: 140 G | Refills: 1 | Status: SHIPPED | OUTPATIENT
Start: 2024-12-11

## 2024-12-11 NOTE — ASSESSMENT & PLAN NOTE
Lab Results   Component Value Date    HGBA1C 5.4 12/02/2024       Orders:    ammonium lactate (LAC-HYDRIN) 12 % cream; Apply topically as needed for dry skin Apply 3-5 times a week, do not apply between toes

## 2024-12-11 NOTE — PROGRESS NOTES
Name: Santino Flores      : 1965      MRN: [id number]  Encounter Provider: Maria M Monge DPM  Encounter Date: 2024   Encounter department: [facility] [place]  :  Assessment & Plan  Callus    Orders:    Ambulatory Referral to Podiatry    ammonium lactate (LAC-HYDRIN) 12 % cream; Apply topically as needed for dry skin Apply 3-5 times a week, do not apply between toes    Type 2 diabetes mellitus with hyperglycemia, with long-term current use of insulin (HCC)    Lab Results   Component Value Date    HGBA1C 5.4 2024       Orders:    ammonium lactate (LAC-HYDRIN) 12 % cream; Apply topically as needed for dry skin Apply 3-5 times a week, do not apply between toes    Onychomycosis         Peripheral vascular disease, unspecified (HCC)         Diabetic polyneuropathy associated with type 2 diabetes mellitus (HCC)    Lab Results   Component Value Date    HGBA1C 5.4 2024                   IMPRESSION:  DM2 A1c 5.5% 2024  Onychomycosis  Right foot submet 5 painful callus    PLAN:  Patient presents for at-risk foot care.  Patient has concern due to large painful callus on the right foot underneath the fifth metatarsal head.  Patient has significant lower extremity risk due to diminished pulses in the feet and trophic skin changes to the lower extremity including thick toenail, atrophic skin, and decreased hair growth.  Patient also has loss of protective sensation to bilateral lower extremities      On exam patient has thickened, hypertrophic, discolored, brittle toenails with subungual debris and tenderness x 10  Callus: 1, right submetatarsal 5  Patient has diminished pedal pulses and decreased perfusion to the lower extremities  Patient has significant trophic changes to the skin including thick toenails, decreased pedal hair and atrophic skin.   Patient has loss of protective sensation when evaluated in 5 places bilaterally.  Patient reports paresthesias    Today's treatment  includes:  Debridement of toenails. Using nail nipper, robert, and curette, nails were sharply debrided, reduced in thickness and length. Devitalized nail tissue and fungal debris excised and removed. Patient tolerated well.  Calluses debrided to patient's tolerance without incident    Discussed proper shoe gear, daily inspections of feet, and general foot health with patient. Patient has Q8  findings and is recommended for at risk foot care every 9-10 weeks.    PCP note from 12/2/2024 reviewed   Diabetic footcare reviewed.  Patient encouraged to check his feet daily  Diabetic neuropathy reviewed.  Patient was counseled that strict glycemic control can help prevent exacerbation of symptoms.  Onychomycosis reviewed.  Elongated thickened painful nails thinned and shortened down to healthy length using electric bur.  Will continue with palliative debridement  Right foot submetatarsal 5 callus debrided to patient's tolerance without incidence using #15 blade and electric bur.  No underlying ulceration noted  I recommend stiff bottomed sneakers/shoes (ex Asics, Vionic, New balance, Baez, etc) for daily use and [employer] [place] (recovery slides) for in home use.   Recommend keratolytic moisturizers regularly to contain skin barrier and help prevent callus and dry skin buildup.  Patient encouraged to use pumice stone on area of irritation.  Patient can also vary his shoes to prevent pressure on the area  Offloading callus pad dispensed  Follow-up 10 weeks for routine diabetic footcare    History of Present Illness   HPI  Santino Flores is a 59 y.o. male who presents     .[email or web address]      Review of Systems   Constitutional:  Negative for chills and fever.   Respiratory:  Negative for choking and shortness of breath.    Cardiovascular:  Positive for leg swelling. Negative for chest pain.   Musculoskeletal:  Positive for arthralgias.          Objective   /65 (BP Location: Left arm, Patient Position: Sitting, Cuff Size: Large)    "Pulse 72   Temp (!) 97.3 °F (36.3 °C) (Temporal)   Resp 18   Ht 6' 0.64\" (1.845 m)   Wt (!) 162 kg (357 lb 9.6 oz)   SpO2 97%   BMI 47.65 kg/m²      Physical Exam  Constitutional:       General: He is not in acute distress.     Appearance: He is obese. He is not ill-appearing or toxic-appearing.   Cardiovascular:      Pulses: Pulses are weak.           Dorsalis pedis pulses are 1+ on the right side and 1+ on the left side.        Posterior tibial pulses are 0 on the right side and 0 on the left side.      Comments: DP 1/4 BL  PT 0/4 BL  Bilateral lower extremity edema  CRT approximately <3 seconds  Skin temperature warm from knees to digits bilaterally  Absent pedal hair growth  Pulmonary:      Effort: No respiratory distress.      Breath sounds: No wheezing.   Feet:      Right foot:      Skin integrity: Callus and dry skin present.      Left foot:      Skin integrity: Dry skin present.   Skin:     Capillary Refill: Capillary refill takes 2 to 3 seconds.      Comments: Bilateral lower extremity edema with hemosiderin deposits  Skin thin with varicosities noted  Xerosis noted to bilateral feet  Large callus submetatarsal 5 right foot without underlying ulceration  Nails thickened, discolored, painful bilateral feet   Neurological:      Comments: Loss of protective sensation bilateral feet     Diabetic Foot Exam    Patient's shoes and socks removed.    Right Foot/Ankle   Right Foot Inspection  Skin Exam: dry skin, callus and callus.     Toe Exam: swelling.     Sensory   Vibration: absent  Monofilament testing: absent    Vascular  Capillary refills: < 3 seconds  The right DP pulse is 1+. The right PT pulse is 0.     Left Foot/Ankle  Left Foot Inspection  Skin Exam: dry skin.     Toe Exam: swelling.     Sensory   Vibration: absent  Monofilament testing: absent    Vascular  Capillary refills: < 3 seconds  The left DP pulse is 1+. The left PT pulse is 0.     Assign Risk Category  No deformity present  Loss of " protective sensation  Weak pulses  Risk: 2

## 2024-12-20 ENCOUNTER — OFFICE VISIT (OUTPATIENT)
Dept: ENDOCRINOLOGY | Facility: CLINIC | Age: 59
End: 2024-12-20
Payer: MEDICARE

## 2024-12-20 VITALS
HEIGHT: 73 IN | BODY MASS INDEX: 41.75 KG/M2 | DIASTOLIC BLOOD PRESSURE: 64 MMHG | SYSTOLIC BLOOD PRESSURE: 128 MMHG | WEIGHT: 315 LBS | OXYGEN SATURATION: 96 % | TEMPERATURE: 97.8 F | HEART RATE: 95 BPM | RESPIRATION RATE: 20 BRPM

## 2024-12-20 DIAGNOSIS — E11.69 DYSLIPIDEMIA ASSOCIATED WITH TYPE 2 DIABETES MELLITUS  (HCC): ICD-10-CM

## 2024-12-20 DIAGNOSIS — E66.01 CLASS 3 SEVERE OBESITY WITH SERIOUS COMORBIDITY AND BODY MASS INDEX (BMI) OF 45.0 TO 49.9 IN ADULT, UNSPECIFIED OBESITY TYPE (HCC): ICD-10-CM

## 2024-12-20 DIAGNOSIS — E11.65 TYPE 2 DIABETES MELLITUS WITH HYPERGLYCEMIA, WITH LONG-TERM CURRENT USE OF INSULIN (HCC): Primary | ICD-10-CM

## 2024-12-20 DIAGNOSIS — E66.813 CLASS 3 SEVERE OBESITY WITH SERIOUS COMORBIDITY AND BODY MASS INDEX (BMI) OF 45.0 TO 49.9 IN ADULT, UNSPECIFIED OBESITY TYPE (HCC): ICD-10-CM

## 2024-12-20 DIAGNOSIS — Z79.4 TYPE 2 DIABETES MELLITUS WITH HYPERGLYCEMIA, WITH LONG-TERM CURRENT USE OF INSULIN (HCC): Primary | ICD-10-CM

## 2024-12-20 DIAGNOSIS — E78.5 DYSLIPIDEMIA ASSOCIATED WITH TYPE 2 DIABETES MELLITUS  (HCC): ICD-10-CM

## 2024-12-20 DIAGNOSIS — I10 ESSENTIAL HYPERTENSION: ICD-10-CM

## 2024-12-20 PROCEDURE — 99214 OFFICE O/P EST MOD 30 MIN: CPT | Performed by: NURSE PRACTITIONER

## 2024-12-20 PROCEDURE — G2211 COMPLEX E/M VISIT ADD ON: HCPCS | Performed by: NURSE PRACTITIONER

## 2024-12-20 RX ORDER — INSULIN GLARGINE 300 U/ML
INJECTION, SOLUTION SUBCUTANEOUS
Qty: 18 ML | Refills: 3 | Status: SHIPPED | OUTPATIENT
Start: 2024-12-20

## 2024-12-20 RX ORDER — INSULIN ASPART 100 [IU]/ML
INJECTION, SOLUTION INTRAVENOUS; SUBCUTANEOUS
Qty: 45 ML | Refills: 1 | Status: SHIPPED | OUTPATIENT
Start: 2024-12-20

## 2024-12-20 NOTE — ASSESSMENT & PLAN NOTE
Patient is very tightly controlled. While reported episodes of hypoglycemia are rare, I recommend reducing Toujeo to 60 units nightly and reducing pre-dinner novolog to 30 units. Continue with 14 units prior to BF and lunch. Continue metformin 1,000 mg twice daily. Continue with healthy diet and regular physical activity. Patient knows to notify me with persistent hyperglycemia or episodes of hypoglycemia.  F/U in 6 months.   Lab Results   Component Value Date    HGBA1C 5.4 12/02/2024       Orders:    NovoLOG FlexPen 100 units/mL injection pen; Inject 14 units with breakfast and lunch and 30 units with dinner.    insulin glargine (Toujeo Max SoloStar) 300 units/mL CONCENTRATED U-300 injection pen (2-unit dial); Inject 60 units nightly.    Hemoglobin A1C; Future    Basic metabolic panel; Future

## 2024-12-20 NOTE — ASSESSMENT & PLAN NOTE
Patient has successfully lost 40 pounds in the last 18 months.  Continue with healthy diet and physical activity as tolerated.

## 2024-12-20 NOTE — PROGRESS NOTES
Name: Santino Flores      : 1965      MRN: [id number]  Encounter Provider: CHRISTINE Santiago  Encounter Date: 2024   Encounter department: [facility] [place]  :  Assessment & Plan  Type 2 diabetes mellitus with hyperglycemia, with long-term current use of insulin (HCC)  Patient is very tightly controlled. While reported episodes of hypoglycemia are rare, I recommend reducing Toujeo to 60 units nightly and reducing pre-dinner novolog to 30 units. Continue with 14 units prior to BF and lunch. Continue metformin 1,000 mg twice daily. Continue with healthy diet and regular physical activity. Patient knows to notify me with persistent hyperglycemia or episodes of hypoglycemia.  F/U in 6 months.   Lab Results   Component Value Date    HGBA1C 5.4 2024       Orders:    NovoLOG FlexPen 100 units/mL injection pen; Inject 14 units with breakfast and lunch and 30 units with dinner.    insulin glargine (Toujeo Max SoloStar) 300 units/mL CONCENTRATED U-300 injection pen (2-unit dial); Inject 60 units nightly.    Hemoglobin A1C; Future    Basic metabolic panel; Future    Dyslipidemia associated with type 2 diabetes mellitus  (HCC)  Continue 10 mg of atorvastatin nightly.  Lab Results   Component Value Date    HGBA1C 5.4 2024            Essential hypertension  BP well-controlled at 128/64.  Continue 10 mg of benazepril daily.         Class 3 severe obesity with serious comorbidity and body mass index (BMI) of 45.0 to 49.9 in adult, unspecified obesity type (HCC)  Patient has successfully lost 40 pounds in the last 18 months.  Continue with healthy diet and physical activity as tolerated.             History of Present Illness   HPI  Santino Flores is a 59 y.o. male with type 2 diabetes with long term use of insulin presenting today for routine follow up.    Current regimen:    Metformin 1000 mg twice daily  Toujeo 66 units nightly  NovoLog 14 units prior to  "breakfast and lunch and 36 units prior to dinner     Hemoglobin A1C   Latest Ref Rng Normal 4.0-5.6%; PreDiabetic 5.7-6.4%; Diabetic >=6.5%; Glycemic control for adults with diabetes <7.0% %   10/30/2023 5.6    5/13/2024 5.5    12/2/2024 5.4       eAG, EST AVG Glucose   Latest Ref Rng mg/dl   10/30/2023 114    5/13/2024 111    12/2/2024 108        Patient reports feeling well. Notes two symptomatic episodes of overnight hypoglycemia since last appointment.     To date on diabetic eye exam and foot exam.  Following with podiatry q 10 weeks.          Review of Systems   Constitutional:  Negative for activity change, appetite change, fatigue and unexpected weight change.   HENT:  Negative for dental problem, sore throat, trouble swallowing and voice change.    Eyes:  Negative for visual disturbance.   Respiratory:  Negative for cough, chest tightness and shortness of breath.    Cardiovascular:  Negative for chest pain, palpitations and leg swelling.   Gastrointestinal:  Negative for constipation, diarrhea, nausea and vomiting.   Endocrine: Negative for polydipsia, polyphagia and polyuria.   Genitourinary:  Negative for frequency.   Musculoskeletal:  Negative for arthralgias, back pain, gait problem and myalgias.   Skin:  Negative for wound.   Allergic/Immunologic: Positive for environmental allergies. Negative for food allergies.   Neurological:  Positive for numbness. Negative for dizziness, weakness, light-headedness and headaches.   Psychiatric/Behavioral:  Negative for decreased concentration, dysphoric mood and sleep disturbance. The patient is not nervous/anxious.           Objective   /64 (BP Location: Left arm, Patient Position: Sitting, Cuff Size: Large)   Pulse 95   Temp 97.8 °F (36.6 °C) (Temporal)   Resp 20   Ht 6' 0.5\" (1.842 m)   Wt (!) 161 kg (356 lb)   SpO2 96%   BMI 47.62 kg/m²      Physical Exam  Vitals reviewed.   Constitutional:       General: He is not in acute distress.     " Appearance: He is well-developed. He is obese. He is not ill-appearing.   HENT:      Head: Normocephalic and atraumatic.   Eyes:      Conjunctiva/sclera: Conjunctivae normal.      Pupils: Pupils are equal, round, and reactive to light.   Cardiovascular:      Rate and Rhythm: Normal rate.      Pulses: Normal pulses.   Pulmonary:      Effort: Pulmonary effort is normal. No respiratory distress.   Abdominal:      Palpations: Abdomen is soft.      Tenderness: There is no abdominal tenderness.   Musculoskeletal:      Cervical back: Normal range of motion.      Right lower leg: No edema.      Left lower leg: No edema.   Skin:     General: Skin is warm and dry.      Capillary Refill: Capillary refill takes less than 2 seconds.   Neurological:      Mental Status: He is alert.   Psychiatric:         Mood and Affect: Mood normal.

## 2024-12-20 NOTE — ASSESSMENT & PLAN NOTE
Continue 10 mg of atorvastatin nightly.  Lab Results   Component Value Date    HGBA1C 5.4 12/02/2024

## 2024-12-27 LAB

## 2025-01-03 ENCOUNTER — ANTICOAG VISIT (OUTPATIENT)
Dept: CARDIOLOGY CLINIC | Facility: CLINIC | Age: 60
End: 2025-01-03
Payer: MEDICARE

## 2025-01-03 ENCOUNTER — APPOINTMENT (OUTPATIENT)
Dept: LAB | Facility: HOSPITAL | Age: 60
End: 2025-01-03
Payer: MEDICARE

## 2025-01-03 DIAGNOSIS — Z79.01 LONG TERM (CURRENT) USE OF ANTICOAGULANTS: ICD-10-CM

## 2025-01-03 DIAGNOSIS — Z95.2 S/P AVR: ICD-10-CM

## 2025-01-03 DIAGNOSIS — I48.11 LONGSTANDING PERSISTENT ATRIAL FIBRILLATION (HCC): Primary | ICD-10-CM

## 2025-01-03 LAB
INR PPP: 2.63 (ref 0.85–1.19)
PROTHROMBIN TIME: 28.3 SECONDS (ref 12.3–15)

## 2025-01-03 PROCEDURE — 85610 PROTHROMBIN TIME: CPT

## 2025-01-03 PROCEDURE — 93793 ANTICOAG MGMT PT WARFARIN: CPT | Performed by: PHYSICIAN ASSISTANT

## 2025-01-03 PROCEDURE — 36415 COLL VENOUS BLD VENIPUNCTURE: CPT

## 2025-01-03 NOTE — ANESTHESIA POSTPROCEDURE EVALUATION
Problem: Ventricular Assist Device  Goal: Optimal Adjustment to Device  Outcome: Not Progressing  Goal: Absence of Bleeding  Outcome: Not Progressing  Intervention: Monitor and Manage Bleeding  Flowsheets (Taken 1/3/2025 1200)  Bleeding Precautions: blood pressure closely monitored  Goal: Absence of Embolism Signs and Symptoms  Outcome: Not Progressing  Intervention: Prevent or Manage Embolism  Flowsheets (Taken 1/3/2025 1200)  VTE Prevention/Management: bleeding risk assessed  Goal: Optimal Blood Flow  Outcome: Not Progressing  Goal: Absence of Infection Signs and Symptoms  Outcome: Not Progressing  Goal: Effective Right-Sided Heart Function  Outcome: Not Progressing  Intervention: Manage Decreased Right Heart Function  Flowsheets (Taken 1/3/2025 1200)  Medication Review/Management: medications reviewed     Problem: Adult Inpatient Plan of Care  Goal: Plan of Care Review  Outcome: Not Progressing  Flowsheets (Taken 1/3/2025 1200)  Plan of Care Reviewed With: patient  Goal: Patient-Specific Goal (Individualized)  Outcome: Not Progressing  Goal: Absence of Hospital-Acquired Illness or Injury  Outcome: Not Progressing  Intervention: Identify and Manage Fall Risk  Flowsheets (Taken 1/3/2025 1200)  Safety Promotion/Fall Prevention: assistive device/personal item within reach  Intervention: Prevent Skin Injury  Flowsheets (Taken 1/3/2025 1200)  Body Position: position changed independently  Intervention: Prevent and Manage VTE (Venous Thromboembolism) Risk  Flowsheets (Taken 1/3/2025 1200)  VTE Prevention/Management: bleeding risk assessed  Intervention: Prevent Infection  Flowsheets (Taken 1/3/2025 1200)  Infection Prevention:   cohorting utilized   personal protective equipment utilized  Goal: Optimal Comfort and Wellbeing  Outcome: Not Progressing  Intervention: Monitor Pain and Promote Comfort  Flowsheets (Taken 1/3/2025 1200)  Pain Management Interventions: care clustered  Intervention: Provide Person-Centered  Post-Op Assessment Note    CV Status:  Stable  Pain Score: 0    Pain management: adequate     Mental Status:  Sleepy and arousable   Hydration Status:  Euvolemic   PONV Controlled:  Controlled   Airway Patency:  Patent      Post Op Vitals Reviewed: Yes      Staff: CRNA   Comments: dr Jose Luis Salas at bedside to address saturation        No complications documented      BP   143/94   Temp  97 0   Pulse  86   Resp  23   SpO2   90 Care  Flowsheets (Taken 1/3/2025 1200)  Trust Relationship/Rapport:   care explained   questions encouraged  Goal: Readiness for Transition of Care  Outcome: Not Progressing     Problem: Diabetes Comorbidity  Goal: Blood Glucose Level Within Targeted Range  Outcome: Not Progressing  Intervention: Monitor and Manage Glycemia  Flowsheets (Taken 1/3/2025 1200)  Glycemic Management: blood glucose monitored     Problem: Sepsis/Septic Shock  Goal: Optimal Coping  Outcome: Not Progressing  Intervention: Optimize Psychosocial Adjustment to Illness  Flowsheets (Taken 1/3/2025 1200)  Supportive Measures: active listening utilized  Goal: Absence of Bleeding  Outcome: Not Progressing  Intervention: Monitor and Manage Bleeding  Flowsheets (Taken 1/3/2025 1200)  Bleeding Precautions: blood pressure closely monitored  Goal: Blood Glucose Level Within Targeted Range  Outcome: Not Progressing  Intervention: Optimize Glycemic Control  Flowsheets (Taken 1/3/2025 1200)  Glycemic Management: blood glucose monitored  Goal: Absence of Infection Signs and Symptoms  Outcome: Not Progressing  Intervention: Initiate Sepsis Management  Flowsheets (Taken 1/3/2025 1200)  Infection Prevention:   cohorting utilized   personal protective equipment utilized  Goal: Optimal Nutrition Intake  Outcome: Not Progressing     Problem: Fall Injury Risk  Goal: Absence of Fall and Fall-Related Injury  Outcome: Not Progressing     Problem: Heart Failure  Goal: Optimal Coping  Outcome: Not Progressing  Goal: Optimal Cardiac Output  Outcome: Not Progressing  Goal: Stable Heart Rate and Rhythm  Outcome: Not Progressing  Goal: Optimal Functional Ability  Outcome: Not Progressing  Goal: Fluid and Electrolyte Balance  Outcome: Not Progressing  Goal: Improved Oral Intake  Outcome: Not Progressing  Goal: Effective Oxygenation and Ventilation  Outcome: Not Progressing  Goal: Effective Breathing Pattern During Sleep  Outcome: Not Progressing

## 2025-02-10 ENCOUNTER — ANTICOAG VISIT (OUTPATIENT)
Dept: CARDIOLOGY CLINIC | Facility: CLINIC | Age: 60
End: 2025-02-10
Payer: MEDICARE

## 2025-02-10 ENCOUNTER — APPOINTMENT (OUTPATIENT)
Dept: LAB | Facility: HOSPITAL | Age: 60
End: 2025-02-10
Payer: MEDICARE

## 2025-02-10 DIAGNOSIS — Z95.2 S/P AVR: ICD-10-CM

## 2025-02-10 DIAGNOSIS — I48.20 CHRONIC ATRIAL FIBRILLATION (HCC): Primary | ICD-10-CM

## 2025-02-10 DIAGNOSIS — Z79.01 LONG TERM (CURRENT) USE OF ANTICOAGULANTS: ICD-10-CM

## 2025-02-10 LAB
INR PPP: 2.66 (ref 0.85–1.19)
PROTHROMBIN TIME: 28.6 SECONDS (ref 12.3–15)

## 2025-02-10 PROCEDURE — 85610 PROTHROMBIN TIME: CPT

## 2025-02-10 PROCEDURE — 93793 ANTICOAG MGMT PT WARFARIN: CPT | Performed by: PHYSICIAN ASSISTANT

## 2025-02-10 PROCEDURE — 36415 COLL VENOUS BLD VENIPUNCTURE: CPT

## 2025-02-19 DIAGNOSIS — I10 ESSENTIAL HYPERTENSION: ICD-10-CM

## 2025-02-19 DIAGNOSIS — E11.9 TYPE 2 DIABETES MELLITUS WITHOUT COMPLICATION, UNSPECIFIED WHETHER LONG TERM INSULIN USE (HCC): ICD-10-CM

## 2025-02-19 NOTE — TELEPHONE ENCOUNTER
Reason for call:   [x] Refill   [] Prior Auth  [] Other:     Office:   [x] PCP/Provider - Mehrdad  [] Specialty/Provider -     Benazepril 10mg   1 tab daily #90    Metformin 1000mg  1 tab bid #180    Pharmacy: [facility] [address] [facility] [place] - [address] [phone]     Does the patient have enough for 3 days?   [x] Yes   [] No - Send as HP to POD

## 2025-02-20 RX ORDER — BENAZEPRIL HYDROCHLORIDE 10 MG/1
10 TABLET ORAL DAILY
Qty: 90 TABLET | Refills: 1 | Status: SHIPPED | OUTPATIENT
Start: 2025-02-20

## 2025-03-11 ENCOUNTER — ANTICOAG VISIT (OUTPATIENT)
Dept: CARDIOLOGY CLINIC | Facility: CLINIC | Age: 60
End: 2025-03-11
Payer: MEDICARE

## 2025-03-11 ENCOUNTER — APPOINTMENT (OUTPATIENT)
Dept: LAB | Facility: HOSPITAL | Age: 60
End: 2025-03-11
Payer: MEDICARE

## 2025-03-11 DIAGNOSIS — Z79.01 LONG TERM (CURRENT) USE OF ANTICOAGULANTS: ICD-10-CM

## 2025-03-11 DIAGNOSIS — I48.11 LONGSTANDING PERSISTENT ATRIAL FIBRILLATION (HCC): Primary | ICD-10-CM

## 2025-03-11 DIAGNOSIS — Z95.2 S/P AVR: ICD-10-CM

## 2025-03-11 LAB
INR PPP: 3.81 (ref 0.85–1.19)
PROTHROMBIN TIME: 37.5 SECONDS (ref 12.3–15)

## 2025-03-11 PROCEDURE — 36415 COLL VENOUS BLD VENIPUNCTURE: CPT

## 2025-03-11 PROCEDURE — 93793 ANTICOAG MGMT PT WARFARIN: CPT | Performed by: PHYSICIAN ASSISTANT

## 2025-03-11 PROCEDURE — 85610 PROTHROMBIN TIME: CPT

## 2025-03-11 NOTE — PATIENT INSTRUCTIONS
Spoke with Patient: No changes in medication health or diet. No missed or extra doses. No s/s of bleeding TIA or CVA. No new ABX, NSAIDs OTC meds, or supplements. Dose as instructed and recheck in tw.   Esther Brooks PA-C

## 2025-03-20 ENCOUNTER — OFFICE VISIT (OUTPATIENT)
Dept: PODIATRY | Facility: CLINIC | Age: 60
End: 2025-03-20
Payer: MEDICARE

## 2025-03-20 VITALS
RESPIRATION RATE: 16 BRPM | TEMPERATURE: 97.7 F | BODY MASS INDEX: 41.75 KG/M2 | HEART RATE: 89 BPM | HEIGHT: 73 IN | OXYGEN SATURATION: 98 % | WEIGHT: 315 LBS

## 2025-03-20 DIAGNOSIS — E11.65 TYPE 2 DIABETES MELLITUS WITH HYPERGLYCEMIA, WITH LONG-TERM CURRENT USE OF INSULIN (HCC): ICD-10-CM

## 2025-03-20 DIAGNOSIS — I73.9 PERIPHERAL VASCULAR DISEASE, UNSPECIFIED (HCC): ICD-10-CM

## 2025-03-20 DIAGNOSIS — B35.1 ONYCHOMYCOSIS: Primary | ICD-10-CM

## 2025-03-20 DIAGNOSIS — L84 CALLUS: ICD-10-CM

## 2025-03-20 DIAGNOSIS — Z79.4 TYPE 2 DIABETES MELLITUS WITH HYPERGLYCEMIA, WITH LONG-TERM CURRENT USE OF INSULIN (HCC): ICD-10-CM

## 2025-03-20 DIAGNOSIS — E11.42 DIABETIC POLYNEUROPATHY ASSOCIATED WITH TYPE 2 DIABETES MELLITUS (HCC): ICD-10-CM

## 2025-03-20 PROCEDURE — RECHECK: Performed by: PODIATRIST

## 2025-03-20 PROCEDURE — 11721 DEBRIDE NAIL 6 OR MORE: CPT | Performed by: PODIATRIST

## 2025-03-20 PROCEDURE — 11056 PARNG/CUTG B9 HYPRKR LES 2-4: CPT | Performed by: PODIATRIST

## 2025-03-20 NOTE — PROGRESS NOTES
Routine care  Extremely painful left plantar heel PK, debrided, offloading pads provided  Name: Santino Flores      : 1965      MRN: [id number]  Encounter Provider: Maria M Monge DPM  Encounter Date: 3/20/2025   Encounter [facility]  :  Assessment & Plan  Type 2 diabetes mellitus with hyperglycemia, with long-term current use of insulin ([facility])    Lab Results   Component Value Date    HGBA1C 5.4 2024            Callus         Onychomycosis         Diabetic polyneuropathy associated with type 2 diabetes mellitus (HCC)    Lab Results   Component Value Date    HGBA1C 5.4 2024            Peripheral vascular disease, unspecified ([facility])         IMPRESSION:  DM2 A1c 5.5% 2024  Onychomycosis  Right foot submet 5 painful callus     PLAN:  Patient presents for at-risk foot care.  Patient has concern due to large painful callus on the right foot underneath the fifth metatarsal head and plantar left heel.  Patient has significant lower extremity risk due to diminished pulses in the feet and trophic skin changes to the lower extremity including thick toenail, atrophic skin, and decreased hair growth.  Patient also has loss of protective sensation to bilateral lower extremities        On exam patient has thickened, hypertrophic, discolored, brittle toenails with subungual debris and tenderness x 10  Callus: 2, right submetatarsal 5, and left plantar heel  Patient has diminished pedal pulses and decreased perfusion to the lower extremities  Patient has significant trophic changes to the skin including thick toenails, decreased pedal hair and atrophic skin.   Patient has loss of protective sensation when evaluated in 5 places bilaterally.  Patient reports paresthesias     Today's treatment includes:  Debridement of toenails. Using nail nipper, robert, and curette, nails were sharply debrided, reduced in thickness and length. Devitalized nail tissue and fungal debris excised and  removed. Patient tolerated well.  Calluses debrided to patient's tolerance without incident down to healthy skin bilaterally     Discussed proper shoe gear, daily inspections of feet, and general foot health with patient. Patient has Q8  findings and is recommended for at risk foot care every 9-10 weeks.     Endocrine note 12/20/2024 reviewed  I recommend stiff bottomed sneakers/shoes (ex Asics, Vionic, New balance, Baez, etc) for daily use and Young Salcido (recovery slides) for in home use.   Recommend keratolytic moisturizers regularly to contain skin barrier and help prevent callus and dry skin buildup.  Patient encouraged to use pumice stone on area of irritation.  Patient can also vary his shoes to prevent pressure on the area  Offloading callus pad dispensed  Follow-up 10 weeks for routine diabetic footcare  Last physical exam 12/11/2024

## 2025-03-27 ENCOUNTER — APPOINTMENT (OUTPATIENT)
Dept: LAB | Facility: HOSPITAL | Age: 60
End: 2025-03-27
Payer: MEDICARE

## 2025-03-27 ENCOUNTER — ANTICOAG VISIT (OUTPATIENT)
Dept: CARDIOLOGY CLINIC | Facility: CLINIC | Age: 60
End: 2025-03-27
Payer: MEDICARE

## 2025-03-27 DIAGNOSIS — Z95.2 S/P AVR: ICD-10-CM

## 2025-03-27 DIAGNOSIS — Z79.01 LONG TERM (CURRENT) USE OF ANTICOAGULANTS: ICD-10-CM

## 2025-03-27 DIAGNOSIS — Z95.2 S/P AVR: Primary | ICD-10-CM

## 2025-03-27 LAB
INR PPP: 2.96 (ref 0.85–1.19)
PROTHROMBIN TIME: 31 SECONDS (ref 12.3–15)

## 2025-03-27 PROCEDURE — 85610 PROTHROMBIN TIME: CPT

## 2025-03-27 PROCEDURE — 93793 ANTICOAG MGMT PT WARFARIN: CPT | Performed by: NURSE PRACTITIONER

## 2025-03-27 PROCEDURE — 36415 COLL VENOUS BLD VENIPUNCTURE: CPT

## 2025-03-27 NOTE — PATIENT INSTRUCTIONS
INR received from lab and reviewed.    No changes needed; continue current dosing schedule.  Recheck INR in 3 weeks-[facility] with results and instructions  CHRISTINE Boyer

## 2025-04-01 DIAGNOSIS — Z79.4 TYPE 2 DIABETES MELLITUS WITHOUT COMPLICATION, WITH LONG-TERM CURRENT USE OF INSULIN (HCC): ICD-10-CM

## 2025-04-01 DIAGNOSIS — E11.9 TYPE 2 DIABETES MELLITUS WITHOUT COMPLICATION, WITH LONG-TERM CURRENT USE OF INSULIN (HCC): ICD-10-CM

## 2025-04-01 NOTE — TELEPHONE ENCOUNTER
Reason for call:   [x] Refill   [] Prior Auth  [] Other: last filled by previous provider, needs Dr Cronin to take over refills     Office:   [x] PCP/Provider - Dr Cronin  [] Specialty/Provider -     Medication: insulin pen needle BD ashley    Dose/Frequency: 32 g X 4 mm use 4 times daily as directed     Quantity: 300- stated pharmacy only gives three boxes     Pharmacy: [facility]      Local Pharmacy   Does the patient have enough for 3 days?   [x] Yes but said pharmacy told him the last script  before he could all refills on it, they're asking for a new script   [] No - Send as HP to POD    Mail Away Pharmacy   Does the patient have enough for 10 days?   [] Yes   [] No - Send as HP to POD

## 2025-04-02 RX ORDER — PEN NEEDLE, DIABETIC 32GX 5/32"
NEEDLE, DISPOSABLE MISCELLANEOUS 4 TIMES DAILY
Qty: 400 EACH | Refills: 1 | Status: SHIPPED | OUTPATIENT
Start: 2025-04-02

## 2025-04-07 DIAGNOSIS — E78.49 OTHER HYPERLIPIDEMIA: ICD-10-CM

## 2025-04-07 DIAGNOSIS — I10 ESSENTIAL HYPERTENSION: ICD-10-CM

## 2025-04-07 RX ORDER — METOPROLOL TARTRATE 25 MG/1
25 TABLET, FILM COATED ORAL 2 TIMES DAILY
Qty: 180 TABLET | Refills: 1 | Status: SHIPPED | OUTPATIENT
Start: 2025-04-07

## 2025-04-07 NOTE — TELEPHONE ENCOUNTER
Reason for call:   [x] Refill   [] Prior Auth  [] Other:     Office:   [] PCP/Provider -   [x] Specialty/Provider - Cardio/[place]    Medication: Metoprolol tart 25mg    Dose/Frequency: 1 tab bid    Quantity: 180    Pharmacy: [facility] [address] [place] [address] [address] [phone]     Local Pharmacy   Does the patient have enough for 3 days?   [x] Yes   [] No - Send as HP to POD

## 2025-04-07 NOTE — TELEPHONE ENCOUNTER
Reason for call:   [x] Refill   [] Prior Auth  [] Other:     Office:   [x] PCP/Provider - Mehrdad  [] Specialty/Provider -     Medication: Atorvastatin 10mg    Dose/Frequency: 1 tab daily     Quantity: 90    Pharmacy: [facility] [address] [facility] [place] - [address] [phone]     Local Pharmacy   Does the patient have enough for 3 days?   [x] Yes   [] No - Send as HP to POD

## 2025-04-08 RX ORDER — ATORVASTATIN CALCIUM 10 MG/1
10 TABLET, FILM COATED ORAL DAILY
Qty: 90 TABLET | Refills: 1 | Status: SHIPPED | OUTPATIENT
Start: 2025-04-08

## 2025-04-25 ENCOUNTER — APPOINTMENT (OUTPATIENT)
Dept: LAB | Facility: HOSPITAL | Age: 60
End: 2025-04-25
Payer: MEDICARE

## 2025-04-25 ENCOUNTER — ANTICOAG VISIT (OUTPATIENT)
Dept: CARDIOLOGY CLINIC | Facility: CLINIC | Age: 60
End: 2025-04-25
Payer: MEDICARE

## 2025-04-25 DIAGNOSIS — Z79.01 LONG TERM (CURRENT) USE OF ANTICOAGULANTS: ICD-10-CM

## 2025-04-25 DIAGNOSIS — I48.11 LONGSTANDING PERSISTENT ATRIAL FIBRILLATION (HCC): Primary | ICD-10-CM

## 2025-04-25 DIAGNOSIS — Z95.2 S/P AVR: ICD-10-CM

## 2025-04-25 LAB
INR PPP: 2.4 (ref 0.85–1.19)
PROTHROMBIN TIME: 26.5 SECONDS (ref 12.3–15)

## 2025-04-25 PROCEDURE — 93793 ANTICOAG MGMT PT WARFARIN: CPT | Performed by: PHYSICIAN ASSISTANT

## 2025-04-25 PROCEDURE — 36415 COLL VENOUS BLD VENIPUNCTURE: CPT

## 2025-04-25 PROCEDURE — 85610 PROTHROMBIN TIME: CPT

## 2025-05-06 DIAGNOSIS — I48.91 ATRIAL FIBRILLATION, UNSPECIFIED TYPE (HCC): ICD-10-CM

## 2025-05-06 RX ORDER — WARFARIN SODIUM 10 MG/1
10 TABLET ORAL DAILY
Qty: 90 TABLET | Refills: 0 | Status: SHIPPED | OUTPATIENT
Start: 2025-05-06

## 2025-05-06 NOTE — TELEPHONE ENCOUNTER
Reason for call:   [x] Refill   [] Prior Auth  [] Other:     Office:   [x] PCP/Provider - Mathieu Cronin, DO   [] Specialty/Provider -     Medication: warfarin (COUMADIN) 10 mg tablet / Take 1 tablet (10 mg total) by mouth daily     Pharmacy: [facility] [address] [place], [place] - [address]     Local Pharmacy   Does the patient have enough for 3 days?   [] Yes   [x] No - Send as HP to POD

## 2025-05-14 NOTE — ASSESSMENT & PLAN NOTE
Diabetes is uncontrolled  Start testing blood sugars 3-4x daily  Provide glucose log every two weeks for review  At this time, will maintain patient on basal-bolus therapy  He will need three meal time injections plus basal insulin  Discussed starting DPP-4, SGLT-2, or GLP-1, which would be idea as the patient's BMI is 51  Counseled on  The pathophysiology of diabetes  Counseled on the negative impact of uncontrolled diabetes including retinopathy, nephropathy, and worsening neuropathy  Strongly encouraged patient to follow up with diabetes educator for MNT    Lab Results   Component Value Date    HGBA1C 10 1 (H) 01/15/2021 36.7

## 2025-05-19 ENCOUNTER — ANTICOAG VISIT (OUTPATIENT)
Dept: CARDIOLOGY CLINIC | Facility: CLINIC | Age: 60
End: 2025-05-19
Payer: MEDICARE

## 2025-05-19 ENCOUNTER — APPOINTMENT (OUTPATIENT)
Dept: LAB | Facility: HOSPITAL | Age: 60
End: 2025-05-19
Payer: MEDICARE

## 2025-05-19 DIAGNOSIS — Z95.2 S/P AVR: ICD-10-CM

## 2025-05-19 DIAGNOSIS — E78.5 DYSLIPIDEMIA ASSOCIATED WITH TYPE 2 DIABETES MELLITUS  (HCC): ICD-10-CM

## 2025-05-19 DIAGNOSIS — I10 ESSENTIAL HYPERTENSION: ICD-10-CM

## 2025-05-19 DIAGNOSIS — Z79.01 LONG TERM (CURRENT) USE OF ANTICOAGULANTS: ICD-10-CM

## 2025-05-19 DIAGNOSIS — Z79.4 TYPE 2 DIABETES MELLITUS WITH HYPERGLYCEMIA, WITH LONG-TERM CURRENT USE OF INSULIN (HCC): ICD-10-CM

## 2025-05-19 DIAGNOSIS — E11.65 TYPE 2 DIABETES MELLITUS WITH HYPERGLYCEMIA, WITH LONG-TERM CURRENT USE OF INSULIN (HCC): ICD-10-CM

## 2025-05-19 DIAGNOSIS — I48.91 ATRIAL FIBRILLATION, UNSPECIFIED TYPE (HCC): ICD-10-CM

## 2025-05-19 DIAGNOSIS — G47.33 OBSTRUCTIVE SLEEP APNEA: ICD-10-CM

## 2025-05-19 DIAGNOSIS — I48.11 LONGSTANDING PERSISTENT ATRIAL FIBRILLATION (HCC): Primary | ICD-10-CM

## 2025-05-19 DIAGNOSIS — E11.69 DYSLIPIDEMIA ASSOCIATED WITH TYPE 2 DIABETES MELLITUS  (HCC): ICD-10-CM

## 2025-05-19 DIAGNOSIS — Z95.5 H/O HEART ARTERY STENT: ICD-10-CM

## 2025-05-19 LAB
ALBUMIN SERPL BCG-MCNC: 4.2 G/DL (ref 3.5–5)
ALP SERPL-CCNC: 78 U/L (ref 34–104)
ALT SERPL W P-5'-P-CCNC: 9 U/L (ref 7–52)
ANION GAP SERPL CALCULATED.3IONS-SCNC: 8 MMOL/L (ref 4–13)
AST SERPL W P-5'-P-CCNC: 15 U/L (ref 13–39)
BASOPHILS # BLD AUTO: 0.08 THOUSANDS/ÂΜL (ref 0–0.1)
BASOPHILS NFR BLD AUTO: 1 % (ref 0–1)
BILIRUB SERPL-MCNC: 0.76 MG/DL (ref 0.2–1)
BUN SERPL-MCNC: 17 MG/DL (ref 5–25)
CALCIUM SERPL-MCNC: 9.3 MG/DL (ref 8.4–10.2)
CHLORIDE SERPL-SCNC: 101 MMOL/L (ref 96–108)
CHOLEST SERPL-MCNC: 109 MG/DL (ref ?–200)
CO2 SERPL-SCNC: 27 MMOL/L (ref 21–32)
CREAT SERPL-MCNC: 0.77 MG/DL (ref 0.6–1.3)
CREAT UR-MCNC: 186.7 MG/DL
EOSINOPHIL # BLD AUTO: 0.28 THOUSAND/ÂΜL (ref 0–0.61)
EOSINOPHIL NFR BLD AUTO: 4 % (ref 0–6)
ERYTHROCYTE [DISTWIDTH] IN BLOOD BY AUTOMATED COUNT: 13 % (ref 11.6–15.1)
EST. AVERAGE GLUCOSE BLD GHB EST-MCNC: 120 MG/DL
GFR SERPL CREATININE-BSD FRML MDRD: 97 ML/MIN/1.73SQ M
GLUCOSE P FAST SERPL-MCNC: 73 MG/DL (ref 65–99)
HBA1C MFR BLD: 5.8 %
HCT VFR BLD AUTO: 45.8 % (ref 36.5–49.3)
HDLC SERPL-MCNC: 31 MG/DL
HGB BLD-MCNC: 15.6 G/DL (ref 12–17)
IMM GRANULOCYTES # BLD AUTO: 0.02 THOUSAND/UL (ref 0–0.2)
IMM GRANULOCYTES NFR BLD AUTO: 0 % (ref 0–2)
INR PPP: 3.18 (ref 0.85–1.19)
LDLC SERPL CALC-MCNC: 62 MG/DL (ref 0–100)
LYMPHOCYTES # BLD AUTO: 1.79 THOUSANDS/ÂΜL (ref 0.6–4.47)
LYMPHOCYTES NFR BLD AUTO: 23 % (ref 14–44)
MCH RBC QN AUTO: 31.6 PG (ref 26.8–34.3)
MCHC RBC AUTO-ENTMCNC: 34.1 G/DL (ref 31.4–37.4)
MCV RBC AUTO: 93 FL (ref 82–98)
MICROALBUMIN UR-MCNC: 21 MG/L
MICROALBUMIN/CREAT 24H UR: 11 MG/G CREATININE (ref 0–30)
MONOCYTES # BLD AUTO: 0.56 THOUSAND/ÂΜL (ref 0.17–1.22)
MONOCYTES NFR BLD AUTO: 7 % (ref 4–12)
NEUTROPHILS # BLD AUTO: 5.16 THOUSANDS/ÂΜL (ref 1.85–7.62)
NEUTS SEG NFR BLD AUTO: 65 % (ref 43–75)
NRBC BLD AUTO-RTO: 0 /100 WBCS
PLATELET # BLD AUTO: 209 THOUSANDS/UL (ref 149–390)
PMV BLD AUTO: 9 FL (ref 8.9–12.7)
POTASSIUM SERPL-SCNC: 4.4 MMOL/L (ref 3.5–5.3)
PROT SERPL-MCNC: 7.3 G/DL (ref 6.4–8.4)
PROTHROMBIN TIME: 32.7 SECONDS (ref 12.3–15)
RBC # BLD AUTO: 4.93 MILLION/UL (ref 3.88–5.62)
SODIUM SERPL-SCNC: 136 MMOL/L (ref 135–147)
TRIGL SERPL-MCNC: 81 MG/DL (ref ?–150)
TSH SERPL DL<=0.05 MIU/L-ACNC: 3.37 UIU/ML (ref 0.45–4.5)
WBC # BLD AUTO: 7.89 THOUSAND/UL (ref 4.31–10.16)

## 2025-05-19 PROCEDURE — 36415 COLL VENOUS BLD VENIPUNCTURE: CPT

## 2025-05-19 PROCEDURE — 83036 HEMOGLOBIN GLYCOSYLATED A1C: CPT

## 2025-05-19 PROCEDURE — 93793 ANTICOAG MGMT PT WARFARIN: CPT | Performed by: NURSE PRACTITIONER

## 2025-05-19 PROCEDURE — 85610 PROTHROMBIN TIME: CPT

## 2025-05-19 PROCEDURE — 80053 COMPREHEN METABOLIC PANEL: CPT

## 2025-05-19 PROCEDURE — 82570 ASSAY OF URINE CREATININE: CPT

## 2025-05-19 PROCEDURE — 84443 ASSAY THYROID STIM HORMONE: CPT

## 2025-05-19 PROCEDURE — 80061 LIPID PANEL: CPT

## 2025-05-19 PROCEDURE — 82043 UR ALBUMIN QUANTITATIVE: CPT

## 2025-05-19 PROCEDURE — 85025 COMPLETE CBC W/AUTO DIFF WBC: CPT

## 2025-05-19 NOTE — PATIENT INSTRUCTIONS
INR received from lab and reviewed.    No changes needed; continue current dosing schedule.  Recheck INR in 3 weeks-instructions given to pt by phone  CHRISTINE Boyer

## 2025-05-22 ENCOUNTER — OFFICE VISIT (OUTPATIENT)
Dept: CARDIOLOGY CLINIC | Facility: CLINIC | Age: 60
End: 2025-05-22
Payer: MEDICARE

## 2025-05-22 ENCOUNTER — TELEPHONE (OUTPATIENT)
Dept: FAMILY MEDICINE CLINIC | Facility: CLINIC | Age: 60
End: 2025-05-22

## 2025-05-22 VITALS
SYSTOLIC BLOOD PRESSURE: 124 MMHG | OXYGEN SATURATION: 98 % | HEIGHT: 73 IN | WEIGHT: 315 LBS | RESPIRATION RATE: 18 BRPM | BODY MASS INDEX: 41.75 KG/M2 | HEART RATE: 98 BPM | DIASTOLIC BLOOD PRESSURE: 70 MMHG

## 2025-05-22 DIAGNOSIS — E11.69 DYSLIPIDEMIA ASSOCIATED WITH TYPE 2 DIABETES MELLITUS  (HCC): ICD-10-CM

## 2025-05-22 DIAGNOSIS — I10 ESSENTIAL HYPERTENSION: ICD-10-CM

## 2025-05-22 DIAGNOSIS — E78.49 OTHER HYPERLIPIDEMIA: ICD-10-CM

## 2025-05-22 DIAGNOSIS — E78.5 DYSLIPIDEMIA ASSOCIATED WITH TYPE 2 DIABETES MELLITUS  (HCC): ICD-10-CM

## 2025-05-22 DIAGNOSIS — Z95.2 S/P AVR: ICD-10-CM

## 2025-05-22 DIAGNOSIS — I48.20 CHRONIC ATRIAL FIBRILLATION (HCC): Primary | ICD-10-CM

## 2025-05-22 DIAGNOSIS — I10 HYPERTENSION, UNSPECIFIED TYPE: ICD-10-CM

## 2025-05-22 DIAGNOSIS — Z95.5 H/O HEART ARTERY STENT: ICD-10-CM

## 2025-05-22 DIAGNOSIS — I50.32 CHRONIC DIASTOLIC HEART FAILURE (HCC): ICD-10-CM

## 2025-05-22 DIAGNOSIS — I25.10 ASCVD (ARTERIOSCLEROTIC CARDIOVASCULAR DISEASE): ICD-10-CM

## 2025-05-22 DIAGNOSIS — I48.11 LONGSTANDING PERSISTENT ATRIAL FIBRILLATION (HCC): ICD-10-CM

## 2025-05-22 PROCEDURE — 99214 OFFICE O/P EST MOD 30 MIN: CPT | Performed by: INTERNAL MEDICINE

## 2025-05-22 PROCEDURE — 93000 ELECTROCARDIOGRAM COMPLETE: CPT | Performed by: INTERNAL MEDICINE

## 2025-05-22 RX ORDER — NITROGLYCERIN 0.4 MG/1
0.4 TABLET SUBLINGUAL
Qty: 100 TABLET | Refills: 3 | Status: SHIPPED | OUTPATIENT
Start: 2025-05-22

## 2025-05-22 NOTE — TELEPHONE ENCOUNTER
Pt requesting refill of trellegy inhaler, I didn't see it on the medication list please review and advise pt would like sent to [facility]

## 2025-05-22 NOTE — ASSESSMENT & PLAN NOTE
Wt Readings from Last 3 Encounters:   05/22/25 (!) 162 kg (358 lb)   03/20/25 (!) 161 kg (356 lb)   12/20/24 (!) 161 kg (356 lb)     Compensated  Cont Lasix

## 2025-05-22 NOTE — PROGRESS NOTES
Patient ID: Santino Flores is a 59 y.o. male.        Plan:      Chronic atrial fibrillation (HCC)  Rate controlled  AC'd    S/P AVR  mAVR  Normal via auscultation and last Echo  Aware of SBE Abx prophylaxis  In our coumadin clinic    H/O heart artery stent  Angina free    Other hyperlipidemia  Tolerating statin    Chronic diastolic heart failure (HCC)  Wt Readings from Last 3 Encounters:   25 (!) 162 kg (358 lb)   25 (!) 161 kg (356 lb)   24 (!) 161 kg (356 lb)     Compensated  Cont Lasix          Essential hypertension  Controlled       Follow up Plan/Other summary comments:  There is no evidence for angina, decompensated CHF, electrical instability.   Advised no medication changes today.   Refilled SL NTG.  CUS ordered  Return in about 6 months (around 2025).    HPI: Santino is here for routine FU.  Overall Feels well.  No major medical setbacks since our last visit.  Offers no cardiac related complaints on extensive ROS questioning.  No cp, sob, palps, unusual edema, orthopnea, pnd, (pre)syncope, tia, or claudication like sx.   Still smoking but has cut back.      Results for orders placed or performed in visit on 25   POCT ECG    Impression    Afib, RBBB, 79 bpm.  Stable findings.         Most recent or relevant cardiac/vascular testin Echo    Left Ventricle: Left ventricular cavity size is normal. Wall thickness is mildly increased. There is mild to moderate concentric hypertrophy. The left ventricular ejection fraction is 55%. Although no diagnostic regional wall motion abnormality was identified, this possibility cannot be completely excluded on the basis of this study.    Right Ventricle: Systolic function is low normal.    Left Atrium: The atrium is mildly dilated.    Aortic Valve: There is a mechanical valve. The prosthetic valve appears to be functioning normally. There is no evidence of regurgitation. The aortic valve peak velocity is 2.6 m/s. The aortic valve mean  "gradient is 15.0 mmHg.    Mitral Valve: There is moderate annular calcification. There is trace regurgitation. There is very mild functional stenosis, likely due more to MAC than leaflet stenosis. The mitral valve mean gradient is 4.5mmHg.    Tricuspid Valve: There is mild regurgitation.    Pericardium: There is no pericardial effusion.    Prior TTE study available for comparison. Prior study date: 3/16/2023. No significant changes noted compared to the prior study.  03/23 Echo   Left Ventricle: Left ventricular cavity size is normal. Wall thickness is normal. The left ventricular ejection fraction is 50%. Systolic function is low normal.    Right Ventricle: Systolic function is low normal. Normal tricuspid annular plane systolic excursion (TAPSE) > 1.7 cm.    Aortic Valve: There is a mechanical valve. The prosthetic valve appears to be functioning normally. There is mild regurgitation. The aortic valve has no significant stenosis. The aortic valve peak velocity is 2.52 m/s. The aortic valve mean gradient is 15 mmHg.    Mitral Valve: There is moderate annular calcification. There is mild regurgitation.    Tricuspid Valve: There is mild regurgitation.      Past Surgical History[1]    Lipid Profile: Reviewed      Review of Systems   10  point ROS  was otherwise non pertinent or negative except as per HPI or as below.           Objective:     /70 (BP Location: Left arm, Patient Position: Sitting, Cuff Size: Large)   Pulse 98   Resp 18   Ht 6' 1\" (1.854 m)   Wt (!) 162 kg (358 lb)   SpO2 98%   BMI 47.23 kg/m²     PHYSICAL EXAM:    General:  Normal appearance in no distress.  Eyes:  Anicteric.  Oral mucosa:  Moist.  Neck:  No JVD. Carotid upstrokes are brisk without bruits.  No masses.  Chest:  Clear to auscultation.  Cardiac:  Irregular, No palpable PMI.  Normal S1 and S2.  No murmur gallop or rub.  Abdomen:  Soft and nontender. No palpable organomegaly or aortic enlargement.  Extremities:  Trace B/L LE " peripheral edema.  Musculoskeletal:  Symmetric.   Vascular: Pedal pulses are intact.  Neuro:  Grossly symmetric.  Psych:  Alert and oriented x3.      Meds reviewed.  Current Medications[2]     Past Medical History[3]        Tobacco Use History[4]               [1]   Past Surgical History:  Procedure Laterality Date    AORTIC VALVE REPLACEMENT  10/07/2016    AVR replacement, mechanical    CORONARY ANGIOPLASTY WITH STENT PLACEMENT  07/29/2010    EF 40%, Successful bare metal stent mid RCA    MENISCECTOMY Left    [2]   Current Outpatient Medications:     albuterol (2.5 mg/3 mL) 0.083 % nebulizer solution, Take 3 mL (2.5 mg total) by nebulization every 6 (six) hours as needed for wheezing or shortness of breath, Disp: 360 mL, Rfl: 2    albuterol (Ventolin HFA) 90 mcg/act inhaler, Inhale 2 puffs every 6 (six) hours as needed for shortness of breath Must be brand necessary, Disp: 18 g, Rfl: 3    ammonium lactate (LAC-HYDRIN) 12 % cream, Apply topically as needed for dry skin Apply 3-5 times a week, do not apply between toes, Disp: 140 g, Rfl: 1    atorvastatin (LIPITOR) 10 mg tablet, Take 1 tablet (10 mg total) by mouth daily, Disp: 90 tablet, Rfl: 1    benazepril (LOTENSIN) 10 mg tablet, Take 1 tablet (10 mg total) by mouth daily, Disp: 90 tablet, Rfl: 1    diltiazem (DILACOR XR) 240 MG 24 hr capsule, Take 1 capsule (240 mg total) by mouth daily, Disp: 90 capsule, Rfl: 3    fluticasone-umeclidinium-vilanterol 100-62.5-25 mcg/actuation inhaler, Inhale 1 puff daily Rinse mouth after use., Disp: 180 blister, Rfl: 3    furosemide (LASIX) 80 mg tablet, Take 1 tablet (80 mg total) by mouth daily, Disp: 90 tablet, Rfl: 3    insulin glargine (Toujeo Max SoloStar) 300 units/mL CONCENTRATED U-300 injection pen (2-unit dial), Inject 60 units nightly., Disp: 18 mL, Rfl: 3    metFORMIN (GLUCOPHAGE) 1000 MG tablet, Take 1 tablet (1,000 mg total) by mouth 2 (two) times a day, Disp: 180 tablet, Rfl: 1    metoprolol tartrate (LOPRESSOR)  25 mg tablet, Take 1 tablet (25 mg total) by mouth 2 (two) times a day, Disp: 180 tablet, Rfl: 1    nitroglycerin (NITROSTAT) 0.4 mg SL tablet, Place 1 tablet (0.4 mg total) under the tongue every 5 (five) minutes as needed for chest pain, Disp: 100 tablet, Rfl: 3    NovoLOG FlexPen 100 units/mL injection pen, Inject 14 units with breakfast and lunch and 30 units with dinner., Disp: 45 mL, Rfl: 1    Potassium Chloride ER 20 MEQ TBCR, Take 1 tablet (20 mEq total) by mouth daily, Disp: 90 tablet, Rfl: 3    warfarin (COUMADIN) 10 mg tablet, Take 1 tablet (10 mg total) by mouth daily, Disp: 90 tablet, Rfl: 0    warfarin (COUMADIN) 2 mg tablet, Take 1 tablet (2 mg total) by mouth daily, Disp: 90 tablet, Rfl: 3    Insulin Pen Needle (BD Pen Needle Fany U/F) 32G X 4 MM MISC, Inject as directed 4 (four) times a day, Disp: 400 each, Rfl: 1  [3]   Past Medical History:  Diagnosis Date    Asthma     last assessed 12    Athscl heart disease of native coronary artery w/o ang pctrs     Atrial fibrillation (HCC)     last assessed 12    Chronic atrial fibrillation ([facility])     Chronic diastolic (congestive) heart failure ([facility])     Closed fracture of fibula     last assessed 10/18/13    COLD (chronic obstructive lung disease) ([facility])     last assessed 12    Coronary angioplasty status     CPAP (continuous positive airway pressure) dependence     Dyslipidemia associated with type 2 diabetes mellitus  ([facility])     H/O heart artery stent     Hyperlipidemia     Hypertension     Morbid obesity with BMI of 50.0-59.9, adult ([facility]) 2018    Presence of prosthetic heart valve     Respiratory failure with hypoxia ([facility]) 2021    S/P AVR    [4]   Social History  Tobacco Use   Smoking Status Former    Current packs/day: 0.00    Types: Cigarettes    Start date: 10/1981    Quit date: 10/2016    Years since quittin.6    Passive exposure: Past   Smokeless Tobacco Never   Tobacco Comments    Quit 2 months ago

## 2025-05-22 NOTE — PATIENT INSTRUCTIONS
"Patient Education     Coronary artery disease   The Basics   Written by the doctors and editors at [facility]   What is coronary artery disease? -- Coronary artery disease is a condition that puts you at risk for heart attack and other forms of heart disease. In people who have coronary artery disease, the arteries that supply blood to the heart get clogged with fatty deposits (figure 1).  Other names for this disease are \"coronary heart disease\" or just \"heart disease.\"  What are the symptoms of coronary artery disease? -- Many people have no symptoms. For those who do, the most common symptoms usually happen with exercise. They can include:   Pain, pressure, or discomfort in the center of the chest - This type of chest pain is called \"angina.\"   Pain, tingling, or discomfort in other parts of the upper body - This might include the arms, back, neck, jaw, or stomach.   Feeling short of breath  What are the symptoms of a heart attack? -- The first symptom of coronary artery disease can be a heart attack (figure 2). That's why it is so important to know how to spot a heart attack.  The symptoms of a heart attack can include:   Pain, pressure, or discomfort in the center of the chest   Pain, tingling, or discomfort in other parts of the upper body, including the arms, back, neck, jaw, or stomach   Shortness of breath   Nausea, vomiting, burping, or heartburn   Sweating or cold, clammy skin   Racing or uneven heartbeat   Feeling dizzy or lightheaded  If these symptoms last more than 10 minutes or they keep coming and going, call for an ambulance right away (in the [place] and [place], call 9-1-1). Do not try to get to the hospital on your own.  Some people with coronary artery disease have chest pain even when they are not having a heart attack. This is most likely to happen when they are walking, going up stairs, or moving around. But if you have chest pain that is new or different, see a doctor right away.  Is there a test " "for coronary artery disease? -- Yes. If your doctor or nurse thinks that you might have coronary artery disease, they might order blood tests and 1 or more of these tests:   Electrocardiogram (\"ECG\") - This test measures the electrical activity in your heart.   Stress test - This is also called an exercise test. For this test, you might be asked to run or walk on a treadmill while you also have an ECG. Physical activity increases the heart's need for blood. This test helps doctors see if the heart is getting enough blood. If you cannot walk or run, your doctor might give you a medicine to make your heart pump faster.   Echocardiogram - This test uses sound waves to create an image of your heart as it beats.   Cardiac catheterization (\"cardiac cath\") - During this test, the doctor puts a thin tube into a blood vessel in your leg or arm. Then, they move the tube up to your heart. Next, the doctor puts a dye that shows up on X-ray into the tube. This part of the test is called \"coronary angiography.\" It can show whether any of the arteries in your heart are clogged.  How is coronary artery disease treated? -- The main treatments for coronary artery disease are:   Lifestyle changes - To reduce your risk of heart attack and death, you should:   Quit smoking, if you smoke. Your doctor or nurse can help with this.   Eat lots of fruits, vegetables, and foods with a lot of fiber. Avoid foods with a lot of sugar.   Walk or do some form of physical activity on most days of the week.   Try to lose weight, if you have excess body weight.   Medicines - The medicines to treat heart disease are very important. Some medicines lower your risk of heart attacks and can help you live longer. But you must take them every day, as instructed. Your doctor might prescribe:   Medicines called \"statins,\" which lower cholesterol   Medicines to lower blood pressure   Aspirin or other medicines that help prevent blood clots   Medicines to treat " "diabetes  People who have chest pain caused by coronary artery disease (called angina) can also get medicines to relive their pain. These medicines might include \"nitrates,\" \"beta blockers,\" and others.  Some people with coronary artery disease can also have:   Stenting - The doctor puts a thin plastic tube into the blocked artery, and uses a tiny balloon to open the blockage. Then, the doctor leaves a tiny mesh tube called a \"stent\" inside the artery to hold it open.   Bypass surgery - This is also known as \"coronary artery bypass grafting\" (\"CABG\"). During this surgery, the doctor removes a piece of blood vessel from another part of the body. Then, they reattach the blood vessel above and below the area that is clogged. This re-routes blood around the clog and allows it to get to the part of the heart that was not getting blood (figure 3).  If your doctor recommends stenting or bypass surgery, ask these questions:   What are the benefits of this procedure for me? Will it help me live longer? Will it reduce my chance of having a heart attack? Will I feel better if I have this procedure?   What are the risks of this procedure?   What happens if I don't have this procedure?  All topics are updated as new evidence becomes available and our peer review process is complete.  This topic retrieved from [employer] on: Apr 13, 2024.  Topic 07712 Version 33.0  Release: 32.3.2 - C32.102  © 2024 UpToDate, Inc. and/or its affiliates. All rights reserved.  figure 1: Coronary heart disease     In people with coronary heart disease, the coronary arteries get clogged with fatty deposits called plaques.  Graphic 65463 Version 5.0  figure 2: Heart attack symptoms     This picture shows the main symptoms of a heart attack. People who are having a heart attack often have only some of these symptoms. The pain, pressure, and discomfort caused by a heart attack mostly affect the left side of the body, but can also affect the right.  Women " "are more likely than men to have to have symptoms other than chest pain. But chest pain or discomfort is the most common symptom of a heart attack in both women and men.  If you think that you are having a heart attack, call for an ambulance (in the [place] and [place], call 9-1-1). Do not try to get yourself to the hospital.  Graphic 61071 Version 4.0  figure 3: Coronary artery bypass graft surgery     During coronary artery bypass surgery, the surgeon removes a piece of blood vessel from the leg, chest, arm, or belly. Then, the surgeon uses that piece of blood vessel (called a \"graft\") to reroute blood around the blocked artery. The surgery is called \"bypass surgery\" because it bypasses the blockage. Some people have more than 1 blocked artery bypassed. In this picture, the graft came from a vein in the leg called the \"saphenous vein.\" But grafts can come from other places, too.  Graphic 66903 Version 6.0  Consumer Information Use and Disclaimer   Disclaimer: This generalized information is a limited summary of diagnosis, treatment, and/or medication information. It is not meant to be comprehensive and should be used as a tool to help the user understand and/or assess potential diagnostic and treatment options. It does NOT include all information about conditions, treatments, medications, side effects, or risks that may apply to a specific patient. It is not intended to be medical advice or a substitute for the medical advice, diagnosis, or treatment of a health care provider based on the health care provider's examination and assessment of a patient's specific and unique circumstances. Patients must speak with a health care provider for complete information about their health, medical questions, and treatment options, including any risks or benefits regarding use of medications. This information does not endorse any treatments or medications as safe, effective, or approved for treating a specific patient. UpToDate, " Inc. and its affiliates disclaim any warranty or liability relating to this information or the use thereof.The use of this information is governed by the Terms of Use, available at https://www.wolterskluwer.com/en/know/clinical-effectiveness-terms. 2024© [employer] and its affiliates and/or licensors. All rights reserved.  Copyright   © 2024 [employer] and/or its affiliates. All rights reserved.

## 2025-05-23 DIAGNOSIS — J44.9 COPD, MODERATE (HCC): ICD-10-CM

## 2025-06-02 ENCOUNTER — OFFICE VISIT (OUTPATIENT)
Dept: FAMILY MEDICINE CLINIC | Facility: CLINIC | Age: 60
End: 2025-06-02
Payer: MEDICARE

## 2025-06-02 VITALS
SYSTOLIC BLOOD PRESSURE: 130 MMHG | OXYGEN SATURATION: 96 % | TEMPERATURE: 97.6 F | DIASTOLIC BLOOD PRESSURE: 86 MMHG | HEIGHT: 73 IN | BODY MASS INDEX: 41.75 KG/M2 | WEIGHT: 315 LBS | HEART RATE: 99 BPM

## 2025-06-02 DIAGNOSIS — Z79.01 LONG TERM (CURRENT) USE OF ANTICOAGULANTS: ICD-10-CM

## 2025-06-02 DIAGNOSIS — E66.813 CLASS 3 SEVERE OBESITY WITH SERIOUS COMORBIDITY AND BODY MASS INDEX (BMI) OF 45.0 TO 49.9 IN ADULT, UNSPECIFIED OBESITY TYPE: ICD-10-CM

## 2025-06-02 DIAGNOSIS — E11.69 DYSLIPIDEMIA ASSOCIATED WITH TYPE 2 DIABETES MELLITUS  (HCC): ICD-10-CM

## 2025-06-02 DIAGNOSIS — I10 ESSENTIAL HYPERTENSION: ICD-10-CM

## 2025-06-02 DIAGNOSIS — J43.9 PULMONARY EMPHYSEMA, UNSPECIFIED EMPHYSEMA TYPE (HCC): ICD-10-CM

## 2025-06-02 DIAGNOSIS — Z12.5 SCREENING FOR PROSTATE CANCER: ICD-10-CM

## 2025-06-02 DIAGNOSIS — I48.91 ATRIAL FIBRILLATION, UNSPECIFIED TYPE (HCC): ICD-10-CM

## 2025-06-02 DIAGNOSIS — Z95.5 H/O HEART ARTERY STENT: ICD-10-CM

## 2025-06-02 DIAGNOSIS — G47.33 OBSTRUCTIVE SLEEP APNEA: ICD-10-CM

## 2025-06-02 DIAGNOSIS — E78.5 DYSLIPIDEMIA ASSOCIATED WITH TYPE 2 DIABETES MELLITUS  (HCC): ICD-10-CM

## 2025-06-02 DIAGNOSIS — E11.9 TYPE 2 DIABETES MELLITUS WITHOUT COMPLICATION, WITH LONG-TERM CURRENT USE OF INSULIN (HCC): Primary | ICD-10-CM

## 2025-06-02 DIAGNOSIS — Z95.2 S/P AVR: ICD-10-CM

## 2025-06-02 DIAGNOSIS — J44.9 COPD, MODERATE (HCC): ICD-10-CM

## 2025-06-02 DIAGNOSIS — Z79.4 TYPE 2 DIABETES MELLITUS WITHOUT COMPLICATION, WITH LONG-TERM CURRENT USE OF INSULIN (HCC): Primary | ICD-10-CM

## 2025-06-02 PROCEDURE — 99214 OFFICE O/P EST MOD 30 MIN: CPT | Performed by: FAMILY MEDICINE

## 2025-06-02 PROCEDURE — G2211 COMPLEX E/M VISIT ADD ON: HCPCS | Performed by: FAMILY MEDICINE

## 2025-06-02 RX ORDER — ALBUTEROL SULFATE 90 UG/1
2 AEROSOL, METERED RESPIRATORY (INHALATION) EVERY 6 HOURS PRN
Qty: 18 G | Refills: 3 | Status: SHIPPED | OUTPATIENT
Start: 2025-06-02

## 2025-06-02 RX ORDER — ALBUTEROL SULFATE 90 UG/1
2 INHALANT RESPIRATORY (INHALATION) EVERY 6 HOURS PRN
Qty: 18 G | Refills: 3 | Status: SHIPPED | OUTPATIENT
Start: 2025-06-02 | End: 2025-06-02 | Stop reason: SDUPTHER

## 2025-06-02 NOTE — ASSESSMENT & PLAN NOTE
Patient is being seen by cardiology.  Orders:    Hemoglobin A1C; Future    Comprehensive metabolic panel; Future    CBC and differential; Future    Albumin / creatinine urine ratio; Future    Lipid Panel with Direct LDL reflex; Future

## 2025-06-02 NOTE — ASSESSMENT & PLAN NOTE
Currently controlled.  Continue current treatment regimen  Orders:    Hemoglobin A1C; Future    Comprehensive metabolic panel; Future    CBC and differential; Future    Albumin / creatinine urine ratio; Future    Lipid Panel with Direct LDL reflex; Future

## 2025-06-02 NOTE — PROGRESS NOTES
Name: Santino Flores      : 1965      MRN: [id number]  Encounter Provider: Mathieu Cronin DO  Encounter Date: 2025   Encounter department: [facility]  :  Assessment & Plan  Type 2 diabetes mellitus without complication, with long-term current use of insulin (HCC)    Lab Results   Component Value Date    HGBA1C 5.8 (H) 2025     Currently controlled.  Continue current treatment regimen.  Endocrinology is also following and making treatment recommendations.  Orders:    Hemoglobin A1C; Future    Comprehensive metabolic panel; Future    CBC and differential; Future    Albumin / creatinine urine ratio; Future    Lipid Panel with Direct LDL reflex; Future    Atrial fibrillation, unspecified type (HCC)  Patient is being seen by cardiology.  Orders:    Hemoglobin A1C; Future    Comprehensive metabolic panel; Future    CBC and differential; Future    Albumin / creatinine urine ratio; Future    Lipid Panel with Direct LDL reflex; Future    Essential hypertension  Currently controlled.  Continue current treatment regimen  Orders:    Hemoglobin A1C; Future    Comprehensive metabolic panel; Future    CBC and differential; Future    Albumin / creatinine urine ratio; Future    Lipid Panel with Direct LDL reflex; Future    Obstructive sleep apnea  Patient is tolerating CPAP       Dyslipidemia associated with type 2 diabetes mellitus  (HCC)    Lab Results   Component Value Date    HGBA1C 5.8 (H) 2025   Continue current treatment.  Continue current monitoring plan.    Orders:    Hemoglobin A1C; Future    Comprehensive metabolic panel; Future    CBC and differential; Future    Albumin / creatinine urine ratio; Future    Lipid Panel with Direct LDL reflex; Future    S/P AVR  Cardiology is following.  Long-term anticoagulation is managed by Dr. Dill.       H/O heart artery stent  Asymptomatic.  No angina.  Cardiology following.       Long term (current) use of anticoagulants  On warfarin.   "Patient is being followed by cardiology who is managing warfarin dosage recommendations.       Screening for prostate cancer    Orders:    PSA, Total Screen; Future    COPD, moderate (HCC)  Stable, continue current medications.       Class 3 severe obesity with serious comorbidity and body mass index (BMI) of 45.0 to 49.9 in adult, unspecified obesity type    Continue dietary and lifestyle modification.       Pulmonary emphysema, unspecified emphysema type (HCC)    Orders:    Ventolin  (90 Base) MCG/ACT inhaler; Inhale 2 puffs every 6 (six) hours as needed for shortness of breath Must be brand necessary    Reviewed lab work from 5/19/2025.  Thyroid test was normal.  Total cholesterol 109, triglycerides 81, HDL 31, LDL 62, urine microalbumin to creatinine normal, complete blood count normal, complete metabolic count normal, A1c 5.8       History of Present Illness   Patient is a pleasant 59-year-old male who presents today for follow-up and management of chronic medical conditions      Review of Systems   Constitutional:  Negative for chills and fever.   HENT:  Negative for ear pain and sore throat.    Eyes:  Negative for pain and visual disturbance.   Respiratory:  Negative for cough and shortness of breath.    Cardiovascular:  Negative for chest pain and palpitations.   Gastrointestinal:  Negative for abdominal pain and vomiting.   Genitourinary:  Negative for dysuria and hematuria.   Musculoskeletal:  Negative for arthralgias and back pain.   Skin:  Negative for color change and rash.   Neurological:  Negative for seizures and syncope.   Psychiatric/Behavioral: Negative.     All other systems reviewed and are negative.      Objective   /86   Pulse 99   Temp 97.6 °F (36.4 °C)   Ht 6' 1\" (1.854 m)   Wt (!) 160 kg (353 lb 12.8 oz)   SpO2 96%   BMI 46.68 kg/m²      Physical Exam  Vitals and nursing note reviewed.   Constitutional:       General: He is not in acute distress.     Appearance: He is " well-developed.   HENT:      Head: Normocephalic and atraumatic.      Mouth/Throat:      Mouth: Mucous membranes are moist.     Eyes:      Conjunctiva/sclera: Conjunctivae normal.       Cardiovascular:      Rate and Rhythm: Normal rate. Rhythm irregular.      Heart sounds: No murmur heard.  Pulmonary:      Effort: Pulmonary effort is normal. No respiratory distress.      Breath sounds: Normal breath sounds. No wheezing or rales.   Abdominal:      Palpations: Abdomen is soft.      Tenderness: There is no abdominal tenderness.     Musculoskeletal:         General: No swelling.      Cervical back: Neck supple.     Skin:     General: Skin is warm and dry.      Capillary Refill: Capillary refill takes less than 2 seconds.     Neurological:      General: No focal deficit present.      Mental Status: He is alert and oriented to person, place, and time.     Psychiatric:         Mood and Affect: Mood normal.         Behavior: Behavior normal.

## 2025-06-02 NOTE — ASSESSMENT & PLAN NOTE
On warfarin.  Patient is being followed by cardiology who is managing warfarin dosage recommendations.

## 2025-06-02 NOTE — TELEPHONE ENCOUNTER
Pharmacy called to verify if patient is ok with being prescribed Ventolin inhaler as it is specified in directions to have brand. Warm called office spoke to shruthi avila and was able to confirm that this is appropriate for patient.

## 2025-06-02 NOTE — ASSESSMENT & PLAN NOTE
Lab Results   Component Value Date    HGBA1C 5.8 (H) 05/19/2025   Continue current treatment.  Continue current monitoring plan.    Orders:    Hemoglobin A1C; Future    Comprehensive metabolic panel; Future    CBC and differential; Future    Albumin / creatinine urine ratio; Future    Lipid Panel with Direct LDL reflex; Future

## 2025-06-18 ENCOUNTER — HOSPITAL ENCOUNTER (OUTPATIENT)
Dept: NON INVASIVE DIAGNOSTICS | Facility: HOSPITAL | Age: 60
Discharge: HOME | End: 2025-06-18
Attending: INTERNAL MEDICINE
Payer: MEDICARE

## 2025-06-18 ENCOUNTER — APPOINTMENT (OUTPATIENT)
Dept: LAB | Facility: HOSPITAL | Age: 60
End: 2025-06-18
Payer: MEDICARE

## 2025-06-18 ENCOUNTER — ANTICOAG VISIT (OUTPATIENT)
Dept: CARDIOLOGY CLINIC | Facility: CLINIC | Age: 60
End: 2025-06-18
Payer: MEDICARE

## 2025-06-18 DIAGNOSIS — Z95.5 H/O HEART ARTERY STENT: ICD-10-CM

## 2025-06-18 DIAGNOSIS — Z95.2 S/P AVR: ICD-10-CM

## 2025-06-18 DIAGNOSIS — I48.20 CHRONIC ATRIAL FIBRILLATION (HCC): Primary | ICD-10-CM

## 2025-06-18 DIAGNOSIS — I48.20 CHRONIC ATRIAL FIBRILLATION (HCC): ICD-10-CM

## 2025-06-18 DIAGNOSIS — E78.5 DYSLIPIDEMIA ASSOCIATED WITH TYPE 2 DIABETES MELLITUS  (HCC): ICD-10-CM

## 2025-06-18 DIAGNOSIS — I25.10 ASCVD (ARTERIOSCLEROTIC CARDIOVASCULAR DISEASE): ICD-10-CM

## 2025-06-18 DIAGNOSIS — E11.69 DYSLIPIDEMIA ASSOCIATED WITH TYPE 2 DIABETES MELLITUS  (HCC): ICD-10-CM

## 2025-06-18 DIAGNOSIS — I48.11 LONGSTANDING PERSISTENT ATRIAL FIBRILLATION (HCC): ICD-10-CM

## 2025-06-18 DIAGNOSIS — I10 HYPERTENSION, UNSPECIFIED TYPE: ICD-10-CM

## 2025-06-18 DIAGNOSIS — Z79.01 LONG TERM (CURRENT) USE OF ANTICOAGULANTS: ICD-10-CM

## 2025-06-18 LAB
INR PPP: 2.75 (ref 0.85–1.19)
PROTHROMBIN TIME: 29.3 SECONDS (ref 12.3–15)

## 2025-06-18 PROCEDURE — 85610 PROTHROMBIN TIME: CPT

## 2025-06-18 PROCEDURE — 93880 EXTRACRANIAL BILAT STUDY: CPT | Performed by: SURGERY

## 2025-06-18 PROCEDURE — 36415 COLL VENOUS BLD VENIPUNCTURE: CPT

## 2025-06-18 PROCEDURE — 93880 EXTRACRANIAL BILAT STUDY: CPT

## 2025-06-18 PROCEDURE — 93793 ANTICOAG MGMT PT WARFARIN: CPT | Performed by: PHYSICIAN ASSISTANT

## 2025-06-18 NOTE — PATIENT INSTRUCTIONS
Spoke with Patient's wife: No changes in medication health or diet. No missed or extra doses. No s/s of bleeding TIA or CVA. No new ABX, NSAIDs OTC meds, or supplements. Dose as instructed and recheck in one month.   Esther Brooks PA-C

## 2025-06-19 ENCOUNTER — RESULTS FOLLOW-UP (OUTPATIENT)
Dept: CARDIOLOGY CLINIC | Facility: CLINIC | Age: 60
End: 2025-06-19

## 2025-06-20 NOTE — PROGRESS NOTES
Name: Santino Flores      : 1965      MRN: [id number]  Encounter Provider: CHRISTINE Santiago  Encounter Date: 2025   Encounter department: [facility] [place]  :  Assessment & Plan  Type 2 diabetes mellitus with hyperglycemia, with long-term current use of insulin (HCC)  Blood sugars are well controlled. Continue Toujeo 60 units nightly. Continue Novolog 1430. Continue metformin 1,000 mg twice daily with meals. Continue with healthy diet and regular physical activity. Patient knows to notify me with persistent hyperglycemia or episodes of hypoglycemia.  F/U in 6 months.   Lab Results   Component Value Date    HGBA1C 5.8 (H) 2025       Orders:    Hemoglobin A1C; Future    Basic metabolic panel; Future    Essential hypertension  BP well controlled at 116/74. Continue benazapril 10 mg daily, diltiazem 240 mg daily, 25 mg of metoprolol tartrate twice daily.       Chronic diastolic heart failure (HCC)  Wt Readings from Last 3 Encounters:   25 (!) 161 kg (354 lb 12.8 oz)   25 (!) 160 kg (353 lb 12.8 oz)   25 (!) 162 kg (358 lb)   Euvolemic on exam. Following with cardiology.                  Dyslipidemia associated with type 2 diabetes mellitus  (HCC)  Very well controlled. Continue 10 mg of atorvastatin nightly.   Lab Results   Component Value Date    HGBA1C 5.8 (H) 2025              Assessment & Plan        History of Present Illness   History of Present Illness    Santino Flores is a 59 y.o. male with type 2 diabetes with long term use of insulin presenting today for routine follow up.     At patient's last appointment on 2024, Toujeo was reduced to 60 units nightly.    He reports 2 episodes of overnight hypoglycemia since that time. He has started taking his Toujeo around 11:30 pm rather than at the same time as his pre-dinner novolog and states that this has helped.    Fasting BG typically in the 120s.    Current  "regimen:  Toujeo 60 units nightly  Novolog 14-14-30  Metformin 1,000 mg twice daily     Hemoglobin A1C   Latest Ref Rng Normal 4.0-5.6%; PreDiabetic 5.7-6.4%; Diabetic >=6.5%; Glycemic control for adults with diabetes <7.0% %   5/13/2024 5.5    12/2/2024 5.4    5/19/2025 5.8 (H)       eAG, EST AVG Glucose   Latest Ref Rng mg/dl   5/13/2024 111    12/2/2024 108    5/19/2025 120       Legend:  (H) High          Review of Systems   Constitutional:  Negative for activity change, appetite change, fatigue and unexpected weight change.   HENT:  Positive for rhinorrhea. Negative for dental problem, sore throat, trouble swallowing and voice change.    Eyes:  Negative for visual disturbance.   Respiratory:  Positive for wheezing. Negative for cough, chest tightness and shortness of breath.    Cardiovascular:  Negative for chest pain, palpitations and leg swelling.   Gastrointestinal:  Negative for constipation, diarrhea, nausea and vomiting.   Endocrine: Negative for polydipsia, polyphagia and polyuria.   Genitourinary:  Negative for frequency.   Musculoskeletal:  Negative for arthralgias, back pain, gait problem and myalgias.   Skin:  Negative for wound.   Allergic/Immunologic: Positive for environmental allergies. Negative for food allergies.   Neurological:  Negative for dizziness, weakness, light-headedness, numbness and headaches.   Psychiatric/Behavioral:  Negative for decreased concentration, dysphoric mood and sleep disturbance. The patient is not nervous/anxious.           Objective   /74   Pulse 96   Temp 98.1 °F (36.7 °C)   Resp 16   Ht 6' 1\" (1.854 m)   Wt (!) 161 kg (354 lb 12.8 oz)   SpO2 99%   BMI 46.81 kg/m²      Physical Exam  Vitals reviewed.   Constitutional:       General: He is not in acute distress.     Appearance: He is well-developed. He is obese. He is not ill-appearing.   HENT:      Head: Normocephalic and atraumatic.     Eyes:      Conjunctiva/sclera: Conjunctivae normal. "       Cardiovascular:      Rate and Rhythm: Normal rate and regular rhythm.      Pulses: Normal pulses.      Heart sounds: Normal heart sounds. No murmur heard.  Pulmonary:      Effort: Pulmonary effort is normal. No respiratory distress.      Breath sounds: Wheezing present.   Abdominal:      Palpations: Abdomen is soft.      Tenderness: There is no abdominal tenderness.     Musculoskeletal:         General: No swelling.      Cervical back: Normal range of motion and neck supple.      Right lower leg: No edema.      Left lower leg: No edema.     Skin:     General: Skin is warm and dry.      Capillary Refill: Capillary refill takes less than 2 seconds.     Neurological:      Mental Status: He is alert.     Psychiatric:         Mood and Affect: Mood normal.       Physical Exam      Results

## 2025-06-24 ENCOUNTER — OFFICE VISIT (OUTPATIENT)
Dept: ENDOCRINOLOGY | Facility: CLINIC | Age: 60
End: 2025-06-24
Payer: MEDICARE

## 2025-06-24 VITALS
TEMPERATURE: 98.1 F | DIASTOLIC BLOOD PRESSURE: 74 MMHG | HEIGHT: 73 IN | SYSTOLIC BLOOD PRESSURE: 116 MMHG | RESPIRATION RATE: 16 BRPM | BODY MASS INDEX: 41.75 KG/M2 | WEIGHT: 315 LBS | OXYGEN SATURATION: 99 % | HEART RATE: 96 BPM

## 2025-06-24 DIAGNOSIS — E11.65 TYPE 2 DIABETES MELLITUS WITH HYPERGLYCEMIA, WITH LONG-TERM CURRENT USE OF INSULIN (HCC): Primary | ICD-10-CM

## 2025-06-24 DIAGNOSIS — I50.32 CHRONIC DIASTOLIC HEART FAILURE (HCC): ICD-10-CM

## 2025-06-24 DIAGNOSIS — E78.5 DYSLIPIDEMIA ASSOCIATED WITH TYPE 2 DIABETES MELLITUS  (HCC): ICD-10-CM

## 2025-06-24 DIAGNOSIS — I10 ESSENTIAL HYPERTENSION: ICD-10-CM

## 2025-06-24 DIAGNOSIS — Z79.4 TYPE 2 DIABETES MELLITUS WITH HYPERGLYCEMIA, WITH LONG-TERM CURRENT USE OF INSULIN (HCC): Primary | ICD-10-CM

## 2025-06-24 DIAGNOSIS — E11.69 DYSLIPIDEMIA ASSOCIATED WITH TYPE 2 DIABETES MELLITUS  (HCC): ICD-10-CM

## 2025-06-24 PROCEDURE — 99214 OFFICE O/P EST MOD 30 MIN: CPT | Performed by: NURSE PRACTITIONER

## 2025-06-24 PROCEDURE — G2211 COMPLEX E/M VISIT ADD ON: HCPCS | Performed by: NURSE PRACTITIONER

## 2025-06-24 NOTE — ASSESSMENT & PLAN NOTE
Very well controlled. Continue 10 mg of atorvastatin nightly.   Lab Results   Component Value Date    HGBA1C 5.8 (H) 05/19/2025

## 2025-06-24 NOTE — ASSESSMENT & PLAN NOTE
Wt Readings from Last 3 Encounters:   06/24/25 (!) 161 kg (354 lb 12.8 oz)   06/02/25 (!) 160 kg (353 lb 12.8 oz)   05/22/25 (!) 162 kg (358 lb)   Euvolemic on exam. Following with cardiology.

## 2025-06-24 NOTE — ASSESSMENT & PLAN NOTE
BP well controlled at 116/74. Continue benazapril 10 mg daily, diltiazem 240 mg daily, 25 mg of metoprolol tartrate twice daily.

## 2025-06-24 NOTE — ASSESSMENT & PLAN NOTE
Blood sugars are well controlled. Continue Toujeo 60 units nightly. Continue Novolog 14-14-30. Continue metformin 1,000 mg twice daily with meals. Continue with healthy diet and regular physical activity. Patient knows to notify me with persistent hyperglycemia or episodes of hypoglycemia.  F/U in 6 months.   Lab Results   Component Value Date    HGBA1C 5.8 (H) 05/19/2025       Orders:    Hemoglobin A1C; Future    Basic metabolic panel; Future

## 2025-07-02 ENCOUNTER — PROCEDURE VISIT (OUTPATIENT)
Dept: PODIATRY | Facility: CLINIC | Age: 60
End: 2025-07-02
Payer: MEDICARE

## 2025-07-02 VITALS
BODY MASS INDEX: 41.75 KG/M2 | HEIGHT: 73 IN | OXYGEN SATURATION: 98 % | TEMPERATURE: 97.8 F | RESPIRATION RATE: 16 BRPM | WEIGHT: 315 LBS

## 2025-07-02 DIAGNOSIS — I73.9 PERIPHERAL VASCULAR DISEASE, UNSPECIFIED (HCC): ICD-10-CM

## 2025-07-02 DIAGNOSIS — Z79.4 TYPE 2 DIABETES MELLITUS WITH HYPERGLYCEMIA, WITH LONG-TERM CURRENT USE OF INSULIN (HCC): ICD-10-CM

## 2025-07-02 DIAGNOSIS — E11.42 DIABETIC POLYNEUROPATHY ASSOCIATED WITH TYPE 2 DIABETES MELLITUS (HCC): ICD-10-CM

## 2025-07-02 DIAGNOSIS — L84 CALLUS: ICD-10-CM

## 2025-07-02 DIAGNOSIS — E11.65 TYPE 2 DIABETES MELLITUS WITH HYPERGLYCEMIA, WITH LONG-TERM CURRENT USE OF INSULIN (HCC): ICD-10-CM

## 2025-07-02 DIAGNOSIS — B35.1 ONYCHOMYCOSIS: Primary | ICD-10-CM

## 2025-07-02 PROCEDURE — 11056 PARNG/CUTG B9 HYPRKR LES 2-4: CPT | Performed by: PODIATRIST

## 2025-07-02 PROCEDURE — 11721 DEBRIDE NAIL 6 OR MORE: CPT | Performed by: PODIATRIST

## 2025-07-02 NOTE — PROGRESS NOTES
Name: Santino Flores      : 1965      MRN: [id number]  Encounter Provider: Maria M Monge DPM  Encounter Date: 2025   Encounter department: [facility] [place]  :  Assessment & Plan  Type 2 diabetes mellitus with hyperglycemia, with long-term current use of insulin ([facility])    Lab Results   Component Value Date    HGBA1C 5.8 (H) 2025            Onychomycosis         Callus         Diabetic polyneuropathy associated with type 2 diabetes mellitus ([facility])    Lab Results   Component Value Date    HGBA1C 5.8 (H) 2025            Peripheral vascular disease, unspecified ([facility])           IMPRESSION:  DM2 A1c 5.5% 2024  Onychomycosis  Right foot submet 5 painful callus     PLAN:    On exam patient has thickened, hypertrophic, discolored, brittle toenails with subungual debris and tenderness x 10  Callus: 2, right submetatarsal 5, and left plantar heel  Patient has diminished pedal pulses and decreased perfusion to the lower extremities  Patient has significant trophic changes to the skin including thick toenails, decreased pedal hair and atrophic skin.   Patient has loss of protective sensation when evaluated in 5 places bilaterally.  Patient reports paresthesias     Today's treatment includes:  Debridement of toenails. Using nail nipper, robert, and curette, nails were sharply debrided, reduced in thickness and length. Devitalized nail tissue and fungal debris excised and removed. Patient tolerated well.  Calluses debrided to patient's tolerance without incident down to healthy skin bilaterally     Discussed proper shoe gear, daily inspections of feet, and general foot health with patient. Patient has Q8  findings and is recommended for at risk foot care every 9-10 weeks.     PCP note reviewed 2025  Endocrinology note reviewed 2025 hemoglobin A1c 5.8% 2025  I recommend stiff bottomed sneakers/shoes (ex Asics, Vionic, New balance, Baez, etc) for daily use and Oofos, Hoka  (recovery slides) for in home use.   Recommend keratolytic moisturizers regularly to contain skin barrier and help prevent callus and dry skin buildup.  Patient encouraged to use pumice stone on area of irritation.  Patient can also vary his shoes to prevent pressure on the area  Offloading callus pad dispensed  Follow-up 10 weeks for routine diabetic footcare  Last physical exam 12/11/2024      History of Present Illness   HPI  Santino Flores is a 59 y.o. male  Patient presents for at-risk foot care.  Patient has reoccurrence of painful calluses fifth metatarsal head and plantar left heel.  Patient has significant lower extremity risk due to diminished pulses in the feet and trophic skin changes to the lower extremity including thick toenail, atrophic skin, and decreased hair growth.  Patient also has loss of protective sensation to bilateral lower extremities

## 2025-07-24 ENCOUNTER — TELEPHONE (OUTPATIENT)
Dept: CARDIOLOGY CLINIC | Facility: CLINIC | Age: 60
End: 2025-07-24

## 2025-07-24 ENCOUNTER — ANTICOAG VISIT (OUTPATIENT)
Dept: CARDIOLOGY CLINIC | Facility: CLINIC | Age: 60
End: 2025-07-24
Payer: MEDICARE

## 2025-07-24 ENCOUNTER — APPOINTMENT (OUTPATIENT)
Dept: LAB | Facility: HOSPITAL | Age: 60
End: 2025-07-24
Payer: MEDICARE

## 2025-07-24 DIAGNOSIS — Z79.01 LONG TERM (CURRENT) USE OF ANTICOAGULANTS: Primary | ICD-10-CM

## 2025-07-24 DIAGNOSIS — Z95.2 S/P AVR: Primary | ICD-10-CM

## 2025-07-24 DIAGNOSIS — I48.20 CHRONIC ATRIAL FIBRILLATION (HCC): ICD-10-CM

## 2025-07-24 DIAGNOSIS — Z79.01 LONG TERM (CURRENT) USE OF ANTICOAGULANTS: ICD-10-CM

## 2025-07-24 LAB
INR PPP: 3.12 (ref 0.85–1.19)
PROTHROMBIN TIME: 32.2 SECONDS (ref 12.3–15)

## 2025-07-24 PROCEDURE — 93793 ANTICOAG MGMT PT WARFARIN: CPT | Performed by: NURSE PRACTITIONER

## 2025-07-24 PROCEDURE — 36415 COLL VENOUS BLD VENIPUNCTURE: CPT

## 2025-07-24 PROCEDURE — 85610 PROTHROMBIN TIME: CPT

## 2025-07-24 NOTE — PATIENT INSTRUCTIONS
INR received from lab and reviewed.    No changes needed; continue current dosing schedule.  Recheck INR in one month-results and instructions given to pt by phone  CHRISTINE Boyer

## 2025-07-24 NOTE — TELEPHONE ENCOUNTER
Received call from Mick from [facility] requesting for a new standing order to be placed for patient.

## 2025-07-28 DIAGNOSIS — I48.91 ATRIAL FIBRILLATION, UNSPECIFIED TYPE (HCC): ICD-10-CM

## 2025-07-28 RX ORDER — WARFARIN SODIUM 2 MG/1
TABLET ORAL
Qty: 90 TABLET | Refills: 3 | Status: SHIPPED | OUTPATIENT
Start: 2025-07-28

## 2025-07-31 DIAGNOSIS — E11.65 TYPE 2 DIABETES MELLITUS WITH HYPERGLYCEMIA, WITH LONG-TERM CURRENT USE OF INSULIN (HCC): ICD-10-CM

## 2025-07-31 DIAGNOSIS — Z79.4 TYPE 2 DIABETES MELLITUS WITH HYPERGLYCEMIA, WITH LONG-TERM CURRENT USE OF INSULIN (HCC): ICD-10-CM

## 2025-07-31 RX ORDER — INSULIN ASPART 100 [IU]/ML
INJECTION, SOLUTION INTRAVENOUS; SUBCUTANEOUS
Qty: 45 ML | Refills: 1 | Status: SHIPPED | OUTPATIENT
Start: 2025-07-31

## 2025-08-05 DIAGNOSIS — I48.91 ATRIAL FIBRILLATION, UNSPECIFIED TYPE (HCC): ICD-10-CM

## 2025-08-05 RX ORDER — WARFARIN SODIUM 10 MG/1
10 TABLET ORAL DAILY
Qty: 90 TABLET | Refills: 0 | Status: SHIPPED | OUTPATIENT
Start: 2025-08-05

## 2025-08-11 ENCOUNTER — OFFICE VISIT (OUTPATIENT)
Dept: FAMILY MEDICINE CLINIC | Facility: CLINIC | Age: 60
End: 2025-08-11
Payer: MEDICARE